# Patient Record
Sex: FEMALE | Race: BLACK OR AFRICAN AMERICAN | Employment: UNEMPLOYED
[De-identification: names, ages, dates, MRNs, and addresses within clinical notes are randomized per-mention and may not be internally consistent; named-entity substitution may affect disease eponyms.]

---

## 2017-01-11 ENCOUNTER — NURSE ONLY (OUTPATIENT)
Dept: OBGYN | Facility: CLINIC | Age: 22
End: 2017-01-11

## 2017-01-11 DIAGNOSIS — Z30.09 FAMILY PLANNING: Primary | ICD-10-CM

## 2017-01-11 PROCEDURE — 99211 OFF/OP EST MAY X REQ PHY/QHP: CPT | Performed by: OBSTETRICS & GYNECOLOGY

## 2017-01-11 RX ORDER — MEDROXYPROGESTERONE ACETATE 150 MG/ML
INJECTION, SUSPENSION INTRAMUSCULAR
Qty: 1 ML | Refills: 2 | Status: CANCELLED | OUTPATIENT
Start: 2017-01-11

## 2017-01-11 RX ORDER — MEDROXYPROGESTERONE ACETATE 150 MG/ML
150 INJECTION, SUSPENSION INTRAMUSCULAR ONCE
Status: COMPLETED | OUTPATIENT
Start: 2017-01-11 | End: 2017-01-11

## 2017-01-11 RX ORDER — MEDROXYPROGESTERONE ACETATE 150 MG/ML
INJECTION, SUSPENSION INTRAMUSCULAR
Qty: 1 ML | Refills: 2 | Status: SHIPPED | OUTPATIENT
Start: 2017-01-11 | End: 2017-06-14 | Stop reason: SDUPTHER

## 2017-01-11 RX ADMIN — MEDROXYPROGESTERONE ACETATE 150 MG: 150 INJECTION, SUSPENSION INTRAMUSCULAR at 11:17

## 2017-01-12 RX ORDER — MEDROXYPROGESTERONE ACETATE 150 MG/ML
INJECTION, SUSPENSION INTRAMUSCULAR
Qty: 1 ML | Refills: 2 | Status: CANCELLED | OUTPATIENT
Start: 2017-01-12

## 2017-03-29 ENCOUNTER — NURSE ONLY (OUTPATIENT)
Dept: OBGYN | Age: 22
End: 2017-03-29
Payer: COMMERCIAL

## 2017-03-29 VITALS — WEIGHT: 240.1 LBS | BODY MASS INDEX: 40.99 KG/M2 | HEIGHT: 64 IN | RESPIRATION RATE: 19 BRPM

## 2017-03-29 DIAGNOSIS — Z30.09 FAMILY PLANNING: Primary | ICD-10-CM

## 2017-03-29 PROCEDURE — 99211 OFF/OP EST MAY X REQ PHY/QHP: CPT | Performed by: OBSTETRICS & GYNECOLOGY

## 2017-03-29 RX ORDER — MEDROXYPROGESTERONE ACETATE 150 MG/ML
150 INJECTION, SUSPENSION INTRAMUSCULAR ONCE
Status: COMPLETED | OUTPATIENT
Start: 2017-03-29 | End: 2017-03-29

## 2017-03-29 RX ADMIN — MEDROXYPROGESTERONE ACETATE 150 MG: 150 INJECTION, SUSPENSION INTRAMUSCULAR at 11:08

## 2017-04-05 ENCOUNTER — HOSPITAL ENCOUNTER (EMERGENCY)
Age: 22
Discharge: HOME | End: 2017-04-05
Attending: EMERGENCY MEDICINE
Payer: COMMERCIAL

## 2017-04-05 VITALS
OXYGEN SATURATION: 100 % | HEIGHT: 64 IN | HEART RATE: 83 BPM | TEMPERATURE: 98.6 F | WEIGHT: 240 LBS | RESPIRATION RATE: 16 BRPM | DIASTOLIC BLOOD PRESSURE: 94 MMHG | SYSTOLIC BLOOD PRESSURE: 138 MMHG | BODY MASS INDEX: 40.97 KG/M2

## 2017-04-05 DIAGNOSIS — K04.7 DENTAL ABSCESS: Primary | ICD-10-CM

## 2017-04-05 PROCEDURE — 6370000000 HC RX 637 (ALT 250 FOR IP): Performed by: EMERGENCY MEDICINE

## 2017-04-05 PROCEDURE — 99282 EMERGENCY DEPT VISIT SF MDM: CPT

## 2017-04-05 RX ORDER — HYDROCODONE BITARTRATE AND ACETAMINOPHEN 5; 325 MG/1; MG/1
1 TABLET ORAL EVERY 6 HOURS PRN
Qty: 10 TABLET | Refills: 0 | Status: SHIPPED | OUTPATIENT
Start: 2017-04-05 | End: 2018-02-05

## 2017-04-05 RX ORDER — HYDROCODONE BITARTRATE AND ACETAMINOPHEN 5; 325 MG/1; MG/1
1 TABLET ORAL ONCE
Status: COMPLETED | OUTPATIENT
Start: 2017-04-05 | End: 2017-04-05

## 2017-04-05 RX ORDER — PENICILLIN V POTASSIUM 500 MG/1
500 TABLET ORAL 4 TIMES DAILY
Qty: 28 TABLET | Refills: 0 | Status: SHIPPED | OUTPATIENT
Start: 2017-04-05 | End: 2017-04-12

## 2017-04-05 RX ADMIN — HYDROCODONE BITARTRATE AND ACETAMINOPHEN 1 TABLET: 5; 325 TABLET ORAL at 19:21

## 2017-04-05 ASSESSMENT — PAIN SCALES - GENERAL
PAINLEVEL_OUTOF10: 10
PAINLEVEL_OUTOF10: 10

## 2017-04-05 ASSESSMENT — PAIN DESCRIPTION - LOCATION: LOCATION: JAW

## 2017-04-05 ASSESSMENT — PAIN DESCRIPTION - PAIN TYPE: TYPE: ACUTE PAIN

## 2017-04-06 ASSESSMENT — ENCOUNTER SYMPTOMS
ABDOMINAL PAIN: 0
SORE THROAT: 0
SINUS PRESSURE: 0
NAUSEA: 0
VOMITING: 0
VOICE CHANGE: 0
FACIAL SWELLING: 1
TROUBLE SWALLOWING: 0
SHORTNESS OF BREATH: 0

## 2017-04-16 ENCOUNTER — HOSPITAL ENCOUNTER (EMERGENCY)
Age: 22
Discharge: HOME | End: 2017-04-16
Attending: EMERGENCY MEDICINE
Payer: COMMERCIAL

## 2017-04-16 VITALS
SYSTOLIC BLOOD PRESSURE: 121 MMHG | HEART RATE: 85 BPM | DIASTOLIC BLOOD PRESSURE: 74 MMHG | TEMPERATURE: 97.9 F | HEIGHT: 67 IN | OXYGEN SATURATION: 99 % | WEIGHT: 200 LBS | RESPIRATION RATE: 12 BRPM | BODY MASS INDEX: 31.39 KG/M2

## 2017-04-16 DIAGNOSIS — J02.9 ACUTE VIRAL PHARYNGITIS: Primary | ICD-10-CM

## 2017-04-16 DIAGNOSIS — J02.9 SORE THROAT: ICD-10-CM

## 2017-04-16 PROCEDURE — 6360000002 HC RX W HCPCS: Performed by: EMERGENCY MEDICINE

## 2017-04-16 PROCEDURE — 99283 EMERGENCY DEPT VISIT LOW MDM: CPT

## 2017-04-16 PROCEDURE — 6370000000 HC RX 637 (ALT 250 FOR IP): Performed by: EMERGENCY MEDICINE

## 2017-04-16 RX ORDER — ACETAMINOPHEN 500 MG
1000 TABLET ORAL ONCE
Status: COMPLETED | OUTPATIENT
Start: 2017-04-16 | End: 2017-04-16

## 2017-04-16 RX ORDER — IBUPROFEN 800 MG/1
800 TABLET ORAL EVERY 8 HOURS PRN
Qty: 30 TABLET | Refills: 0 | Status: SHIPPED | OUTPATIENT
Start: 2017-04-16 | End: 2017-09-21

## 2017-04-16 RX ORDER — DEXAMETHASONE SODIUM PHOSPHATE 10 MG/ML
10 INJECTION INTRAMUSCULAR; INTRAVENOUS ONCE
Status: COMPLETED | OUTPATIENT
Start: 2017-04-16 | End: 2017-04-16

## 2017-04-16 RX ADMIN — ACETAMINOPHEN 1000 MG: 500 TABLET ORAL at 09:31

## 2017-04-16 RX ADMIN — BENZOCAINE AND MENTHOL 1 LOZENGE: 15; 3.6 LOZENGE ORAL at 09:31

## 2017-04-16 RX ADMIN — DEXAMETHASONE SODIUM PHOSPHATE 10 MG: 10 INJECTION INTRAMUSCULAR; INTRAVENOUS at 09:30

## 2017-04-16 ASSESSMENT — ENCOUNTER SYMPTOMS
EYES NEGATIVE: 1
COUGH: 1
SORE THROAT: 1
ALLERGIC/IMMUNOLOGIC NEGATIVE: 1
GASTROINTESTINAL NEGATIVE: 1

## 2017-04-16 ASSESSMENT — PAIN SCALES - GENERAL: PAINLEVEL_OUTOF10: 6

## 2017-06-14 ENCOUNTER — NURSE ONLY (OUTPATIENT)
Dept: OBGYN | Age: 22
End: 2017-06-14
Payer: COMMERCIAL

## 2017-06-14 VITALS — WEIGHT: 261 LBS | BODY MASS INDEX: 40.88 KG/M2

## 2017-06-14 DIAGNOSIS — Z30.09 FAMILY PLANNING: Primary | ICD-10-CM

## 2017-06-14 PROCEDURE — 96372 THER/PROPH/DIAG INJ SC/IM: CPT | Performed by: OBSTETRICS & GYNECOLOGY

## 2017-06-14 RX ORDER — MEDROXYPROGESTERONE ACETATE 150 MG/ML
150 INJECTION, SUSPENSION INTRAMUSCULAR ONCE
Status: COMPLETED | OUTPATIENT
Start: 2017-06-14 | End: 2017-06-14

## 2017-06-14 RX ORDER — MEDROXYPROGESTERONE ACETATE 150 MG/ML
INJECTION, SUSPENSION INTRAMUSCULAR
Qty: 1 ML | Refills: 2 | Status: SHIPPED | OUTPATIENT
Start: 2017-06-14 | End: 2017-08-15 | Stop reason: SDUPTHER

## 2017-06-14 RX ADMIN — MEDROXYPROGESTERONE ACETATE 150 MG: 150 INJECTION, SUSPENSION INTRAMUSCULAR at 09:33

## 2017-08-15 ENCOUNTER — OFFICE VISIT (OUTPATIENT)
Dept: OBGYN | Age: 22
End: 2017-08-15
Payer: COMMERCIAL

## 2017-08-15 ENCOUNTER — HOSPITAL ENCOUNTER (OUTPATIENT)
Age: 22
Setting detail: SPECIMEN
Discharge: HOME | End: 2017-08-15
Payer: COMMERCIAL

## 2017-08-15 VITALS
DIASTOLIC BLOOD PRESSURE: 80 MMHG | HEART RATE: 64 BPM | SYSTOLIC BLOOD PRESSURE: 122 MMHG | WEIGHT: 235 LBS | BODY MASS INDEX: 40.12 KG/M2 | HEIGHT: 64 IN

## 2017-08-15 DIAGNOSIS — Z01.419 WELL WOMAN EXAM: Primary | ICD-10-CM

## 2017-08-15 DIAGNOSIS — Z30.09 FAMILY PLANNING: ICD-10-CM

## 2017-08-15 LAB
DIRECT EXAM: NORMAL
Lab: NORMAL
SPECIMEN DESCRIPTION: NORMAL
STATUS: NORMAL

## 2017-08-15 PROCEDURE — 99395 PREV VISIT EST AGE 18-39: CPT | Performed by: OBSTETRICS & GYNECOLOGY

## 2017-08-15 RX ORDER — MEDROXYPROGESTERONE ACETATE 150 MG/ML
INJECTION, SUSPENSION INTRAMUSCULAR
Qty: 1 ML | Refills: 3 | Status: SHIPPED | OUTPATIENT
Start: 2017-08-15 | End: 2018-02-23 | Stop reason: SDUPTHER

## 2017-08-15 RX ORDER — MEDROXYPROGESTERONE ACETATE 150 MG/ML
INJECTION, SUSPENSION INTRAMUSCULAR
COMMUNITY
Start: 2017-06-13 | End: 2018-09-14

## 2017-08-16 LAB
C TRACH DNA GENITAL QL NAA+PROBE: NEGATIVE
N. GONORRHOEAE DNA: NEGATIVE

## 2017-08-30 ENCOUNTER — NURSE ONLY (OUTPATIENT)
Dept: OBGYN | Age: 22
End: 2017-08-30
Payer: COMMERCIAL

## 2017-08-30 VITALS
DIASTOLIC BLOOD PRESSURE: 74 MMHG | RESPIRATION RATE: 16 BRPM | HEART RATE: 62 BPM | TEMPERATURE: 98.3 F | WEIGHT: 238.4 LBS | BODY MASS INDEX: 40.7 KG/M2 | SYSTOLIC BLOOD PRESSURE: 118 MMHG | HEIGHT: 64 IN

## 2017-08-30 DIAGNOSIS — Z30.09 FAMILY PLANNING: Primary | ICD-10-CM

## 2017-08-30 PROCEDURE — 96372 THER/PROPH/DIAG INJ SC/IM: CPT | Performed by: OBSTETRICS & GYNECOLOGY

## 2017-08-30 RX ORDER — MEDROXYPROGESTERONE ACETATE 150 MG/ML
150 INJECTION, SUSPENSION INTRAMUSCULAR ONCE
Status: COMPLETED | OUTPATIENT
Start: 2017-08-30 | End: 2017-08-30

## 2017-08-30 RX ADMIN — MEDROXYPROGESTERONE ACETATE 150 MG: 150 INJECTION, SUSPENSION INTRAMUSCULAR at 11:50

## 2017-09-21 ENCOUNTER — HOSPITAL ENCOUNTER (EMERGENCY)
Age: 22
Discharge: HOME | End: 2017-09-21
Attending: EMERGENCY MEDICINE
Payer: COMMERCIAL

## 2017-09-21 VITALS
TEMPERATURE: 98.4 F | SYSTOLIC BLOOD PRESSURE: 131 MMHG | RESPIRATION RATE: 16 BRPM | DIASTOLIC BLOOD PRESSURE: 79 MMHG | OXYGEN SATURATION: 98 % | HEART RATE: 64 BPM

## 2017-09-21 DIAGNOSIS — M65.4 DE QUERVAIN'S TENOSYNOVITIS, RIGHT: Primary | ICD-10-CM

## 2017-09-21 PROCEDURE — 6370000000 HC RX 637 (ALT 250 FOR IP): Performed by: PHYSICIAN ASSISTANT

## 2017-09-21 PROCEDURE — 99283 EMERGENCY DEPT VISIT LOW MDM: CPT

## 2017-09-21 RX ORDER — IBUPROFEN 800 MG/1
800 TABLET ORAL EVERY 8 HOURS PRN
Qty: 20 TABLET | Refills: 0 | Status: SHIPPED | OUTPATIENT
Start: 2017-09-21 | End: 2018-01-10 | Stop reason: ALTCHOICE

## 2017-09-21 RX ORDER — IBUPROFEN 800 MG/1
800 TABLET ORAL ONCE
Status: COMPLETED | OUTPATIENT
Start: 2017-09-21 | End: 2017-09-21

## 2017-09-21 RX ADMIN — IBUPROFEN 800 MG: 800 TABLET ORAL at 22:54

## 2017-09-21 ASSESSMENT — PAIN DESCRIPTION - PAIN TYPE: TYPE: ACUTE PAIN

## 2017-09-21 ASSESSMENT — ENCOUNTER SYMPTOMS
COUGH: 0
ABDOMINAL PAIN: 0
NAUSEA: 0
VOMITING: 0
WHEEZING: 0

## 2017-09-21 ASSESSMENT — PAIN SCALES - GENERAL
PAINLEVEL_OUTOF10: 7
PAINLEVEL_OUTOF10: 7

## 2017-09-21 ASSESSMENT — PAIN DESCRIPTION - ORIENTATION: ORIENTATION: RIGHT

## 2017-09-21 ASSESSMENT — PAIN DESCRIPTION - LOCATION: LOCATION: FINGER (COMMENT WHICH ONE)

## 2017-10-12 ENCOUNTER — HOSPITAL ENCOUNTER (EMERGENCY)
Age: 22
Discharge: HOME | End: 2017-10-12
Attending: EMERGENCY MEDICINE
Payer: COMMERCIAL

## 2017-10-12 VITALS
SYSTOLIC BLOOD PRESSURE: 149 MMHG | HEART RATE: 113 BPM | OXYGEN SATURATION: 98 % | DIASTOLIC BLOOD PRESSURE: 78 MMHG | RESPIRATION RATE: 18 BRPM | TEMPERATURE: 98.1 F

## 2017-10-12 DIAGNOSIS — H60.312 ACUTE DIFFUSE OTITIS EXTERNA OF LEFT EAR: Primary | ICD-10-CM

## 2017-10-12 PROCEDURE — 99282 EMERGENCY DEPT VISIT SF MDM: CPT

## 2017-10-12 PROCEDURE — 6370000000 HC RX 637 (ALT 250 FOR IP): Performed by: NURSE PRACTITIONER

## 2017-10-12 RX ADMIN — NEOMYCIN SULFATE, POLYMYXIN B SULFATE, HYDROCORTISONE 4 DROP: 3.5; 10000; 1 SOLUTION/ DROPS AURICULAR (OTIC) at 12:47

## 2017-10-12 ASSESSMENT — PAIN SCALES - GENERAL: PAINLEVEL_OUTOF10: 7

## 2017-10-12 ASSESSMENT — PAIN DESCRIPTION - ORIENTATION: ORIENTATION: LEFT

## 2017-10-12 ASSESSMENT — PAIN DESCRIPTION - LOCATION: LOCATION: EAR

## 2017-10-12 NOTE — ED PROVIDER NOTES
[facility] ED  Emergency Department Encounter  Mid Level Provider     Pt Name: Charisma Song  MRN: [id number]  [place] 1995  Date of evaluation: 10/12/17  PCP:  Candy Cuevas MD    [address] [place] [address]       Chief Complaint   Patient presents with    Otalgia     left ear pain for 3 days. HISTORY OF PRESENT ILLNESS  (Location/Symptom, Timing/Onset, Context/Setting, Quality, Duration, Modifying Factors, Severity.)      Charisma Song is a 20 y.o. female who presents with Left ear pain ×1 week. Patient denies fever or chills. Patient states she had frequent ear infections as a child. PAST MEDICAL / SURGICAL / SOCIAL / FAMILY HISTORY      has a past medical history of Asthma; Chronic airway disease ([address] Dental crowns present; History of  labor; Hydradenitis; Left ankle sprain; Maternal congenital heart disease, antepartum; Obesity; and Wears glasses. has a past surgical history that includes Tonsillectomy and adenoidectomy and other surgical history (Bilateral, 2016). Social History     Social History    Marital status: Single     Spouse name: N/A    Number of children: N/A    Years of education: N/A     Occupational History    Not on file.      Social History Main Topics    Smoking status: Never Smoker    Smokeless tobacco: Never Used    Alcohol use No    Drug use: No    Sexual activity: Not Currently     Partners: Male     Other Topics Concern    Not on file     Social History Narrative    No narrative on file       Family History   Problem Relation Age of Onset    Asthma Brother     Learning Disabilities Brother     Substance Abuse Maternal Grandmother     Diabetes Maternal Grandmother     Cancer Maternal Grandmother     Heart Disease Paternal Uncle     Early Death Paternal Uncle 62     MI    Learning Disabilities Mother     Breast Cancer Neg Hx     Colon Cancer Neg Hx     Eclampsia Neg Hx     Hypertension Neg Hx     Ovarian Cancer Neg DO Bella       REVIEW OF SYSTEMS    (2-9 systems for level 4, 10 or more for level 5)      Review of Systems   Constitutional: Negative for chills and fever. HENT: Positive for ear discharge and ear pain. Left ear       PHYSICAL EXAM   (up to 7 for level 4, 8 or more for level 5)      INITIAL VITALS:  oral temperature is 98.1 °F (36.7 °C). Her blood pressure is 149/78 (abnormal) and her pulse is 113. Her respiration is 18 and oxygen saturation is 98%. Physical Exam   Constitutional: She is oriented to person, place, and time. She appears well-developed and well-nourished. No distress. HENT:   Head: Normocephalic and atraumatic. Left Ear: There is drainage, swelling and tenderness. No foreign bodies. No mastoid tenderness. Neck: Normal range of motion. Neck supple. Cardiovascular: Normal rate. Pulmonary/Chest: Effort normal. No respiratory distress. Neurological: She is alert and oriented to person, place, and time. Skin: Skin is warm and dry. Psychiatric: She has a normal mood and affect. Her behavior is normal. Judgment and thought content normal.   Nursing note and vitals reviewed. DIFFERENTIAL  DIAGNOSIS   Otitis media, otitis externa, foreign body    PLAN (LABS / IMAGING / EKG):  No orders of the defined types were placed in this encounter. MEDICATIONS ORDERED:  Orders Placed This Encounter   Medications    DISCONTD: neomycin-polymyxin-hydrocortisone (CORTISPORIN) otic solution 4 drop       Controlled Substances Monitoring:      DIAGNOSTIC RESULTS / EMERGENCY DEPARTMENT COURSE / [facility]     RADIOLOGY:   I directly visualized (with the attending physician) the following  images and reviewed the radiologist interpretations:  No results found. No orders to display       LABS:  No results found for this visit on 10/12/17. EMERGENCY DEPARTMENT COURSE:  Explained the patient how I would like her to use the otic drops.   Advised her to call her family doctor for a follow-up appointment. Patient understands to return to emergency room persistent symptoms or any new concerns    CONSULTS:  None    PROCEDURES:  None    FINAL IMPRESSION      1.  Acute diffuse otitis externa of left ear          DISPOSITION / PLAN     DISPOSITION Decision To Discharge    PATIENT REFERRED TO:  Gema Baker MD  [facility]  [phone]            DISCHARGE MEDICATIONS:  Discharge Medication List as of 10/12/2017 12:41 PM          Luana Aquino, [address] [facility]   Emergency Medicine Nurse Practitioner    (Please note that portions of this note were completed with a voice recognition program.  Efforts were made to edit the dictations but occasionally words are mis-transcribed.)        Luana Aquino, CNP  10/12/17 1966

## 2017-10-13 ENCOUNTER — HOSPITAL ENCOUNTER (EMERGENCY)
Age: 22
Discharge: HOME | End: 2017-10-13
Attending: EMERGENCY MEDICINE
Payer: COMMERCIAL

## 2017-10-13 VITALS
RESPIRATION RATE: 19 BRPM | BODY MASS INDEX: 40.12 KG/M2 | WEIGHT: 235 LBS | HEIGHT: 64 IN | DIASTOLIC BLOOD PRESSURE: 79 MMHG | TEMPERATURE: 98.1 F | SYSTOLIC BLOOD PRESSURE: 131 MMHG | HEART RATE: 92 BPM | OXYGEN SATURATION: 98 %

## 2017-10-13 DIAGNOSIS — H60.502 ACUTE NONINFECTIVE OTITIS EXTERNA OF LEFT EAR, UNSPECIFIED TYPE: Primary | ICD-10-CM

## 2017-10-13 PROCEDURE — 99282 EMERGENCY DEPT VISIT SF MDM: CPT

## 2017-10-13 PROCEDURE — 6370000000 HC RX 637 (ALT 250 FOR IP): Performed by: EMERGENCY MEDICINE

## 2017-10-13 RX ORDER — OXYCODONE HYDROCHLORIDE 5 MG/1
5 TABLET ORAL ONCE
Status: COMPLETED | OUTPATIENT
Start: 2017-10-13 | End: 2017-10-13

## 2017-10-13 RX ADMIN — OXYCODONE HYDROCHLORIDE 5 MG: 5 TABLET ORAL at 22:13

## 2017-10-13 ASSESSMENT — ENCOUNTER SYMPTOMS
FACIAL SWELLING: 0
SORE THROAT: 0
SHORTNESS OF BREATH: 0
VOMITING: 0
NAUSEA: 0
ABDOMINAL PAIN: 0

## 2017-10-13 ASSESSMENT — PAIN DESCRIPTION - FREQUENCY: FREQUENCY: CONTINUOUS

## 2017-10-13 ASSESSMENT — PAIN SCALES - GENERAL
PAINLEVEL_OUTOF10: 10
PAINLEVEL_OUTOF10: 10

## 2017-10-13 ASSESSMENT — PAIN DESCRIPTION - LOCATION: LOCATION: EAR

## 2017-10-13 ASSESSMENT — PAIN DESCRIPTION - ONSET: ONSET: SUDDEN

## 2017-10-13 ASSESSMENT — PAIN DESCRIPTION - PAIN TYPE: TYPE: ACUTE PAIN

## 2017-10-13 ASSESSMENT — PAIN DESCRIPTION - ORIENTATION: ORIENTATION: LEFT

## 2017-10-13 ASSESSMENT — PAIN DESCRIPTION - DESCRIPTORS: DESCRIPTORS: ACHING

## 2017-10-14 NOTE — ED PROVIDER NOTES
[facility] ED  Emergency Department Encounter  Emergency Medicine Resident     Pt Name: Radha Katz  MRN: [id number]  [place] 1995  Date of evaluation: 10/13/17  PCP:  Sigifredo Ferrell MD    [address] [place] [address]       Chief Complaint   Patient presents with    Otalgia     earache seen here yesterday sent home w/ drops, no relief and worsening       HISTORY OF PRESENT ILLNESS  (Location/Symptom, Timing/Onset, Context/Setting, Quality, Duration, Modifying Factors, Severity.)      Radha Katz is a 20 y.o. female who presents with L ear pain. Was seen here yesterday, carla'jacey, diagnosed with otitis externa. Given corticosporin drops and states has been compliant. Says pain hasn't gone away yet. PAST MEDICAL / SURGICAL / SOCIAL / FAMILY HISTORY      has a past medical history of Asthma; Chronic airway disease ([address] Dental crowns present; History of  labor; Hydradenitis; Left ankle sprain; Maternal congenital heart disease, antepartum; Obesity; and Wears glasses. has a past surgical history that includes Tonsillectomy and adenoidectomy and other surgical history (Bilateral, 2016). Social History     Social History    Marital status: Single     Spouse name: N/A    Number of children: N/A    Years of education: N/A     Occupational History    Not on file.      Social History Main Topics    Smoking status: Never Smoker    Smokeless tobacco: Never Used    Alcohol use No    Drug use: No    Sexual activity: Not Currently     Partners: Male     Other Topics Concern    Not on file     Social History Narrative    No narrative on file       Family History   Problem Relation Age of Onset    Asthma Brother     Learning Disabilities Brother     Substance Abuse Maternal Grandmother     Diabetes Maternal Grandmother     Cancer Maternal Grandmother     Heart Disease Paternal Uncle     Early Death Paternal Uncle 62     MI    Learning Disabilities Mother     Breast Cancer Neg Hx     Colon Cancer Neg Hx     Eclampsia Neg Hx     Hypertension Neg Hx     Ovarian Cancer Neg Hx      Labor Neg Hx     Spont Abortions Neg Hx     Stroke Neg Hx        Allergies:  Lidocaine    Home Medications:  Prior to Admission medications    Medication Sig Start Date End Date Taking? Authorizing Provider   ibuprofen (ADVIL;MOTRIN) 800 MG tablet Take 1 tablet by mouth every 8 hours as needed for Pain 17   Roni Medina PA-C   medroxyPROGESTERone (DEPO-PROVERA) 150 MG/ML injection  17   Historical Provider, MD   medroxyPROGESTERone (DEPO-PROVERA) 150 MG/ML injection Inject 1 mL into the muscle every 3 months 8/15/17   Emra Caban, DO   Benzocaine-Menthol (CEPACOL EXTRA STRENGTH) 15-2.6 MG LOZG lozenge Take 1 lozenge by mouth every 2 hours as needed for Sore Throat 17   Mich Benson, DO   Qrtkhpzlb-EIC-VY-Aspirin (REBEKAH-SELTZER PLUS COLD & COUGH) 7.8-2- MG TBEF Take 2 tablets by mouth 3 times daily as needed (for sore throat) 17   Mich Benson, DO   HYDROcodone-acetaminophen (NORCO) 5-325 MG per tablet Take 1 tablet by mouth every 6 hours as needed for Pain .  17   Gibson Nugent MD   docosanol (ABREVA) 10 % CREA cream Apply to affected area of your lip 16   Vitor Blum MD   fluticasone [facility]) 50 MCG/ACT nasal spray 1 spray by Nasal route daily 16   Saba Choudhary MD   albuterol sulfate HFA (PROAIR HFA) 108 (90 BASE) MCG/ACT inhaler Inhale 2 puffs into the lungs every 6 hours as needed for Wheezing 16   Saba Choudhary MD   gabapentin (NEURONTIN) 100 MG capsule Take 1 capsule by mouth 3 times daily 3/31/16   Avtar Guadalupe MD   HYDROcodone-acetaminophen [facility]) 5-325 MG per tablet 1-2 tabs by mouth every 4-6 hours as needed for pain 16   Yusra Leach, DO   fluticasone (FLOVENT [facility]) 110 MCG/ACT inhaler Inhale 2 puffs into the lungs 2 times daily 12/31/15 12/30/16  Farida Carlson MD orders of the defined types were placed in this encounter. MEDICATIONS ORDERED:  Orders Placed This Encounter   Medications    oxyCODONE (ROXICODONE) immediate release tablet 5 mg         DIAGNOSTIC RESULTS / EMERGENCY DEPARTMENT COURSE / MDM     LABS:  No results found for this visit on 10/13/17. IMPRESSION: 22yoF L otitis externa, corticosporin drops. Awake/alert/appropriately oriented/moderate distress. Speaking full sentences, clear lungs bilat, normal cardiac sounds, abd snt/nondistended. L TM clear; L tragus tender w mvmt and canal with cerumen/dead skin. Plan for pain relief, wick placement, continued use of corticosporin drops. Placed ear wick. Told her to come back three days from now (Monday) for removal.  She verbalized her understanding and agrees with this plan. PROCEDURES:  None    CONSULTS:  None    CRITICAL CARE:  None    FINAL IMPRESSION      1.  Acute noninfective otitis externa of left ear, unspecified type          DISPOSITION / PLAN     DISPOSITION   Discharged      PATIENT REFERRED TO:  Koffi Edwards MD  [place] [address]  [phone]    Schedule an appointment as soon as possible for a visit       [facility] ED  [facility]  [phone]  Go to   As needed      DISCHARGE MEDICATIONS:  New Prescriptions    No medications on file       Tyler Degroot MD  Emergency Medicine Resident    (Please note that portions of this note were completed with a voice recognition program.  Efforts were made to edit the dictations but occasionally words are mis-transcribed.)       Tyler Degroot MD  Resident  10/13/17 5969

## 2017-10-14 NOTE — ED NOTES
Pt walk in to ER c/o left earache w/ decrease in hearing ongoing x3 days and was seen here yesterday for it, discharged w/ ear drops and has had no relief of pain and increased pain. Pt arrives tearful and guarding left ear. Pt in NAD and rr even and unlabored on arrival, resident @ bedside, will continue to monitor.      Srinivasan Pires RN  10/13/17 6031

## 2017-10-14 NOTE — ED NOTES
Writer assisted resident in placing ear sponge to aid in med distribution. Pt discharged in NAD w/ pain relief and rr even and unlabored.      Eros Bowie RN  10/13/17 4781

## 2017-10-16 ENCOUNTER — HOSPITAL ENCOUNTER (EMERGENCY)
Age: 22
Discharge: HOME | End: 2017-10-17
Attending: EMERGENCY MEDICINE
Payer: COMMERCIAL

## 2017-10-16 VITALS
OXYGEN SATURATION: 96 % | SYSTOLIC BLOOD PRESSURE: 148 MMHG | TEMPERATURE: 97.3 F | HEART RATE: 85 BPM | RESPIRATION RATE: 14 BRPM | WEIGHT: 235 LBS | BODY MASS INDEX: 40.34 KG/M2 | DIASTOLIC BLOOD PRESSURE: 90 MMHG

## 2017-10-16 DIAGNOSIS — H60.502 ACUTE OTITIS EXTERNA OF LEFT EAR, UNSPECIFIED TYPE: Primary | ICD-10-CM

## 2017-10-16 PROCEDURE — 6370000000 HC RX 637 (ALT 250 FOR IP): Performed by: EMERGENCY MEDICINE

## 2017-10-16 PROCEDURE — G0381 LEV 2 HOSP TYPE B ED VISIT: HCPCS

## 2017-10-16 RX ORDER — ACETIC ACID 20.65 MG/ML
4 SOLUTION AURICULAR (OTIC) 3 TIMES DAILY
Status: DISCONTINUED | OUTPATIENT
Start: 2017-10-16 | End: 2017-10-17 | Stop reason: HOSPADM

## 2017-10-16 RX ADMIN — ACETIC ACID 4 DROP: 20 SOLUTION AURICULAR (OTIC) at 23:22

## 2017-10-16 ASSESSMENT — ENCOUNTER SYMPTOMS
VOMITING: 0
CHEST TIGHTNESS: 0
EYE DISCHARGE: 0
NAUSEA: 0
ABDOMINAL PAIN: 0
RHINORRHEA: 0
SORE THROAT: 0
DIARRHEA: 0
BLOOD IN STOOL: 0
SHORTNESS OF BREATH: 0
EYE REDNESS: 0
COUGH: 0

## 2017-10-17 NOTE — ED PROVIDER NOTES
[facility]  ED  Emergency Department Encounter  Emergency Medicine Resident     Pt Name: Martínez Fleming  MRN: [id number]  [place] 1995  Date of evaluation: 10/16/17  PCP:  Zoey Coates MD    CHIEF COMPLAINT       Chief Complaint   Patient presents with    Other     ear check (left)       HISTORY OF PRESENT ILLNESS  (Location/Symptom, Timing/Onset, Context/Setting, Quality, Duration, Modifying Factors, Severity.)      Martínez Fleming is a 20 y.o. female who presents with Complaints of continued left ear pain after being evaluated on the  and  for acute otitis externa and receiving antibiotic ear drops. Return for the second visit due to continued pain, during which visit in the ear wick was placed. Patient states that the ear wick came out, she went to [facility] and they gave her oral antibiotics, pain medication and placed a new ear wick. Patient states that her ear pain has improved, denies any new or worsening symptoms. PAST MEDICAL / SURGICAL / SOCIAL / FAMILY HISTORY      has a past medical history of Asthma; Chronic airway disease ([address] Dental crowns present; History of  labor; Hydradenitis; Left ankle sprain; Maternal congenital heart disease, antepartum; Obesity; and Wears glasses. has a past surgical history that includes Tonsillectomy and adenoidectomy and other surgical history (Bilateral, 2016). Social History     Social History    Marital status: Single     Spouse name: N/A    Number of children: N/A    Years of education: N/A     Occupational History    Not on file.      Social History Main Topics    Smoking status: Never Smoker    Smokeless tobacco: Never Used    Alcohol use No    Drug use: No    Sexual activity: Not Currently     Partners: Male     Other Topics Concern    Not on file     Social History Narrative    No narrative on file       Family History   Problem Relation Age of Onset    Asthma Brother     Learning RADIOLOGY:  None    PROCEDURES:  None    CONSULTS:  None    CRITICAL CARE:  None    FINAL IMPRESSION      1.  Acute otitis externa of left ear, unspecified type          DISPOSITION / PLAN     DISPOSITION     PATIENT REFERRED TO:  Terrell Mcdermott MD  [place] [address]  [phone]    Schedule an appointment as soon as possible for a visit       [facility] ED  [address] [address]  [phone]  Go to   If symptoms worsen    Estelita Castrejon MD  [address]  [phone]    Schedule an appointment as soon as possible for a visit         DISCHARGE MEDICATIONS:  New Prescriptions    No medications on file       Alfonso Iraheta DO  Emergency Medicine Resident    (Please note that portions of this note were completed with a voice recognition program.  Efforts were made to edit the dictations but occasionally words are mis-transcribed.)     Alfonso Iraheta DO  10/16/17 6134

## 2017-10-17 NOTE — ED PROVIDER NOTES
[facility]     Emergency Department     Faculty Attestation    I performed a history and physical examination of the patient and discussed management with the resident. I reviewed the residents note and agree with the documented findings including all diagnostic interpretations and plan of care. Any areas of disagreement are noted on the chart. I was personally present for the key portions of any procedures. I have documented in the chart those procedures where I was not present during the key portions. I have reviewed the emergency nurses triage note. I agree with the chief complaint, past medical history, past surgical history, allergies, medications, social and family history as documented unless otherwise noted below. Documentation of the HPI, Physical Exam and Medical Decision Making performed by scribpolo is based on my personal performance of the HPI, PE and MDM. For Physician Assistant/ Nurse Practitioner cases/documentation I have personally evaluated this patient and have completed at least one if not all key elements of the E/M (history, physical exam, and MDM). Additional findings are as noted. Primary Care Physician: Dea Morse MD    History: This is a 20 y.o. female who presents to the Emergency Department with complaint of ear discomfort. Patient has been seen several times in the past week for otitis externa, had a wick placed however this fell out, had it replaced at [facility] on Saturday. Reports that her pain and drainage has been improving however didn't want to be evaluated for the wick that was still in place. No fevers no vomiting    Physical:     weight is 235 lb (106.6 kg). Her oral temperature is 97.3 °F (36.3 °C). Her blood pressure is 148/90 (abnormal) and her pulse is 85.  Her respiration is 14 and oxygen saturation is 96%.    20 y.o. female no acute distress, no tenderness to palpation of the pinnae or tragus, there

## 2017-10-17 NOTE — ED NOTES
Pt advised to lay on side for approx 10 mins after instillation of otic drops w/ good understanding at this time     Lidia Salcido RN  10/16/17 5911

## 2017-11-28 ENCOUNTER — NURSE ONLY (OUTPATIENT)
Dept: OBGYN | Age: 22
End: 2017-11-28
Payer: COMMERCIAL

## 2017-11-28 DIAGNOSIS — Z30.09 FAMILY PLANNING: Primary | ICD-10-CM

## 2017-11-28 PROCEDURE — 96372 THER/PROPH/DIAG INJ SC/IM: CPT | Performed by: OBSTETRICS & GYNECOLOGY

## 2017-11-28 RX ORDER — MEDROXYPROGESTERONE ACETATE 150 MG/ML
150 INJECTION, SUSPENSION INTRAMUSCULAR ONCE
Status: COMPLETED | OUTPATIENT
Start: 2017-11-28 | End: 2017-11-28

## 2017-11-28 RX ADMIN — MEDROXYPROGESTERONE ACETATE 150 MG: 150 INJECTION, SUSPENSION INTRAMUSCULAR at 10:12

## 2017-11-28 NOTE — PROGRESS NOTES
Patient presents to office for Depo Provera injection. Per doctor's orders of Depo Provera 150mg given IM, right arm, patient tolerated well. Patient advised to return in 11-12 weeks for next injection.

## 2017-12-23 ENCOUNTER — HOSPITAL ENCOUNTER (EMERGENCY)
Age: 22
Discharge: HOME | End: 2017-12-23
Attending: EMERGENCY MEDICINE
Payer: COMMERCIAL

## 2017-12-23 VITALS
RESPIRATION RATE: 17 BRPM | WEIGHT: 251 LBS | HEART RATE: 66 BPM | DIASTOLIC BLOOD PRESSURE: 82 MMHG | SYSTOLIC BLOOD PRESSURE: 131 MMHG | TEMPERATURE: 97.3 F | BODY MASS INDEX: 42.85 KG/M2 | OXYGEN SATURATION: 97 % | HEIGHT: 64 IN

## 2017-12-23 DIAGNOSIS — H60.391 INFECTIVE OTITIS EXTERNA OF RIGHT EAR: Primary | ICD-10-CM

## 2017-12-23 LAB
CHP ED QC CHECK: NORMAL
GLUCOSE BLD-MCNC: 97 MG/DL
GLUCOSE BLD-MCNC: 97 MG/DL (ref 65–105)

## 2017-12-23 PROCEDURE — 82947 ASSAY GLUCOSE BLOOD QUANT: CPT

## 2017-12-23 PROCEDURE — 99282 EMERGENCY DEPT VISIT SF MDM: CPT

## 2017-12-23 PROCEDURE — 6370000000 HC RX 637 (ALT 250 FOR IP): Performed by: EMERGENCY MEDICINE

## 2017-12-23 RX ORDER — CIPROFLOXACIN AND DEXAMETHASONE 3; 1 MG/ML; MG/ML
4 SUSPENSION/ DROPS AURICULAR (OTIC) ONCE
Status: COMPLETED | OUTPATIENT
Start: 2017-12-23 | End: 2017-12-23

## 2017-12-23 RX ORDER — CIPROFLOXACIN AND DEXAMETHASONE 3; 1 MG/ML; MG/ML
4 SUSPENSION/ DROPS AURICULAR (OTIC) 2 TIMES DAILY
Qty: 1 BOTTLE | Refills: 0 | Status: SHIPPED | OUTPATIENT
Start: 2017-12-23 | End: 2017-12-30

## 2017-12-23 RX ADMIN — CIPROFLOXACIN AND DEXAMETHASONE 4 DROP: 3; 1 SUSPENSION/ DROPS AURICULAR (OTIC) at 09:35

## 2017-12-23 ASSESSMENT — PAIN SCALES - GENERAL: PAINLEVEL_OUTOF10: 6

## 2017-12-23 NOTE — ED PROVIDER NOTES
[facility] ED  Emergency Department Encounter  Emergency Medicine Resident     Pt Name: Jered Varela  MRN: [id number]  [place] 1995  Date of evaluation: 17  PCP:  Checo Cali MD    [address] [place] [address]       Chief Complaint   Patient presents with   Rita Mustache     for 2 weeks to rt ear        HISTORY OF PRESENT ILLNESS  (Location/Symptom, Timing/Onset, Context/Setting, Quality, Duration, Modifying Factors, Severity.)      Jered Varela is a 20 y.o. female who presents with Right ear pain. Patient states pain started actually 5 days ago. Patient states pain is sharp, worse with cold air blows into her ear. Patient also complains of mild sore throat for the past 5 days. Patient denies any fever, chills, cough, congestion, headache, weakness, numbness. Patient has history of left-sided otitis media. Patient denies history of diabetes. PAST MEDICAL / SURGICAL / SOCIAL / FAMILY HISTORY      has a past medical history of Asthma; Chronic airway disease ([address] Dental crowns present; History of  labor; Hydradenitis; Left ankle sprain; Maternal congenital heart disease, antepartum; Obesity; and Wears glasses. has a past surgical history that includes Tonsillectomy and adenoidectomy and other surgical history (Bilateral, 2016). Social History     Social History    Marital status: Single     Spouse name: N/A    Number of children: N/A    Years of education: N/A     Occupational History    Not on file.      Social History Main Topics    Smoking status: Never Smoker    Smokeless tobacco: Never Used    Alcohol use No    Drug use: No    Sexual activity: Not Currently     Partners: Male     Other Topics Concern    Not on file     Social History Narrative    No narrative on file       Family History   Problem Relation Age of Onset    Asthma Brother     Learning Disabilities Brother     Substance Abuse Maternal Grandmother     Diabetes Maternal Grandmother  Cancer Maternal Grandmother     Heart Disease Paternal Uncle     Early Death Paternal Uncle 62     MI    Learning Disabilities Mother     Breast Cancer Neg Hx     Colon Cancer Neg Hx     Eclampsia Neg Hx     Hypertension Neg Hx     Ovarian Cancer Neg Hx      Labor Neg Hx     Spont Abortions Neg Hx     Stroke Neg Hx        Allergies:  Lidocaine    Home Medications:  Prior to Admission medications    Medication Sig Start Date End Date Taking? Authorizing Provider   ciprofloxacin-dexamethasone (CIPRODEX) 0.3-0.1 % otic suspension Place 4 drops in ear(s) 2 times daily for 7 days 17 Yes Latesha Rick, DO   ibuprofen (ADVIL;MOTRIN) 800 MG tablet Take 1 tablet by mouth every 8 hours as needed for Pain 17  Yes Anca Hopkins PA-C   medroxyPROGESTERone (DEPO-PROVERA) 150 MG/ML injection  17  Yes Historical Provider, MD   Benzocaine-Menthol (CEPACOL EXTRA STRENGTH) 15-2.6 MG LOZG lozenge Take 1 lozenge by mouth every 2 hours as needed for Sore Throat 17  Yes Humphrey Moreno, DO   Fyxonwops-FCV-ZF-Aspirin (REBEKAH-SELTZER PLUS COLD & COUGH) 7.8-2- MG TBEF Take 2 tablets by mouth 3 times daily as needed (for sore throat) 17  Yes Humphrey Moreno, DO   fluticasone (FLONASE) 50 MCG/ACT nasal spray 1 spray by Nasal route daily 16  Yes Charles Diaz MD   albuterol sulfate HFA (PROAIR HFA) 108 (90 BASE) MCG/ACT inhaler Inhale 2 puffs into the lungs every 6 hours as needed for Wheezing 16  Yes Charles Diaz MD   docusate sodium (COLACE) 100 MG capsule Take 1 capsule by mouth 2 times daily as needed for Constipation. 4/10/15  Yes Tawnya Willett, DO   medroxyPROGESTERone (DEPO-PROVERA) 150 MG/ML injection Inject 1 mL into the muscle every 3 months 8/15/17   Justin Ponce, DO   HYDROcodone-acetaminophen (NORCO) 5-325 MG per tablet Take 1 tablet by mouth every 6 hours as needed for Pain .  17   Contreras Zamorano MD   docosanol ([address]) 10

## 2018-01-10 ENCOUNTER — HOSPITAL ENCOUNTER (EMERGENCY)
Age: 23
Discharge: HOME | End: 2018-01-10
Attending: EMERGENCY MEDICINE
Payer: COMMERCIAL

## 2018-01-10 ENCOUNTER — APPOINTMENT (OUTPATIENT)
Dept: CT IMAGING | Age: 23
End: 2018-01-10
Payer: COMMERCIAL

## 2018-01-10 VITALS
DIASTOLIC BLOOD PRESSURE: 87 MMHG | WEIGHT: 257 LBS | BODY MASS INDEX: 43.87 KG/M2 | OXYGEN SATURATION: 96 % | HEART RATE: 70 BPM | RESPIRATION RATE: 16 BRPM | HEIGHT: 64 IN | TEMPERATURE: 99.6 F | SYSTOLIC BLOOD PRESSURE: 133 MMHG

## 2018-01-10 DIAGNOSIS — H65.02 ACUTE SEROUS OTITIS MEDIA OF LEFT EAR, RECURRENCE NOT SPECIFIED: Primary | ICD-10-CM

## 2018-01-10 DIAGNOSIS — R07.89 MUSCULOSKELETAL CHEST PAIN: ICD-10-CM

## 2018-01-10 DIAGNOSIS — R51.9 INTRACTABLE EPISODIC HEADACHE, UNSPECIFIED HEADACHE TYPE: ICD-10-CM

## 2018-01-10 DIAGNOSIS — H92.03 ACUTE OTALGIA, BILATERAL: ICD-10-CM

## 2018-01-10 PROCEDURE — 6370000000 HC RX 637 (ALT 250 FOR IP): Performed by: STUDENT IN AN ORGANIZED HEALTH CARE EDUCATION/TRAINING PROGRAM

## 2018-01-10 PROCEDURE — 99285 EMERGENCY DEPT VISIT HI MDM: CPT

## 2018-01-10 PROCEDURE — 70486 CT MAXILLOFACIAL W/O DYE: CPT

## 2018-01-10 RX ORDER — IBUPROFEN 800 MG/1
800 TABLET ORAL EVERY 8 HOURS PRN
Qty: 30 TABLET | Refills: 0 | Status: SHIPPED | OUTPATIENT
Start: 2018-01-10 | End: 2018-02-05

## 2018-01-10 RX ORDER — AMOXICILLIN 500 MG/1
500 CAPSULE ORAL 2 TIMES DAILY
Qty: 20 CAPSULE | Refills: 0 | Status: SHIPPED | OUTPATIENT
Start: 2018-01-10 | End: 2018-01-20

## 2018-01-10 RX ORDER — IBUPROFEN 800 MG/1
800 TABLET ORAL ONCE
Status: COMPLETED | OUTPATIENT
Start: 2018-01-10 | End: 2018-01-10

## 2018-01-10 RX ORDER — AMOXICILLIN 250 MG/1
500 CAPSULE ORAL ONCE
Status: COMPLETED | OUTPATIENT
Start: 2018-01-10 | End: 2018-01-10

## 2018-01-10 RX ADMIN — AMOXICILLIN 500 MG: 250 CAPSULE ORAL at 14:09

## 2018-01-10 RX ADMIN — IBUPROFEN 800 MG: 800 TABLET, FILM COATED ORAL at 13:32

## 2018-01-10 ASSESSMENT — PAIN SCALES - GENERAL: PAINLEVEL_OUTOF10: 10

## 2018-01-10 ASSESSMENT — ENCOUNTER SYMPTOMS
PHOTOPHOBIA: 1
SORE THROAT: 0
SHORTNESS OF BREATH: 0
VOMITING: 0
BACK PAIN: 1
NAUSEA: 0
COUGH: 0
ABDOMINAL PAIN: 0

## 2018-01-10 NOTE — ED PROVIDER NOTES
time.   HENT:   No mastoid tenderness whatsoever but does have otitis media on the right as well as mild effusion on the left. Exquisitely reproduceable TTP over the bridge of the nose specifically. (This correlates to exactly where the patient's HA is as well)   Neck: Normal range of motion. Neck supple. Absolutely no meningismus whatsoever   Cardiovascular: Regular rhythm and normal heart sounds. Exam reveals no gallop. No murmur heard. Pulmonary/Chest: Breath sounds normal. No respiratory distress. She has no wheezes. She has no rales. Abdominal: Soft. Bowel sounds are normal. She exhibits no distension and no mass (No obvious pulsatile masses palpated). There is no tenderness. There is no rebound and no guarding. Musculoskeletal:   Paraspinal musculature of the thoracic area in the posterior region is incredibly reproducible to palpation and no bony midline tenderness. Neurological: She is alert and oriented to person, place, and time. She displays normal reflexes (DTR's are 2+ and equal bilaterally. Down going Babinski's bilaterally as well. ). No cranial nerve deficit (CN II-XII intact. ) or sensory deficit (Sensation to light touch intact bilaterally. ). She exhibits normal muscle tone (5/5 strength in bilateral proximal and distal flexors and extensors of both upper and lower extremities. ). Coordination (Finger to nose intact with no pronator drift.  ) normal.     Assessment/Plan   Appears to be otitis media some concern giving fever and possibly concern for sinusitis that could be complicated (especially given the exquisitely singular area of HA over the bridge of the nose) therefore obtain CT of the sinuses. Of note, the HA is not the \"worst HA of life\", and is clealry very focal with significant reproducibility to palpation on the nose and no neck pain at all.            Critical Care  None    Note, if the patient's blood pressure was elevated, and they have no history of

## 2018-01-10 NOTE — ED PROVIDER NOTES
[facility] ED  Emergency Department Encounter  Emergency Medicine Resident     Pt Name: Damir Paiz  MRN: [id number]  [place] 1995  Date of evaluation: 1/10/18  PCP:  Mireya Chavira MD    [address] [place] [address]       Chief Complaint   Patient presents with   Sydnie Silver     both    Back Pain     with movement    Chest Pain     with movement       HISTORY OF PRESENT ILLNESS  (Location/Symptom, Timing/Onset, Context/Setting, Quality, Duration, Modifying Factors, Severity.)      Damir Paiz is a 20 y.o. female who presents with Bilateral ear pain. Patient states that she has been dealing with alternating ear pain over last several months. Patient has been treated on multiple visits with drops as well as oral antibiotics. Patient states that she finished her most recent course of eardrops in the right ear, but is not having worsening left ear pain, with new onset headaches. Patient denies prior history of headaches. He states the headache came on suddenly, denies any focal neuro deficits, numbness tingling weakness. Extremities, changes in vision. The patient is complaining of ringing in the left ear which is new. Patient denies recent illness, no congestion, sore throat, fevers, chills, nausea or vomiting. Patient states that she is also having midsternal chest pain which is sharp, only occurs with movement, is tender to touch, as well as midback pain which occurs with movement and palpation. Patient history of asthma, denies cough, shortness of breath. PAST MEDICAL / SURGICAL / SOCIAL / FAMILY HISTORY      has a past medical history of Asthma; Chronic airway disease ([address] Dental crowns present; History of  labor; Hydradenitis; Left ankle sprain; Maternal congenital heart disease, antepartum; Obesity; and Wears glasses. has a past surgical history that includes Tonsillectomy and adenoidectomy and other surgical history (Bilateral, 2016).     Social History DO   HYDROcodone-acetaminophen (NORCO) 5-325 MG per tablet Take 1 tablet by mouth every 6 hours as needed for Pain . 4/5/17   Nirmala Madison MD   docosanol (ABREVA) 10 % CREA cream Apply to affected area of your lip 11/26/16   Alcides Mcgraw MD   fluticasone [facility]) 50 MCG/ACT nasal spray 1 spray by Nasal route daily 8/2/16   Rubén Garsia MD   albuterol sulfate HFA (PROAIR HFA) 108 (90 BASE) MCG/ACT inhaler Inhale 2 puffs into the lungs every 6 hours as needed for Wheezing 8/2/16   Rubén Garsia MD   gabapentin (NEURONTIN) 100 MG capsule Take 1 capsule by mouth 3 times daily 3/31/16   Art Love MD   HYDROcodone-acetaminophen [facility]) 5-325 MG per tablet 1-2 tabs by mouth every 4-6 hours as needed for pain 2/12/16   Shital Deleon DO   fluticasone (FLOVENT HFA) 110 MCG/ACT inhaler Inhale 2 puffs into the lungs 2 times daily 12/31/15 12/30/16  Yann Stevens MD   docusate sodium (COLACE) 100 MG capsule Take 1 capsule by mouth 2 times daily as needed for Constipation. 4/10/15   Dana Sloan DO       REVIEW OF SYSTEMS    (2-9 systems for level 4, 10 or more for level 5)      Review of Systems   Constitutional: Negative for chills and fever. HENT: Positive for ear pain. Negative for congestion, ear discharge and sore throat. Eyes: Positive for photophobia. Negative for visual disturbance. Respiratory: Negative for cough and shortness of breath. Cardiovascular: Positive for chest pain. Chest wall pain. Gastrointestinal: Negative for abdominal pain, nausea and vomiting. Musculoskeletal: Positive for back pain. Skin: Negative for rash. Neurological: Positive for headaches. Psychiatric/Behavioral: Negative for confusion.        PHYSICAL EXAM   (up to 7 for level 4, 8 or more for level 5)      INITIAL VITALS:   /87   Pulse 70   Temp 99.6 °F (37.6 °C) (Oral)   Resp 16   Ht 5' 4\" (1.626 m)   Wt 257 lb (116.6 kg)   SpO2 96%   BMI 44.11 kg/m²     Physical Exam   Constitutional: She is oriented to person, place, and time. She appears well-developed. No distress. HENT:   Right Ear: There is tenderness. No drainage. No mastoid tenderness. Left Ear: There is tenderness. No drainage. No mastoid tenderness. A middle ear effusion is present. Pain with bilateral injection of pain, no mastoid tenderness bilaterally. Mild left-sided middle ear effusion, concern for possible otitis media right TM. Eyes: EOM are normal. Pupils are equal, round, and reactive to light. Neck: Normal range of motion. Neck supple. Cardiovascular: Normal rate and normal heart sounds. Pulmonary/Chest: Effort normal. No respiratory distress. She has no wheezes. Midsternal chest wall pain, reproducible to palpation. Abdominal: Soft. There is no tenderness. There is no rebound and no guarding. Musculoskeletal: Normal range of motion. Neurological: She is alert and oriented to person, place, and time. She has normal strength. No cranial nerve deficit or sensory deficit. GCS eye subscore is 4. GCS verbal subscore is 5. GCS motor subscore is 6. DIFFERENTIAL  DIAGNOSIS     PLAN (LABS / IMAGING / EKG):  Orders Placed This Encounter   Procedures    CT Sinus WO Contrast    Vital signs       MEDICATIONS ORDERED:  Orders Placed This Encounter   Medications    ibuprofen (ADVIL;MOTRIN) tablet 800 mg    amoxicillin (AMOXIL) capsule 500 mg    amoxicillin (AMOXIL) 500 MG capsule     Sig: Take 1 capsule by mouth 2 times daily for 10 days     Dispense:  20 capsule     Refill:  0    ibuprofen (ADVIL;MOTRIN) 800 MG tablet     Sig: Take 1 tablet by mouth every 8 hours as needed for Pain     Dispense:  30 tablet     Refill:  0       DDX: Otitis media, otitis externa, sinusitis, lower concern for mastoiditis    DIAGNOSTIC RESULTS / EMERGENCY DEPARTMENT COURSE / MDM     LABS:  No results found for this visit on 01/10/18.     IMPRESSION: 23-year-old female presenting with recurrent bilateral

## 2018-02-05 ENCOUNTER — OFFICE VISIT (OUTPATIENT)
Dept: INTERNAL MEDICINE | Age: 23
End: 2018-02-05
Payer: COMMERCIAL

## 2018-02-05 VITALS
WEIGHT: 244 LBS | HEART RATE: 60 BPM | DIASTOLIC BLOOD PRESSURE: 70 MMHG | HEIGHT: 64 IN | BODY MASS INDEX: 41.66 KG/M2 | SYSTOLIC BLOOD PRESSURE: 113 MMHG

## 2018-02-05 DIAGNOSIS — J45.20 MILD INTERMITTENT ASTHMA WITHOUT COMPLICATION: Primary | ICD-10-CM

## 2018-02-05 DIAGNOSIS — L85.3 XEROSIS OF SKIN: ICD-10-CM

## 2018-02-05 DIAGNOSIS — Z23 NEED FOR INFLUENZA VACCINATION: ICD-10-CM

## 2018-02-05 DIAGNOSIS — H65.93 BILATERAL NON-SUPPURATIVE OTITIS MEDIA: ICD-10-CM

## 2018-02-05 DIAGNOSIS — K59.00 CONSTIPATION, UNSPECIFIED CONSTIPATION TYPE: ICD-10-CM

## 2018-02-05 DIAGNOSIS — R21 RASH: ICD-10-CM

## 2018-02-05 PROCEDURE — G8417 CALC BMI ABV UP PARAM F/U: HCPCS | Performed by: STUDENT IN AN ORGANIZED HEALTH CARE EDUCATION/TRAINING PROGRAM

## 2018-02-05 PROCEDURE — 99213 OFFICE O/P EST LOW 20 MIN: CPT | Performed by: STUDENT IN AN ORGANIZED HEALTH CARE EDUCATION/TRAINING PROGRAM

## 2018-02-05 PROCEDURE — G8427 DOCREV CUR MEDS BY ELIG CLIN: HCPCS | Performed by: STUDENT IN AN ORGANIZED HEALTH CARE EDUCATION/TRAINING PROGRAM

## 2018-02-05 PROCEDURE — G8484 FLU IMMUNIZE NO ADMIN: HCPCS | Performed by: STUDENT IN AN ORGANIZED HEALTH CARE EDUCATION/TRAINING PROGRAM

## 2018-02-05 PROCEDURE — 90471 IMMUNIZATION ADMIN: CPT | Performed by: INTERNAL MEDICINE

## 2018-02-05 PROCEDURE — 1036F TOBACCO NON-USER: CPT | Performed by: STUDENT IN AN ORGANIZED HEALTH CARE EDUCATION/TRAINING PROGRAM

## 2018-02-05 PROCEDURE — 90688 IIV4 VACCINE SPLT 0.5 ML IM: CPT | Performed by: INTERNAL MEDICINE

## 2018-02-05 RX ORDER — ALBUTEROL SULFATE 90 UG/1
2 AEROSOL, METERED RESPIRATORY (INHALATION) EVERY 6 HOURS PRN
Qty: 1 INHALER | Refills: 5 | Status: SHIPPED | OUTPATIENT
Start: 2018-02-05 | End: 2018-10-30

## 2018-02-05 RX ORDER — FLUTICASONE PROPIONATE 110 UG/1
2 AEROSOL, METERED RESPIRATORY (INHALATION) 2 TIMES DAILY
Qty: 1 INHALER | Refills: 3 | Status: SHIPPED | OUTPATIENT
Start: 2018-02-05 | End: 2018-08-03 | Stop reason: SDUPTHER

## 2018-02-05 RX ORDER — MONTELUKAST SODIUM 10 MG/1
10 TABLET ORAL NIGHTLY
Qty: 30 TABLET | Refills: 5 | Status: SHIPPED | OUTPATIENT
Start: 2018-02-05 | End: 2018-10-30 | Stop reason: SDUPTHER

## 2018-02-05 RX ORDER — FEXOFENADINE HYDROCHLORIDE 60 MG/1
60 TABLET, FILM COATED ORAL DAILY PRN
Qty: 30 TABLET | Refills: 2 | Status: SHIPPED | OUTPATIENT
Start: 2018-02-05 | End: 2019-02-04 | Stop reason: SDUPTHER

## 2018-02-05 RX ORDER — FLUTICASONE PROPIONATE 50 MCG
1 SPRAY, SUSPENSION (ML) NASAL DAILY
Qty: 1 BOTTLE | Refills: 3 | Status: CANCELLED | OUTPATIENT
Start: 2018-02-05

## 2018-02-05 ASSESSMENT — PATIENT HEALTH QUESTIONNAIRE - PHQ9
SUM OF ALL RESPONSES TO PHQ QUESTIONS 1-9: 0
2. FEELING DOWN, DEPRESSED OR HOPELESS: 0
SUM OF ALL RESPONSES TO PHQ9 QUESTIONS 1 & 2: 0
1. LITTLE INTEREST OR PLEASURE IN DOING THINGS: 0

## 2018-02-05 NOTE — PROGRESS NOTES
[facility]    Progress Note    Date of patient's visit: 2/5/2018  YOB: 1995  Patient's Name:  Imelda Turk    Patient Care Team:  Mitra Warner MD as PCP - General (Internal Medicine)  Gilbert Jesus MD as Obstetrician (Perinatology)  Ariana Allison DO as Consulting Physician (General Surgery)    REASON FOR VISIT: Routine outpatient follow     HISTORY OF PRESENT ILLNESS:    History was obtained from the patient. Imelda Turk is a 20 y.o. is here for a  Follow-up visit. Patient has underlying mild intermittent asthma. She says she actually requires 1-2 albuterol as every week. She says she has not been using steroid inhaler. She was recently seen in the emergency room for ear pain. She has been having recurrent ear issues for the last one to one and half years. Currently she is symptom-free as well as ears are concerned. Patient has a new complaint of a dry scaly itchy rash on her face. This started couple of months ago. She also has dry skin. Patient Active Problem List   Diagnosis    Asthma    Hidradenitis axillaris       ALLERGIES      Allergies   Allergen Reactions    Lidocaine Hives         MEDICATIONS:      Current Outpatient Prescriptions on File Prior to Visit   Medication Sig Dispense Refill    albuterol sulfate HFA (PROAIR HFA) 108 (90 BASE) MCG/ACT inhaler Inhale 2 puffs into the lungs every 6 hours as needed for Wheezing 1 Inhaler 3    medroxyPROGESTERone (DEPO-PROVERA) 150 MG/ML injection       medroxyPROGESTERone (DEPO-PROVERA) 150 MG/ML injection Inject 1 mL into the muscle every 3 months 1 mL 3    fluticasone (FLONASE) 50 MCG/ACT nasal spray 1 spray by Nasal route daily 1 Bottle 3    fluticasone (FLOVENT HFA) 110 MCG/ACT inhaler Inhale 2 puffs into the lungs 2 times daily 1 Inhaler 3    docusate sodium (COLACE) 100 MG capsule Take 1 capsule by mouth 2 times daily as needed for Constipation.  60 capsule 2 Current Facility-Administered Medications on File Prior to Visit   Medication Dose Route Frequency Provider Last Rate Last Dose    medroxyPROGESTERone (DEPO-PROVERA) injection 150 mg  150 mg Intramuscular See Admin Instructions Suraj George DO   150 mg at 04/03/14 1044     SOCIAL HISTORY    Reviewed and no change from previous record. Gracia Rodriguez  reports that she has never smoked. She has never used smokeless tobacco.    FAMILY HISTORY:    Reviewed and No change from previous visit    REVIEW OF SYSTEMS:    ENT: ear pain intermittently.   Currently pain-free  Respiratory: no cough, shortness of breath, or wheezing  Cardiovascular: no chest pain or dyspnea on exertion  Gastrointestinal: no abdominal pain, black or bloody stools  Neurological: no TIA or stroke symptoms  Endocrine: negative  Genito-Urinary: no dysuria, trouble voiding, or hematuria  Musculoskeletal: negative  Dermatological: Rash on face    PHYSICAL EXAM:      Vitals:    02/05/18 1248   BP: 113/70   Pulse: 60     General appearance - alert, well appearing, and in no distress  Lymphatics - no palpable lymphadenopathy, no hepatosplenomegaly  Chest - clear to auscultation, no wheezes, rales or rhonchi, symmetric air entry  Heart - normal rate, regular rhythm, normal S1, S2, no murmurs, rubs, clicks or gallops  Neurological - alert, oriented, normal speech, no focal findings or movement disorder noted  Musculoskeletal - no joint tenderness, deformity or swelling  Skin -DRY SCALY RASH ON FACE    LABORATORY FINDINGS:    CBC:  Lab Results   Component Value Date    WBC 6.4 02/12/2016    HGB 12.9 02/12/2016     02/12/2016     BMP:    Lab Results   Component Value Date     02/12/2016    K 4.0 02/12/2016     02/12/2016    CO2 23 02/12/2016    BUN 8 02/12/2016    CREATININE 0.53 02/12/2016    GLUCOSE 97 12/23/2017     HEMOGLOBIN A1C: No results found for: LABA1C  MICROALBUMIN URINE: No results found for: MICROALBUR  FASTING LIPID PANEL:No

## 2018-02-15 ENCOUNTER — NURSE ONLY (OUTPATIENT)
Dept: OBGYN | Age: 23
End: 2018-02-15
Payer: COMMERCIAL

## 2018-02-15 DIAGNOSIS — Z30.09 FAMILY PLANNING: Primary | ICD-10-CM

## 2018-02-15 PROCEDURE — 99211 OFF/OP EST MAY X REQ PHY/QHP: CPT | Performed by: OBSTETRICS & GYNECOLOGY

## 2018-02-15 RX ORDER — MEDROXYPROGESTERONE ACETATE 150 MG/ML
150 INJECTION, SUSPENSION INTRAMUSCULAR ONCE
Status: COMPLETED | OUTPATIENT
Start: 2018-02-15 | End: 2018-02-15

## 2018-02-15 RX ADMIN — MEDROXYPROGESTERONE ACETATE 150 MG: 150 INJECTION, SUSPENSION INTRAMUSCULAR at 11:40

## 2018-02-23 ENCOUNTER — HOSPITAL ENCOUNTER (OUTPATIENT)
Age: 23
Setting detail: SPECIMEN
Discharge: HOME | End: 2018-02-23
Payer: COMMERCIAL

## 2018-02-23 ENCOUNTER — OFFICE VISIT (OUTPATIENT)
Dept: INTERNAL MEDICINE | Age: 23
End: 2018-02-23
Payer: COMMERCIAL

## 2018-02-23 VITALS
HEIGHT: 64 IN | HEART RATE: 81 BPM | BODY MASS INDEX: 41.48 KG/M2 | SYSTOLIC BLOOD PRESSURE: 125 MMHG | WEIGHT: 243 LBS | DIASTOLIC BLOOD PRESSURE: 73 MMHG

## 2018-02-23 DIAGNOSIS — H65.93 BILATERAL NON-SUPPURATIVE OTITIS MEDIA: ICD-10-CM

## 2018-02-23 DIAGNOSIS — H10.9 BACTERIAL CONJUNCTIVITIS OF LEFT EYE: Primary | ICD-10-CM

## 2018-02-23 DIAGNOSIS — L85.3 XEROSIS OF SKIN: ICD-10-CM

## 2018-02-23 PROBLEM — B30.9 ACUTE VIRAL CONJUNCTIVITIS OF LEFT EYE: Status: ACTIVE | Noted: 2018-02-23

## 2018-02-23 PROBLEM — Z88.9 ATOPY: Status: ACTIVE | Noted: 2018-02-23

## 2018-02-23 LAB
ANION GAP SERPL CALCULATED.3IONS-SCNC: 15 MMOL/L (ref 9–17)
BUN BLDV-MCNC: 8 MG/DL (ref 6–20)
BUN/CREAT BLD: NORMAL (ref 9–20)
CALCIUM SERPL-MCNC: 9.1 MG/DL (ref 8.6–10.4)
CHLORIDE BLD-SCNC: 103 MMOL/L (ref 98–107)
CO2: 23 MMOL/L (ref 20–31)
CREAT SERPL-MCNC: 0.56 MG/DL (ref 0.5–0.9)
GFR AFRICAN AMERICAN: >60 ML/MIN
GFR NON-AFRICAN AMERICAN: >60 ML/MIN
GFR SERPL CREATININE-BSD FRML MDRD: NORMAL ML/MIN/{1.73_M2}
GFR SERPL CREATININE-BSD FRML MDRD: NORMAL ML/MIN/{1.73_M2}
GLUCOSE BLD-MCNC: 80 MG/DL (ref 70–99)
POTASSIUM SERPL-SCNC: 4.1 MMOL/L (ref 3.7–5.3)
SODIUM BLD-SCNC: 141 MMOL/L (ref 135–144)

## 2018-02-23 PROCEDURE — 80048 BASIC METABOLIC PNL TOTAL CA: CPT

## 2018-02-23 PROCEDURE — G8427 DOCREV CUR MEDS BY ELIG CLIN: HCPCS | Performed by: STUDENT IN AN ORGANIZED HEALTH CARE EDUCATION/TRAINING PROGRAM

## 2018-02-23 PROCEDURE — G8484 FLU IMMUNIZE NO ADMIN: HCPCS | Performed by: STUDENT IN AN ORGANIZED HEALTH CARE EDUCATION/TRAINING PROGRAM

## 2018-02-23 PROCEDURE — 1036F TOBACCO NON-USER: CPT | Performed by: STUDENT IN AN ORGANIZED HEALTH CARE EDUCATION/TRAINING PROGRAM

## 2018-02-23 PROCEDURE — 99213 OFFICE O/P EST LOW 20 MIN: CPT | Performed by: STUDENT IN AN ORGANIZED HEALTH CARE EDUCATION/TRAINING PROGRAM

## 2018-02-23 PROCEDURE — 36415 COLL VENOUS BLD VENIPUNCTURE: CPT

## 2018-02-23 PROCEDURE — G8417 CALC BMI ABV UP PARAM F/U: HCPCS | Performed by: STUDENT IN AN ORGANIZED HEALTH CARE EDUCATION/TRAINING PROGRAM

## 2018-02-23 RX ORDER — MOXIFLOXACIN 5 MG/ML
1 SOLUTION/ DROPS OPHTHALMIC 3 TIMES DAILY
Qty: 1 BOTTLE | Refills: 0 | Status: SHIPPED | OUTPATIENT
Start: 2018-02-23 | End: 2018-02-28

## 2018-02-23 RX ORDER — TETRAHYDROZOLINE HCL 0.05 %
1 DROPS OPHTHALMIC (EYE) 3 TIMES DAILY
Qty: 10 ML | Refills: 0 | Status: SHIPPED | OUTPATIENT
Start: 2018-02-23 | End: 2018-09-14 | Stop reason: ALTCHOICE

## 2018-02-23 NOTE — PROGRESS NOTES
Attending Physician Statement (Earle Allen)  Internal Medicine Clinic Progress Note    I have discussed the care of [address], including pertinent history and exam findings,  with the resident. I have reviewed the key elements of all parts of the encounter with the resident. I agree with the assessment, plan and orders as documented by the resident.      Sally Moon MD, DANIELA  Attending, Internal Medicine  [address] [place] [address]  2/23/2018, 1:30 PM
results found for: LABURIN  ASSESSMENT AND PLAN:    1. Acute viral conjunctivitis of left eye     - moxifloxacin (VIGAMOX) 0.5 % ophthalmic solution; Place 1 drop into the left eye 3 times daily for 5 days  Dispense: 1 Bottle; Refill: 0  - tetrahydrozoline (EYE DROPS) 0.05 % ophthalmic solution; Place 1 drop into the left eye 3 times daily  Dispense: 10 mL; Refill: 4      FOLLOW UP:       1. Clari received counseling on the following healthy behaviors: medication adherence  2. Patient given educational materials - see patient instructions  3. Discussed use, benefit, and side effects of prescribed medications. Barriers to medication compliance addressed. All patient questions answered. Pt voiced understanding. 4.  Reviewed prior labs and health maintenance  5. Continue current medications, diet and exercise.          Calixto Santoro MD, PGY-1 Internal Medicine Resident  [address] [facility]  2/23/2018  1:18 PM

## 2018-02-28 ENCOUNTER — HOSPITAL ENCOUNTER (EMERGENCY)
Age: 23
Discharge: HOME | End: 2018-02-28
Attending: EMERGENCY MEDICINE
Payer: COMMERCIAL

## 2018-02-28 VITALS
HEART RATE: 87 BPM | TEMPERATURE: 97.5 F | SYSTOLIC BLOOD PRESSURE: 136 MMHG | BODY MASS INDEX: 41.48 KG/M2 | WEIGHT: 243 LBS | DIASTOLIC BLOOD PRESSURE: 88 MMHG | HEIGHT: 64 IN | OXYGEN SATURATION: 99 %

## 2018-02-28 DIAGNOSIS — J02.9 ACUTE PHARYNGITIS, UNSPECIFIED ETIOLOGY: Primary | ICD-10-CM

## 2018-02-28 LAB
DIRECT EXAM: NORMAL
Lab: NORMAL
SPECIMEN DESCRIPTION: NORMAL
STATUS: NORMAL

## 2018-02-28 PROCEDURE — 87651 STREP A DNA AMP PROBE: CPT

## 2018-02-28 PROCEDURE — 99283 EMERGENCY DEPT VISIT LOW MDM: CPT

## 2018-02-28 PROCEDURE — 6360000002 HC RX W HCPCS: Performed by: EMERGENCY MEDICINE

## 2018-02-28 RX ORDER — DEXAMETHASONE SODIUM PHOSPHATE 10 MG/ML
10 INJECTION INTRAMUSCULAR; INTRAVENOUS ONCE
Status: COMPLETED | OUTPATIENT
Start: 2018-02-28 | End: 2018-02-28

## 2018-02-28 RX ORDER — IBUPROFEN 800 MG/1
800 TABLET ORAL EVERY 8 HOURS PRN
Qty: 30 TABLET | Refills: 0 | Status: SHIPPED | OUTPATIENT
Start: 2018-02-28 | End: 2018-09-14 | Stop reason: ALTCHOICE

## 2018-02-28 RX ADMIN — DEXAMETHASONE SODIUM PHOSPHATE 10 MG: 10 INJECTION INTRAMUSCULAR; INTRAVENOUS at 23:13

## 2018-02-28 ASSESSMENT — ENCOUNTER SYMPTOMS
VOICE CHANGE: 0
TROUBLE SWALLOWING: 0
NAUSEA: 0
VOMITING: 0
SORE THROAT: 1
COUGH: 0

## 2018-02-28 ASSESSMENT — PAIN SCALES - GENERAL: PAINLEVEL_OUTOF10: 10

## 2018-02-28 ASSESSMENT — PAIN DESCRIPTION - PAIN TYPE: TYPE: ACUTE PAIN

## 2018-02-28 ASSESSMENT — PAIN DESCRIPTION - LOCATION: LOCATION: THROAT

## 2018-03-01 LAB
DIRECT EXAM: NORMAL
DIRECT EXAM: NORMAL
Lab: NORMAL
SPECIMEN DESCRIPTION: NORMAL
STATUS: NORMAL

## 2018-03-01 NOTE — ED PROVIDER NOTES
[facility]     Emergency Department     Faculty Attestation    I performed a history and physical examination of the patient and discussed management with the resident. I reviewed the residents note and agree with the documented findings and plan of care. Any areas of disagreement are noted on the chart. I was personally present for the key portions of any procedures. I have documented in the chart those procedures where I was not present during the key portions. I have reviewed the emergency nurses triage note. I agree with the chief complaint, past medical history, past surgical history, allergies, medications, social and family history as documented unless otherwise noted below. Documentation of the HPI, Physical Exam and Medical Decision Making performed by medical students or scribes is based on my personal performance of the HPI, PE and MDM. For Midlevel providers: I have personally seen and evaluated the patient. I find the patient's history and physical exam are consistent with the NP/PA documentation. I agree with the care provided, treatment rendered, disposition and follow-up plan      Pharyngitis. Here with daughter.       Critical Care          MD Richi Bland MD  02/28/18 4284

## 2018-03-01 NOTE — ED PROVIDER NOTES
Review of Systems   Constitutional: Negative for chills and fever. HENT: Positive for sore throat. Negative for congestion, postnasal drip, trouble swallowing and voice change. Eyes: Negative for visual disturbance. Respiratory: Negative for cough. Gastrointestinal: Negative for nausea and vomiting. Musculoskeletal: Negative for neck pain and neck stiffness. Allergic/Immunologic: Negative for immunocompromised state. PHYSICAL EXAM   (up to 7 for level 4, 8 or more for level 5)      INITIAL VITALS:   /88   Pulse 87   Temp 97.5 °F (36.4 °C) (Oral)   Ht 5' 4\" (1.626 m)   Wt 243 lb (110.2 kg)   SpO2 99%   BMI 41.71 kg/m²     Physical Exam   Constitutional: She is oriented to person, place, and time. She appears well-developed and well-nourished. No distress. HENT:   Head: Normocephalic and atraumatic. Right Ear: External ear normal.   Left Ear: External ear normal.   Nose: Nose normal.   Mouth/Throat: Oropharynx is clear and moist. No oropharyngeal exudate. Eyes: Conjunctivae and EOM are normal. Right eye exhibits no discharge. Left eye exhibits no discharge. No scleral icterus. Neck: Normal range of motion. No JVD present. No tracheal deviation present. Cardiovascular: Normal rate. Pulmonary/Chest: Effort normal. No respiratory distress. Musculoskeletal: Normal range of motion. She exhibits no edema or tenderness. Lymphadenopathy:     She has cervical adenopathy. Neurological: She is alert and oriented to person, place, and time. She exhibits normal muscle tone. Coordination normal.   Skin: Skin is warm and dry. She is not diaphoretic. Psychiatric: She has a normal mood and affect.  Her behavior is normal.       DIFFERENTIAL  DIAGNOSIS     PLAN (LABS / IMAGING / EKG):  Orders Placed This Encounter   Procedures    STREP SCREEN GROUP A THROAT    Strep A DNA probe, amplification       MEDICATIONS ORDERED:  Orders Placed This Encounter   Medications    dexamethasone (DECADRON) injection 10 mg    ibuprofen (ADVIL;MOTRIN) 800 MG tablet     Sig: Take 1 tablet by mouth every 8 hours as needed for Pain     Dispense:  30 tablet     Refill:  0         DIAGNOSTIC RESULTS / EMERGENCY DEPARTMENT COURSE / MDM     LABS:  Results for orders placed or performed during the hospital encounter of 02/28/18   STREP SCREEN GROUP A THROAT   Result Value Ref Range    Specimen Description . THROAT     Special Requests NOT REPORTED     Direct Exam       Rapid Strep A negative. A negative Rapid Group A Strep Screen result does not    Direct Exam        rule out the possibility of Group A Streptococci in the specimen. A Group A    Direct Exam  strep DNA test will be performed. Direct Exam       [facility] [address] [facility], [address] [place] [address] [phone]    Status FINAL 02/28/2018        IMPRESSION: 58-year-old female pharyngitis. Awake alert and appropriate  Acute distress sitting upright in bed speaking in full sentences. Full range of motion in the neck. No oropharynx asymmetry. Mild anterior cervical adenopathy. Lungs clear to auscultation bilaterally. Plan for rapid strep test, oral Decadron. Strep neg. D/c with motrin. Spoke with pt regarding results, understands it will take 24-48hrs full effectiveness of steroid medication. PROCEDURES:  None    CONSULTS:  None    CRITICAL CARE:  None    FINAL IMPRESSION      1.  Acute pharyngitis, unspecified etiology          DISPOSITION / PLAN     DISPOSITION    Discharged      PATIENT REFERRED TO:  Taylor Birmingahm MD  [place] [address]  [phone]    Schedule an appointment as soon as possible for a visit       [facility] ED  [address] [place] [address]  [phone]  Go to   As needed      DISCHARGE MEDICATIONS:  New Prescriptions    IBUPROFEN (ADVIL;MOTRIN) 800 MG TABLET    Take 1 tablet by mouth every 8 hours as needed for Pain       Lala Fernandez MD  Emergency Medicine

## 2018-05-07 ENCOUNTER — NURSE ONLY (OUTPATIENT)
Dept: OBGYN | Age: 23
End: 2018-05-07
Payer: COMMERCIAL

## 2018-05-07 VITALS — WEIGHT: 243 LBS | BODY MASS INDEX: 41.48 KG/M2 | RESPIRATION RATE: 22 BRPM | HEIGHT: 64 IN

## 2018-05-07 DIAGNOSIS — Z30.09 FAMILY PLANNING: Primary | ICD-10-CM

## 2018-05-07 PROCEDURE — 96372 THER/PROPH/DIAG INJ SC/IM: CPT | Performed by: OBSTETRICS & GYNECOLOGY

## 2018-05-07 PROCEDURE — 99211 OFF/OP EST MAY X REQ PHY/QHP: CPT | Performed by: OBSTETRICS & GYNECOLOGY

## 2018-05-07 RX ORDER — MEDROXYPROGESTERONE ACETATE 150 MG/ML
150 INJECTION, SUSPENSION INTRAMUSCULAR ONCE
Status: COMPLETED | OUTPATIENT
Start: 2018-05-07 | End: 2018-05-07

## 2018-05-07 RX ADMIN — MEDROXYPROGESTERONE ACETATE 150 MG: 150 INJECTION, SUSPENSION INTRAMUSCULAR at 11:09

## 2018-06-01 ENCOUNTER — HOSPITAL ENCOUNTER (EMERGENCY)
Age: 23
Discharge: HOME | End: 2018-06-01
Attending: EMERGENCY MEDICINE
Payer: COMMERCIAL

## 2018-06-01 VITALS
SYSTOLIC BLOOD PRESSURE: 127 MMHG | RESPIRATION RATE: 16 BRPM | WEIGHT: 250 LBS | OXYGEN SATURATION: 98 % | HEART RATE: 84 BPM | HEIGHT: 64 IN | DIASTOLIC BLOOD PRESSURE: 89 MMHG | TEMPERATURE: 97.2 F | BODY MASS INDEX: 42.68 KG/M2

## 2018-06-01 DIAGNOSIS — H65.02 ACUTE SEROUS OTITIS MEDIA OF LEFT EAR, RECURRENCE NOT SPECIFIED: Primary | ICD-10-CM

## 2018-06-01 PROCEDURE — 6370000000 HC RX 637 (ALT 250 FOR IP): Performed by: EMERGENCY MEDICINE

## 2018-06-01 PROCEDURE — 99282 EMERGENCY DEPT VISIT SF MDM: CPT

## 2018-06-01 RX ORDER — NEOMYCIN SULFATE, POLYMYXIN B SULFATE AND HYDROCORTISONE 10; 3.5; 1 MG/ML; MG/ML; [USP'U]/ML
3 SUSPENSION/ DROPS AURICULAR (OTIC) EVERY 6 HOURS SCHEDULED
Status: DISCONTINUED | OUTPATIENT
Start: 2018-06-02 | End: 2018-06-02 | Stop reason: HOSPADM

## 2018-06-01 RX ORDER — PSEUDOEPHEDRINE HYDROCHLORIDE 30 MG/1
30 TABLET ORAL EVERY 4 HOURS PRN
Qty: 30 TABLET | Refills: 1 | Status: SHIPPED | OUTPATIENT
Start: 2018-06-01 | End: 2018-06-08

## 2018-06-01 RX ORDER — CETIRIZINE HYDROCHLORIDE 10 MG/1
10 TABLET ORAL ONCE
Status: COMPLETED | OUTPATIENT
Start: 2018-06-01 | End: 2018-06-01

## 2018-06-01 RX ORDER — AMOXICILLIN 250 MG/1
500 CAPSULE ORAL ONCE
Status: COMPLETED | OUTPATIENT
Start: 2018-06-01 | End: 2018-06-01

## 2018-06-01 RX ORDER — NEOMYCIN SULFATE, POLYMYXIN B SULFATE, HYDROCORTISONE 3.5; 10000; 1 MG/ML; [USP'U]/ML; MG/ML
2 SOLUTION/ DROPS AURICULAR (OTIC) EVERY 8 HOURS SCHEDULED
Qty: 1 BOTTLE | Refills: 0 | Status: SHIPPED | OUTPATIENT
Start: 2018-06-01 | End: 2018-06-08

## 2018-06-01 RX ORDER — CETIRIZINE HYDROCHLORIDE 10 MG/1
10 TABLET ORAL DAILY
Qty: 30 TABLET | Refills: 0 | Status: SHIPPED | OUTPATIENT
Start: 2018-06-01 | End: 2019-11-08

## 2018-06-01 RX ORDER — AMOXICILLIN 500 MG/1
500 CAPSULE ORAL 3 TIMES DAILY
Qty: 30 CAPSULE | Refills: 0 | Status: SHIPPED | OUTPATIENT
Start: 2018-06-01 | End: 2018-06-11

## 2018-06-01 RX ADMIN — CETIRIZINE HYDROCHLORIDE 10 MG: 10 TABLET ORAL at 23:12

## 2018-06-01 RX ADMIN — NEOMYCIN SULFATE, POLYMYXIN B SULFATE AND HYDROCORTISONE 3 DROP: 3.5; 10000; 1 SUSPENSION AURICULAR (OTIC) at 23:13

## 2018-06-01 RX ADMIN — AMOXICILLIN 500 MG: 250 CAPSULE ORAL at 23:12

## 2018-06-01 ASSESSMENT — ENCOUNTER SYMPTOMS
SORE THROAT: 0
EYE DISCHARGE: 0
SHORTNESS OF BREATH: 0
SINUS PRESSURE: 0
CHEST TIGHTNESS: 0
ABDOMINAL PAIN: 0

## 2018-06-01 ASSESSMENT — PAIN DESCRIPTION - PAIN TYPE: TYPE: ACUTE PAIN

## 2018-06-01 ASSESSMENT — PAIN DESCRIPTION - ORIENTATION: ORIENTATION: RIGHT;LEFT

## 2018-06-01 ASSESSMENT — PAIN DESCRIPTION - LOCATION: LOCATION: EAR

## 2018-06-01 ASSESSMENT — PAIN SCALES - GENERAL: PAINLEVEL_OUTOF10: 9

## 2018-07-25 ENCOUNTER — TELEPHONE (OUTPATIENT)
Dept: INTERNAL MEDICINE | Age: 23
End: 2018-07-25

## 2018-07-30 ENCOUNTER — NURSE ONLY (OUTPATIENT)
Dept: OBGYN | Age: 23
End: 2018-07-30
Payer: COMMERCIAL

## 2018-07-30 VITALS
SYSTOLIC BLOOD PRESSURE: 132 MMHG | HEART RATE: 60 BPM | DIASTOLIC BLOOD PRESSURE: 89 MMHG | BODY MASS INDEX: 42.91 KG/M2 | WEIGHT: 250 LBS

## 2018-07-30 DIAGNOSIS — Z30.09 FAMILY PLANNING: Primary | ICD-10-CM

## 2018-07-30 PROCEDURE — 96372 THER/PROPH/DIAG INJ SC/IM: CPT

## 2018-07-30 PROCEDURE — 99211 OFF/OP EST MAY X REQ PHY/QHP: CPT

## 2018-07-30 RX ORDER — MEDROXYPROGESTERONE ACETATE 150 MG/ML
150 INJECTION, SUSPENSION INTRAMUSCULAR ONCE
Status: COMPLETED | OUTPATIENT
Start: 2018-07-30 | End: 2018-07-30

## 2018-07-30 RX ORDER — MEDROXYPROGESTERONE ACETATE 150 MG/ML
150 INJECTION, SUSPENSION INTRAMUSCULAR
Qty: 1 ML | Refills: 3 | Status: CANCELLED | OUTPATIENT
Start: 2018-07-30

## 2018-07-30 RX ORDER — MEDROXYPROGESTERONE ACETATE 150 MG/ML
150 INJECTION, SUSPENSION INTRAMUSCULAR
Qty: 1 ML | Refills: 3 | Status: SHIPPED | OUTPATIENT
Start: 2018-07-30 | End: 2018-09-14

## 2018-07-30 RX ADMIN — MEDROXYPROGESTERONE ACETATE 150 MG: 150 INJECTION, SUSPENSION INTRAMUSCULAR at 10:21

## 2018-07-30 NOTE — TELEPHONE ENCOUNTER
Indication (medical or family planning): Family planning  Last Depo given: 5/7/18  Last Annual: 8/2017  Next Annual: 8/2018  Depo refills: none  Appointment History Compliance: yes  Any significant provider instructions/notes: n/a

## 2018-08-03 ENCOUNTER — OFFICE VISIT (OUTPATIENT)
Dept: INTERNAL MEDICINE | Age: 23
End: 2018-08-03
Payer: COMMERCIAL

## 2018-08-03 VITALS
SYSTOLIC BLOOD PRESSURE: 130 MMHG | BODY MASS INDEX: 42.61 KG/M2 | HEART RATE: 67 BPM | HEIGHT: 64 IN | DIASTOLIC BLOOD PRESSURE: 70 MMHG | WEIGHT: 249.6 LBS

## 2018-08-03 DIAGNOSIS — H60.63 CHRONIC OTITIS EXTERNA OF BOTH EARS, UNSPECIFIED TYPE: ICD-10-CM

## 2018-08-03 DIAGNOSIS — J45.20 MILD INTERMITTENT ASTHMA WITHOUT COMPLICATION: ICD-10-CM

## 2018-08-03 DIAGNOSIS — G47.33 OSA (OBSTRUCTIVE SLEEP APNEA): Primary | ICD-10-CM

## 2018-08-03 PROCEDURE — 99212 OFFICE O/P EST SF 10 MIN: CPT | Performed by: INTERNAL MEDICINE

## 2018-08-03 PROCEDURE — 1036F TOBACCO NON-USER: CPT | Performed by: STUDENT IN AN ORGANIZED HEALTH CARE EDUCATION/TRAINING PROGRAM

## 2018-08-03 PROCEDURE — G8427 DOCREV CUR MEDS BY ELIG CLIN: HCPCS | Performed by: STUDENT IN AN ORGANIZED HEALTH CARE EDUCATION/TRAINING PROGRAM

## 2018-08-03 PROCEDURE — G8417 CALC BMI ABV UP PARAM F/U: HCPCS | Performed by: STUDENT IN AN ORGANIZED HEALTH CARE EDUCATION/TRAINING PROGRAM

## 2018-08-03 PROCEDURE — 99213 OFFICE O/P EST LOW 20 MIN: CPT | Performed by: STUDENT IN AN ORGANIZED HEALTH CARE EDUCATION/TRAINING PROGRAM

## 2018-08-03 RX ORDER — FLUTICASONE PROPIONATE 110 UG/1
2 AEROSOL, METERED RESPIRATORY (INHALATION) 2 TIMES DAILY
Qty: 1 INHALER | Refills: 3 | Status: SHIPPED | OUTPATIENT
Start: 2018-08-03 | End: 2020-04-09

## 2018-08-03 RX ORDER — ALBUTEROL SULFATE 90 UG/1
2 AEROSOL, METERED RESPIRATORY (INHALATION) EVERY 6 HOURS PRN
Qty: 1 INHALER | Refills: 5 | Status: CANCELLED | OUTPATIENT
Start: 2018-08-03

## 2018-08-03 RX ORDER — HYDROCORTISONE AND ACETIC ACID 20.75; 10.375 MG/ML; MG/ML
3 SOLUTION AURICULAR (OTIC) 2 TIMES DAILY
Qty: 1 BOTTLE | Refills: 5 | Status: SHIPPED | OUTPATIENT
Start: 2018-08-03 | End: 2018-08-13

## 2018-08-03 ASSESSMENT — ENCOUNTER SYMPTOMS
SHORTNESS OF BREATH: 0
BACK PAIN: 0
SPUTUM PRODUCTION: 0
ABDOMINAL PAIN: 0
VOMITING: 0
HEARTBURN: 0
PHOTOPHOBIA: 0
COUGH: 0
NAUSEA: 0
EYE PAIN: 0
HEMOPTYSIS: 0
WHEEZING: 0
SINUS PAIN: 0
STRIDOR: 0
BLURRED VISION: 0
DOUBLE VISION: 0
ORTHOPNEA: 0

## 2018-08-03 NOTE — PROGRESS NOTES
[facility]  [facility] [address] [facility]  [address] [facility] [address]  Dept: [phone]  Dept Fax: [phone]    Office Progress/Follow Up Note  Date of patient's visit: 8/3/2018  Patient's Name:  Liza Ruby YOB: 1995            Patient Care Team:  Jennifer oJyner MD as PCP - General (Internal Medicine)  Lizzie Klinefelter, MD as PCP - S Attributed Provider  Cheryle Eis, MD as Obstetrician (Perinatology)  Brianna Harris DO as Consulting Physician (General Surgery)  ================================================================    REASON FOR VISIT/CHIEF COMPLAINT:  Asthma    HISTORY OF PRESENTING ILLNESS:  History was obtained from: patient. Liza Ruby is a 24 y.o. is here for a follow for asthma. Patient denies any increased use of albuterol. She has been complaining of ear pain that has been ongoing for about a year and a half. Last time when she was to she was given eardrops, she states that they helped a little but doesn't help anymore. She is also complaining of ear discharge, as well as itching in her ear. Patient has been complaining of chest pain on and off, initially reproducible but now she states it is not reproducible. She followed up with pediatric cardiology for mild aortic stenosis in the past with no symptoms at that time. She states that chest pain is mostly exertional.  On further questioning patient admits to snoring at night, increased tiredness throughout the day. STOP BANG score of 3.        Patient Active Problem List   Diagnosis    Asthma    Atopy    Acute viral conjunctivitis of left eye       Health Maintenance Due   Topic Date Due    Chlamydia screen  08/15/2018       Allergies   Allergen Reactions    Lidocaine Hives         Current Outpatient Prescriptions   Medication Sig Dispense Refill    medroxyPROGESTERone (DEPO-PROVERA) 150 MG/ML injection Inject 1 mL into the muscle every 3 months 1 mL 3 Hypertension Neg Hx     Ovarian Cancer Neg Hx      Labor Neg Hx     Spont Abortions Neg Hx     Stroke Neg Hx         REVIEW OF SYSTEMS:  Review of Systems   Constitutional: Negative for chills, diaphoresis, fever, malaise/fatigue and weight loss. HENT: Positive for ear discharge and ear pain. Negative for congestion, hearing loss, nosebleeds, sinus pain and tinnitus. Eyes: Negative for blurred vision, double vision, photophobia and pain. Respiratory: Negative for cough, hemoptysis, sputum production, shortness of breath, wheezing and stridor. Cardiovascular: Positive for chest pain. Negative for palpitations, orthopnea, claudication, leg swelling and PND. Gastrointestinal: Negative for abdominal pain, heartburn, nausea and vomiting. Genitourinary: Negative for dysuria, frequency and urgency. Musculoskeletal: Negative for back pain, joint pain, myalgias and neck pain. Skin: Negative for itching and rash. Neurological: Negative for dizziness, tingling, tremors, sensory change, speech change, focal weakness, weakness and headaches. Endo/Heme/Allergies: Does not bruise/bleed easily. Psychiatric/Behavioral: Negative for depression, hallucinations, memory loss, substance abuse and suicidal ideas. The patient is not nervous/anxious and does not have insomnia. PHYSICAL EXAM:  Vitals:    18 0941   BP: 130/70   Site: Right Arm   Position: Sitting   Cuff Size: Large Adult   Pulse: 67   Weight: 249 lb 9.6 oz (113.2 kg)   Height: 5' 4\" (1.626 m)     BP Readings from Last 3 Encounters:   18 130/70   18 132/89   18 127/89        Physical Exam   Constitutional: She is oriented to person, place, and time and well-developed, well-nourished, and in no distress. No distress. HENT:   Head: Normocephalic and atraumatic. B/L ear : pearly white coating on external ear canal.    Eyes: Conjunctivae and EOM are normal. Pupils are equal, round, and reactive to light.  Right eye exhibits no discharge. Left eye exhibits no discharge. Neck: Normal range of motion. Neck supple. No tracheal deviation present. No thyromegaly present. Cardiovascular: Normal rate, regular rhythm, normal heart sounds and intact distal pulses. Exam reveals no friction rub. No murmur heard. Pulmonary/Chest: Effort normal and breath sounds normal. No respiratory distress. She has no wheezes. She has no rales. Abdominal: Soft. Bowel sounds are normal. She exhibits no distension. There is no tenderness. There is no rebound. Musculoskeletal: Normal range of motion. She exhibits no edema or deformity. Neurological: She is alert and oriented to person, place, and time. No cranial nerve deficit. Gait normal. Coordination normal.   Skin: Skin is warm and dry. No rash noted. She is not diaphoretic. No erythema. No pallor. Psychiatric: Mood, memory, affect and judgment normal.     DIAGNOSTIC FINDINGS:  CBC:  Lab Results   Component Value Date    WBC 6.4 02/12/2016    HGB 12.9 02/12/2016     02/12/2016       BMP:    Lab Results   Component Value Date     02/23/2018    K 4.1 02/23/2018     02/23/2018    CO2 23 02/23/2018    BUN 8 02/23/2018    CREATININE 0.56 02/23/2018    GLUCOSE 80 02/23/2018       HEMOGLOBIN A1C: No results found for: LABA1C    FASTING LIPID PANEL:No results found for: CHOL, HDL, TRIG    ASSESSMENT AND PLAN:    AMPARO was seen today for asthma. Diagnoses and all orders for this visit:    Mild intermittent asthma without complication  -     fluticasone (FLOVENT HFA) 110 MCG/ACT inhaler; Inhale 2 puffs into the lungs 2 times daily  -     albuterol sulfate HFA (PROAIR HFA) 108 (90 Base) MCG/ACT inhaler; Inhale 2 puffs into the lungs every 6 hours as needed for Wheezing    Other orders  -     [facility] [address]  [address]  -     Baseline Diagnostic Sleep Study; Future  -     Sleep Study with PAP Titration;  Future  -     [address] [facility] AND INSTRUCTIONS:    Return in about 3 months (around 11/3/2018), or if symptoms worsen or fail to improve. · Clari received counseling on the following healthy behaviors: nutrition, exercise and medication adherence    · Discussed use, benefit, and side effects of prescribed medications. Barriers to medication compliance addressed. All patient questions answered. Pt voiced understanding.      · Patient given educational materials - see patient instructions    Quincy Unger MD      Department of Internal Medicine  [facility]         8/3/2018, 10:54 AM

## 2018-08-03 NOTE — PROGRESS NOTES
Attending Physician Statement  I have discussed the care of [address], including pertinent history and exam findings with the resident. I have reviewed the key elements of all parts of the encounter and discussed them with the resident. Added history includes hx of asthma that is mod persistent and it is doing ok. She has recurrent ear pain itching and discharge. She has some intermittent chest pain that is not reproducible on chest wall. When it happens she rests and it goes away. She has no risk factors for CAD. Her STOP BANG is pos and she has daytime drowsiness  Added exam findings include Obese BP is ok and heart normal no chest tenderness chest is clear and obese no edema . I agree with the assessment, and status of the problem list as documented. The plan and orders should include continue her current medss and get Sleep study done and this was also documented by the resident. .The medication list was reviewed with the resident and is up to date. The return visit should be in 3 months or sooner if sx worsen .     Chidi Cantrell

## 2018-09-14 ENCOUNTER — OFFICE VISIT (OUTPATIENT)
Dept: OBGYN | Age: 23
End: 2018-09-14
Payer: COMMERCIAL

## 2018-09-14 ENCOUNTER — HOSPITAL ENCOUNTER (OUTPATIENT)
Age: 23
Setting detail: SPECIMEN
Discharge: HOME | End: 2018-09-14
Payer: COMMERCIAL

## 2018-09-14 VITALS
WEIGHT: 249 LBS | SYSTOLIC BLOOD PRESSURE: 120 MMHG | HEIGHT: 64 IN | HEART RATE: 70 BPM | BODY MASS INDEX: 42.51 KG/M2 | DIASTOLIC BLOOD PRESSURE: 78 MMHG

## 2018-09-14 DIAGNOSIS — Z01.419 ENCOUNTER FOR WELL WOMAN EXAM WITH ROUTINE GYNECOLOGICAL EXAM: ICD-10-CM

## 2018-09-14 DIAGNOSIS — Z01.419 ENCOUNTER FOR WELL WOMAN EXAM WITH ROUTINE GYNECOLOGICAL EXAM: Primary | ICD-10-CM

## 2018-09-14 LAB
DIRECT EXAM: NORMAL
Lab: NORMAL
SPECIMEN DESCRIPTION: NORMAL
STATUS: NORMAL

## 2018-09-14 PROCEDURE — 99213 OFFICE O/P EST LOW 20 MIN: CPT | Performed by: STUDENT IN AN ORGANIZED HEALTH CARE EDUCATION/TRAINING PROGRAM

## 2018-09-14 PROCEDURE — 99395 PREV VISIT EST AGE 18-39: CPT | Performed by: STUDENT IN AN ORGANIZED HEALTH CARE EDUCATION/TRAINING PROGRAM

## 2018-09-14 RX ORDER — MEDROXYPROGESTERONE ACETATE 150 MG/ML
150 INJECTION, SUSPENSION INTRAMUSCULAR
Qty: 1 ML | Refills: 3 | Status: SHIPPED | OUTPATIENT
Start: 2018-09-14 | End: 2019-11-08

## 2018-09-14 NOTE — PROGRESS NOTES
Diabetes Maternal Grandmother     Cancer Maternal Grandmother     Heart Disease Paternal Uncle     Early Death Paternal Uncle 62        MI    Learning Disabilities Mother     Breast Cancer Neg Hx     Colon Cancer Neg Hx     Eclampsia Neg Hx     Hypertension Neg Hx     Ovarian Cancer Neg Hx      Labor Neg Hx     Spont Abortions Neg Hx     Stroke Neg Hx     Uterine Cancer Neg Hx      Social History     Social History    Marital status: Single     Spouse name: N/A    Number of children: N/A    Years of education: N/A     Occupational History    Not on file. Social History Main Topics    Smoking status: Never Smoker    Smokeless tobacco: Never Used    Alcohol use No    Drug use: No    Sexual activity: Not Currently     Partners: Male     Birth control/ protection: Injection     Other Topics Concern    Not on file     Social History Narrative    No narrative on file       MEDICATIONS:  Current Outpatient Prescriptions   Medication Sig Dispense Refill    medroxyPROGESTERone (DEPO-PROVERA) 150 MG/ML injection Inject 1 mL into the muscle every 3 months 1 mL 3    fluticasone (FLOVENT HFA) 110 MCG/ACT inhaler Inhale 2 puffs into the lungs 2 times daily 1 Inhaler 3    albuterol sulfate HFA (PROAIR HFA) 108 (90 Base) MCG/ACT inhaler Inhale 2 puffs into the lungs every 6 hours as needed for Wheezing 1 Inhaler 5    fexofenadine (ALLEGRA ALLERGY) 60 MG tablet Take 1 tablet by mouth daily as needed (ITCHING) 30 tablet 2    diphenhydrAMINE-zinc acetate (BENADRYL ITCH STOPPING) 1-0.1 % cream Apply topically 3 times daily as needed.  1 Tube 2    fluticasone (FLONASE) 50 MCG/ACT nasal spray 1 spray by Nasal route daily 1 Bottle 3    cetirizine (ZYRTEC ALLERGY) 10 MG tablet Take 1 tablet by mouth daily 30 tablet 0    montelukast (SINGULAIR) 10 MG tablet Take 1 tablet by mouth nightly 30 tablet 5    docusate sodium (COLACE) 100 MG capsule Take 1 capsule by mouth 2 times daily as needed for Constipation. 60 capsule 2     No current facility-administered medications for this visit. ALLERGIES:  Allergies as of 09/14/2018 - Review Complete 09/14/2018   Allergen Reaction Noted    Lidocaine Hives 09/19/2014         Symptoms of decreased mood absent  Symptoms of anhedonia absent    **If either question is answered in a  positive fashion then complete the PHQ9 Scoring Evaluation and make the appropriate referral**    Immunization status: stated as current, but no records available. Gynecologic History:  Menarche: does not remember  Menopause N/A     No LMP recorded. Patient has had an injection. Sexually Active: No    STD History: No     Permanent Sterilization: No   Reversible Birth Control: Yes Depo    (last given 7/30/18)    Hormone Replacement Exposure: No      Genetic Qualified Family History of Breast, Ovarian , Colon or Uterine Cancer: No     If YES see scanned worksheet. Preventative Health Testing:    Health Maintenance Due:  Health Maintenance Due   Topic Date Due    Chlamydia screen  08/15/2018    Flu vaccine (1) 09/01/2018         Date of Last Pap Smear: 11/30/2015; Negative  Abnormal Pap Smear History: Denies  Colposcopy History: N/A  Date of Last Mammogram: N/A  Date of Last Colonoscopy: N/A  Date of Last Bone Density:N/A      ________________________________________________________________________        REVIEW OF SYSTEMS  A minimum of an eleven point review of systems was completed.     Review Of Systems (11 point):  Constitutional: negative fever, negative chills  HEENT: negative visual disturbances, negative headaches  Respiratory: negative dyspnea, negative cough  Cardiovascular: negative chest pain,  negative palpitations  Gastrointestinal: negative abdominal pain, negative RUQ pain, negative N/V, negative diarrhea, negative constipation  Genitourinary: negative dysuria, negative vaginal discharge  Dermatological: negative rash  Hematologic: negative mL into the muscle See Admin Instructions    Route: Intramuscular    Cosign for Ordering: Accepted by Lizz Davila DO on 1/15/2014  6:15   AM        Smoking status: Never Smoker                                                              Smokeless tobacco: Never Used                           Standing Status:   Future     Standing Expiration Date:   9/14/2019     Order Specific Question:   Collection Type     Answer: Thin Prep     Order Specific Question:   Prior Abnormal Pap Test     Answer:   No     Order Specific Question:   Screening or Diagnostic     Answer:   Screening     Order Specific Question:   HPV Requested?      Answer:   Yes -  If ASCUS Reflex HPV     Order Specific Question:   High Risk Patient     Answer:   N/A

## 2018-09-17 LAB
C TRACH DNA GENITAL QL NAA+PROBE: NEGATIVE
N. GONORRHOEAE DNA: NEGATIVE

## 2018-09-28 LAB — CYTOLOGY REPORT: NORMAL

## 2018-10-15 ENCOUNTER — NURSE ONLY (OUTPATIENT)
Dept: OBGYN | Age: 23
End: 2018-10-15
Payer: COMMERCIAL

## 2018-10-15 VITALS
BODY MASS INDEX: 43.07 KG/M2 | RESPIRATION RATE: 20 BRPM | DIASTOLIC BLOOD PRESSURE: 80 MMHG | HEART RATE: 91 BPM | HEIGHT: 64 IN | WEIGHT: 252.3 LBS | SYSTOLIC BLOOD PRESSURE: 132 MMHG

## 2018-10-15 DIAGNOSIS — Z30.09 FAMILY PLANNING: Primary | ICD-10-CM

## 2018-10-15 PROCEDURE — 96372 THER/PROPH/DIAG INJ SC/IM: CPT

## 2018-10-15 PROCEDURE — 99211 OFF/OP EST MAY X REQ PHY/QHP: CPT

## 2018-10-15 RX ORDER — MEDROXYPROGESTERONE ACETATE 150 MG/ML
150 INJECTION, SUSPENSION INTRAMUSCULAR ONCE
Status: COMPLETED | OUTPATIENT
Start: 2018-10-15 | End: 2018-10-15

## 2018-10-15 RX ADMIN — MEDROXYPROGESTERONE ACETATE 150 MG: 150 INJECTION, SUSPENSION, EXTENDED RELEASE INTRAMUSCULAR at 12:43

## 2018-10-30 ENCOUNTER — OFFICE VISIT (OUTPATIENT)
Dept: INTERNAL MEDICINE | Age: 23
End: 2018-10-30
Payer: COMMERCIAL

## 2018-10-30 VITALS
WEIGHT: 252 LBS | DIASTOLIC BLOOD PRESSURE: 68 MMHG | BODY MASS INDEX: 43.26 KG/M2 | SYSTOLIC BLOOD PRESSURE: 118 MMHG | HEART RATE: 68 BPM

## 2018-10-30 DIAGNOSIS — J45.30 MILD PERSISTENT ASTHMA WITHOUT COMPLICATION: ICD-10-CM

## 2018-10-30 DIAGNOSIS — K21.9 GASTROESOPHAGEAL REFLUX DISEASE WITHOUT ESOPHAGITIS: ICD-10-CM

## 2018-10-30 DIAGNOSIS — Z23 NEEDS FLU SHOT: ICD-10-CM

## 2018-10-30 DIAGNOSIS — E66.01 MORBID OBESITY WITH BMI OF 40.0-44.9, ADULT (HCC): ICD-10-CM

## 2018-10-30 DIAGNOSIS — B00.9 HSV-1 (HERPES SIMPLEX VIRUS 1) INFECTION: Primary | ICD-10-CM

## 2018-10-30 PROCEDURE — 99211 OFF/OP EST MAY X REQ PHY/QHP: CPT | Performed by: INTERNAL MEDICINE

## 2018-10-30 PROCEDURE — 99213 OFFICE O/P EST LOW 20 MIN: CPT | Performed by: STUDENT IN AN ORGANIZED HEALTH CARE EDUCATION/TRAINING PROGRAM

## 2018-10-30 PROCEDURE — G8482 FLU IMMUNIZE ORDER/ADMIN: HCPCS | Performed by: STUDENT IN AN ORGANIZED HEALTH CARE EDUCATION/TRAINING PROGRAM

## 2018-10-30 PROCEDURE — 1036F TOBACCO NON-USER: CPT | Performed by: STUDENT IN AN ORGANIZED HEALTH CARE EDUCATION/TRAINING PROGRAM

## 2018-10-30 PROCEDURE — G8417 CALC BMI ABV UP PARAM F/U: HCPCS | Performed by: STUDENT IN AN ORGANIZED HEALTH CARE EDUCATION/TRAINING PROGRAM

## 2018-10-30 PROCEDURE — 90686 IIV4 VACC NO PRSV 0.5 ML IM: CPT | Performed by: INTERNAL MEDICINE

## 2018-10-30 PROCEDURE — G8427 DOCREV CUR MEDS BY ELIG CLIN: HCPCS | Performed by: STUDENT IN AN ORGANIZED HEALTH CARE EDUCATION/TRAINING PROGRAM

## 2018-10-30 RX ORDER — ALBUTEROL SULFATE 90 UG/1
2 AEROSOL, METERED RESPIRATORY (INHALATION) EVERY 6 HOURS PRN
Qty: 1 INHALER | Refills: 3 | Status: SHIPPED | OUTPATIENT
Start: 2018-10-30 | End: 2019-02-04 | Stop reason: SDUPTHER

## 2018-10-30 RX ORDER — OMEPRAZOLE 20 MG/1
20 CAPSULE, DELAYED RELEASE ORAL DAILY
Qty: 30 CAPSULE | Refills: 0 | Status: SHIPPED | OUTPATIENT
Start: 2018-10-30 | End: 2019-07-05 | Stop reason: ALTCHOICE

## 2018-10-30 RX ORDER — FLUTICASONE PROPIONATE 50 MCG
1 SPRAY, SUSPENSION (ML) NASAL DAILY
Qty: 1 BOTTLE | Refills: 3 | Status: SHIPPED | OUTPATIENT
Start: 2018-10-30 | End: 2019-02-04 | Stop reason: SDUPTHER

## 2018-10-30 RX ORDER — ACYCLOVIR 400 MG/1
400 TABLET ORAL EVERY 8 HOURS
Qty: 21 TABLET | Refills: 0 | Status: SHIPPED | OUTPATIENT
Start: 2018-10-30 | End: 2018-11-06

## 2018-10-30 RX ORDER — MONTELUKAST SODIUM 10 MG/1
10 TABLET ORAL NIGHTLY
Qty: 30 TABLET | Refills: 5 | Status: SHIPPED | OUTPATIENT
Start: 2018-10-30 | End: 2019-02-04 | Stop reason: SDUPTHER

## 2018-10-30 NOTE — LETTER
[address]  [address] [facility] [address] [facility]  Phone: [phone]  Fax: [phone]    Beverley Cohen MD        October 30, 2018     Patient: Hilda Bustos   YOB: 1995   Date of Visit: 10/30/2018       To Whom It May Concern:     Gwendolyn Wei was seen in our office on 10/30/18. she may return to school on 10/31/18. If you have any questions or concerns, please don't hesitate to call.     Sincerely,        Beverley Cohen MD

## 2018-10-30 NOTE — PATIENT INSTRUCTIONS
Your medications for this visit were escribed to your preferred pharmacy. Avs was given and reviewed appt card given with next appt. MS      You were given your flu vaccination in office today.

## 2019-01-02 ENCOUNTER — NURSE ONLY (OUTPATIENT)
Dept: OBGYN | Age: 24
End: 2019-01-02
Payer: COMMERCIAL

## 2019-01-02 VITALS — HEIGHT: 64 IN | RESPIRATION RATE: 20 BRPM | BODY MASS INDEX: 42.85 KG/M2 | WEIGHT: 251 LBS

## 2019-01-02 DIAGNOSIS — Z30.09 FAMILY PLANNING: Primary | ICD-10-CM

## 2019-01-02 PROCEDURE — 99211 OFF/OP EST MAY X REQ PHY/QHP: CPT | Performed by: OBSTETRICS & GYNECOLOGY

## 2019-01-02 PROCEDURE — 96372 THER/PROPH/DIAG INJ SC/IM: CPT | Performed by: OBSTETRICS & GYNECOLOGY

## 2019-01-02 RX ORDER — MEDROXYPROGESTERONE ACETATE 150 MG/ML
150 INJECTION, SUSPENSION INTRAMUSCULAR ONCE
Status: COMPLETED | OUTPATIENT
Start: 2019-01-02 | End: 2019-01-02

## 2019-01-02 RX ADMIN — MEDROXYPROGESTERONE ACETATE 150 MG: 150 INJECTION, SUSPENSION, EXTENDED RELEASE INTRAMUSCULAR at 12:57

## 2019-02-04 ENCOUNTER — TELEPHONE (OUTPATIENT)
Dept: INTERNAL MEDICINE CLINIC | Age: 24
End: 2019-02-04

## 2019-02-04 DIAGNOSIS — R21 RASH: ICD-10-CM

## 2019-02-04 RX ORDER — FEXOFENADINE HYDROCHLORIDE 60 MG/1
60 TABLET, FILM COATED ORAL DAILY PRN
Qty: 30 TABLET | Refills: 2 | Status: SHIPPED | OUTPATIENT
Start: 2019-02-04 | End: 2019-11-08

## 2019-02-04 RX ORDER — FLUTICASONE PROPIONATE 50 MCG
1 SPRAY, SUSPENSION (ML) NASAL DAILY
Qty: 1 BOTTLE | Refills: 3 | Status: SHIPPED | OUTPATIENT
Start: 2019-02-04 | End: 2020-01-02 | Stop reason: ALTCHOICE

## 2019-02-04 RX ORDER — MONTELUKAST SODIUM 10 MG/1
10 TABLET ORAL NIGHTLY
Qty: 30 TABLET | Refills: 5 | Status: SHIPPED | OUTPATIENT
Start: 2019-02-04 | End: 2019-11-19 | Stop reason: ALTCHOICE

## 2019-02-04 RX ORDER — ALBUTEROL SULFATE 90 UG/1
2 AEROSOL, METERED RESPIRATORY (INHALATION) EVERY 6 HOURS PRN
Qty: 1 INHALER | Refills: 3 | Status: SHIPPED | OUTPATIENT
Start: 2019-02-04 | End: 2020-01-02 | Stop reason: SDUPTHER

## 2019-03-27 ENCOUNTER — NURSE ONLY (OUTPATIENT)
Dept: OBGYN | Age: 24
End: 2019-03-27
Payer: COMMERCIAL

## 2019-03-27 DIAGNOSIS — Z30.09 FAMILY PLANNING: Primary | ICD-10-CM

## 2019-03-27 PROCEDURE — 99211 OFF/OP EST MAY X REQ PHY/QHP: CPT

## 2019-03-27 PROCEDURE — 96372 THER/PROPH/DIAG INJ SC/IM: CPT

## 2019-03-27 RX ORDER — MEDROXYPROGESTERONE ACETATE 150 MG/ML
150 INJECTION, SUSPENSION INTRAMUSCULAR ONCE
Status: COMPLETED | OUTPATIENT
Start: 2019-03-27 | End: 2019-03-27

## 2019-03-27 RX ADMIN — MEDROXYPROGESTERONE ACETATE 150 MG: 150 INJECTION, SUSPENSION, EXTENDED RELEASE INTRAMUSCULAR at 14:02

## 2019-04-26 ENCOUNTER — TELEPHONE (OUTPATIENT)
Dept: OBGYN | Age: 24
End: 2019-04-26

## 2019-06-28 ENCOUNTER — TELEPHONE (OUTPATIENT)
Dept: OBGYN | Age: 24
End: 2019-06-28

## 2019-06-28 NOTE — TELEPHONE ENCOUNTER
Pt states she missed her Depo-Provera injection because she is in the process of trying to conceive with her significant other. Pt will call to schedule appointment once pregnant.

## 2019-07-01 ENCOUNTER — TELEPHONE (OUTPATIENT)
Dept: OBGYN | Age: 24
End: 2019-07-01

## 2019-07-05 ENCOUNTER — OFFICE VISIT (OUTPATIENT)
Dept: OBGYN | Age: 24
End: 2019-07-05
Payer: COMMERCIAL

## 2019-07-05 VITALS
DIASTOLIC BLOOD PRESSURE: 80 MMHG | HEART RATE: 66 BPM | WEIGHT: 252.9 LBS | BODY MASS INDEX: 43.17 KG/M2 | HEIGHT: 64 IN | SYSTOLIC BLOOD PRESSURE: 120 MMHG

## 2019-07-05 DIAGNOSIS — N64.4 BREAST PAIN: Primary | ICD-10-CM

## 2019-07-05 LAB
CONTROL: PRESENT
PREGNANCY TEST URINE, POC: NEGATIVE

## 2019-07-05 PROCEDURE — 81025 URINE PREGNANCY TEST: CPT | Performed by: STUDENT IN AN ORGANIZED HEALTH CARE EDUCATION/TRAINING PROGRAM

## 2019-07-05 PROCEDURE — 99213 OFFICE O/P EST LOW 20 MIN: CPT | Performed by: STUDENT IN AN ORGANIZED HEALTH CARE EDUCATION/TRAINING PROGRAM

## 2019-07-05 PROCEDURE — G8417 CALC BMI ABV UP PARAM F/U: HCPCS | Performed by: STUDENT IN AN ORGANIZED HEALTH CARE EDUCATION/TRAINING PROGRAM

## 2019-07-05 PROCEDURE — 99212 OFFICE O/P EST SF 10 MIN: CPT | Performed by: STUDENT IN AN ORGANIZED HEALTH CARE EDUCATION/TRAINING PROGRAM

## 2019-07-05 PROCEDURE — G8427 DOCREV CUR MEDS BY ELIG CLIN: HCPCS | Performed by: STUDENT IN AN ORGANIZED HEALTH CARE EDUCATION/TRAINING PROGRAM

## 2019-07-05 PROCEDURE — 1036F TOBACCO NON-USER: CPT | Performed by: STUDENT IN AN ORGANIZED HEALTH CARE EDUCATION/TRAINING PROGRAM

## 2019-07-05 NOTE — PROGRESS NOTES
disorder  Immunologic: negative recent illness, negative recent sick contact, negative allergic reactions  Lymphatic: negative lymph nodes  Musculoskeletal: negative back pain, negative myalgias, negative arthralgias  Neurological:  negative dizziness, negative weakness  Behavior/Psych: negative depression, negative anxiety    ________________________________________________________________________    GYNECOLOGICAL HISTORY:  Age of Menarche: reports she doesn't remember   Age of Menopause: N/A      Sexually Active: single partner, contraception - none  STD History: no past history    Pap History: Last PAP was normal; 2018.   Colposcopy History: denies    Permanent Sterilization: no  Reversible Birth Control: no  Hormone Replacement Exposure: no    OBSTETRICAL HISTORY:  OB History    Para Term  AB Living   2 2 0 2 0 2   SAB TAB Ectopic Molar Multiple Live Births   0 0 0   0 1      # Outcome Date GA Lbr Filipe/2nd Weight Sex Delivery Anes PTL Lv   2   32w0d  4 lb 3 oz (1.899 kg) M Vag-Spont         Birth Comments: [facility]   1   35w0d   F Vag-Spont EPI Y VANITA       PAST MEDICAL HISTORY:      Diagnosis Date    Asthma     Chronic airway disease ([address]     Dental crowns present     Hidradenitis axillaris 2015    History of  labor     Hydradenitis     Left ankle sprain 2015    Maternal congenital heart disease, antepartum 2014    Obesity     Wears glasses        PAST SURGICAL HISTORY:                                                                    Procedure Laterality Date    OTHER SURGICAL HISTORY Bilateral 2016    excision axillary hydranitis    TONSILLECTOMY AND ADENOIDECTOMY         MEDICATIONS:  Current Outpatient Medications   Medication Sig Dispense Refill    montelukast (SINGULAIR) 10 MG tablet Take 1 tablet by mouth nightly 30 tablet 5    albuterol sulfate HFA (VENTOLIN HFA) 108 (90 Base) MCG/ACT inhaler Inhale 2 puffs into the

## 2019-07-30 ENCOUNTER — APPOINTMENT (OUTPATIENT)
Dept: CT IMAGING | Age: 24
End: 2019-07-30
Payer: COMMERCIAL

## 2019-07-30 ENCOUNTER — HOSPITAL ENCOUNTER (EMERGENCY)
Age: 24
Discharge: HOME | End: 2019-07-30
Attending: EMERGENCY MEDICINE
Payer: COMMERCIAL

## 2019-07-30 VITALS
DIASTOLIC BLOOD PRESSURE: 81 MMHG | BODY MASS INDEX: 36.7 KG/M2 | WEIGHT: 215 LBS | OXYGEN SATURATION: 97 % | TEMPERATURE: 97.5 F | RESPIRATION RATE: 18 BRPM | HEIGHT: 64 IN | HEART RATE: 62 BPM | SYSTOLIC BLOOD PRESSURE: 133 MMHG

## 2019-07-30 DIAGNOSIS — H65.196 OTHER RECURRENT ACUTE NONSUPPURATIVE OTITIS MEDIA OF BOTH EARS: ICD-10-CM

## 2019-07-30 DIAGNOSIS — H60.393 INFECTIVE OTITIS EXTERNA OF BOTH EARS: Primary | ICD-10-CM

## 2019-07-30 PROCEDURE — 6360000002 HC RX W HCPCS: Performed by: STUDENT IN AN ORGANIZED HEALTH CARE EDUCATION/TRAINING PROGRAM

## 2019-07-30 PROCEDURE — 99283 EMERGENCY DEPT VISIT LOW MDM: CPT

## 2019-07-30 PROCEDURE — 70480 CT ORBIT/EAR/FOSSA W/O DYE: CPT

## 2019-07-30 PROCEDURE — 6370000000 HC RX 637 (ALT 250 FOR IP): Performed by: STUDENT IN AN ORGANIZED HEALTH CARE EDUCATION/TRAINING PROGRAM

## 2019-07-30 PROCEDURE — 96372 THER/PROPH/DIAG INJ SC/IM: CPT

## 2019-07-30 RX ORDER — IBUPROFEN 800 MG/1
800 TABLET ORAL EVERY 6 HOURS PRN
Qty: 30 TABLET | Refills: 0 | Status: SHIPPED | OUTPATIENT
Start: 2019-07-30 | End: 2019-11-08

## 2019-07-30 RX ORDER — IBUPROFEN 800 MG/1
800 TABLET ORAL ONCE
Status: DISCONTINUED | OUTPATIENT
Start: 2019-07-30 | End: 2019-07-30

## 2019-07-30 RX ORDER — AMOXICILLIN AND CLAVULANATE POTASSIUM 875; 125 MG/1; MG/1
1 TABLET, FILM COATED ORAL 2 TIMES DAILY
Qty: 20 TABLET | Refills: 0 | Status: SHIPPED | OUTPATIENT
Start: 2019-07-30 | End: 2019-08-09

## 2019-07-30 RX ORDER — CIPROFLOXACIN AND DEXAMETHASONE 3; 1 MG/ML; MG/ML
4 SUSPENSION/ DROPS AURICULAR (OTIC) 2 TIMES DAILY
Qty: 1 BOTTLE | Refills: 0 | Status: SHIPPED | OUTPATIENT
Start: 2019-07-30 | End: 2019-08-09

## 2019-07-30 RX ORDER — CIPROFLOXACIN AND DEXAMETHASONE 3; 1 MG/ML; MG/ML
4 SUSPENSION/ DROPS AURICULAR (OTIC) ONCE
Status: COMPLETED | OUTPATIENT
Start: 2019-07-30 | End: 2019-07-30

## 2019-07-30 RX ORDER — KETOROLAC TROMETHAMINE 15 MG/ML
15 INJECTION, SOLUTION INTRAMUSCULAR; INTRAVENOUS ONCE
Status: COMPLETED | OUTPATIENT
Start: 2019-07-30 | End: 2019-07-30

## 2019-07-30 RX ORDER — AMOXICILLIN AND CLAVULANATE POTASSIUM 875; 125 MG/1; MG/1
1 TABLET, FILM COATED ORAL ONCE
Status: COMPLETED | OUTPATIENT
Start: 2019-07-30 | End: 2019-07-30

## 2019-07-30 RX ADMIN — CIPROFLOXACIN AND DEXAMETHASONE 4 DROP: 3; 1 SUSPENSION/ DROPS AURICULAR (OTIC) at 20:35

## 2019-07-30 RX ADMIN — KETOROLAC TROMETHAMINE 15 MG: 15 INJECTION, SOLUTION INTRAMUSCULAR; INTRAVENOUS at 20:34

## 2019-07-30 RX ADMIN — AMOXICILLIN AND CLAVULANATE POTASSIUM 1 TABLET: 875; 125 TABLET, FILM COATED ORAL at 20:35

## 2019-07-30 ASSESSMENT — ENCOUNTER SYMPTOMS
WHEEZING: 0
VOMITING: 0
ABDOMINAL PAIN: 0
NAUSEA: 0
SORE THROAT: 0
FACIAL SWELLING: 0
SHORTNESS OF BREATH: 0
TROUBLE SWALLOWING: 0
COUGH: 0

## 2019-07-30 ASSESSMENT — PAIN SCALES - GENERAL: PAINLEVEL_OUTOF10: 7

## 2019-07-30 NOTE — ED PROVIDER NOTES
HISTORY: recurrent internal/external ear infections, right mastoid swelling/tenderness TECHNOLOGIST PROVIDED HISTORY: recurrent internal/external ear infections, right mastoid swelling/tenderness Reason for Exam: recurrent internal/external ear infections, right mastoid swelling/tenderness; recurrent internal/external ear infections, right mastoid swelling/tenderness Acuity: Unknown Type of Exam: Unknown FINDINGS: RIGHT TEMPORAL BONE:  Mucosal thickening is noted along the margins of the external auditory canal.  There retraction and soft tissue thickening in the region of the tympanic membrane. Soft tissue density is noted near the right scutum and in the Prussak's space. Artifact limits fine detail in this region. Subtle erosive changes would be difficult to exclude. There is no disruption of the ossicles. Multifocal additional mucosal thickening is noted. Internal auditory canal is normal in caliber. 7th cranial nerve canal is grossly normal in appearance. LEFT TEMPORAL BONE: Left mastoid air cells are clear. Left internal and external auditory canals are normal in appearance. 7th cranial nerve canal is normal.  Ossicles are normal in appearance. There is a questionable small amount of soft tissue density in the Prussak's space on the left marginating the scutum. There is no definitive erosive change or blunting of the scutum. BRAIN: Limited intracranial evaluation demonstrates no large area of abnormal density. There is no midline shift. ORBITS: Limited evaluation of the orbits demonstrates no focal abnormality. SINUSES: Multifocal paranasal sinus mucosal thickening is noted. This is greatest in the right maxillary sinus. Soft tissues: Small soft tissue lesions are noted in the parotid glands bilaterally. Correlate with ultrasound. There is soft tissue density near the scutum and within the Prussak's space on the right.   This is nonspecific and may be related to an inflammatory/infectious

## 2019-07-31 NOTE — ED PROVIDER NOTES
[facility] ED  eMERGENCY dEPARTMENT eNCOUnter   Attending Attestation     Pt Name: Consuella Barthel  MRN: [id number]  [place] 1995  Date of evaluation: 7/30/19       Consuella Barthel is a 21 y.o. female who presents with Otalgia (Pt c/o Rt ear pain )      History: Patient is a 69-year-old female with bilateral ear pain and discharge. Patient has had this in the past and sees an ENT doctor. Patient has an appointment with them. Exam: Bilateral ears have yellow discharge coming from them. Patient has mastoid tenderness. TMs are difficult to appreciate given amount of purulent drainage. Plan for treating with oral and topical antibiotics and discharge and follow-up with ENT after CT to rule out mastoiditis. I performed a history and physical examination of the patient and discussed management with the resident. I reviewed the residents note and agree with the documented findings and plan of care. Any areas of disagreement are noted on the chart. I was personally present for the key portions of any procedures. I have documented in the chart those procedures where I was not present during the key portions. I have personally reviewed all images and agree with the resident's interpretation. I have reviewed the emergency nurses triage note. I agree with the chief complaint, past medical history, past surgical history, allergies, medications, social and family history as documented unless otherwise noted below. Documentation of the HPI, Physical Exam and Medical Decision Making performed by medical students or scribes is based on my personal performance of the HPI, PE and MDM. For Phys Assistant/ Nurse Practitioner cases/documentation I have had a face to face evaluation of this patient and have completed at least one if not all key elements of the E/M (history, physical exam, and MDM). Additional findings are as noted.     For APC cases I have personally evaluated and examined the patient in conjunction with the APC and agree with the treatment plan and disposition of the patient as recorded by the APC.     Kassandra Rabago MD  Attending Emergency  Physician       Aden Dong MD  07/30/19 [id number]

## 2019-08-05 ENCOUNTER — HOSPITAL ENCOUNTER (OUTPATIENT)
Dept: MAMMOGRAPHY | Age: 24
End: 2019-08-05
Payer: COMMERCIAL

## 2019-08-05 ENCOUNTER — HOSPITAL ENCOUNTER (OUTPATIENT)
Dept: ULTRASOUND IMAGING | Age: 24
Discharge: HOME | End: 2019-08-07
Payer: COMMERCIAL

## 2019-08-05 DIAGNOSIS — N64.4 BREAST PAIN: ICD-10-CM

## 2019-08-05 PROCEDURE — 76642 ULTRASOUND BREAST LIMITED: CPT

## 2019-08-08 ENCOUNTER — TELEPHONE (OUTPATIENT)
Dept: OBGYN | Age: 24
End: 2019-08-08

## 2019-10-24 ENCOUNTER — HOSPITAL ENCOUNTER (OUTPATIENT)
Age: 24
Setting detail: SPECIMEN
Discharge: HOME | End: 2019-10-24
Payer: COMMERCIAL

## 2019-10-24 ENCOUNTER — NURSE ONLY (OUTPATIENT)
Dept: OBGYN | Age: 24
End: 2019-10-24
Payer: COMMERCIAL

## 2019-10-24 VITALS — BODY MASS INDEX: 42.74 KG/M2 | WEIGHT: 249 LBS

## 2019-10-24 DIAGNOSIS — Z34.91 CURRENTLY PREGNANT IN FIRST TRIMESTER WITH UNKNOWN GESTATIONAL AGE: ICD-10-CM

## 2019-10-24 DIAGNOSIS — Z34.91 CURRENTLY PREGNANT IN FIRST TRIMESTER WITH UNKNOWN GESTATIONAL AGE: Primary | ICD-10-CM

## 2019-10-24 DIAGNOSIS — Z32.01 POSITIVE PREGNANCY TEST: Primary | ICD-10-CM

## 2019-10-24 LAB
CONTROL: YES
HCG QUANTITATIVE: 6206 IU/L
PREGNANCY TEST URINE, POC: POSITIVE

## 2019-10-24 PROCEDURE — 81025 URINE PREGNANCY TEST: CPT | Performed by: OBSTETRICS & GYNECOLOGY

## 2019-10-24 PROCEDURE — 36415 COLL VENOUS BLD VENIPUNCTURE: CPT

## 2019-10-24 PROCEDURE — 99211 OFF/OP EST MAY X REQ PHY/QHP: CPT | Performed by: OBSTETRICS & GYNECOLOGY

## 2019-10-24 PROCEDURE — 84702 CHORIONIC GONADOTROPIN TEST: CPT

## 2019-10-25 ENCOUNTER — TELEPHONE (OUTPATIENT)
Dept: OBGYN | Age: 24
End: 2019-10-25

## 2019-10-25 DIAGNOSIS — Z34.90 PREGNANCY, UNSPECIFIED GESTATIONAL AGE: Primary | ICD-10-CM

## 2019-10-26 ENCOUNTER — TELEPHONE (OUTPATIENT)
Dept: OBGYN | Age: 24
End: 2019-10-26

## 2019-10-29 ENCOUNTER — TELEPHONE (OUTPATIENT)
Dept: OBGYN | Age: 24
End: 2019-10-29

## 2019-11-08 ENCOUNTER — TELEPHONE (OUTPATIENT)
Dept: OBGYN | Age: 24
End: 2019-11-08

## 2019-11-08 ENCOUNTER — ROUTINE PRENATAL (OUTPATIENT)
Dept: PERINATAL CARE | Age: 24
End: 2019-11-08
Payer: COMMERCIAL

## 2019-11-08 VITALS
DIASTOLIC BLOOD PRESSURE: 69 MMHG | BODY MASS INDEX: 42.76 KG/M2 | HEIGHT: 64 IN | HEART RATE: 60 BPM | RESPIRATION RATE: 16 BRPM | TEMPERATURE: 98.4 F | WEIGHT: 250.5 LBS | SYSTOLIC BLOOD PRESSURE: 110 MMHG

## 2019-11-08 DIAGNOSIS — Z3A.01 7 WEEKS GESTATION OF PREGNANCY: ICD-10-CM

## 2019-11-08 DIAGNOSIS — O09.211 CURRENT PREGNANCY WITH HISTORY OF PRE-TERM LABOR IN FIRST TRIMESTER: ICD-10-CM

## 2019-11-08 DIAGNOSIS — O36.80X0 ENCOUNTER TO DETERMINE FETAL VIABILITY OF PREGNANCY, SINGLE OR UNSPECIFIED FETUS: Primary | ICD-10-CM

## 2019-11-08 DIAGNOSIS — Q28.9 MATERNAL CONGENITAL CARDIOVASCULAR DISORDER DURING PREGNANCY IN FIRST TRIMESTER: ICD-10-CM

## 2019-11-08 DIAGNOSIS — O99.211 OBESITY AFFECTING PREGNANCY IN FIRST TRIMESTER: ICD-10-CM

## 2019-11-08 DIAGNOSIS — Z87.74 HISTORY OF CONGENITAL HEART DEFECT: Primary | ICD-10-CM

## 2019-11-08 DIAGNOSIS — O99.411 MATERNAL CONGENITAL CARDIOVASCULAR DISORDER DURING PREGNANCY IN FIRST TRIMESTER: ICD-10-CM

## 2019-11-08 DIAGNOSIS — Z87.51 HISTORY OF PRETERM DELIVERY: ICD-10-CM

## 2019-11-08 PROCEDURE — 76817 TRANSVAGINAL US OBSTETRIC: CPT | Performed by: OBSTETRICS & GYNECOLOGY

## 2019-11-08 RX ORDER — IBUPROFEN 200 MG
1 TABLET ORAL DAILY
Qty: 30 TABLET | Refills: 12 | Status: SHIPPED | OUTPATIENT
Start: 2019-11-08 | End: 2020-01-02 | Stop reason: SDUPTHER

## 2019-11-19 ENCOUNTER — INITIAL PRENATAL (OUTPATIENT)
Dept: OBGYN | Age: 24
End: 2019-11-19
Payer: COMMERCIAL

## 2019-11-19 VITALS
SYSTOLIC BLOOD PRESSURE: 90 MMHG | WEIGHT: 247 LBS | HEART RATE: 80 BPM | DIASTOLIC BLOOD PRESSURE: 60 MMHG | BODY MASS INDEX: 42.4 KG/M2

## 2019-11-19 DIAGNOSIS — L73.2 AXILLARY HIDRADENITIS SUPPURATIVA: ICD-10-CM

## 2019-11-19 DIAGNOSIS — Z87.74 HISTORY OF CONGENITAL HEART DEFECT: ICD-10-CM

## 2019-11-19 DIAGNOSIS — O09.91 HIGH-RISK PREGNANCY, FIRST TRIMESTER: ICD-10-CM

## 2019-11-19 DIAGNOSIS — J45.30 MILD PERSISTENT ASTHMA WITHOUT COMPLICATION: ICD-10-CM

## 2019-11-19 DIAGNOSIS — O09.891 HISTORY OF PRETERM DELIVERY, CURRENTLY PREGNANT IN FIRST TRIMESTER: ICD-10-CM

## 2019-11-19 PROBLEM — B30.9 ACUTE VIRAL CONJUNCTIVITIS OF LEFT EYE: Status: RESOLVED | Noted: 2018-02-23 | Resolved: 2019-11-19

## 2019-11-19 PROCEDURE — 59899 UNLISTED PX MAT CARE&DLVR: CPT | Performed by: OBSTETRICS & GYNECOLOGY

## 2019-11-19 PROCEDURE — G8417 CALC BMI ABV UP PARAM F/U: HCPCS | Performed by: OBSTETRICS & GYNECOLOGY

## 2019-11-19 PROCEDURE — G8427 DOCREV CUR MEDS BY ELIG CLIN: HCPCS | Performed by: OBSTETRICS & GYNECOLOGY

## 2019-11-19 PROCEDURE — 99211 OFF/OP EST MAY X REQ PHY/QHP: CPT | Performed by: OBSTETRICS & GYNECOLOGY

## 2019-11-19 PROCEDURE — 99213 OFFICE O/P EST LOW 20 MIN: CPT | Performed by: OBSTETRICS & GYNECOLOGY

## 2019-11-19 RX ORDER — ASPIRIN 81 MG/1
81 TABLET ORAL DAILY
Qty: 30 TABLET | Refills: 12 | Status: SHIPPED | OUTPATIENT
Start: 2019-11-19 | End: 2020-04-09 | Stop reason: SDUPTHER

## 2019-11-26 ENCOUNTER — TELEPHONE (OUTPATIENT)
Dept: OBGYN | Age: 24
End: 2019-11-26

## 2019-11-27 DIAGNOSIS — O09.91 HIGH-RISK PREGNANCY, FIRST TRIMESTER: Primary | ICD-10-CM

## 2019-12-06 DIAGNOSIS — Z87.74 HISTORY OF CONGENITAL HEART DEFECT: ICD-10-CM

## 2019-12-06 DIAGNOSIS — O09.91 HIGH-RISK PREGNANCY, FIRST TRIMESTER: Primary | ICD-10-CM

## 2019-12-06 DIAGNOSIS — Z86.79 HISTORY OF AORTIC STENOSIS: ICD-10-CM

## 2019-12-12 ENCOUNTER — TELEPHONE (OUTPATIENT)
Dept: OTHER | Age: 24
End: 2019-12-12

## 2019-12-12 ENCOUNTER — TELEPHONE (OUTPATIENT)
Dept: OBGYN | Age: 24
End: 2019-12-12

## 2019-12-13 ENCOUNTER — ROUTINE PRENATAL (OUTPATIENT)
Dept: PERINATAL CARE | Age: 24
End: 2019-12-13
Payer: COMMERCIAL

## 2019-12-13 VITALS
TEMPERATURE: 98.1 F | RESPIRATION RATE: 16 BRPM | DIASTOLIC BLOOD PRESSURE: 76 MMHG | HEIGHT: 64 IN | SYSTOLIC BLOOD PRESSURE: 121 MMHG | HEART RATE: 79 BPM | WEIGHT: 243 LBS | BODY MASS INDEX: 41.48 KG/M2

## 2019-12-13 DIAGNOSIS — O09.211 CURRENT PREGNANCY WITH HISTORY OF PRE-TERM LABOR IN FIRST TRIMESTER: ICD-10-CM

## 2019-12-13 DIAGNOSIS — O99.211 OBESITY AFFECTING PREGNANCY IN FIRST TRIMESTER: ICD-10-CM

## 2019-12-13 DIAGNOSIS — O99.411 MATERNAL CONGENITAL CARDIOVASCULAR DISORDER DURING PREGNANCY IN FIRST TRIMESTER: ICD-10-CM

## 2019-12-13 DIAGNOSIS — Z3A.12 12 WEEKS GESTATION OF PREGNANCY: ICD-10-CM

## 2019-12-13 DIAGNOSIS — Z36.9 FIRST TRIMESTER SCREENING: Primary | ICD-10-CM

## 2019-12-13 DIAGNOSIS — Q28.9 MATERNAL CONGENITAL CARDIOVASCULAR DISORDER DURING PREGNANCY IN FIRST TRIMESTER: ICD-10-CM

## 2019-12-13 LAB
CRL: NORMAL
SAC DIAMETER: NORMAL

## 2019-12-13 PROCEDURE — 76801 OB US < 14 WKS SINGLE FETUS: CPT | Performed by: OBSTETRICS & GYNECOLOGY

## 2019-12-13 PROCEDURE — 76813 OB US NUCHAL MEAS 1 GEST: CPT | Performed by: OBSTETRICS & GYNECOLOGY

## 2019-12-15 ENCOUNTER — HOSPITAL ENCOUNTER (EMERGENCY)
Age: 24
Discharge: HOME | End: 2019-12-15
Attending: EMERGENCY MEDICINE
Payer: COMMERCIAL

## 2019-12-15 VITALS
BODY MASS INDEX: 43.19 KG/M2 | WEIGHT: 253 LBS | HEIGHT: 64 IN | OXYGEN SATURATION: 98 % | DIASTOLIC BLOOD PRESSURE: 77 MMHG | RESPIRATION RATE: 15 BRPM | SYSTOLIC BLOOD PRESSURE: 126 MMHG | HEART RATE: 87 BPM | TEMPERATURE: 98.2 F

## 2019-12-15 DIAGNOSIS — K92.0 HEMATEMESIS WITH NAUSEA: ICD-10-CM

## 2019-12-15 DIAGNOSIS — R10.9 ABDOMINAL PAIN IN PREGNANCY, SECOND TRIMESTER: Primary | ICD-10-CM

## 2019-12-15 DIAGNOSIS — O26.892 ABDOMINAL PAIN IN PREGNANCY, SECOND TRIMESTER: Primary | ICD-10-CM

## 2019-12-15 LAB
ABO/RH: NORMAL
ABSOLUTE EOS #: 0.04 K/UL (ref 0–0.44)
ABSOLUTE IMMATURE GRANULOCYTE: <0.03 K/UL (ref 0–0.3)
ABSOLUTE LYMPH #: 1.16 K/UL (ref 1.1–3.7)
ABSOLUTE MONO #: 0.27 K/UL (ref 0.1–1.2)
ANION GAP SERPL CALCULATED.3IONS-SCNC: 13 MMOL/L (ref 9–17)
ANTIBODY SCREEN: NEGATIVE
ARM BAND NUMBER: NORMAL
BASOPHILS # BLD: 0 % (ref 0–2)
BASOPHILS ABSOLUTE: <0.03 K/UL (ref 0–0.2)
BUN BLDV-MCNC: 4 MG/DL (ref 6–20)
BUN/CREAT BLD: ABNORMAL (ref 9–20)
CALCIUM SERPL-MCNC: 9 MG/DL (ref 8.6–10.4)
CHLORIDE BLD-SCNC: 103 MMOL/L (ref 98–107)
CO2: 18 MMOL/L (ref 20–31)
CREAT SERPL-MCNC: 0.31 MG/DL (ref 0.5–0.9)
DIFFERENTIAL TYPE: ABNORMAL
DIRECT EXAM: NORMAL
EOSINOPHILS RELATIVE PERCENT: 1 % (ref 1–4)
EXPIRATION DATE: NORMAL
GFR AFRICAN AMERICAN: >60 ML/MIN
GFR NON-AFRICAN AMERICAN: >60 ML/MIN
GFR SERPL CREATININE-BSD FRML MDRD: ABNORMAL ML/MIN/{1.73_M2}
GFR SERPL CREATININE-BSD FRML MDRD: ABNORMAL ML/MIN/{1.73_M2}
GLUCOSE BLD-MCNC: 86 MG/DL (ref 70–99)
HCT VFR BLD CALC: 37.4 % (ref 36.3–47.1)
HEMOGLOBIN: 12.3 G/DL (ref 11.9–15.1)
IMMATURE GRANULOCYTES: 0 %
LIPASE: 13 U/L (ref 13–60)
LYMPHOCYTES # BLD: 20 % (ref 24–43)
Lab: NORMAL
Lab: NORMAL
MCH RBC QN AUTO: 29.8 PG (ref 25.2–33.5)
MCHC RBC AUTO-ENTMCNC: 32.9 G/DL (ref 28.4–34.8)
MCV RBC AUTO: 90.6 FL (ref 82.6–102.9)
MONOCYTES # BLD: 5 % (ref 3–12)
NRBC AUTOMATED: 0 PER 100 WBC
PDW BLD-RTO: 12.5 % (ref 11.8–14.4)
PLATELET # BLD: 158 K/UL (ref 138–453)
PLATELET ESTIMATE: ABNORMAL
PMV BLD AUTO: 14 FL (ref 8.1–13.5)
POTASSIUM SERPL-SCNC: 3.6 MMOL/L (ref 3.7–5.3)
RBC # BLD: 4.13 M/UL (ref 3.95–5.11)
RBC # BLD: ABNORMAL 10*6/UL
SEG NEUTROPHILS: 75 % (ref 36–65)
SEGMENTED NEUTROPHILS ABSOLUTE COUNT: 4.38 K/UL (ref 1.5–8.1)
SODIUM BLD-SCNC: 134 MMOL/L (ref 135–144)
SPECIMEN DESCRIPTION: NORMAL
SPECIMEN DESCRIPTION: NORMAL
WBC # BLD: 5.9 K/UL (ref 3.5–11.3)
WBC # BLD: ABNORMAL 10*3/UL

## 2019-12-15 PROCEDURE — 99284 EMERGENCY DEPT VISIT MOD MDM: CPT

## 2019-12-15 PROCEDURE — 87086 URINE CULTURE/COLONY COUNT: CPT

## 2019-12-15 PROCEDURE — 87804 INFLUENZA ASSAY W/OPTIC: CPT

## 2019-12-15 PROCEDURE — 86850 RBC ANTIBODY SCREEN: CPT

## 2019-12-15 PROCEDURE — 80076 HEPATIC FUNCTION PANEL: CPT

## 2019-12-15 PROCEDURE — 87591 N.GONORRHOEAE DNA AMP PROB: CPT

## 2019-12-15 PROCEDURE — 86900 BLOOD TYPING SEROLOGIC ABO: CPT

## 2019-12-15 PROCEDURE — 80048 BASIC METABOLIC PNL TOTAL CA: CPT

## 2019-12-15 PROCEDURE — 81001 URINALYSIS AUTO W/SCOPE: CPT

## 2019-12-15 PROCEDURE — 87660 TRICHOMONAS VAGIN DIR PROBE: CPT

## 2019-12-15 PROCEDURE — 83690 ASSAY OF LIPASE: CPT

## 2019-12-15 PROCEDURE — 96374 THER/PROPH/DIAG INJ IV PUSH: CPT

## 2019-12-15 PROCEDURE — 87491 CHLMYD TRACH DNA AMP PROBE: CPT

## 2019-12-15 PROCEDURE — 2500000003 HC RX 250 WO HCPCS: Performed by: STUDENT IN AN ORGANIZED HEALTH CARE EDUCATION/TRAINING PROGRAM

## 2019-12-15 PROCEDURE — 85025 COMPLETE CBC W/AUTO DIFF WBC: CPT

## 2019-12-15 PROCEDURE — 96375 TX/PRO/DX INJ NEW DRUG ADDON: CPT

## 2019-12-15 PROCEDURE — 2580000003 HC RX 258: Performed by: STUDENT IN AN ORGANIZED HEALTH CARE EDUCATION/TRAINING PROGRAM

## 2019-12-15 PROCEDURE — 80053 COMPREHEN METABOLIC PANEL: CPT

## 2019-12-15 PROCEDURE — 6360000002 HC RX W HCPCS: Performed by: STUDENT IN AN ORGANIZED HEALTH CARE EDUCATION/TRAINING PROGRAM

## 2019-12-15 PROCEDURE — 86901 BLOOD TYPING SEROLOGIC RH(D): CPT

## 2019-12-15 PROCEDURE — 87480 CANDIDA DNA DIR PROBE: CPT

## 2019-12-15 PROCEDURE — 87510 GARDNER VAG DNA DIR PROBE: CPT

## 2019-12-15 RX ORDER — DOCUSATE SODIUM 100 MG/1
100 CAPSULE, LIQUID FILLED ORAL 2 TIMES DAILY PRN
Qty: 60 CAPSULE | Refills: 1 | Status: SHIPPED | OUTPATIENT
Start: 2019-12-15 | End: 2020-04-28

## 2019-12-15 RX ORDER — ONDANSETRON 2 MG/ML
4 INJECTION INTRAMUSCULAR; INTRAVENOUS ONCE
Status: COMPLETED | OUTPATIENT
Start: 2019-12-15 | End: 2019-12-15

## 2019-12-15 RX ORDER — ONDANSETRON 2 MG/ML
INJECTION INTRAMUSCULAR; INTRAVENOUS
Status: DISPENSED
Start: 2019-12-15 | End: 2019-12-15

## 2019-12-15 RX ORDER — ONDANSETRON 4 MG/1
4 TABLET, ORALLY DISINTEGRATING ORAL EVERY 8 HOURS PRN
Qty: 8 TABLET | Refills: 0 | Status: SHIPPED | OUTPATIENT
Start: 2019-12-15 | End: 2020-04-14 | Stop reason: ALTCHOICE

## 2019-12-15 RX ORDER — FAMOTIDINE 20 MG/1
20 TABLET, FILM COATED ORAL 2 TIMES DAILY
Qty: 60 TABLET | Refills: 3 | Status: SHIPPED | OUTPATIENT
Start: 2019-12-15 | End: 2020-04-28

## 2019-12-15 RX ORDER — 0.9 % SODIUM CHLORIDE 0.9 %
1000 INTRAVENOUS SOLUTION INTRAVENOUS ONCE
Status: COMPLETED | OUTPATIENT
Start: 2019-12-15 | End: 2019-12-15

## 2019-12-15 RX ADMIN — SODIUM CHLORIDE 1000 ML: 9 INJECTION, SOLUTION INTRAVENOUS at 08:16

## 2019-12-15 RX ADMIN — ONDANSETRON 4 MG: 2 INJECTION INTRAMUSCULAR; INTRAVENOUS at 08:16

## 2019-12-15 RX ADMIN — FAMOTIDINE 20 MG: 10 INJECTION, SOLUTION INTRAVENOUS at 08:16

## 2019-12-15 ASSESSMENT — ENCOUNTER SYMPTOMS
BLOOD IN STOOL: 0
BACK PAIN: 0
PHOTOPHOBIA: 0
SHORTNESS OF BREATH: 0
STRIDOR: 0
NAUSEA: 1
VOMITING: 1
ABDOMINAL PAIN: 1
COUGH: 0
SORE THROAT: 0
RHINORRHEA: 0

## 2019-12-15 ASSESSMENT — PAIN DESCRIPTION - DESCRIPTORS: DESCRIPTORS: CRAMPING

## 2019-12-15 ASSESSMENT — PAIN SCALES - GENERAL: PAINLEVEL_OUTOF10: 9

## 2019-12-15 ASSESSMENT — PAIN DESCRIPTION - ORIENTATION: ORIENTATION: LOWER

## 2019-12-15 ASSESSMENT — PAIN DESCRIPTION - LOCATION: LOCATION: ABDOMEN

## 2019-12-15 ASSESSMENT — PAIN DESCRIPTION - PAIN TYPE: TYPE: ACUTE PAIN

## 2019-12-16 LAB
-: NORMAL
ALBUMIN SERPL-MCNC: 4.1 G/DL (ref 3.5–5.2)
ALBUMIN/GLOBULIN RATIO: 1 (ref 1–2.5)
ALP BLD-CCNC: 74 U/L (ref 35–104)
ALT SERPL-CCNC: 33 U/L (ref 5–33)
AMORPHOUS: NORMAL
AST SERPL-CCNC: 30 U/L
BACTERIA: NORMAL
BILIRUB SERPL-MCNC: 0.19 MG/DL (ref 0.3–1.2)
BILIRUBIN DIRECT: 0.09 MG/DL
BILIRUBIN URINE: NEGATIVE
BILIRUBIN, INDIRECT: 0.1 MG/DL (ref 0–1)
C TRACH DNA GENITAL QL NAA+PROBE: NEGATIVE
CASTS UA: NORMAL /LPF (ref 0–2)
COLOR: YELLOW
COMMENT UA: ABNORMAL
CRYSTALS, UA: NORMAL /HPF
CULTURE: NORMAL
EPITHELIAL CELLS UA: NORMAL /HPF
GLOBULIN: ABNORMAL G/DL (ref 1.5–3.8)
GLUCOSE URINE: NEGATIVE
KETONES, URINE: ABNORMAL
LEUKOCYTE ESTERASE, URINE: NEGATIVE
Lab: NORMAL
MUCUS: NORMAL
N. GONORRHOEAE DNA: NEGATIVE
NITRITE, URINE: NEGATIVE
OTHER OBSERVATIONS UA: NORMAL
PH UA: 6.5 (ref 5–8)
PROTEIN UA: ABNORMAL
RBC UA: NORMAL /HPF (ref 0–2)
RENAL EPITHELIAL, UA: NORMAL /HPF
SPECIFIC GRAVITY UA: 1.03 (ref 1–1.03)
SPECIMEN DESCRIPTION: NORMAL
SPECIMEN DESCRIPTION: NORMAL
TOTAL PROTEIN: 8.1 G/DL (ref 6.4–8.3)
TRICHOMONAS: NORMAL
TURBIDITY: CLEAR
URINE HGB: NEGATIVE
UROBILINOGEN, URINE: NORMAL
WBC UA: NORMAL /HPF (ref 0–5)
YEAST: NORMAL

## 2019-12-18 ENCOUNTER — ROUTINE PRENATAL (OUTPATIENT)
Dept: PERINATAL CARE | Age: 24
End: 2019-12-18
Payer: COMMERCIAL

## 2019-12-18 ENCOUNTER — TELEPHONE (OUTPATIENT)
Dept: OBGYN | Age: 24
End: 2019-12-18

## 2019-12-18 VITALS
TEMPERATURE: 98 F | HEART RATE: 65 BPM | HEIGHT: 64 IN | DIASTOLIC BLOOD PRESSURE: 45 MMHG | SYSTOLIC BLOOD PRESSURE: 108 MMHG | BODY MASS INDEX: 41.48 KG/M2 | RESPIRATION RATE: 16 BRPM | WEIGHT: 243 LBS

## 2019-12-18 DIAGNOSIS — O99.211 OBESITY AFFECTING PREGNANCY IN FIRST TRIMESTER: ICD-10-CM

## 2019-12-18 DIAGNOSIS — O09.211 CURRENT PREGNANCY WITH HISTORY OF PRE-TERM LABOR IN FIRST TRIMESTER: ICD-10-CM

## 2019-12-18 DIAGNOSIS — O99.411 MATERNAL CONGENITAL CARDIOVASCULAR DISORDER DURING PREGNANCY IN FIRST TRIMESTER: Primary | ICD-10-CM

## 2019-12-18 DIAGNOSIS — Q28.9 MATERNAL CONGENITAL CARDIOVASCULAR DISORDER DURING PREGNANCY IN FIRST TRIMESTER: Primary | ICD-10-CM

## 2019-12-18 DIAGNOSIS — Z3A.13 13 WEEKS GESTATION OF PREGNANCY: ICD-10-CM

## 2019-12-18 PROCEDURE — G8427 DOCREV CUR MEDS BY ELIG CLIN: HCPCS | Performed by: OBSTETRICS & GYNECOLOGY

## 2019-12-18 PROCEDURE — G8484 FLU IMMUNIZE NO ADMIN: HCPCS | Performed by: OBSTETRICS & GYNECOLOGY

## 2019-12-18 PROCEDURE — 99242 OFF/OP CONSLTJ NEW/EST SF 20: CPT | Performed by: OBSTETRICS & GYNECOLOGY

## 2019-12-18 PROCEDURE — G8417 CALC BMI ABV UP PARAM F/U: HCPCS | Performed by: OBSTETRICS & GYNECOLOGY

## 2019-12-20 ENCOUNTER — HOSPITAL ENCOUNTER (OUTPATIENT)
Age: 24
Setting detail: SPECIMEN
Discharge: HOME | End: 2019-12-20
Payer: COMMERCIAL

## 2019-12-20 ENCOUNTER — INITIAL PRENATAL (OUTPATIENT)
Dept: OBGYN | Age: 24
End: 2019-12-20
Payer: COMMERCIAL

## 2019-12-20 VITALS
SYSTOLIC BLOOD PRESSURE: 117 MMHG | WEIGHT: 242.2 LBS | DIASTOLIC BLOOD PRESSURE: 74 MMHG | HEIGHT: 64 IN | BODY MASS INDEX: 41.35 KG/M2 | HEART RATE: 90 BPM

## 2019-12-20 DIAGNOSIS — O09.91 HIGH-RISK PREGNANCY, FIRST TRIMESTER: ICD-10-CM

## 2019-12-20 LAB
ABO/RH: NORMAL
ABSOLUTE EOS #: 0.05 K/UL (ref 0–0.44)
ABSOLUTE IMMATURE GRANULOCYTE: <0.03 K/UL (ref 0–0.3)
ABSOLUTE LYMPH #: 1.48 K/UL (ref 1.1–3.7)
ABSOLUTE MONO #: 0.3 K/UL (ref 0.1–1.2)
AMPHETAMINE SCREEN URINE: NEGATIVE
ANTIBODY SCREEN: NEGATIVE
BARBITURATE SCREEN URINE: NEGATIVE
BASOPHILS # BLD: 0 % (ref 0–2)
BASOPHILS ABSOLUTE: <0.03 K/UL (ref 0–0.2)
BENZODIAZEPINE SCREEN, URINE: NEGATIVE
BUPRENORPHINE URINE: NORMAL
CANNABINOID SCREEN URINE: NEGATIVE
COCAINE METABOLITE, URINE: NEGATIVE
DIFFERENTIAL TYPE: ABNORMAL
DIRECT EXAM: NORMAL
EOSINOPHILS RELATIVE PERCENT: 1 % (ref 1–4)
HCT VFR BLD CALC: 35.7 % (ref 36.3–47.1)
HEMOGLOBIN: 11.7 G/DL (ref 11.9–15.1)
HEPATITIS B SURFACE ANTIGEN: NONREACTIVE
HIV AG/AB: NONREACTIVE
IMMATURE GRANULOCYTES: 0 %
LYMPHOCYTES # BLD: 25 % (ref 24–43)
Lab: NORMAL
MCH RBC QN AUTO: 29.8 PG (ref 25.2–33.5)
MCHC RBC AUTO-ENTMCNC: 32.8 G/DL (ref 28.4–34.8)
MCV RBC AUTO: 91.1 FL (ref 82.6–102.9)
MDMA URINE: NORMAL
METHADONE SCREEN, URINE: NEGATIVE
METHAMPHETAMINE, URINE: NORMAL
MONOCYTES # BLD: 5 % (ref 3–12)
NRBC AUTOMATED: 0 PER 100 WBC
OPIATES, URINE: NEGATIVE
OXYCODONE SCREEN URINE: NEGATIVE
PDW BLD-RTO: 12.6 % (ref 11.8–14.4)
PHENCYCLIDINE, URINE: NEGATIVE
PLATELET # BLD: ABNORMAL K/UL (ref 138–453)
PLATELET ESTIMATE: ABNORMAL
PLATELET, FLUORESCENCE: 171 K/UL (ref 138–453)
PLATELET, IMMATURE FRACTION: 11.8 % (ref 1.1–10.3)
PMV BLD AUTO: ABNORMAL FL (ref 8.1–13.5)
PROPOXYPHENE, URINE: NORMAL
RBC # BLD: 3.92 M/UL (ref 3.95–5.11)
RBC # BLD: ABNORMAL 10*6/UL
RUBV IGG SER QL: 262.6 IU/ML
SEG NEUTROPHILS: 69 % (ref 36–65)
SEGMENTED NEUTROPHILS ABSOLUTE COUNT: 4.17 K/UL (ref 1.5–8.1)
SPECIMEN DESCRIPTION: NORMAL
T. PALLIDUM, IGG: NONREACTIVE
TEST INFORMATION: NORMAL
TRICYCLIC ANTIDEPRESSANTS, UR: NORMAL
WBC # BLD: 6 K/UL (ref 3.5–11.3)
WBC # BLD: ABNORMAL 10*3/UL

## 2019-12-20 PROCEDURE — 80307 DRUG TEST PRSMV CHEM ANLYZR: CPT

## 2019-12-20 PROCEDURE — 87340 HEPATITIS B SURFACE AG IA: CPT

## 2019-12-20 PROCEDURE — 85025 COMPLETE CBC W/AUTO DIFF WBC: CPT

## 2019-12-20 PROCEDURE — 36415 COLL VENOUS BLD VENIPUNCTURE: CPT

## 2019-12-20 PROCEDURE — 87480 CANDIDA DNA DIR PROBE: CPT

## 2019-12-20 PROCEDURE — 83020 HEMOGLOBIN ELECTROPHORESIS: CPT

## 2019-12-20 PROCEDURE — 87086 URINE CULTURE/COLONY COUNT: CPT

## 2019-12-20 PROCEDURE — 86850 RBC ANTIBODY SCREEN: CPT

## 2019-12-20 PROCEDURE — 86900 BLOOD TYPING SEROLOGIC ABO: CPT

## 2019-12-20 PROCEDURE — 99212 OFFICE O/P EST SF 10 MIN: CPT | Performed by: STUDENT IN AN ORGANIZED HEALTH CARE EDUCATION/TRAINING PROGRAM

## 2019-12-20 PROCEDURE — 87660 TRICHOMONAS VAGIN DIR PROBE: CPT

## 2019-12-20 PROCEDURE — 87591 N.GONORRHOEAE DNA AMP PROB: CPT

## 2019-12-20 PROCEDURE — 86780 TREPONEMA PALLIDUM: CPT

## 2019-12-20 PROCEDURE — 86762 RUBELLA ANTIBODY: CPT

## 2019-12-20 PROCEDURE — 99213 OFFICE O/P EST LOW 20 MIN: CPT | Performed by: STUDENT IN AN ORGANIZED HEALTH CARE EDUCATION/TRAINING PROGRAM

## 2019-12-20 PROCEDURE — 87491 CHLMYD TRACH DNA AMP PROBE: CPT

## 2019-12-20 PROCEDURE — 87389 HIV-1 AG W/HIV-1&-2 AB AG IA: CPT

## 2019-12-20 PROCEDURE — 87510 GARDNER VAG DNA DIR PROBE: CPT

## 2019-12-20 PROCEDURE — 86901 BLOOD TYPING SEROLOGIC RH(D): CPT

## 2019-12-20 PROCEDURE — 90686 IIV4 VACC NO PRSV 0.5 ML IM: CPT | Performed by: STUDENT IN AN ORGANIZED HEALTH CARE EDUCATION/TRAINING PROGRAM

## 2019-12-20 PROCEDURE — 85055 RETICULATED PLATELET ASSAY: CPT

## 2019-12-21 LAB
CULTURE: NORMAL
Lab: NORMAL
SPECIMEN DESCRIPTION: NORMAL

## 2019-12-23 LAB
C TRACH DNA GENITAL QL NAA+PROBE: NEGATIVE
HGB ELECTROPHORESIS INTERP: NORMAL
N. GONORRHOEAE DNA: NEGATIVE
PATHOLOGIST: NORMAL
SPECIMEN DESCRIPTION: NORMAL

## 2019-12-24 ENCOUNTER — HOSPITAL ENCOUNTER (EMERGENCY)
Age: 24
Discharge: HOME | End: 2019-12-24
Attending: EMERGENCY MEDICINE
Payer: COMMERCIAL

## 2019-12-24 ENCOUNTER — TELEPHONE (OUTPATIENT)
Dept: OBGYN | Age: 24
End: 2019-12-24

## 2019-12-24 VITALS
RESPIRATION RATE: 16 BRPM | TEMPERATURE: 98.1 F | OXYGEN SATURATION: 98 % | HEART RATE: 87 BPM | WEIGHT: 250 LBS | HEIGHT: 64 IN | SYSTOLIC BLOOD PRESSURE: 121 MMHG | BODY MASS INDEX: 42.68 KG/M2 | DIASTOLIC BLOOD PRESSURE: 74 MMHG

## 2019-12-24 DIAGNOSIS — N30.01 ACUTE CYSTITIS WITH HEMATURIA: ICD-10-CM

## 2019-12-24 DIAGNOSIS — O46.90 VAGINAL BLEEDING IN PREGNANCY: Primary | ICD-10-CM

## 2019-12-24 LAB
-: NORMAL
ALBUMIN SERPL-MCNC: 4.1 G/DL (ref 3.5–5.2)
ALBUMIN/GLOBULIN RATIO: 1.3 (ref 1–2.5)
ALP BLD-CCNC: 73 U/L (ref 35–104)
ALT SERPL-CCNC: 42 U/L (ref 5–33)
AMORPHOUS: NORMAL
ANION GAP SERPL CALCULATED.3IONS-SCNC: 11 MMOL/L (ref 9–17)
AST SERPL-CCNC: 23 U/L
BACTERIA: NORMAL
BILIRUB SERPL-MCNC: 0.19 MG/DL (ref 0.3–1.2)
BILIRUBIN URINE: NEGATIVE
BUN BLDV-MCNC: 5 MG/DL (ref 6–20)
BUN/CREAT BLD: ABNORMAL (ref 9–20)
CALCIUM SERPL-MCNC: 9.2 MG/DL (ref 8.6–10.4)
CASTS UA: NORMAL /LPF (ref 0–8)
CHLORIDE BLD-SCNC: 101 MMOL/L (ref 98–107)
CO2: 21 MMOL/L (ref 20–31)
COLOR: ABNORMAL
COMMENT UA: ABNORMAL
CREAT SERPL-MCNC: 0.35 MG/DL (ref 0.5–0.9)
CRYSTALS, UA: NORMAL /HPF
CULTURE: NORMAL
DIRECT EXAM: NORMAL
EPITHELIAL CELLS UA: NORMAL /HPF (ref 0–5)
GFR AFRICAN AMERICAN: >60 ML/MIN
GFR NON-AFRICAN AMERICAN: >60 ML/MIN
GFR SERPL CREATININE-BSD FRML MDRD: ABNORMAL ML/MIN/{1.73_M2}
GFR SERPL CREATININE-BSD FRML MDRD: ABNORMAL ML/MIN/{1.73_M2}
GLUCOSE BLD-MCNC: 93 MG/DL (ref 70–99)
GLUCOSE URINE: NEGATIVE
HCG QUANTITATIVE: ABNORMAL IU/L
HCT VFR BLD CALC: 34.5 % (ref 36.3–47.1)
HEMOGLOBIN: 11.2 G/DL (ref 11.9–15.1)
KETONES, URINE: ABNORMAL
LEUKOCYTE ESTERASE, URINE: ABNORMAL
LIPASE: 15 U/L (ref 13–60)
Lab: NORMAL
Lab: NORMAL
MCH RBC QN AUTO: 29.8 PG (ref 25.2–33.5)
MCHC RBC AUTO-ENTMCNC: 32.5 G/DL (ref 28.4–34.8)
MCV RBC AUTO: 91.8 FL (ref 82.6–102.9)
MUCUS: NORMAL
NITRITE, URINE: NEGATIVE
NRBC AUTOMATED: 0 PER 100 WBC
OTHER OBSERVATIONS UA: NORMAL
PDW BLD-RTO: 12.7 % (ref 11.8–14.4)
PH UA: 6 (ref 5–8)
PLATELET # BLD: 157 K/UL (ref 138–453)
PMV BLD AUTO: 12.8 FL (ref 8.1–13.5)
POTASSIUM SERPL-SCNC: 3.4 MMOL/L (ref 3.7–5.3)
PROTEIN UA: ABNORMAL
RBC # BLD: 3.76 M/UL (ref 3.95–5.11)
RBC UA: NORMAL /HPF (ref 0–4)
RENAL EPITHELIAL, UA: NORMAL /HPF
SODIUM BLD-SCNC: 133 MMOL/L (ref 135–144)
SPECIFIC GRAVITY UA: 1.03 (ref 1–1.03)
SPECIMEN DESCRIPTION: NORMAL
SPECIMEN DESCRIPTION: NORMAL
TOTAL PROTEIN: 7.3 G/DL (ref 6.4–8.3)
TRICHOMONAS: NORMAL
TURBIDITY: ABNORMAL
URINE HGB: ABNORMAL
UROBILINOGEN, URINE: NORMAL
WBC # BLD: 6.8 K/UL (ref 3.5–11.3)
WBC UA: NORMAL /HPF (ref 0–5)
YEAST: NORMAL

## 2019-12-24 PROCEDURE — 87086 URINE CULTURE/COLONY COUNT: CPT

## 2019-12-24 PROCEDURE — 83690 ASSAY OF LIPASE: CPT

## 2019-12-24 PROCEDURE — 87491 CHLMYD TRACH DNA AMP PROBE: CPT

## 2019-12-24 PROCEDURE — 99284 EMERGENCY DEPT VISIT MOD MDM: CPT

## 2019-12-24 PROCEDURE — 84702 CHORIONIC GONADOTROPIN TEST: CPT

## 2019-12-24 PROCEDURE — 87660 TRICHOMONAS VAGIN DIR PROBE: CPT

## 2019-12-24 PROCEDURE — 87591 N.GONORRHOEAE DNA AMP PROB: CPT

## 2019-12-24 PROCEDURE — 85027 COMPLETE CBC AUTOMATED: CPT

## 2019-12-24 PROCEDURE — 80053 COMPREHEN METABOLIC PANEL: CPT

## 2019-12-24 PROCEDURE — 87510 GARDNER VAG DNA DIR PROBE: CPT

## 2019-12-24 PROCEDURE — 87480 CANDIDA DNA DIR PROBE: CPT

## 2019-12-24 PROCEDURE — 81001 URINALYSIS AUTO W/SCOPE: CPT

## 2019-12-24 RX ORDER — CEPHALEXIN 500 MG/1
500 CAPSULE ORAL 2 TIMES DAILY
Qty: 14 CAPSULE | Refills: 0 | Status: SHIPPED | OUTPATIENT
Start: 2019-12-24 | End: 2019-12-31

## 2019-12-24 ASSESSMENT — ENCOUNTER SYMPTOMS
ABDOMINAL PAIN: 1
CHEST TIGHTNESS: 0
SHORTNESS OF BREATH: 0
SORE THROAT: 0
EYE REDNESS: 0
NAUSEA: 0
BACK PAIN: 0
VOMITING: 0
PHOTOPHOBIA: 0

## 2019-12-24 ASSESSMENT — PAIN DESCRIPTION - ORIENTATION: ORIENTATION: LEFT;UPPER

## 2019-12-24 ASSESSMENT — PAIN DESCRIPTION - LOCATION: LOCATION: ABDOMEN

## 2019-12-24 ASSESSMENT — PAIN SCALES - GENERAL: PAINLEVEL_OUTOF10: 10

## 2019-12-24 ASSESSMENT — PAIN DESCRIPTION - DESCRIPTORS: DESCRIPTORS: SHARP

## 2019-12-26 LAB
C TRACH DNA GENITAL QL NAA+PROBE: NEGATIVE
N. GONORRHOEAE DNA: NEGATIVE
SPECIMEN DESCRIPTION: NORMAL

## 2020-01-02 ENCOUNTER — OFFICE VISIT (OUTPATIENT)
Dept: INTERNAL MEDICINE | Age: 25
End: 2020-01-02
Payer: COMMERCIAL

## 2020-01-02 VITALS
SYSTOLIC BLOOD PRESSURE: 127 MMHG | WEIGHT: 235.4 LBS | BODY MASS INDEX: 40.19 KG/M2 | HEART RATE: 92 BPM | DIASTOLIC BLOOD PRESSURE: 74 MMHG | HEIGHT: 64 IN

## 2020-01-02 PROCEDURE — 99213 OFFICE O/P EST LOW 20 MIN: CPT | Performed by: STUDENT IN AN ORGANIZED HEALTH CARE EDUCATION/TRAINING PROGRAM

## 2020-01-02 PROCEDURE — 1036F TOBACCO NON-USER: CPT | Performed by: STUDENT IN AN ORGANIZED HEALTH CARE EDUCATION/TRAINING PROGRAM

## 2020-01-02 PROCEDURE — G8417 CALC BMI ABV UP PARAM F/U: HCPCS | Performed by: STUDENT IN AN ORGANIZED HEALTH CARE EDUCATION/TRAINING PROGRAM

## 2020-01-02 PROCEDURE — G8482 FLU IMMUNIZE ORDER/ADMIN: HCPCS | Performed by: STUDENT IN AN ORGANIZED HEALTH CARE EDUCATION/TRAINING PROGRAM

## 2020-01-02 PROCEDURE — 99211 OFF/OP EST MAY X REQ PHY/QHP: CPT | Performed by: INTERNAL MEDICINE

## 2020-01-02 PROCEDURE — G8427 DOCREV CUR MEDS BY ELIG CLIN: HCPCS | Performed by: STUDENT IN AN ORGANIZED HEALTH CARE EDUCATION/TRAINING PROGRAM

## 2020-01-02 RX ORDER — AMOXICILLIN AND CLAVULANATE POTASSIUM 875; 125 MG/1; MG/1
1 TABLET, FILM COATED ORAL 2 TIMES DAILY
Qty: 14 TABLET | Refills: 0 | Status: SHIPPED | OUTPATIENT
Start: 2020-01-02 | End: 2020-01-09

## 2020-01-02 RX ORDER — DOCUSATE SODIUM 100 MG/1
100 CAPSULE, LIQUID FILLED ORAL 2 TIMES DAILY PRN
Qty: 60 CAPSULE | Refills: 1 | Status: CANCELLED | OUTPATIENT
Start: 2020-01-02

## 2020-01-02 RX ORDER — ASPIRIN 81 MG/1
81 TABLET ORAL DAILY
Qty: 30 TABLET | Refills: 12 | Status: CANCELLED | OUTPATIENT
Start: 2020-01-02

## 2020-01-02 RX ORDER — IBUPROFEN 200 MG
1 TABLET ORAL DAILY
Qty: 30 TABLET | Refills: 12 | Status: SHIPPED | OUTPATIENT
Start: 2020-01-02 | End: 2020-04-09 | Stop reason: SDUPTHER

## 2020-01-02 RX ORDER — LORATADINE 10 MG/1
10 TABLET ORAL DAILY
Qty: 30 TABLET | Refills: 0 | Status: SHIPPED | OUTPATIENT
Start: 2020-01-02 | End: 2020-04-28

## 2020-01-02 RX ORDER — ONDANSETRON 4 MG/1
4 TABLET, ORALLY DISINTEGRATING ORAL EVERY 8 HOURS PRN
Qty: 8 TABLET | Refills: 0 | Status: CANCELLED | OUTPATIENT
Start: 2020-01-02

## 2020-01-02 RX ORDER — FLUTICASONE PROPIONATE 50 MCG
1 SPRAY, SUSPENSION (ML) NASAL DAILY
Qty: 1 BOTTLE | Refills: 3 | Status: CANCELLED | OUTPATIENT
Start: 2020-01-02

## 2020-01-02 RX ORDER — FAMOTIDINE 20 MG/1
20 TABLET, FILM COATED ORAL 2 TIMES DAILY
Qty: 60 TABLET | Refills: 3 | Status: CANCELLED | OUTPATIENT
Start: 2020-01-02

## 2020-01-02 RX ORDER — ALBUTEROL SULFATE 90 UG/1
2 AEROSOL, METERED RESPIRATORY (INHALATION) EVERY 6 HOURS PRN
Qty: 1 INHALER | Refills: 3 | Status: SHIPPED | OUTPATIENT
Start: 2020-01-02 | End: 2020-08-19

## 2020-01-02 ASSESSMENT — PATIENT HEALTH QUESTIONNAIRE - PHQ9
SUM OF ALL RESPONSES TO PHQ9 QUESTIONS 1 & 2: 0
SUM OF ALL RESPONSES TO PHQ QUESTIONS 1-9: 0
SUM OF ALL RESPONSES TO PHQ QUESTIONS 1-9: 0
1. LITTLE INTEREST OR PLEASURE IN DOING THINGS: 0
2. FEELING DOWN, DEPRESSED OR HOPELESS: 0

## 2020-01-02 NOTE — PROGRESS NOTES
[facility] [address] [address]  Dept: [phone]  Dept Fax: [phone]    Office Progress/Follow Up Note  Date of patient's visit: 2020  Patient's Name:  Hermes Aggarwal YOB: 1995            Patient Care Team:  Kassy Rosen MD as PCP - General (Internal Medicine)  Bruce Zavala MD as Obstetrician (Perinatology)  Tariq Whyte DO as Consulting Physician (General Surgery)  ________________________________________________________________________      Reason for Visit: Routine outpatient same-day visit  ________________________________________________________________________  Chief Complaint:  Otalgia (infection, off and on about a year)    ________________________________________________________________________  History of Presenting Illness:  History was obtained from: patient, electronic medical record. Hermes Aggarwal is a 21 y.o. is here for a follow-up for same-day visit for right ear infection. Patient has history of seasonal allergies, asthma on albuterol inhaler, 15 weeks pregnant is here for the same day visit complaining of right ear pain infection. Patient stating that she has right ear infection with the same type of symptoms that she experience when she had one in the past.  On examination his right external ear appears normal, tympanic membrane erythematous, patient complaining of excruciating pain in the ear, no pain on the movement of ear pinna. No complaint of chest pain, nausea, vomiting or diaphoresis. Patient Active Problem List   Diagnosis    Asthma    Atopy    High-risk pregnancy, first trimester    History of congenital heart defect    BMI 40.0-44.9, adult ([address]    Axillary hidradenitis suppurativa    History of  delivery, currently pregnant in first trimester       There are no preventive care reminders to display for this patient.     Allergies   Allergen Reactions    Lidocaine Hives         Current Outpatient Medications   Medication Sig Dispense Refill    docusate sodium (COLACE) 100 MG capsule Take 1 capsule by mouth 2 times daily as needed for Constipation 60 capsule 1    famotidine (PEPCID) 20 MG tablet Take 1 tablet by mouth 2 times daily 60 tablet 3    ondansetron (ZOFRAN ODT) 4 MG disintegrating tablet Take 1 tablet by mouth every 8 hours as needed for Nausea 8 tablet 0    aspirin EC 81 MG EC tablet Take 1 tablet by mouth daily Start on Dec 10, 2019  and take until completion of pregnancy 30 tablet 12    Prenatal Multivit-Min-Fe-FA (PRENATAL FORTE) TABS Take 1 tablet by mouth Daily Pharmacist may substitute with any formulary prenatal vitamin. 30 tablet 12    albuterol sulfate HFA (VENTOLIN HFA) 108 (90 Base) MCG/ACT inhaler Inhale 2 puffs into the lungs every 6 hours as needed for Wheezing 1 Inhaler 3    fluticasone (FLONASE) 50 MCG/ACT nasal spray 1 spray by Nasal route daily 1 Bottle 3    fluticasone (FLOVENT HFA) 110 MCG/ACT inhaler Inhale 2 puffs into the lungs 2 times daily 1 Inhaler 3     No current facility-administered medications for this visit.         Social History     Tobacco Use    Smoking status: Never Smoker    Smokeless tobacco: Never Used   Substance Use Topics    Alcohol use: No     Alcohol/week: 0.0 standard drinks    Drug use: No       Family History   Problem Relation Age of Onset    Other Brother          suicide     Diabetes Maternal Grandmother     Cancer Maternal Grandmother     Heart Disease Paternal Uncle     Early Death Paternal Uncle 62        MI    Learning Disabilities Mother     Diabetes Mother         Type 2 managed with insulin     No Known Problems Paternal Grandfather     No Known Problems Paternal Grandmother     Lung Cancer Maternal Grandfather     No Known Problems Father     No Known Problems Sister     No Known Problems Brother     Breast Cancer Neg Hx     Colon Cancer Neg Hx     Eclampsia Neg Hx     Hypertension Neg Hx     Ovarian Cancer Neg Hx      Labor Neg Hx     Spont Abortions Neg Hx     Stroke Neg Hx     Uterine Cancer Neg Hx       ________________________________________________________________________  Review of Systems:  CONSTITUTIONAL: Denies: fever, chills  PSYCH: Denies: anxiety, depression  ALLERGIES: Denies: urticaria  EYES: Denies: blurry vision, decreased vision, photophobia  ENT: Ear as mentioned in HPI, complaining of nasal congestion and runny nose.   CARDIOVASCULAR: Denies: chest pain, dyspnea on exertion  RESPIRATORY: Denies: cough, hemoptysis, shortness of breath  GI: Denies: Denies: abdominal pain, flank pain  : Denies: Denies: dysuria, frequency/urgency  NEURO: Denies: dizzy/vertigo, headache  MUSCULOSKELETAL: Denies: back pain, joint pain  SKIN: Denies: rash, itching  ________________________________________________________________________  Physical Exam:  Vitals:    20 1130   BP: 127/74   Site: Right Upper Arm   Position: Sitting   Cuff Size: Medium Adult   Pulse: 92   Weight: 235 lb 6.4 oz (106.8 kg)   Height: 5' 4\" (1.626 m)     BP Readings from Last 3 Encounters:   20 127/74   19 121/74   19 117/74      General appearance - alert, well appearing, and in no distress  Mental status - alert, oriented to person, place, and time  Eyes - pupils equal and reactive, extraocular eye movements intact  Ears -as mentioned in the HPI  Nose - normal and patent, edema present, discharge noted  Chest - clear to auscultation, no wheezes, rales or rhonchi, symmetric air entry  Heart - normal rate, regular rhythm, normal S1, S2, no murmurs, rubs, clicks or gallops  Extremities - peripheral pulses normal, no pedal edema, no clubbing or cyanosis    ________________________________________________________________________  Diagnostic findings:  CBC:  Lab Results   Component Value Date    WBC 6.8 2019    HGB 11.2 2019     2019       BMP:    Lab 12:00 PM    This note is created with the assistance of a speech-recognition program. While intending to generate a document that actually reflects the content of the visit, the document can still have some mistakes which may not have been identified and corrected by editing. Attending Physician Statement  I have discussed the care of [address], including pertinent history and exam findings,  with the resident. I have reviewed the key elements of all parts of the encounter with the resident. I agree with the assessment, plan and orders as documented by the resident. (GE Modifier)    Acute OM. Plan budesonide nasal, po Augmentin course. Note 15 week pregnancy.

## 2020-01-08 ENCOUNTER — TELEPHONE (OUTPATIENT)
Dept: INTERNAL MEDICINE | Age: 25
End: 2020-01-08

## 2020-01-08 NOTE — TELEPHONE ENCOUNTER
PA request for Rhinocort      PA processed and submitted to pt insurance, waiting for response in regards to coverage

## 2020-01-09 ENCOUNTER — TELEPHONE (OUTPATIENT)
Dept: OBGYN | Age: 25
End: 2020-01-09

## 2020-01-10 ENCOUNTER — ROUTINE PRENATAL (OUTPATIENT)
Dept: PERINATAL CARE | Age: 25
End: 2020-01-10
Payer: COMMERCIAL

## 2020-01-10 VITALS
DIASTOLIC BLOOD PRESSURE: 70 MMHG | SYSTOLIC BLOOD PRESSURE: 110 MMHG | BODY MASS INDEX: 40.46 KG/M2 | WEIGHT: 237 LBS | RESPIRATION RATE: 16 BRPM | TEMPERATURE: 98.3 F | HEIGHT: 64 IN | HEART RATE: 68 BPM

## 2020-01-10 LAB
ABDOMINAL CIRCUMFERENCE: NORMAL
BIPARIETAL DIAMETER: NORMAL
ESTIMATED FETAL WEIGHT: NORMAL
FEMORAL DIAMETER: NORMAL
HC/AC: NORMAL
HEAD CIRCUMFERENCE: NORMAL

## 2020-01-10 PROCEDURE — 76805 OB US >/= 14 WKS SNGL FETUS: CPT | Performed by: OBSTETRICS & GYNECOLOGY

## 2020-01-10 PROCEDURE — 76817 TRANSVAGINAL US OBSTETRIC: CPT | Performed by: OBSTETRICS & GYNECOLOGY

## 2020-01-10 RX ORDER — HYDROXYPROGESTERONE CAPROATE 250 MG/ML
250 INJECTION INTRAMUSCULAR
Status: ON HOLD | COMMUNITY
Start: 2020-01-11 | End: 2020-06-11 | Stop reason: HOSPADM

## 2020-01-10 RX ORDER — AMOXICILLIN 875 MG/1
875 TABLET, COATED ORAL 2 TIMES DAILY
COMMUNITY
End: 2020-02-21 | Stop reason: ALTCHOICE

## 2020-01-14 ENCOUNTER — ROUTINE PRENATAL (OUTPATIENT)
Dept: OBGYN | Age: 25
End: 2020-01-14
Payer: COMMERCIAL

## 2020-01-14 ENCOUNTER — HOSPITAL ENCOUNTER (OUTPATIENT)
Age: 25
Setting detail: SPECIMEN
Discharge: HOME | End: 2020-01-14
Payer: COMMERCIAL

## 2020-01-14 VITALS
WEIGHT: 239.31 LBS | DIASTOLIC BLOOD PRESSURE: 66 MMHG | BODY MASS INDEX: 41.08 KG/M2 | HEART RATE: 66 BPM | SYSTOLIC BLOOD PRESSURE: 103 MMHG

## 2020-01-14 PROCEDURE — 82105 ALPHA-FETOPROTEIN SERUM: CPT

## 2020-01-14 PROCEDURE — 99213 OFFICE O/P EST LOW 20 MIN: CPT | Performed by: OBSTETRICS & GYNECOLOGY

## 2020-01-14 PROCEDURE — G8482 FLU IMMUNIZE ORDER/ADMIN: HCPCS | Performed by: OBSTETRICS & GYNECOLOGY

## 2020-01-14 PROCEDURE — 1036F TOBACCO NON-USER: CPT | Performed by: OBSTETRICS & GYNECOLOGY

## 2020-01-14 PROCEDURE — G8417 CALC BMI ABV UP PARAM F/U: HCPCS | Performed by: OBSTETRICS & GYNECOLOGY

## 2020-01-14 PROCEDURE — G8427 DOCREV CUR MEDS BY ELIG CLIN: HCPCS | Performed by: OBSTETRICS & GYNECOLOGY

## 2020-01-14 PROCEDURE — 36415 COLL VENOUS BLD VENIPUNCTURE: CPT

## 2020-01-14 NOTE — PROGRESS NOTES
Elena Downing is a 21 y.o. female 17w0d        OB History    Para Term  AB Living   3 2 0 2 0 2   SAB TAB Ectopic Molar Multiple Live Births   0 0 0   0 1      # Outcome Date GA Lbr Filipe/2nd Weight Sex Delivery Anes PTL Lv   3 Current            2   32w0d  4 lb 3 oz (1.899 kg) M Vag-Spont         Birth Comments: [facility]   1   35w0d  5 lb 1 oz (2.296 kg) F Vag-Spont EPI Y VANITA      Obstetric Comments   G1-    G2-  - Progesterone in this preg      Same partner for all preg. Vitals  BP: 103/66  Weight: 239 lb 5 oz (108.6 kg)  Pulse: 66  Patient Position: Sitting  Albumin: Trace  Glucose: Negative      The patient was seen and evaluated. There was Positive fetal movements. No contractions or leakage of fluid. Signs and symptoms of  labor as well as labor were reviewed. The Nuchal Translucency testing was reviewed and found to be normal. A single marker MSAFP was ordered for a 15-20 week gestational age window. An 18-22 week anatomy ultrasound has been ordered. The patient will return to the office for her next visit in 4 weeks. See antepartum flow sheet. Risk Factors- Same partner, h/o CHD, BMI>40, Asthma, h/o SPTD  Cultures only at next visit. Agreeable to vaccinations in preg  1hr gtt lab given for early diabetic screening   Plan to refer pt to Dr. Garrett Winter for h/o CHD   Rx sent for pt to start ASA  19- Pt has appt with M for first trimester screening and consult. Referral placed for Anatomy Scan  19- Referral sent to Dr. Garrett Winter   12/10/19- Rx for prog sent to [facility] and referral faxed to [employer]     Pap 18 Negative (Age 24)          Assessment:  1. Elena Downing is a 21 y.o. female  2. [id number]  3. 17w0d    Patient Active Problem List    Diagnosis Date Noted    Asthma 2012     Priority: High     Daily inhaler- pt non compliant using.   Rescue inhaler as needed      High-risk pregnancy, first trimester 2019     Rx for ASA sent to pt pharm to start at [facility] 12wks as she meets mod risk factors per protocol. Pt established with MFM. Scheduled for first trimester screening and consult  19- MFM referral placed for anatomy scan      History of congenital heart defect 2019     Arotic Stenosis. Pt was under the care of Peds Cardiology. Dr. Randa Ramos. Last visit . Plan to refer patient. Last maternal echo -   19- Referral sent to Dr. Randa Ramos   19- Pt reports she has appt with [facility] on 19 referred by Dr. Primitivo Woods- [place] BMI 40.0-44.9, adult ([address] 2019     GA 9 wk. Early one hr GTT lab ordered for early diabetic screening.  Axillary hidradenitis suppurativa 2019- Pt reports she continues to have current problems and pain assoc with this dx- SK      History of  delivery, currently pregnant in first trimester 2019 and   ( progesterone in  )   17P Plans/Education and Counseled-  Labor Definition and Warning Signs, What is 17P and  Common side effects of 17P injections,  [employer] care with [employer]. 12/10/19- RX faxed to [facility] and referral faxed to [facility] Atopy 2018        Diagnosis Orders   1.  HRP (high risk pregnancy), second trimester  Alpha Fetoprotein, Maternal         Plan:  MSAFP ordered  Pt receiving Progesterone injections on   States she is taking PNV and ASA 81mg daily  Pt scheduled for MFM follow up (2020)  Encouraged pt to get 1 hour GTT done asap  RTO in 4 weeks    [address]

## 2020-01-19 LAB
AFP INTERPRETATION: NORMAL
AFP MOM: 1.61
AFP SPECIMEN: NORMAL
AFP: 48 NG/ML
DATE OF BIRTH: NORMAL
DATING METHOD: NORMAL
DETERMINED BY: NORMAL
DIABETIC: NEGATIVE
DUE DATE: NORMAL
ESTIMATED DUE DATE: NORMAL
FAMILY HISTORY NTD: NORMAL
GESTATIONAL AGE: NORMAL
INSULIN REQ DIABETES: NO
LAST MENSTRUAL PERIOD: NORMAL
MATERNAL AGE AT EDD: 26 YR
MATERNAL WEIGHT: 239
MONOCHORIONIC TWINS: NORMAL
NUMBER OF FETUSES: NORMAL
PATIENT WEIGHT UNITS: NORMAL
PATIENT WEIGHT: NORMAL
RACE (MATERNAL): NORMAL
RACE: NORMAL
REPEAT SPECIMEN?: NORMAL
SMOKING: NORMAL
SMOKING: NORMAL
VALPROIC/CARBAMAZEP: NORMAL
ZZ NTE CLEAN UP: HISTORY: NO

## 2020-01-24 ENCOUNTER — ROUTINE PRENATAL (OUTPATIENT)
Dept: PERINATAL CARE | Age: 25
End: 2020-01-24
Payer: COMMERCIAL

## 2020-01-24 VITALS
BODY MASS INDEX: 40.63 KG/M2 | HEART RATE: 81 BPM | TEMPERATURE: 98.7 F | RESPIRATION RATE: 16 BRPM | HEIGHT: 64 IN | WEIGHT: 238 LBS | SYSTOLIC BLOOD PRESSURE: 128 MMHG | DIASTOLIC BLOOD PRESSURE: 61 MMHG

## 2020-01-24 PROCEDURE — 76815 OB US LIMITED FETUS(S): CPT | Performed by: OBSTETRICS & GYNECOLOGY

## 2020-01-24 PROCEDURE — 99999 PR OFFICE/OUTPT VISIT,PROCEDURE ONLY: CPT | Performed by: OBSTETRICS & GYNECOLOGY

## 2020-01-24 PROCEDURE — 76817 TRANSVAGINAL US OBSTETRIC: CPT | Performed by: OBSTETRICS & GYNECOLOGY

## 2020-02-07 ENCOUNTER — ROUTINE PRENATAL (OUTPATIENT)
Dept: PERINATAL CARE | Age: 25
End: 2020-02-07
Payer: COMMERCIAL

## 2020-02-07 VITALS
RESPIRATION RATE: 16 BRPM | BODY MASS INDEX: 40.8 KG/M2 | TEMPERATURE: 98.2 F | HEIGHT: 64 IN | WEIGHT: 239 LBS | HEART RATE: 79 BPM | DIASTOLIC BLOOD PRESSURE: 69 MMHG | SYSTOLIC BLOOD PRESSURE: 115 MMHG

## 2020-02-07 PROCEDURE — 76811 OB US DETAILED SNGL FETUS: CPT | Performed by: OBSTETRICS & GYNECOLOGY

## 2020-02-07 PROCEDURE — 76817 TRANSVAGINAL US OBSTETRIC: CPT | Performed by: OBSTETRICS & GYNECOLOGY

## 2020-02-21 ENCOUNTER — ROUTINE PRENATAL (OUTPATIENT)
Dept: PERINATAL CARE | Age: 25
End: 2020-02-21
Payer: COMMERCIAL

## 2020-02-21 VITALS
WEIGHT: 242 LBS | HEART RATE: 74 BPM | TEMPERATURE: 97.7 F | RESPIRATION RATE: 16 BRPM | HEIGHT: 64 IN | SYSTOLIC BLOOD PRESSURE: 105 MMHG | DIASTOLIC BLOOD PRESSURE: 59 MMHG | BODY MASS INDEX: 41.32 KG/M2

## 2020-02-21 PROCEDURE — 76817 TRANSVAGINAL US OBSTETRIC: CPT | Performed by: OBSTETRICS & GYNECOLOGY

## 2020-02-21 PROCEDURE — 76815 OB US LIMITED FETUS(S): CPT | Performed by: OBSTETRICS & GYNECOLOGY

## 2020-03-03 ENCOUNTER — ROUTINE PRENATAL (OUTPATIENT)
Dept: OBGYN | Age: 25
End: 2020-03-03
Payer: COMMERCIAL

## 2020-03-03 VITALS
HEART RATE: 78 BPM | DIASTOLIC BLOOD PRESSURE: 67 MMHG | SYSTOLIC BLOOD PRESSURE: 107 MMHG | WEIGHT: 240 LBS | BODY MASS INDEX: 41.2 KG/M2

## 2020-03-03 PROCEDURE — 99212 OFFICE O/P EST SF 10 MIN: CPT | Performed by: OBSTETRICS & GYNECOLOGY

## 2020-03-03 PROCEDURE — G8417 CALC BMI ABV UP PARAM F/U: HCPCS | Performed by: OBSTETRICS & GYNECOLOGY

## 2020-03-03 PROCEDURE — G8482 FLU IMMUNIZE ORDER/ADMIN: HCPCS | Performed by: OBSTETRICS & GYNECOLOGY

## 2020-03-03 PROCEDURE — 99213 OFFICE O/P EST LOW 20 MIN: CPT | Performed by: OBSTETRICS & GYNECOLOGY

## 2020-03-03 PROCEDURE — 1036F TOBACCO NON-USER: CPT | Performed by: OBSTETRICS & GYNECOLOGY

## 2020-03-03 PROCEDURE — G8427 DOCREV CUR MEDS BY ELIG CLIN: HCPCS | Performed by: OBSTETRICS & GYNECOLOGY

## 2020-03-03 NOTE — PROGRESS NOTES
Hermes Aggarwal 21 y.o. [id number] at 24w0d     Blood pressure 107/67, pulse 78, weight 240 lb (108.9 kg), not currently breastfeeding. The patient was seen and evaluated. There was positive fetal movements. No contractions or leakage of fluid. Signs and symptoms of  labor as well as labor were reviewed. The S/S of Pre-Eclampsia were reviewed with the patient in detail. She is to report any of these if they occur. She currently denies any of these. The patient had her 28 week labs ordered. The patient was instructed on fetal kick counts. . She was instructed that the baby should move at a minimum of ten times within one hour after a meal. The patient was instructed to lay down on her left side twenty minutes after eating and count movements for up to one hour with a target value of ten movements. She was instructed to notify the office if she did not make that target after two attempts or if after any attempt there was less than four movements. The patient reports that the targets have been made Yes. Patient Active Problem List   Diagnosis    Asthma    Atopy    High-risk pregnancy, first trimester    History of congenital heart defect    BMI 40.0-44.9, adult ([facility] [address]    Axillary hidradenitis suppurativa    History of  delivery, currently pregnant in first trimester     1. HRP (high risk pregnancy), second trimester  -  1 hr GTT  - CBC; Future    2. History of congenital heart defect  - Cardiology follow up scheduled 3/24/20    3. History of aortic stenosis, asymptomatic  Cardiology follow up    4. History of  delivery  - continue weekly 17 OHP          The patient will return to the office for her next visit in 4 weeks. See antepartum flow sheet.

## 2020-03-06 ENCOUNTER — ROUTINE PRENATAL (OUTPATIENT)
Dept: PERINATAL CARE | Age: 25
End: 2020-03-06
Payer: COMMERCIAL

## 2020-03-06 VITALS
HEART RATE: 85 BPM | DIASTOLIC BLOOD PRESSURE: 62 MMHG | TEMPERATURE: 98 F | SYSTOLIC BLOOD PRESSURE: 116 MMHG | BODY MASS INDEX: 40.72 KG/M2 | RESPIRATION RATE: 16 BRPM | HEIGHT: 64 IN | WEIGHT: 238.5 LBS

## 2020-03-06 PROCEDURE — 76816 OB US FOLLOW-UP PER FETUS: CPT | Performed by: OBSTETRICS & GYNECOLOGY

## 2020-03-06 PROCEDURE — 76817 TRANSVAGINAL US OBSTETRIC: CPT | Performed by: OBSTETRICS & GYNECOLOGY

## 2020-03-27 ENCOUNTER — TELEPHONE (OUTPATIENT)
Dept: INTERNAL MEDICINE | Age: 25
End: 2020-03-27

## 2020-03-27 NOTE — TELEPHONE ENCOUNTER
Patient states she has another ear infection & would like an antibiotic sent downstairs. She wanted to inform that she is 7 months pregnant in case it is not safe to take certain antibiotics while pregnant. Please advise & send medications to [facility] pharmacy if appropriate.

## 2020-03-31 ENCOUNTER — ROUTINE PRENATAL (OUTPATIENT)
Dept: OBGYN | Age: 25
End: 2020-03-31
Payer: COMMERCIAL

## 2020-03-31 VITALS
TEMPERATURE: 96.6 F | BODY MASS INDEX: 41.15 KG/M2 | DIASTOLIC BLOOD PRESSURE: 62 MMHG | HEART RATE: 67 BPM | HEIGHT: 64 IN | SYSTOLIC BLOOD PRESSURE: 107 MMHG | WEIGHT: 241 LBS

## 2020-03-31 PROBLEM — O09.91 HIGH-RISK PREGNANCY, FIRST TRIMESTER: Status: RESOLVED | Noted: 2019-11-19 | Resolved: 2020-03-31

## 2020-03-31 PROCEDURE — 99213 OFFICE O/P EST LOW 20 MIN: CPT | Performed by: STUDENT IN AN ORGANIZED HEALTH CARE EDUCATION/TRAINING PROGRAM

## 2020-03-31 PROCEDURE — G8482 FLU IMMUNIZE ORDER/ADMIN: HCPCS | Performed by: STUDENT IN AN ORGANIZED HEALTH CARE EDUCATION/TRAINING PROGRAM

## 2020-03-31 PROCEDURE — 90715 TDAP VACCINE 7 YRS/> IM: CPT | Performed by: OBSTETRICS & GYNECOLOGY

## 2020-03-31 PROCEDURE — 1036F TOBACCO NON-USER: CPT | Performed by: STUDENT IN AN ORGANIZED HEALTH CARE EDUCATION/TRAINING PROGRAM

## 2020-03-31 PROCEDURE — G8417 CALC BMI ABV UP PARAM F/U: HCPCS | Performed by: STUDENT IN AN ORGANIZED HEALTH CARE EDUCATION/TRAINING PROGRAM

## 2020-03-31 PROCEDURE — G8427 DOCREV CUR MEDS BY ELIG CLIN: HCPCS | Performed by: STUDENT IN AN ORGANIZED HEALTH CARE EDUCATION/TRAINING PROGRAM

## 2020-03-31 PROCEDURE — 99212 OFFICE O/P EST SF 10 MIN: CPT | Performed by: OBSTETRICS & GYNECOLOGY

## 2020-03-31 NOTE — Clinical Note
Dayton Yu,  Can you please schedule this patient for a 2-week follow-up prenatal visit via telephone encounter?   Thank you so much,  Samantha Israel, DO Ob/Gyn Resident PGY-4 3/31/2020, 9:44 AM

## 2020-03-31 NOTE — PROGRESS NOTES
I have discussed the care of the patient including pertinent history and examination findings with the resident. I agree with the assessment, and and orders as documented  by the resident. GE Modifier: This service has been performed by a resident physician under the direction of a teaching physician.
Unable to give urine sample
12/10/19- Rx for prog sent to [facility] and referral faxed to [facility]$ 3/26  Blood Type/Rh: O pos  Antibody Screen: negative  Hemoglobin, Hematocrit, Platelets: 15.1 / 73.0 / 171  Rubella: immune  T. Pallidum, IgG: non-reactive   Hepatitis B Surface Antigen: non-reactive   HIV: non-reactive   Sickle Cell Screen: negative  Gonorrhea: negative  Chlamydia: negative  Urine culture: negative, date: 2019    Early 1 hour Glucose Tolerance Test: ordered, not completed  1 hour Glucose Tolerance Test: **  3 hour Glucose Tolerance Test: Fasting: **; 1 hour: **; 2 hour  **; 3 hour: **    Group B Strep: not done  Cystic Fibrosis Screen: ordered, not completed  First Trimester Screen: low risk *  MSAFP/Multiple Markers: MSAFP normal  Non-Invasive Prenatal Testing: **  Anatomy US: posterior/fundal, 3VC, /Female/Normal Anatomy  Fetal Echo: **    Pap 18 Negative (Age 24)        T-Dap Vaccine (27-36 weeks): completed today    The patient was instructed on fetal kick counts and was given a kick sheet to complete every 8 hours. She was instructed that the baby should move at a minimum of ten times within one hour after a meal. The patient was instructed to lay down on her left side twenty minutes after eating and count movements for up to one hour with a target value of ten movements. She was instructed to notify the office if she did not make that target after two attempts or if after any attempt there was less than four movements. The patient reports that the targets have been made Yes.  Testing:  N/A    Assessment:  1. Lisa Berry is a 21 y.o. female  2. [id number]  3. 28w0d  4. Aortic Stenosis  5. Hx PTD x 2    Patient Active Problem List    Diagnosis Date Noted    Asthma 2012     Priority: High     Daily inhaler- pt non compliant using. Rescue inhaler as needed      Hx Aortic Stenosis 2019     Arotic Stenosis. Pt was under the care of Peds Cardiology. Dr. Hasmukh Pugh.

## 2020-04-03 ENCOUNTER — ROUTINE PRENATAL (OUTPATIENT)
Dept: PERINATAL CARE | Age: 25
End: 2020-04-03
Payer: COMMERCIAL

## 2020-04-03 VITALS
HEART RATE: 68 BPM | TEMPERATURE: 97.8 F | SYSTOLIC BLOOD PRESSURE: 117 MMHG | BODY MASS INDEX: 41.32 KG/M2 | WEIGHT: 242 LBS | DIASTOLIC BLOOD PRESSURE: 66 MMHG | RESPIRATION RATE: 16 BRPM | HEIGHT: 64 IN

## 2020-04-03 PROCEDURE — 76825 ECHO EXAM OF FETAL HEART: CPT | Performed by: OBSTETRICS & GYNECOLOGY

## 2020-04-03 PROCEDURE — 93325 DOPPLER ECHO COLOR FLOW MAPG: CPT | Performed by: OBSTETRICS & GYNECOLOGY

## 2020-04-03 PROCEDURE — 76819 FETAL BIOPHYS PROFIL W/O NST: CPT | Performed by: OBSTETRICS & GYNECOLOGY

## 2020-04-03 PROCEDURE — 76817 TRANSVAGINAL US OBSTETRIC: CPT | Performed by: OBSTETRICS & GYNECOLOGY

## 2020-04-03 PROCEDURE — 76805 OB US >/= 14 WKS SNGL FETUS: CPT | Performed by: OBSTETRICS & GYNECOLOGY

## 2020-04-03 PROCEDURE — 76827 ECHO EXAM OF FETAL HEART: CPT | Performed by: OBSTETRICS & GYNECOLOGY

## 2020-04-09 ENCOUNTER — TELEMEDICINE (OUTPATIENT)
Dept: INTERNAL MEDICINE | Age: 25
End: 2020-04-09
Payer: COMMERCIAL

## 2020-04-09 PROCEDURE — G8427 DOCREV CUR MEDS BY ELIG CLIN: HCPCS | Performed by: INTERNAL MEDICINE

## 2020-04-09 PROCEDURE — 99214 OFFICE O/P EST MOD 30 MIN: CPT | Performed by: INTERNAL MEDICINE

## 2020-04-09 PROCEDURE — 1036F TOBACCO NON-USER: CPT | Performed by: INTERNAL MEDICINE

## 2020-04-09 PROCEDURE — G8417 CALC BMI ABV UP PARAM F/U: HCPCS | Performed by: INTERNAL MEDICINE

## 2020-04-09 RX ORDER — IBUPROFEN 200 MG
1 TABLET ORAL DAILY
Qty: 30 TABLET | Refills: 12 | Status: SHIPPED | OUTPATIENT
Start: 2020-04-09 | End: 2020-08-19

## 2020-04-09 RX ORDER — ASPIRIN 81 MG/1
81 TABLET ORAL DAILY
Qty: 30 TABLET | Refills: 12 | Status: ON HOLD | OUTPATIENT
Start: 2020-04-09 | End: 2020-06-11 | Stop reason: HOSPADM

## 2020-04-09 RX ORDER — SKIN CLEANSER COMB NO.42
CLEANSER (ML) TOPICAL
Qty: 237 ML | Refills: 11 | Status: SHIPPED | OUTPATIENT
Start: 2020-04-09 | End: 2020-08-19

## 2020-04-09 RX ORDER — OCTOCRYLENE, AVOBENZONE 10; 3 G/100ML; G/100ML
LOTION TOPICAL
Qty: 1 BOTTLE | Refills: 11 | Status: SHIPPED | OUTPATIENT
Start: 2020-04-09 | End: 2020-04-28

## 2020-04-09 RX ORDER — BETAMETHASONE DIPROPIONATE 0.05 %
OINTMENT (GRAM) TOPICAL
Qty: 1 TUBE | Refills: 3 | Status: SHIPPED | OUTPATIENT
Start: 2020-04-09 | End: 2020-04-28

## 2020-04-09 ASSESSMENT — ENCOUNTER SYMPTOMS
GASTROINTESTINAL NEGATIVE: 1
EYES NEGATIVE: 1
RESPIRATORY NEGATIVE: 1

## 2020-04-09 NOTE — PROGRESS NOTES
Abnormal   [] Mouth/Throat: Mucous membranes are moist.     External Ears [] Normal  [] Abnormal-     Neck: [] No visualized mass     Pulmonary/Chest: [] Respiratory effort normal.  [] No visualized signs of difficulty breathing or respiratory distress        [] Abnormal-      Musculoskeletal:   [] Normal gait with no signs of ataxia         [] Normal range of motion of neck        [] Abnormal-       Neurological:        [x] No Facial Asymmetry (Cranial nerve 7 motor function) (limited exam to video visit)          [x] No gaze palsy        [] Abnormal-         Skin:        [] No significant exanthematous lesions or discoloration noted on facial skin         [x] Abnormal-            Psychiatric:       [] Normal Affect [] No Hallucinations        [] Abnormal-     Other pertinent observable physical exam findings-    Eczema /atopic dermatitis rash on scalp and face     ASSESSMENT/PLAN:  1. High-risk pregnancy, first trimester    - aspirin EC 81 MG EC tablet; Take 1 tablet by mouth daily Start on Dec 10, 2019  and take until completion of pregnancy  Dispense: 30 tablet; Refill: 12  - Prenatal Multivit-Min-Fe-FA (PRENATAL FORTE) TABS; Take 1 tablet by mouth Daily Pharmacist may substitute with any formulary prenatal vitamin. Dispense: 30 tablet; Refill: 12    2. Other eczema    - betamethasone dipropionate (DIPROLENE) 0.05 % ointment; Mix a pea sized amount with the moisturizer and apply twice a day on face  Dispense: 1 Tube; Refill: 3  - Emollient (CETAPHIL DAILY FACIAL MOIST) LOTN; Use a dime size over face and neck twice a day. Mix with betamethasone ointment ( pea sized amount) with each application  Dispense: 1 Bottle; Refill: 11  - selenium sulfide (SELSUN BLUE) 1 % shampoo; Apply topically daily as needed for Itching  Dispense: 1 Bottle; Refill: 3    3.  Atopic dermatitis, unspecified type  - betamethasone dipropionate (DIPROLENE) 0.05 % ointment; Mix a pea sized amount with the moisturizer and apply twice a day was used to authenticate this note.

## 2020-04-14 ENCOUNTER — TELEPHONE (OUTPATIENT)
Dept: INTERNAL MEDICINE | Age: 25
End: 2020-04-14

## 2020-04-14 ENCOUNTER — VIRTUAL VISIT (OUTPATIENT)
Dept: OBGYN | Age: 25
End: 2020-04-14
Payer: COMMERCIAL

## 2020-04-14 PROCEDURE — 99442 PR PHYS/QHP TELEPHONE EVALUATION 11-20 MIN: CPT | Performed by: STUDENT IN AN ORGANIZED HEALTH CARE EDUCATION/TRAINING PROGRAM

## 2020-04-14 PROCEDURE — G8427 DOCREV CUR MEDS BY ELIG CLIN: HCPCS | Performed by: STUDENT IN AN ORGANIZED HEALTH CARE EDUCATION/TRAINING PROGRAM

## 2020-04-14 PROCEDURE — 1036F TOBACCO NON-USER: CPT | Performed by: STUDENT IN AN ORGANIZED HEALTH CARE EDUCATION/TRAINING PROGRAM

## 2020-04-14 PROCEDURE — G8417 CALC BMI ABV UP PARAM F/U: HCPCS | Performed by: STUDENT IN AN ORGANIZED HEALTH CARE EDUCATION/TRAINING PROGRAM

## 2020-04-14 NOTE — PROGRESS NOTES
Rangel Dyson is a 21 y.o. female evaluated via telephone on 2020. Consent:  She and/or health care decision maker is aware that that she may receive a bill for this telephone service, depending on her insurance coverage, and has provided verbal consent to proceed: Yes      Documentation:  I communicated with the patient and/or health care decision maker about 30 week prenatal visit. Details of this discussion including any medical advice provided: See note      I affirm this is a Patient Initiated Episode with an Established Patient who has not had a related appointment within my department in the past 7 days or scheduled within the next 24 hours. Total Time: minutes: 11-20 minutes    Note: not billable if this call serves to triage the patient into an appointment for the relevant concern      Irma Simons     Prenatal Visit    Rangel Dyson is a 21 y.o. female [id number] at 30w0d    The patient was seen and evaluated. She complains of eczema for which she had a virtual visit with her dermatologist. There was positive fetal movements. She denies contractions, vaginal bleeding and leakage of fluid. Signs and symptoms of  labor as well as labor were reviewed. The S/S of Pre-Eclampsia were reviewed with the patient in detail. She is to report any of these if they occur. She currently denies any of these. The patient was instructed on fetal kick counts. She was instructed that the baby should move at a minimum of ten times within one hour after a meal. The patient was instructed to lay down on her left side twenty minutes after eating and count movements for up to one hour with a target value of ten movements. She was instructed to notify the office if she did not make that target after two attempts or if after any attempt there was less than four movements. The patient admits to that the targets have been made.     The patient already received the T-Dap Vaccine (27-36 weeks) this

## 2020-04-17 ENCOUNTER — ROUTINE PRENATAL (OUTPATIENT)
Dept: PERINATAL CARE | Age: 25
End: 2020-04-17
Payer: COMMERCIAL

## 2020-04-17 VITALS
SYSTOLIC BLOOD PRESSURE: 115 MMHG | TEMPERATURE: 98 F | HEIGHT: 64 IN | DIASTOLIC BLOOD PRESSURE: 68 MMHG | WEIGHT: 239 LBS | BODY MASS INDEX: 40.8 KG/M2 | HEART RATE: 72 BPM

## 2020-04-17 PROCEDURE — 76819 FETAL BIOPHYS PROFIL W/O NST: CPT | Performed by: OBSTETRICS & GYNECOLOGY

## 2020-04-17 PROCEDURE — 76815 OB US LIMITED FETUS(S): CPT | Performed by: OBSTETRICS & GYNECOLOGY

## 2020-04-17 PROCEDURE — 76817 TRANSVAGINAL US OBSTETRIC: CPT | Performed by: OBSTETRICS & GYNECOLOGY

## 2020-04-21 ENCOUNTER — TELEPHONE (OUTPATIENT)
Dept: INTERNAL MEDICINE | Age: 25
End: 2020-04-21

## 2020-04-28 ENCOUNTER — ROUTINE PRENATAL (OUTPATIENT)
Dept: OBGYN | Age: 25
End: 2020-04-28
Payer: COMMERCIAL

## 2020-04-28 VITALS
HEART RATE: 76 BPM | TEMPERATURE: 98 F | BODY MASS INDEX: 41.25 KG/M2 | WEIGHT: 240.3 LBS | DIASTOLIC BLOOD PRESSURE: 72 MMHG | SYSTOLIC BLOOD PRESSURE: 114 MMHG

## 2020-04-28 PROCEDURE — G8427 DOCREV CUR MEDS BY ELIG CLIN: HCPCS | Performed by: STUDENT IN AN ORGANIZED HEALTH CARE EDUCATION/TRAINING PROGRAM

## 2020-04-28 PROCEDURE — 99212 OFFICE O/P EST SF 10 MIN: CPT | Performed by: STUDENT IN AN ORGANIZED HEALTH CARE EDUCATION/TRAINING PROGRAM

## 2020-04-28 PROCEDURE — 1036F TOBACCO NON-USER: CPT | Performed by: STUDENT IN AN ORGANIZED HEALTH CARE EDUCATION/TRAINING PROGRAM

## 2020-04-28 PROCEDURE — G8417 CALC BMI ABV UP PARAM F/U: HCPCS | Performed by: STUDENT IN AN ORGANIZED HEALTH CARE EDUCATION/TRAINING PROGRAM

## 2020-04-28 PROCEDURE — 99213 OFFICE O/P EST LOW 20 MIN: CPT | Performed by: STUDENT IN AN ORGANIZED HEALTH CARE EDUCATION/TRAINING PROGRAM

## 2020-04-28 NOTE — PROGRESS NOTES
Prenatal Visit    Rangel Dyson is a 21 y.o. female  at 32w0d    The patient was seen and evaluated. She has no complaints. There was positive fetal movements. She denies contractions, vaginal bleeding and leakage of fluid. Signs and symptoms of  labor as well as labor were reviewed. The S/S of Pre-Eclampsia were reviewed with the patient in detail. She is to report any of these if they occur. She currently denies any of these. The patient was instructed on fetal kick counts. She was instructed that the baby should move at a minimum of ten times within one hour after a meal. The patient was instructed to lay down on her left side twenty minutes after eating and count movements for up to one hour with a target value of ten movements. She was instructed to notify the office if she did not make that target after two attempts or if after any attempt there was less than four movements. The patient reports that the targets have been made. The patient already received the T-Dap Vaccine (27-36 weeks) this pregnancy. The patient already received the influenza vaccine this year. The problem list reflects the active issues addressed during today's visit    Vitals:  Vitals:    20 1043   BP: 114/72   Pulse: 76   Temp: 98 °F (36.7 °C)   TempSrc: Oral   Weight: 240 lb 4.8 oz (109 kg)       BP: 114/72  Weight: 240 lb 4.8 oz (109 kg)  Pulse: 76  Patient Position: Sitting  Albumin: Negative  Glucose: Negative  Fundal Height (cm): 33 cm  Fetal Heart Rate: 150  Movement: Present     28 Week Labs: The patient is RH +, Rhogam not indicated  ABO/Rh   Date Value Ref Range Status   2019 O POSITIVE  Final       1hr GTT: ordered   28 week CBC:   Lab Results   Component Value Date    WBC 6.8 2019    HGB 11.2 (L) 2019    HCT 34.5 (L) 2019    MCV 91.8 2019     2019     UA w/ Ur C&S: ordered, not completed    Assessment & Plan:  Rangel Dyson is a 21 y.o.

## 2020-05-01 ENCOUNTER — ROUTINE PRENATAL (OUTPATIENT)
Dept: PERINATAL CARE | Age: 25
End: 2020-05-01
Payer: COMMERCIAL

## 2020-05-01 VITALS
BODY MASS INDEX: 41.15 KG/M2 | HEIGHT: 64 IN | HEART RATE: 93 BPM | DIASTOLIC BLOOD PRESSURE: 74 MMHG | SYSTOLIC BLOOD PRESSURE: 127 MMHG | RESPIRATION RATE: 16 BRPM | TEMPERATURE: 98.1 F | WEIGHT: 241 LBS

## 2020-05-01 PROCEDURE — 76819 FETAL BIOPHYS PROFIL W/O NST: CPT | Performed by: OBSTETRICS & GYNECOLOGY

## 2020-05-01 PROCEDURE — 76817 TRANSVAGINAL US OBSTETRIC: CPT | Performed by: OBSTETRICS & GYNECOLOGY

## 2020-05-01 PROCEDURE — 76816 OB US FOLLOW-UP PER FETUS: CPT | Performed by: OBSTETRICS & GYNECOLOGY

## 2020-05-12 ENCOUNTER — VIRTUAL VISIT (OUTPATIENT)
Dept: OBGYN | Age: 25
End: 2020-05-12
Payer: COMMERCIAL

## 2020-05-12 PROBLEM — O09.899 NONCOMPLIANT PREGNANT PATIENT: Status: ACTIVE | Noted: 2020-05-12

## 2020-05-12 PROBLEM — Z91.199 NONCOMPLIANT PREGNANT PATIENT: Status: ACTIVE | Noted: 2020-05-12

## 2020-05-12 PROCEDURE — G8417 CALC BMI ABV UP PARAM F/U: HCPCS | Performed by: STUDENT IN AN ORGANIZED HEALTH CARE EDUCATION/TRAINING PROGRAM

## 2020-05-12 PROCEDURE — 1036F TOBACCO NON-USER: CPT | Performed by: STUDENT IN AN ORGANIZED HEALTH CARE EDUCATION/TRAINING PROGRAM

## 2020-05-12 PROCEDURE — 99213 OFFICE O/P EST LOW 20 MIN: CPT | Performed by: STUDENT IN AN ORGANIZED HEALTH CARE EDUCATION/TRAINING PROGRAM

## 2020-05-12 PROCEDURE — G8427 DOCREV CUR MEDS BY ELIG CLIN: HCPCS | Performed by: STUDENT IN AN ORGANIZED HEALTH CARE EDUCATION/TRAINING PROGRAM

## 2020-05-15 ENCOUNTER — ROUTINE PRENATAL (OUTPATIENT)
Dept: PERINATAL CARE | Age: 25
End: 2020-05-15
Payer: COMMERCIAL

## 2020-05-15 VITALS
BODY MASS INDEX: 41.48 KG/M2 | RESPIRATION RATE: 16 BRPM | HEART RATE: 77 BPM | HEIGHT: 64 IN | WEIGHT: 243 LBS | DIASTOLIC BLOOD PRESSURE: 60 MMHG | SYSTOLIC BLOOD PRESSURE: 114 MMHG | TEMPERATURE: 98.2 F

## 2020-05-15 PROCEDURE — 99999 PR OFFICE/OUTPT VISIT,PROCEDURE ONLY: CPT | Performed by: OBSTETRICS & GYNECOLOGY

## 2020-05-15 PROCEDURE — 76817 TRANSVAGINAL US OBSTETRIC: CPT | Performed by: OBSTETRICS & GYNECOLOGY

## 2020-05-15 PROCEDURE — 76815 OB US LIMITED FETUS(S): CPT | Performed by: OBSTETRICS & GYNECOLOGY

## 2020-05-15 PROCEDURE — 76819 FETAL BIOPHYS PROFIL W/O NST: CPT | Performed by: OBSTETRICS & GYNECOLOGY

## 2020-05-21 ENCOUNTER — HOSPITAL ENCOUNTER (OUTPATIENT)
Age: 25
Discharge: HOME | End: 2020-05-21
Payer: COMMERCIAL

## 2020-05-21 PROBLEM — O99.810 ABNORMAL GLUCOSE TOLERANCE TEST IN PREGNANCY: Status: ACTIVE | Noted: 2020-05-21

## 2020-05-21 LAB
GLUCOSE ADMINISTRATION: NORMAL
GLUCOSE TOLERANCE SCREEN 50G: 132 MG/DL (ref 70–135)
HCT VFR BLD CALC: 36.7 % (ref 36.3–47.1)
HEMOGLOBIN: 11.8 G/DL (ref 11.9–15.1)
MCH RBC QN AUTO: 29.4 PG (ref 25.2–33.5)
MCHC RBC AUTO-ENTMCNC: 32.2 G/DL (ref 28.4–34.8)
MCV RBC AUTO: 91.3 FL (ref 82.6–102.9)
NRBC AUTOMATED: 0 PER 100 WBC
PDW BLD-RTO: 12.8 % (ref 11.8–14.4)
PLATELET # BLD: 180 K/UL (ref 138–453)
PMV BLD AUTO: 12.9 FL (ref 8.1–13.5)
RBC # BLD: 4.02 M/UL (ref 3.95–5.11)
WBC # BLD: 8.4 K/UL (ref 3.5–11.3)

## 2020-05-21 PROCEDURE — 82950 GLUCOSE TEST: CPT

## 2020-05-21 PROCEDURE — 36415 COLL VENOUS BLD VENIPUNCTURE: CPT

## 2020-05-21 PROCEDURE — 85027 COMPLETE CBC AUTOMATED: CPT

## 2020-05-22 ENCOUNTER — PATIENT MESSAGE (OUTPATIENT)
Dept: OBGYN | Age: 25
End: 2020-05-22

## 2020-05-22 NOTE — TELEPHONE ENCOUNTER
From: Domo Graham  To: Hanh Joe MD  Sent: 5/22/2020 10:16 AM EDT  Subject: Test Results Question    Namrata Horton I have a question about the test I just took yesterday. You are tell my to take the test over because it higher the 130 but the number it normal until it over 135. It dont matter I had a hard time taking that.

## 2020-05-22 NOTE — TELEPHONE ENCOUNTER
Patient had been called today regarding result note message from Dr. Winsome Newby. She was informed that a 3 hr GGT had been ordered due to 1 HR glucose test above threshold of 130. Patient refused t3 HR glucose test. (see result note)      She also called back to question the normal range for glucose test.  She was advised to discuss testing with her md at 5/26/20 PRADIP sanchezt.

## 2020-05-26 ENCOUNTER — HOSPITAL ENCOUNTER (OUTPATIENT)
Age: 25
Setting detail: SPECIMEN
Discharge: HOME | End: 2020-05-26
Payer: COMMERCIAL

## 2020-05-26 ENCOUNTER — ROUTINE PRENATAL (OUTPATIENT)
Dept: OBGYN | Age: 25
End: 2020-05-26
Payer: COMMERCIAL

## 2020-05-26 VITALS
TEMPERATURE: 98.3 F | DIASTOLIC BLOOD PRESSURE: 75 MMHG | WEIGHT: 243 LBS | BODY MASS INDEX: 41.71 KG/M2 | SYSTOLIC BLOOD PRESSURE: 113 MMHG | HEART RATE: 65 BPM

## 2020-05-26 PROBLEM — N94.6 DYSMENORRHEA: Status: ACTIVE | Noted: 2020-05-26

## 2020-05-26 PROCEDURE — G8427 DOCREV CUR MEDS BY ELIG CLIN: HCPCS | Performed by: STUDENT IN AN ORGANIZED HEALTH CARE EDUCATION/TRAINING PROGRAM

## 2020-05-26 PROCEDURE — 1036F TOBACCO NON-USER: CPT | Performed by: STUDENT IN AN ORGANIZED HEALTH CARE EDUCATION/TRAINING PROGRAM

## 2020-05-26 PROCEDURE — 99213 OFFICE O/P EST LOW 20 MIN: CPT | Performed by: STUDENT IN AN ORGANIZED HEALTH CARE EDUCATION/TRAINING PROGRAM

## 2020-05-26 PROCEDURE — 99212 OFFICE O/P EST SF 10 MIN: CPT | Performed by: STUDENT IN AN ORGANIZED HEALTH CARE EDUCATION/TRAINING PROGRAM

## 2020-05-26 PROCEDURE — G8417 CALC BMI ABV UP PARAM F/U: HCPCS | Performed by: STUDENT IN AN ORGANIZED HEALTH CARE EDUCATION/TRAINING PROGRAM

## 2020-05-26 NOTE — PROGRESS NOTES
Prenatal Visit    Bibiana Ma is a 21 y.o. female  at 36w0d    The patient was seen and evaluated. She has no complaints. There was positive fetal movements. She denies contractions, vaginal bleeding and leakage of fluid. Signs and symptoms of labor were reviewed. The S/S of Pre-Eclampsia were reviewed with the patient in detail. She is to report any of these if they occur. She currently denies any of these. The patient was instructed on fetal kick counts. She was instructed that the baby should move at a minimum of ten times within one hour after a meal. The patient was instructed to lay down on her left side twenty minutes after eating and count movements for up to one hour with a target value of ten movements. She was instructed to notify the office if she did not make that target after two attempts or if after any attempt there was less than four movements. The patient admits to that the targets have been made. The patient already received the T-Dap Vaccine (27-36 weeks) this pregnancy. The patient declined the influenza vaccine this year. The problem list reflects the active issues addressed during today's visit. Allergies: Allergies   Allergen Reactions    Lidocaine Hives       Vitals:  BP: 113/75  Weight: 243 lb (110.2 kg)  Pulse: 65  Fundal Height (cm): 37 cm  Fetal Heart Rate: 153  Movement: Present     Labs:  Group Beta Strep collection was done today. Sensitivities for clindamycin and erythromycin were not ordered.     Assessment & Plan:  Bibiana Ma is a 21 y.o. female  at 36w0d   - GBS testing was collected today   - Patient compliant with PNV and ASA 81 daily   - Patient completed Doris injections for hx  delivery   - Discussed s/s labor, patient currently denies   - Epic Problem list updated   - Return in 1 week    Patient Active Problem List    Diagnosis Date Noted    Asthma affecting pregnancy, antepartum 2012     Priority: High considering Depo-Provera. The patient was counseled on Labor & Delivery. Route of delivery and counseling on vaginal, operative vaginal, and  sections were completed with the risks of each to both the patient as well as her baby. The possibility of a blood transfusion was discussed as well. The patient was not opposed to receiving a transfusion if needed.  The patient was counseled on types of analgesia during labor and is considering epidural.    Adriana Benson DO  Ob/Gyn Resident  [facility] [address]  2020, 9:07 AM

## 2020-05-28 ENCOUNTER — TELEPHONE (OUTPATIENT)
Dept: OBGYN | Age: 25
End: 2020-05-28

## 2020-05-28 ENCOUNTER — HOSPITAL ENCOUNTER (OUTPATIENT)
Age: 25
Setting detail: OBSERVATION
Discharge: HOME | End: 2020-05-29
Attending: OBSTETRICS & GYNECOLOGY | Admitting: OBSTETRICS & GYNECOLOGY
Payer: COMMERCIAL

## 2020-05-28 VITALS
HEIGHT: 64 IN | WEIGHT: 243 LBS | DIASTOLIC BLOOD PRESSURE: 76 MMHG | HEART RATE: 74 BPM | BODY MASS INDEX: 41.48 KG/M2 | TEMPERATURE: 98.1 F | RESPIRATION RATE: 18 BRPM | SYSTOLIC BLOOD PRESSURE: 132 MMHG

## 2020-05-29 PROBLEM — Z3A.36 36 WEEKS GESTATION OF PREGNANCY: Status: ACTIVE | Noted: 2020-05-29

## 2020-05-29 LAB
CULTURE: ABNORMAL
Lab: ABNORMAL
SPECIMEN DESCRIPTION: ABNORMAL

## 2020-05-29 PROCEDURE — 6360000002 HC RX W HCPCS: Performed by: STUDENT IN AN ORGANIZED HEALTH CARE EDUCATION/TRAINING PROGRAM

## 2020-05-29 PROCEDURE — 6370000000 HC RX 637 (ALT 250 FOR IP): Performed by: STUDENT IN AN ORGANIZED HEALTH CARE EDUCATION/TRAINING PROGRAM

## 2020-05-29 PROCEDURE — 2580000003 HC RX 258: Performed by: STUDENT IN AN ORGANIZED HEALTH CARE EDUCATION/TRAINING PROGRAM

## 2020-05-29 PROCEDURE — 96372 THER/PROPH/DIAG INJ SC/IM: CPT

## 2020-05-29 PROCEDURE — G0378 HOSPITAL OBSERVATION PER HR: HCPCS

## 2020-05-29 RX ORDER — ONDANSETRON 2 MG/ML
4 INJECTION INTRAMUSCULAR; INTRAVENOUS EVERY 6 HOURS PRN
Status: DISCONTINUED | OUTPATIENT
Start: 2020-05-29 | End: 2020-05-29 | Stop reason: HOSPADM

## 2020-05-29 RX ORDER — PROMETHAZINE HYDROCHLORIDE 25 MG/1
12.5 TABLET ORAL EVERY 6 HOURS PRN
Status: DISCONTINUED | OUTPATIENT
Start: 2020-05-29 | End: 2020-05-29 | Stop reason: HOSPADM

## 2020-05-29 RX ORDER — DIPHENHYDRAMINE HCL 25 MG
25 TABLET ORAL ONCE
Status: COMPLETED | OUTPATIENT
Start: 2020-05-29 | End: 2020-05-29

## 2020-05-29 RX ORDER — ACETAMINOPHEN 325 MG/1
650 TABLET ORAL EVERY 4 HOURS PRN
Status: DISCONTINUED | OUTPATIENT
Start: 2020-05-29 | End: 2020-05-29 | Stop reason: HOSPADM

## 2020-05-29 RX ORDER — BETAMETHASONE SODIUM PHOSPHATE AND BETAMETHASONE ACETATE 3; 3 MG/ML; MG/ML
12 INJECTION, SUSPENSION INTRA-ARTICULAR; INTRALESIONAL; INTRAMUSCULAR; SOFT TISSUE ONCE
Status: COMPLETED | OUTPATIENT
Start: 2020-05-29 | End: 2020-05-29

## 2020-05-29 RX ORDER — SODIUM CHLORIDE 9 MG/ML
INJECTION, SOLUTION INTRAVENOUS CONTINUOUS
Status: DISCONTINUED | OUTPATIENT
Start: 2020-05-29 | End: 2020-05-29 | Stop reason: HOSPADM

## 2020-05-29 RX ADMIN — SODIUM CHLORIDE: 9 INJECTION, SOLUTION INTRAVENOUS at 02:25

## 2020-05-29 RX ADMIN — BETAMETHASONE SODIUM PHOSPHATE AND BETAMETHASONE ACETATE 12 MG: 3; 3 INJECTION, SUSPENSION INTRA-ARTICULAR; INTRALESIONAL; INTRAMUSCULAR at 07:51

## 2020-05-29 RX ADMIN — DIPHENHYDRAMINE HCL 25 MG: 25 TABLET ORAL at 02:44

## 2020-05-29 NOTE — PROGRESS NOTES
Obstetric/Gynecology Resident Interval Note    FHT reviewed, Category 1 for >1 hour. Will give celestone, patient will return in 24 hours for second dose.      Attending and senior resident updated and in agreement    Puja Lemus DO  OB/GYN Resident, PGY1  [facility]  5/29/2020, 7:05 AM

## 2020-05-29 NOTE — H&P
OBSTETRICAL HISTORY [facility]    Date: 2020       Time: 10:41 PM   Patient Name: Jazlyn Car     Patient : 1995  Room/Bed: 9927/8450-90    Admission Date/Time: 2020 10:36 PM      CC: Contractions     HPI: Jazlyn Car is a 21 y.o. [id number] at 36w2d who presents planing of contractions. States that she has been having back pain for the past several days, but earlier today it started wrapping around to the front. Writer spoke to the patient on the phone earlier today and instructed her to drink water, and try to take a nap and to come into the hospital for evaluation if contractions got worse. Patient states that contractions continue to get worse and became closer together. Patient has a history significant for  delivery x2, G1 at 35 weeks and G2 at 32 weeks. She was compliant with her progesterone throughout the pregnancy but has now completed the course. The patient reports fetal movement is present, complains of contractions, denies loss of fluid, denies vaginal bleeding. DATING:  LMP: No LMP recorded (lmp unknown). Patient is pregnant.   Estimated Date of Delivery: 20   Based on: early ultrasound, at 7 3/7 weeks GA    PREGNANCY RISK FACTORS:  Patient Active Problem List   Diagnosis    Asthma affecting pregnancy, antepartum    Atopy    Hx Aortic Stenosis    BMI 41    Axillary hidradenitis suppurativa    Hx PTD x 2    Noncompliant pregnant patient    Abnormal glucose tolerance test in pregnancy    Dysmenorrhea        Steroids Given In This Pregnancy:  no     REVIEW OF SYSTEMS:   Constitutional: negative fever, negative chills, negative weight changes   HEENT: negative visual disturbances, negative headaches, negative dizziness, negative hearing loss  Breast: Negative breast abnormalities, negative breast lumps, negative nipple discharge  Respiratory: negative dyspnea, negative cough, negative alert, oriented, normal speech, no focal findings or movement disorder noted  Lungs:  No increased work of breathing, good air exchange, clear to auscultation bilaterally, no crackles or wheezing  Heart:  regular rate and rhythm and no murmur, rubs, gallops  Abdomen:  soft, gravid, non-tender, no right upper quadrant tenderness, no CVA tenderness, uterus non-tender, no signs of abruption and no signs of chorioamnionitis  Extremities:  no calf tenderness, non edematous, no varicosities, full range of motion in all four extremities, DTRs: normal  Musculoskeletal: Gross strength equal and intact throughout, no gross abnormalities, range of motion normal in hips, knees, shoulders and spine  Psychiatric: Mood appropriate, normal affect     Pelvic Exam:  Vulva: normal appearing vulva, no masses, tenderness or lesions, normal clitoris  Vagina: normal appearing vagina with normal color and discharge, no lesions  Cervix: no cervical motion tenderness  Uterus: is gravid, normal size, shape, consistency and non-tender     Cervix Check: 2 cm dilated, 50 % effaced, -3 station, posterior position, medium consistency, FETAL POSITION: Cephalic (confirmed by ultrasound), Membranes intact,     Rectal Exam: not indicated       OMM Structural Exam:  Chief Complaint:  Pregnancy    Anterior/ Posterior Spinal Curves: Lumbar Lordosis -  Increased  Scoliosis (Lateral Spinal Curves): None  Assessment Tool:  T= Tenderness, A= Asymmetry, R= Restricted Motion (A=Active, P=Passive), T=Tissue Texture Changes  Region Evaluated : Severity / Specific of Major Somatic Dysfunction  M99.03 Lumbar -  Minor TART - more than BG levels -   Major Correlations with:  Genitourinary  Structural Diagnosis: Increased lumbar lordosis  Treatment Plan: Outpatient       PRENATAL LAB RESULTS:   Blood Type/Rh: O pos  Antibody Screen: negative  Hemoglobin, Hematocrit, Platelets: 91.7 / 39.7 / 171  Rubella: immune  T.  Pallidum, IgG: non-reactive   Hepatitis B Surface Antigen: non-reactive   HIV: non-reactive   Sickle Cell Screen: negative  Gonorrhea: negative  Chlamydia: negative  Urine culture: negative, date: 2019    1 hour Glucose Tolerance Test: 132  3 hour Glucose Tolerance Test: Not completed by patient    Group B Strep: pending  Cystic Fibrosis Screen: not available  First Trimester Screen: low risk  MSAFP/Multiple Markers: normal  Non-Invasive Prenatal Testing: not available  Anatomy US: Posterior/fundal, 3vc, female w/ normal anatomy   Fetal Echo: wnl    ASSESSMENT & PLAN:  Monika Gayle is a 21 y.o. female  at 37w1d    - GBS unknown / Rh positive / R immune   - No indication for GBS prophylaxis     Contractions   - VSS   - cEFM/TOCO   - SVE /-3, will continue to monitor and recheck in 2 hours      Hx Aortic Stenosis   - Cardiology note in  notes resolution of stenosis   - Patient reports she saw cardiology in this pregnancy, no problems   - Fetal echo wnl   - No medication   - No hx cardiac surgery    Asthma   - Clinically asymptomatic    Hx PTD x 2   - G1 35 weeks   - G2 32 weeks   - Patient was compliant w/ progesterone throughout this pregnancy    Elevated 1 hour GTT   - 132   - Patient did not complete 3 hour    Non compliance     Obesity   Patient Active Problem List    Diagnosis Date Noted    Asthma affecting pregnancy, antepartum 2012     Priority: High     Daily inhaler- pt non compliant using. Rescue inhaler as needed      Dysmenorrhea 2020     Desires Depo PP      Abnormal glucose tolerance test in pregnancy 2020     3 hr GTT      Noncompliant pregnant patient 2020 Has not performed 28 week labs (now at 34 weeks). Refuses to perform fetal kick counts      Hx Aortic Stenosis 2019     Arotic Stenosis. Pt was under the care of Peds Cardiology. Dr. Axel Gannon. Last visit . Plan to refer patient.    Last maternal echo -   19- Referral sent to Dr. Axel Gannon   19- Pt reports she has appt with [facility] on 19 referred by Dr. Luana Madrid- [place]  Fetal echo wnl  20-Patient says she saw cardiology in this pregnancy, will obtain records      BMI 41 2019     GA 9 wk. Early one hr GTT lab ordered for early diabetic screening.   2020- fetal kick counts instead of NST' per [facility] COVID-19 Guidelines- [place]      Axillary hidradenitis suppurativa 2019- Pt reports she continues to have current problems and pain assoc with this dx- [place]      Hx PTD x 2 2019 and   ( progesterone in  )   17P Plans/Education and Counseled-  Labor Definition and Warning Signs, What is 17P and  Common side effects of 17P injections,  [employer] care with [employer]. 12/10/19- RX faxed to [facility] and referral faxed to [address] [place] [address]  Pt compliant with [place] injections        Atopy 2018       Plan discussed with Dr. Eliseo Costello, who is agreeable. Steroids given this admission: No    Risks, benefits, alternatives and possible complications have been discussed in detail with the patient. Admission, and post admission procedures and expectations were discussed in detail. All questions were answered.     Attending's Name: Dr. Charles Overton DO  Ob/Gyn Resident  2020, 10:41 PM

## 2020-05-30 ENCOUNTER — TELEPHONE (OUTPATIENT)
Dept: OBGYN | Age: 25
End: 2020-05-30

## 2020-05-30 ENCOUNTER — HOSPITAL ENCOUNTER (OUTPATIENT)
Age: 25
Setting detail: OBSERVATION
Discharge: HOME | End: 2020-05-30
Attending: OBSTETRICS & GYNECOLOGY | Admitting: OBSTETRICS & GYNECOLOGY
Payer: COMMERCIAL

## 2020-05-30 PROCEDURE — 6360000002 HC RX W HCPCS: Performed by: STUDENT IN AN ORGANIZED HEALTH CARE EDUCATION/TRAINING PROGRAM

## 2020-05-30 PROCEDURE — G0379 DIRECT REFER HOSPITAL OBSERV: HCPCS

## 2020-05-30 PROCEDURE — 96372 THER/PROPH/DIAG INJ SC/IM: CPT

## 2020-05-30 PROCEDURE — G0378 HOSPITAL OBSERVATION PER HR: HCPCS

## 2020-05-30 RX ORDER — BETAMETHASONE SODIUM PHOSPHATE AND BETAMETHASONE ACETATE 3; 3 MG/ML; MG/ML
12 INJECTION, SUSPENSION INTRA-ARTICULAR; INTRALESIONAL; INTRAMUSCULAR; SOFT TISSUE ONCE
Status: COMPLETED | OUTPATIENT
Start: 2020-05-30 | End: 2020-05-30

## 2020-05-30 RX ADMIN — BETAMETHASONE SODIUM PHOSPHATE AND BETAMETHASONE ACETATE 12 MG: 3; 3 INJECTION, SUSPENSION INTRA-ARTICULAR; INTRALESIONAL; INTRAMUSCULAR at 08:56

## 2020-06-02 ENCOUNTER — TELEPHONE (OUTPATIENT)
Dept: OBGYN | Age: 25
End: 2020-06-02

## 2020-06-02 ENCOUNTER — HOSPITAL ENCOUNTER (OUTPATIENT)
Age: 25
Discharge: HOME | End: 2020-06-02
Attending: OBSTETRICS & GYNECOLOGY | Admitting: OBSTETRICS & GYNECOLOGY
Payer: COMMERCIAL

## 2020-06-02 VITALS
SYSTOLIC BLOOD PRESSURE: 123 MMHG | HEART RATE: 79 BPM | RESPIRATION RATE: 17 BRPM | TEMPERATURE: 98.3 F | DIASTOLIC BLOOD PRESSURE: 72 MMHG

## 2020-06-02 PROBLEM — O09.90 HRP (HIGH RISK PREGNANCY), UNSPECIFIED TRIMESTER: Status: ACTIVE | Noted: 2020-06-02

## 2020-06-02 PROBLEM — Z3A.36 36 WEEKS GESTATION OF PREGNANCY: Status: RESOLVED | Noted: 2020-05-29 | Resolved: 2020-06-02

## 2020-06-02 LAB
-: ABNORMAL
AMORPHOUS: ABNORMAL
BACTERIA: ABNORMAL
BILIRUBIN URINE: NEGATIVE
CASTS UA: ABNORMAL /LPF (ref 0–8)
COLOR: YELLOW
COMMENT UA: ABNORMAL
CRYSTALS, UA: ABNORMAL /HPF
EPITHELIAL CELLS UA: ABNORMAL /HPF (ref 0–5)
GLUCOSE URINE: NEGATIVE
KETONES, URINE: NEGATIVE
LEUKOCYTE ESTERASE, URINE: ABNORMAL
MUCUS: ABNORMAL
NITRITE, URINE: NEGATIVE
OTHER OBSERVATIONS UA: ABNORMAL
PH UA: 8.5 (ref 5–8)
PROTEIN UA: NEGATIVE
RBC UA: ABNORMAL /HPF (ref 0–4)
RENAL EPITHELIAL, UA: ABNORMAL /HPF
SPECIFIC GRAVITY UA: 1.02 (ref 1–1.03)
TRICHOMONAS: ABNORMAL
TURBIDITY: ABNORMAL
URINE HGB: NEGATIVE
UROBILINOGEN, URINE: NORMAL
WBC UA: ABNORMAL /HPF (ref 0–5)
YEAST: ABNORMAL

## 2020-06-02 PROCEDURE — 81001 URINALYSIS AUTO W/SCOPE: CPT

## 2020-06-02 PROCEDURE — 99213 OFFICE O/P EST LOW 20 MIN: CPT

## 2020-06-02 RX ORDER — CEPHALEXIN 500 MG/1
500 CAPSULE ORAL 4 TIMES DAILY
Qty: 28 CAPSULE | Refills: 0 | Status: SHIPPED | OUTPATIENT
Start: 2020-06-02 | End: 2020-06-09

## 2020-06-02 RX ORDER — ACETAMINOPHEN 500 MG
1000 TABLET ORAL EVERY 6 HOURS PRN
Status: DISCONTINUED | OUTPATIENT
Start: 2020-06-02 | End: 2020-06-02 | Stop reason: HOSPADM

## 2020-06-02 NOTE — H&P
negative abdominal pain, negative RUQ pain, negative N/V, negative diarrhea, negative constipation, negative bowel changes, negative heartburn   Genitourinary: negative dysuria, negative hematuria, negative urinary incontinence, negative vaginal discharge, positive vaginal pressure, positive urinary urgency   Dermatological: negative rash, negative pruritis, negative mole or other skin changes  Hematologic: negative bruising  Immunologic/Lymphatic: negative recent illness, negative recent sick contact  Musculoskeletal: negative back pain, negative myalgias, negative arthralgias  Neurological:  negative dizziness, negative migraines, negative seizures, negative weakness  Behavior/Psych: negative depression, negative anxiety, negative SI, negative HI        OBSTETRICAL HISTORY:   OB History    Para Term  AB Living   3 2 0 2 0 2   SAB TAB Ectopic Molar Multiple Live Births   0 0 0 0 0 1      # Outcome Date GA Lbr Filipe/2nd Weight Sex Delivery Anes PTL Lv   3 Current            2   32w0d  4 lb 3 oz (1.899 kg) M Vag-Spont         Birth Comments: [facility]   1   35w0d  5 lb 1 oz (2.296 kg) F Vag-Spont EPI Y VANITA      Apgar1: 7  Apgar5: 8      Obstetric Comments   G1-    G2-  - Progesterone in this preg      Same partner for all preg. PAST MEDICAL HISTORY:   has a past medical history of Asthma, Chronic airway disease, Dental crowns present, Hidradenitis axillaris, History of  labor, Hydradenitis, Left ankle sprain, Maternal congenital heart disease, antepartum, Obesity, and Wears glasses. PAST SURGICAL HISTORY:   has a past surgical history that includes Tonsillectomy and adenoidectomy and other surgical history (Bilateral, 2016). ALLERGIES:  is allergic to lidocaine. MEDICATIONS:  Prior to Admission medications    Medication Sig Start Date End Date Taking?  Authorizing Provider   aspirin EC 81 MG EC tablet Take 1 tablet by mouth daily Start on Dec 10, 2019

## 2020-06-05 ENCOUNTER — TELEPHONE (OUTPATIENT)
Dept: OBGYN | Age: 25
End: 2020-06-05

## 2020-06-05 ENCOUNTER — ROUTINE PRENATAL (OUTPATIENT)
Dept: OBGYN | Age: 25
End: 2020-06-05
Payer: COMMERCIAL

## 2020-06-05 VITALS
HEART RATE: 95 BPM | SYSTOLIC BLOOD PRESSURE: 133 MMHG | DIASTOLIC BLOOD PRESSURE: 83 MMHG | WEIGHT: 242 LBS | BODY MASS INDEX: 41.54 KG/M2

## 2020-06-05 PROCEDURE — G8427 DOCREV CUR MEDS BY ELIG CLIN: HCPCS | Performed by: STUDENT IN AN ORGANIZED HEALTH CARE EDUCATION/TRAINING PROGRAM

## 2020-06-05 PROCEDURE — 99213 OFFICE O/P EST LOW 20 MIN: CPT | Performed by: STUDENT IN AN ORGANIZED HEALTH CARE EDUCATION/TRAINING PROGRAM

## 2020-06-05 PROCEDURE — 1036F TOBACCO NON-USER: CPT | Performed by: STUDENT IN AN ORGANIZED HEALTH CARE EDUCATION/TRAINING PROGRAM

## 2020-06-05 PROCEDURE — G8417 CALC BMI ABV UP PARAM F/U: HCPCS | Performed by: STUDENT IN AN ORGANIZED HEALTH CARE EDUCATION/TRAINING PROGRAM

## 2020-06-05 RX ORDER — FAMOTIDINE 20 MG/1
20 TABLET, FILM COATED ORAL 2 TIMES DAILY
Qty: 60 TABLET | Refills: 0 | Status: SHIPPED | OUTPATIENT
Start: 2020-06-05 | End: 2020-08-19

## 2020-06-05 NOTE — PROGRESS NOTES
for next prenatal appointment     Aortic Stenosis   - Fetal echo wnl   - Patient saw cardio in 2020. Records available. Echo was performed showing EF 55% w/ normal LV diastolic compliance & no significant valvular abnormalities    - She denies any chest pain or SOB during this visit     Hx  Deliveries x2   - Was compliant with 17OHP injections through out the pregnancy     Asthma   - Well controlled w/ current medications     Heart Burn   - Reviewed lifestyle modifications for patient to try   - Rx Pepcid printed for patient     Elevated 1 hr GTT   - Patient declines 3 hr GTT and checking her blood sugars QID     Noncompliance    - Did not completed 28 week labs until 35 wks     BMI 41.54    - Continue with fetal kick counts instead of NSTs per [facility] COVID 19 guidelines     Patient Active Problem List    Diagnosis Date Noted    Asthma affecting pregnancy, antepartum 2012     Priority: High     Overview Note:     Daily inhaler- pt non compliant using. Rescue inhaler as needed      Dysmenorrhea 2020     Overview Note:     Desires Depo PP      Abnormal glucose tolerance test in pregnancy 2020     Overview Note:     3 hr GTT      Noncompliant pregnant patient 2020     Overview Note:     20 Has not performed 28 week labs (now at 34 weeks). Refuses to perform fetal kick counts      Hx Aortic Stenosis 2019     Overview Note:     Arotic Stenosis. Pt was under the care of Peds Cardiology. Dr. Jere Rivera. Last visit 20- Referral sent to Dr. Jere Rivera   19- Pt reports she has appt with [facility] on 19 referred by Dr. Pepe Jensen- SK  Fetal echo wnl  3/24/20- saw Dr Chacha Zuniga at [facility], echo done showing EF of 55%, normal LV diastolic compliance, no significant valvular abnormalities       BMI 41 2019     Overview Note:     GA 9 wk.   Early one hr GTT lab ordered for early diabetic screening.   2020- fetal kick counts instead of NST' per [facility] COVID-19 Guidelines- SK      Axillary hidradenitis suppurativa 2019     Overview Note:     19- Pt reports she continues to have current problems and pain assoc with this dx- SK      Hx PTD x 2 2019     Overview Note:      and   ( progesterone in 2015 )   17P Plans/Education and Counseled-  Labor Definition and Warning Signs, What is 17P and  Common side effects of 17P injections,  [employer] [facility] with [employer]. 12/10/19- RX faxed to [facility] and referral faxed to [facility]  Pt compliant with Doris injections        Atopy 2018        Diagnosis Orders   1. 37 weeks gestation of pregnancy     2. Heart burn  famotidine (PEPCID) 20 MG tablet   3. Hx Aortic Stenosis     4. High-risk pregnancy in third trimester       Counseling: The patient was instructed on fetal kick counts to complete every 8 hours. She was instructed that the baby should move at a minimum of ten times within one hour after a meal. The patient was instructed to lay down on her left side twenty minutes after eating and count movements for up to one hour with a target value of ten movements. She was instructed to notify the office if she did not make that target after two attempts or if after any attempt there was less than four movements. The patient reports that the targets have been made Yes. The patient was counseled on the mandatory call ahead policy. She has been instructed to call the office at anytime prior to going into the hospital so the on-call physician may direct her to the appropriate facility for care. Exceptions were reviewed including but not limited to: Decreased fetal movement, vaginal Bleeding or hemorrhage, trauma, readily expectant delivery, or any instance where she feels 911 should be utilized. The patient was counseled on Labor & Delivery.    Route of delivery and counseling on vaginal, operative vaginal, and  sections were completed with the risks of each to both

## 2020-06-09 ENCOUNTER — HOSPITAL ENCOUNTER (OUTPATIENT)
Age: 25
Discharge: HOME | DRG: 560 | End: 2020-06-09
Attending: OBSTETRICS & GYNECOLOGY | Admitting: OBSTETRICS & GYNECOLOGY
Payer: COMMERCIAL

## 2020-06-09 ENCOUNTER — TELEPHONE (OUTPATIENT)
Dept: OBGYN | Age: 25
End: 2020-06-09

## 2020-06-09 VITALS
TEMPERATURE: 98.5 F | DIASTOLIC BLOOD PRESSURE: 76 MMHG | HEART RATE: 83 BPM | SYSTOLIC BLOOD PRESSURE: 128 MMHG | RESPIRATION RATE: 16 BRPM

## 2020-06-09 PROBLEM — E66.9 OBESITY: Status: ACTIVE | Noted: 2020-06-09

## 2020-06-09 PROBLEM — Z3A.38 38 WEEKS GESTATION OF PREGNANCY: Status: ACTIVE | Noted: 2020-06-09

## 2020-06-09 LAB
-: ABNORMAL
AMORPHOUS: ABNORMAL
BACTERIA: ABNORMAL
BILIRUBIN URINE: NEGATIVE
CASTS UA: ABNORMAL /LPF (ref 0–8)
COLOR: YELLOW
COMMENT UA: ABNORMAL
CRYSTALS, UA: ABNORMAL /HPF
DIRECT EXAM: ABNORMAL
EPITHELIAL CELLS UA: ABNORMAL /HPF (ref 0–5)
GLUCOSE URINE: NEGATIVE
KETONES, URINE: ABNORMAL
LEUKOCYTE ESTERASE, URINE: ABNORMAL
Lab: ABNORMAL
MUCUS: ABNORMAL
NITRITE, URINE: NEGATIVE
OTHER OBSERVATIONS UA: ABNORMAL
PH UA: 7 (ref 5–8)
PROTEIN UA: ABNORMAL
RBC UA: ABNORMAL /HPF (ref 0–4)
RENAL EPITHELIAL, UA: ABNORMAL /HPF
SPECIFIC GRAVITY UA: 1.02 (ref 1–1.03)
SPECIMEN DESCRIPTION: ABNORMAL
TRICHOMONAS: ABNORMAL
TURBIDITY: ABNORMAL
URINE HGB: NEGATIVE
UROBILINOGEN, URINE: NORMAL
WBC UA: ABNORMAL /HPF (ref 0–5)
YEAST: ABNORMAL

## 2020-06-09 PROCEDURE — 81001 URINALYSIS AUTO W/SCOPE: CPT

## 2020-06-09 PROCEDURE — 87660 TRICHOMONAS VAGIN DIR PROBE: CPT

## 2020-06-09 PROCEDURE — 87491 CHLMYD TRACH DNA AMP PROBE: CPT

## 2020-06-09 PROCEDURE — 87591 N.GONORRHOEAE DNA AMP PROB: CPT

## 2020-06-09 PROCEDURE — 87086 URINE CULTURE/COLONY COUNT: CPT

## 2020-06-09 PROCEDURE — 87480 CANDIDA DNA DIR PROBE: CPT

## 2020-06-09 PROCEDURE — 99213 OFFICE O/P EST LOW 20 MIN: CPT

## 2020-06-09 PROCEDURE — 87510 GARDNER VAG DNA DIR PROBE: CPT

## 2020-06-09 RX ORDER — FLUCONAZOLE 150 MG/1
150 TABLET ORAL ONCE
Qty: 1 TABLET | Refills: 0 | Status: SHIPPED | OUTPATIENT
Start: 2020-06-09 | End: 2020-06-09

## 2020-06-09 RX ORDER — ONDANSETRON 2 MG/ML
4 INJECTION INTRAMUSCULAR; INTRAVENOUS EVERY 6 HOURS PRN
Status: DISCONTINUED | OUTPATIENT
Start: 2020-06-09 | End: 2020-06-09 | Stop reason: HOSPADM

## 2020-06-09 RX ORDER — METRONIDAZOLE 500 MG/1
500 TABLET ORAL 2 TIMES DAILY
Qty: 14 TABLET | Refills: 0 | Status: ON HOLD | OUTPATIENT
Start: 2020-06-09 | End: 2020-06-11 | Stop reason: HOSPADM

## 2020-06-09 RX ORDER — ACETAMINOPHEN 500 MG
1000 TABLET ORAL EVERY 4 HOURS PRN
Status: DISCONTINUED | OUTPATIENT
Start: 2020-06-09 | End: 2020-06-09

## 2020-06-09 RX ORDER — ONDANSETRON 4 MG/1
4 TABLET, ORALLY DISINTEGRATING ORAL EVERY 8 HOURS PRN
Status: DISCONTINUED | OUTPATIENT
Start: 2020-06-09 | End: 2020-06-09 | Stop reason: HOSPADM

## 2020-06-09 RX ORDER — ACETAMINOPHEN 500 MG
1000 TABLET ORAL EVERY 6 HOURS PRN
Status: DISCONTINUED | OUTPATIENT
Start: 2020-06-09 | End: 2020-06-09 | Stop reason: HOSPADM

## 2020-06-09 NOTE — H&P
OBSTETRICAL HISTORY [facility]    Date: 2020       Time: 4:36 PM   Patient Name: Jazlyn Car     Patient : 1995  Room/Bed: [place] [address]    Admission Date/Time: 2020  2:51 PM      CC: Vaginal Discharge     HPI: Jazlyn Car is a 21 y.o. [id number] at 38w0d who presents with three days of vaginal discharge. She denies any burning or itching. It seems to be getting thicker as the days go on. She denies fever, chills, headache, change in vision, N/V, RUQ pain, urinary symptoms. The patient has been taking Keflex for a suspected UTI recently diagnosed on 20 and has a few days left. She is concerned the discharge is due to the recent medication. The patient reports fetal movement is present, complains of occasional contractions, denies loss of fluid, denies vaginal bleeding. Pregnancy is complicated by asthma, hx aortic stenosis, obesity, hx spontaneous  delivery x2, noncompliance, elevated 1hr, no 3hr GTT, dysmenorrhea. DATING:  LMP: No LMP recorded (lmp unknown). Patient is pregnant.   Estimated Date of Delivery: 20   Based on: early ultrasound, at 7 3/7 weeks GA    PREGNANCY RISK FACTORS:  Patient Active Problem List   Diagnosis    Asthma     Atopy    Hx Aortic Stenosis    BMI 41    Axillary hidradenitis suppurativa    Hx sPTD x 2    Noncompliant    Elev 1hr, no 3hr    Dysmenorrhea    38 weeks gestation of pregnancy    Obesity        Steroids Given In This Pregnancy:  no     REVIEW OF SYSTEMS:   Constitutional: negative fever, negative chills, negative weight changes   HEENT: negative visual disturbances, negative headaches, negative dizziness, negative hearing loss  Breast: Negative breast abnormalities, negative breast lumps, negative nipple discharge  Respiratory: negative dyspnea, negative cough, negative SOB  Cardiovascular: negative chest pain,  negative palpitations, negative arrhythmia, negative syncope Gastrointestinal: positive abdominal pain, negative RUQ pain, negative N/V, negative diarrhea, negative constipation, negative bowel changes, negative heartburn   Genitourinary: negative dysuria, negative hematuria, negative urinary incontinence, ppositive vaginal discharge  Dermatological: negative rash, negative pruritis, negative mole or other skin changes  Hematologic: negative bruising  Immunologic/Lymphatic: negative recent illness, negative recent sick contact  Musculoskeletal: negative back pain, negative myalgias, negative arthralgias  Neurological:  negative dizziness, negative migraines, negative seizures, negative weakness  Behavior/Psych: negative depression, negative anxiety, negative SI, negative HI        OBSTETRICAL HISTORY:   OB History    Para Term  AB Living   3 2 0 2 0 2   SAB TAB Ectopic Molar Multiple Live Births   0 0 0 0 0 2      # Outcome Date GA Lbr Filipe/2nd Weight Sex Delivery Anes PTL Lv   3 Current            2   32w0d  4 lb 3 oz (1.899 kg) M Vag-Spont         Birth Comments: [facility]   1   35w0d  5 lb 1 oz (2.296 kg) F Vag-Spont EPI Y VANITA      Apgar1: 7  Apgar5: 8      Obstetric Comments   G1-    G2-  - Progesterone in this preg      Same partner for all preg. PAST MEDICAL HISTORY:   has a past medical history of Asthma, Chronic airway disease, Dental crowns present, Hidradenitis axillaris, History of  labor, Hydradenitis, Left ankle sprain, Maternal congenital heart disease, antepartum, Obesity, and Wears glasses. PAST SURGICAL HISTORY:   has a past surgical history that includes Tonsillectomy and adenoidectomy and other surgical history (Bilateral, 2016). ALLERGIES:  is allergic to lidocaine. MEDICATIONS:  Prior to Admission medications    Medication Sig Start Date End Date Taking?  Authorizing Provider   cephALEXin (KEFLEX) 500 MG capsule Take 1 capsule by mouth 4 times daily for 7 days 20 Yes Mylinda Parent inhaler- pt non compliant using. Rescue inhaler as needed      38 weeks gestation of pregnancy 2020    Obesity 2020    Dysmenorrhea 2020     Desires Depo PP      Elev 1hr, no 3hr 2020     3 hr GTT      Noncompliant 2020 Has not performed 28 week labs (now at 34 weeks). Refuses to perform fetal kick counts      Hx Aortic Stenosis 2019     Arotic Stenosis. Pt was under the care of Peds Cardiology. Dr. Lauren Del Rosario. Last visit 20- Referral sent to Dr. Lauren Del Rosario- referral cancelled as recommended  per [facility]    19- Pt reports she has appt with [facility] on 19 referred by Dr. Oliver Ramirez- SK  Fetal echo wnl  3/24/20- saw Dr Balta Ramirez at [facility], echo done showing EF of 55%, normal LV diastolic compliance, no significant valvular abnormalities   Pt has f/u appt in 2020      BMI 41 2019     GA 9 wk. Early one hr GTT lab ordered for early diabetic screening.   2020- fetal kick counts instead of NST' per [facility] COVID-19 Guidelines- SK      Axillary hidradenitis suppurativa 2019- Pt reports she continues to have current problems and pain assoc with this dx- SK      Hx sPTD x 2 2019 and   ( progesterone in  )   17P Plans/Education and Counseled-  Labor Definition and Warning Signs, What is 17P and  Common side effects of 17P injections,  [employer] [facility] with [employer]. 12/10/19- RX faxed to [facility] and referral faxed to [facility]  Pt compliant with Doris injections        Atopy 2018       Plan discussed with Dr. Harley Adan, who is agreeable. Steroids given this admission: No    Risks, benefits, alternatives and possible complications have been discussed in detail with the patient. Admission, and post admission procedures and expectations were discussed in detail. All questions were answered.     Attending's Name: Dr. Shannan Lopez DO  Ob/Gyn Resident  2020, 4:36 PM

## 2020-06-09 NOTE — TELEPHONE ENCOUNTER
Patient c/o vaginal leakage and contraction every 15 minutes. She was advised to go to [facility] per Dr. Isabella Rice.

## 2020-06-10 ENCOUNTER — HOSPITAL ENCOUNTER (INPATIENT)
Age: 25
LOS: 1 days | Discharge: HOME | DRG: 560 | End: 2020-06-11
Attending: OBSTETRICS & GYNECOLOGY | Admitting: OBSTETRICS & GYNECOLOGY
Payer: COMMERCIAL

## 2020-06-10 ENCOUNTER — ANESTHESIA (OUTPATIENT)
Dept: LABOR AND DELIVERY | Age: 25
DRG: 560 | End: 2020-06-10
Payer: COMMERCIAL

## 2020-06-10 ENCOUNTER — ANESTHESIA EVENT (OUTPATIENT)
Dept: LABOR AND DELIVERY | Age: 25
DRG: 560 | End: 2020-06-10
Payer: COMMERCIAL

## 2020-06-10 PROBLEM — Z3A.38 38 WEEKS GESTATION OF PREGNANCY: Status: RESOLVED | Noted: 2020-06-09 | Resolved: 2020-06-10

## 2020-06-10 PROBLEM — O09.90 HRP (HIGH RISK PREGNANCY), UNSPECIFIED TRIMESTER: Status: ACTIVE | Noted: 2020-06-10

## 2020-06-10 PROBLEM — Z88.9 ATOPY: Status: RESOLVED | Noted: 2018-02-23 | Resolved: 2020-06-10

## 2020-06-10 PROBLEM — Z30.430 ENCOUNTER FOR INSERTION OF MIRENA IUD: Status: ACTIVE | Noted: 2020-06-10

## 2020-06-10 LAB
-: NORMAL
ABO/RH: NORMAL
ABSOLUTE EOS #: 0.08 K/UL (ref 0–0.44)
ABSOLUTE IMMATURE GRANULOCYTE: 0.03 K/UL (ref 0–0.3)
ABSOLUTE LYMPH #: 2 K/UL (ref 1.1–3.7)
ABSOLUTE MONO #: 0.6 K/UL (ref 0.1–1.2)
AMORPHOUS: NORMAL
AMPHETAMINE SCREEN URINE: NEGATIVE
ANTIBODY SCREEN: NEGATIVE
ARM BAND NUMBER: NORMAL
BACTERIA: NORMAL
BARBITURATE SCREEN URINE: NEGATIVE
BASOPHILS # BLD: 0 % (ref 0–2)
BASOPHILS ABSOLUTE: <0.03 K/UL (ref 0–0.2)
BENZODIAZEPINE SCREEN, URINE: NEGATIVE
BILIRUBIN URINE: NEGATIVE
BUPRENORPHINE URINE: NORMAL
C TRACH DNA GENITAL QL NAA+PROBE: NEGATIVE
CANNABINOID SCREEN URINE: NEGATIVE
CASTS UA: NORMAL /LPF (ref 0–2)
COCAINE METABOLITE, URINE: NEGATIVE
COLOR: YELLOW
COMMENT UA: ABNORMAL
CRYSTALS, UA: NORMAL /HPF
CULTURE: NORMAL
DIFFERENTIAL TYPE: ABNORMAL
EOSINOPHILS RELATIVE PERCENT: 1 % (ref 1–4)
EPITHELIAL CELLS UA: NORMAL /HPF (ref 0–5)
EXPIRATION DATE: NORMAL
GLUCOSE BLD-MCNC: 53 MG/DL (ref 65–105)
GLUCOSE URINE: NEGATIVE
HCT VFR BLD CALC: 35.1 % (ref 36.3–47.1)
HEMOGLOBIN: 11.4 G/DL (ref 11.9–15.1)
IMMATURE GRANULOCYTES: 0 %
KETONES, URINE: NEGATIVE
LEUKOCYTE ESTERASE, URINE: ABNORMAL
LYMPHOCYTES # BLD: 24 % (ref 24–43)
Lab: NORMAL
MCH RBC QN AUTO: 29.3 PG (ref 25.2–33.5)
MCHC RBC AUTO-ENTMCNC: 32.5 G/DL (ref 28.4–34.8)
MCV RBC AUTO: 90.2 FL (ref 82.6–102.9)
MDMA URINE: NORMAL
METHADONE SCREEN, URINE: NEGATIVE
METHAMPHETAMINE, URINE: NORMAL
MONOCYTES # BLD: 7 % (ref 3–12)
MUCUS: NORMAL
N. GONORRHOEAE DNA: NEGATIVE
NITRITE, URINE: NEGATIVE
NRBC AUTOMATED: 0 PER 100 WBC
OPIATES, URINE: NEGATIVE
OTHER OBSERVATIONS UA: NORMAL
OXYCODONE SCREEN URINE: NEGATIVE
PDW BLD-RTO: 13.1 % (ref 11.8–14.4)
PH UA: 7 (ref 5–8)
PHENCYCLIDINE, URINE: NEGATIVE
PLATELET # BLD: ABNORMAL K/UL (ref 138–453)
PLATELET ESTIMATE: ABNORMAL
PLATELET, FLUORESCENCE: 184 K/UL (ref 138–453)
PLATELET, IMMATURE FRACTION: 14.8 % (ref 1.1–10.3)
PMV BLD AUTO: ABNORMAL FL (ref 8.1–13.5)
PROPOXYPHENE, URINE: NORMAL
PROTEIN UA: NEGATIVE
RBC # BLD: 3.89 M/UL (ref 3.95–5.11)
RBC # BLD: ABNORMAL 10*6/UL
RBC UA: NORMAL /HPF (ref 0–2)
RENAL EPITHELIAL, UA: NORMAL /HPF
SARS-COV-2, PCR: NORMAL
SARS-COV-2, RAPID: NOT DETECTED
SARS-COV-2: NORMAL
SEG NEUTROPHILS: 67 % (ref 36–65)
SEGMENTED NEUTROPHILS ABSOLUTE COUNT: 5.54 K/UL (ref 1.5–8.1)
SOURCE: NORMAL
SPECIFIC GRAVITY UA: 1.01 (ref 1–1.03)
SPECIMEN DESCRIPTION: NORMAL
SPECIMEN DESCRIPTION: NORMAL
T. PALLIDUM, IGG: NONREACTIVE
TEST INFORMATION: NORMAL
TRICHOMONAS: NORMAL
TRICYCLIC ANTIDEPRESSANTS, UR: NORMAL
TURBIDITY: CLEAR
URINE HGB: ABNORMAL
UROBILINOGEN, URINE: NORMAL
WBC # BLD: 8.3 K/UL (ref 3.5–11.3)
WBC # BLD: ABNORMAL 10*3/UL
WBC UA: NORMAL /HPF (ref 0–5)
YEAST: NORMAL

## 2020-06-10 PROCEDURE — 6360000002 HC RX W HCPCS: Performed by: ANESTHESIOLOGY

## 2020-06-10 PROCEDURE — 3700000025 EPIDURAL BLOCK: Performed by: ANESTHESIOLOGY

## 2020-06-10 PROCEDURE — 86901 BLOOD TYPING SEROLOGIC RH(D): CPT

## 2020-06-10 PROCEDURE — 86780 TREPONEMA PALLIDUM: CPT

## 2020-06-10 PROCEDURE — 6360000002 HC RX W HCPCS: Performed by: STUDENT IN AN ORGANIZED HEALTH CARE EDUCATION/TRAINING PROGRAM

## 2020-06-10 PROCEDURE — 51701 INSERT BLADDER CATHETER: CPT

## 2020-06-10 PROCEDURE — 85055 RETICULATED PLATELET ASSAY: CPT

## 2020-06-10 PROCEDURE — 96360 HYDRATION IV INFUSION INIT: CPT

## 2020-06-10 PROCEDURE — 85025 COMPLETE CBC W/AUTO DIFF WBC: CPT

## 2020-06-10 PROCEDURE — 2500000003 HC RX 250 WO HCPCS: Performed by: NURSE ANESTHETIST, CERTIFIED REGISTERED

## 2020-06-10 PROCEDURE — 88307 TISSUE EXAM BY PATHOLOGIST: CPT

## 2020-06-10 PROCEDURE — 0UH97HZ INSERTION OF CONTRACEPTIVE DEVICE INTO UTERUS, VIA NATURAL OR ARTIFICIAL OPENING: ICD-10-PCS | Performed by: OBSTETRICS & GYNECOLOGY

## 2020-06-10 PROCEDURE — 86850 RBC ANTIBODY SCREEN: CPT

## 2020-06-10 PROCEDURE — 6370000000 HC RX 637 (ALT 250 FOR IP): Performed by: STUDENT IN AN ORGANIZED HEALTH CARE EDUCATION/TRAINING PROGRAM

## 2020-06-10 PROCEDURE — 2580000003 HC RX 258: Performed by: STUDENT IN AN ORGANIZED HEALTH CARE EDUCATION/TRAINING PROGRAM

## 2020-06-10 PROCEDURE — 86900 BLOOD TYPING SEROLOGIC ABO: CPT

## 2020-06-10 PROCEDURE — 80307 DRUG TEST PRSMV CHEM ANLYZR: CPT

## 2020-06-10 PROCEDURE — 81001 URINALYSIS AUTO W/SCOPE: CPT

## 2020-06-10 PROCEDURE — 82947 ASSAY GLUCOSE BLOOD QUANT: CPT

## 2020-06-10 PROCEDURE — 1220000000 HC SEMI PRIVATE OB R&B

## 2020-06-10 PROCEDURE — U0002 COVID-19 LAB TEST NON-CDC: HCPCS

## 2020-06-10 PROCEDURE — 96361 HYDRATE IV INFUSION ADD-ON: CPT

## 2020-06-10 PROCEDURE — 7200000001 HC VAGINAL DELIVERY

## 2020-06-10 RX ORDER — ONDANSETRON 2 MG/ML
4 INJECTION INTRAMUSCULAR; INTRAVENOUS EVERY 6 HOURS PRN
Status: DISCONTINUED | OUTPATIENT
Start: 2020-06-10 | End: 2020-06-10

## 2020-06-10 RX ORDER — LIDOCAINE HYDROCHLORIDE AND EPINEPHRINE 15; 5 MG/ML; UG/ML
INJECTION, SOLUTION EPIDURAL PRN
Status: DISCONTINUED | OUTPATIENT
Start: 2020-06-10 | End: 2020-06-10 | Stop reason: SDUPTHER

## 2020-06-10 RX ORDER — NALBUPHINE HCL 10 MG/ML
5 AMPUL (ML) INJECTION EVERY 4 HOURS PRN
Status: DISCONTINUED | OUTPATIENT
Start: 2020-06-10 | End: 2020-06-10

## 2020-06-10 RX ORDER — IBUPROFEN 800 MG/1
800 TABLET ORAL EVERY 8 HOURS PRN
Status: DISCONTINUED | OUTPATIENT
Start: 2020-06-10 | End: 2020-06-11 | Stop reason: HOSPADM

## 2020-06-10 RX ORDER — DOCUSATE SODIUM 100 MG/1
100 CAPSULE, LIQUID FILLED ORAL 2 TIMES DAILY
Status: DISCONTINUED | OUTPATIENT
Start: 2020-06-10 | End: 2020-06-11 | Stop reason: HOSPADM

## 2020-06-10 RX ORDER — ONDANSETRON 4 MG/1
4 TABLET, FILM COATED ORAL EVERY 6 HOURS PRN
Status: DISCONTINUED | OUTPATIENT
Start: 2020-06-10 | End: 2020-06-11 | Stop reason: HOSPADM

## 2020-06-10 RX ORDER — ROPIVACAINE HYDROCHLORIDE 2 MG/ML
INJECTION, SOLUTION EPIDURAL; INFILTRATION; PERINEURAL
Status: COMPLETED
Start: 2020-06-10 | End: 2020-06-10

## 2020-06-10 RX ORDER — ACETAMINOPHEN 500 MG
1000 TABLET ORAL EVERY 6 HOURS PRN
Status: DISCONTINUED | OUTPATIENT
Start: 2020-06-10 | End: 2020-06-11 | Stop reason: HOSPADM

## 2020-06-10 RX ORDER — LANOLIN 100 %
OINTMENT (GRAM) TOPICAL PRN
Status: DISCONTINUED | OUTPATIENT
Start: 2020-06-10 | End: 2020-06-11 | Stop reason: HOSPADM

## 2020-06-10 RX ORDER — NALOXONE HYDROCHLORIDE 0.4 MG/ML
0.4 INJECTION, SOLUTION INTRAMUSCULAR; INTRAVENOUS; SUBCUTANEOUS PRN
Status: DISCONTINUED | OUTPATIENT
Start: 2020-06-10 | End: 2020-06-10

## 2020-06-10 RX ORDER — SODIUM CHLORIDE, SODIUM LACTATE, POTASSIUM CHLORIDE, CALCIUM CHLORIDE 600; 310; 30; 20 MG/100ML; MG/100ML; MG/100ML; MG/100ML
INJECTION, SOLUTION INTRAVENOUS CONTINUOUS
Status: DISCONTINUED | OUTPATIENT
Start: 2020-06-10 | End: 2020-06-10

## 2020-06-10 RX ORDER — ACETAMINOPHEN 500 MG
1000 TABLET ORAL EVERY 6 HOURS PRN
Status: DISCONTINUED | OUTPATIENT
Start: 2020-06-10 | End: 2020-06-10

## 2020-06-10 RX ORDER — DIPHENHYDRAMINE HCL 25 MG
25 TABLET ORAL EVERY 4 HOURS PRN
Status: DISCONTINUED | OUTPATIENT
Start: 2020-06-10 | End: 2020-06-10

## 2020-06-10 RX ADMIN — SODIUM CHLORIDE, POTASSIUM CHLORIDE, SODIUM LACTATE AND CALCIUM CHLORIDE: 600; 310; 30; 20 INJECTION, SOLUTION INTRAVENOUS at 04:18

## 2020-06-10 RX ADMIN — ACETAMINOPHEN 1000 MG: 500 TABLET ORAL at 17:21

## 2020-06-10 RX ADMIN — DEXTROSE MONOHYDRATE 5 MILLION UNITS: 5 INJECTION INTRAVENOUS at 04:26

## 2020-06-10 RX ADMIN — Medication 2.5 MILLION UNITS: at 08:39

## 2020-06-10 RX ADMIN — ACETAMINOPHEN 1000 MG: 500 TABLET ORAL at 23:30

## 2020-06-10 RX ADMIN — Medication 1 MILLI-UNITS/MIN: at 09:24

## 2020-06-10 RX ADMIN — LEVONORGESTREL 1 EACH: 52 INTRAUTERINE DEVICE INTRAUTERINE at 09:08

## 2020-06-10 RX ADMIN — ACETAMINOPHEN 1000 MG: 500 TABLET ORAL at 11:16

## 2020-06-10 RX ADMIN — IBUPROFEN 800 MG: 800 TABLET, FILM COATED ORAL at 19:33

## 2020-06-10 RX ADMIN — LIDOCAINE HYDROCHLORIDE,EPINEPHRINE BITARTRATE 3 ML: 15; .005 INJECTION, SOLUTION EPIDURAL; INFILTRATION; INTRACAUDAL; PERINEURAL at 05:29

## 2020-06-10 RX ADMIN — SODIUM CHLORIDE, POTASSIUM CHLORIDE, SODIUM LACTATE AND CALCIUM CHLORIDE: 600; 310; 30; 20 INJECTION, SOLUTION INTRAVENOUS at 05:42

## 2020-06-10 RX ADMIN — IBUPROFEN 800 MG: 800 TABLET, FILM COATED ORAL at 11:16

## 2020-06-10 RX ADMIN — Medication 10 ML/HR: at 05:36

## 2020-06-10 ASSESSMENT — PAIN SCALES - GENERAL
PAINLEVEL_OUTOF10: 7
PAINLEVEL_OUTOF10: 5

## 2020-06-10 NOTE — DISCHARGE SUMMARY
exposure. She was counseled on various alternate recommendations to decrease the exposure to secondary smoke to her children.

## 2020-06-10 NOTE — ANESTHESIA PRE PROCEDURE
injection 4 mg  4 mg Intravenous Q6H PRN Edda Erasmo, DO        oxytocin (PITOCIN) 30 units in 500 mL infusion  1 brian-units/min Intravenous Continuous PRN Edda Erasmo, DO        penicillin G potassium in d5w IVPB 2.5 Million Units  2.5 Million Units Intravenous Q4H Edda Erasmo, DO        ropivacaine (NAROPIN) 0.2% injection 0.2%                Allergies:     Allergies   Allergen Reactions    Lidocaine Hives       Problem List:    Patient Active Problem List   Diagnosis Code    Asthma  O99.519, J45.909    Hx Aortic Stenosis Z87.74    BMI 39 Z68.41    Axillary hidradenitis suppurativa L73.2    Hx sPTD x 2 O09.211    Noncompliant O09.899, Z91.19    Elev 1hr, no 3hr O99.810    Dysmenorrhea N94.6    Obesity E66.9    HRP (high risk pregnancy), unspecified trimester O09.90       Past Medical History:        Diagnosis Date    Asthma     Chronic airway disease     Dental crowns present     Hidradenitis axillaris 2015    History of  labor 2010    Hydradenitis     Left ankle sprain 2015    Maternal congenital heart disease, antepartum 2014    Obesity     Wears glasses        Past Surgical History:        Procedure Laterality Date    OTHER SURGICAL HISTORY Bilateral 2016    excision axillary hydranitis    TONSILLECTOMY AND ADENOIDECTOMY         Social History:    Social History     Tobacco Use    Smoking status: Never Smoker    Smokeless tobacco: Never Used   Substance Use Topics    Alcohol use: No     Alcohol/week: 0.0 standard drinks                                Counseling given: Not Answered      Vital Signs (Current):   Vitals:    06/10/20 0400   BP: (!) 137/91   Pulse: 76   Resp: 18   Temp: 36.5 °C (97.7 °F)   TempSrc: Oral   Weight: 243 lb (110.2 kg)   Height: 5' 4\" (1.626 m)                                              BP Readings from Last 3 Encounters:   06/10/20 (!) 137/91   20 128/76   20 133/83       NPO Status: BMI:   Wt Readings from Last 3 Encounters:   06/10/20 243 lb (110.2 kg)   06/05/20 242 lb (109.8 kg)   05/28/20 243 lb (110.2 kg)     Body mass index is 41.71 kg/m². CBC:   Lab Results   Component Value Date    WBC 8.3 06/10/2020    RBC 3.89 06/10/2020    HGB 11.4 06/10/2020    HCT 35.1 06/10/2020    MCV 90.2 06/10/2020    RDW 13.1 06/10/2020    PLT See Reflexed IPF Result 06/10/2020       CMP:   Lab Results   Component Value Date     12/24/2019    K 3.4 12/24/2019     12/24/2019    CO2 21 12/24/2019    BUN 5 12/24/2019    CREATININE 0.35 12/24/2019    GFRAA >60 12/24/2019    LABGLOM >60 12/24/2019    GLUCOSE 132 05/21/2020    GLUCOSE 93 12/24/2019    PROT 7.3 12/24/2019    CALCIUM 9.2 12/24/2019    BILITOT 0.19 12/24/2019    ALKPHOS 73 12/24/2019    AST 23 12/24/2019    ALT 42 12/24/2019       POC Tests: No results for input(s): POCGLU, POCNA, POCK, POCCL, POCBUN, POCHEMO, POCHCT in the last 72 hours.     Coags: No results found for: PROTIME, INR, APTT    HCG (If Applicable):   Lab Results   Component Value Date    PREGTESTUR positive 10/24/2019    HCG NEGATIVE 02/12/2016    HCGQUANT 40,818 (H) 12/24/2019        ABGs: No results found for: PHART, PO2ART, QGR1EHK, FPI7VBS, BEART, V3RYVYWQ     Type & Screen (If Applicable):  No results found for: LABABO, LABRH    Drug/Infectious Status (If Applicable):  No results found for: HIV, HEPCAB    COVID-19 Screening (If Applicable):   Lab Results   Component Value Date    COVID19 Not Detected 06/10/2020         Anesthesia Evaluation  Patient summary reviewed and Nursing notes reviewed  Airway: Mallampati: II  TM distance: >3 FB   Neck ROM: full  Mouth opening: > = 3 FB Dental: normal exam         Pulmonary:normal exam  breath sounds clear to auscultation  (+) asthma: allergic asthma,                            Cardiovascular:    (+) valvular problems/murmurs: AS and no interval change,

## 2020-06-10 NOTE — FLOWSHEET NOTE
[address] at bedside. Epidural procedure explained, risks discussed. Pt verbalizes consent for epidural.   [id number] patient positioned for epidural. 0529 catheter placed. 0530 test dose given. Epidural catheter taped and secured per anesthesia. 0533 to low fowlers with left uterine displacement. 0548 loading dose given. 0548 pump initiated. Pt tolerated procedure well.

## 2020-06-10 NOTE — H&P
dysuria, negative hematuria, negative urinary incontinence, negative vaginal discharge, positive leaking fluid  Dermatological: negative rash, negative pruritis, negative mole or other skin changes  Hematologic: negative bruising  Immunologic/Lymphatic: negative recent illness, negative recent sick contact  Musculoskeletal: negative back pain, negative myalgias, negative arthralgias  Neurological:  negative dizziness, negative migraines, negative seizures, negative weakness  Behavior/Psych: negative depression, negative anxiety, negative SI, negative HI        OBSTETRICAL HISTORY:   OB History    Para Term  AB Living   3 2 0 2 0 2   SAB TAB Ectopic Molar Multiple Live Births   0 0 0 0 0 2      # Outcome Date GA Lbr Filipe/2nd Weight Sex Delivery Anes PTL Lv   3 Current            2   32w0d  4 lb 3 oz (1.899 kg) M Vag-Spont         Birth Comments: [facility]   1   35w0d  5 lb 1 oz (2.296 kg) F Vag-Spont EPI Y VANITA      Apgar1: 7  Apgar5: 8      Obstetric Comments   G1-    G2-  - Progesterone in this preg      Same partner for all preg. PAST MEDICAL HISTORY:   has a past medical history of Asthma, Chronic airway disease, Dental crowns present, Hidradenitis axillaris, History of  labor, Hydradenitis, Left ankle sprain, Maternal congenital heart disease, antepartum, Obesity, and Wears glasses. PAST SURGICAL HISTORY:   has a past surgical history that includes Tonsillectomy and adenoidectomy and other surgical history (Bilateral, 2016). ALLERGIES:  is allergic to lidocaine. MEDICATIONS:  Prior to Admission medications    Medication Sig Start Date End Date Taking?  Authorizing Provider   metroNIDAZOLE (FLAGYL) 500 MG tablet Take 1 tablet by mouth 2 times daily for 7 days 20  Sonny Peter,    famotidine (PEPCID) 20 MG tablet Take 1 tablet by mouth 2 times daily 20   Elmira Bueno,    aspirin EC 81 MG EC tablet Take 1 tablet by mouth increased work of breathing, good air exchange, clear to auscultation bilaterally, no crackles or wheezing  Heart:  regular rate and rhythm and no murmur, rubs, gallops  Abdomen:  soft, gravid, non-tender, no right upper quadrant tenderness, no CVA tenderness, uterus non-tender, no signs of abruption and no signs of chorioamnionitis  Extremities:  no calf tenderness, non edematous, no varicosities, full range of motion in all four extremities, DTRs: normal  Musculoskeletal: Gross strength equal and intact throughout, no gross abnormalities, range of motion normal in hips, knees, shoulders and spine  Psychiatric: Mood appropriate, normal affect     Pelvic Exam:  Vulva: normal appearing vulva, no masses, tenderness or lesions, normal clitoris  Vagina: normal appearing vagina with normal color and discharge, no lesions, Patient appears grossly ruptured. Cervix: no cervical motion tenderness  Uterus: is gravid, normal size, shape, consistency and non-tender     Cervix Check: 4 cm dilated, 90 % effaced, 0 station, anterior position, soft consistency, FETAL POSITION: Cephalic (confirmed by ultrasound), Membranes ruptured    DATA:  Membranes Ruptured: Yes. Grossly    LIMITED BEDSIDE US:  Position: Cephalic  Placental Location: posterior  Fetal Heart Tones: Present  Fetal Movement: Present  Amniotic Fluid Index/Volume:  adequate 2x2 cm fluid pocket  Estimated Fetal Weight:  6 lbs 5oz    Prenatal Labs      Blood Type/Rh: O pos  Antibody Screen: negative  Hemoglobin, Hematocrit, Platelets: 11.7 / 76.0 / 171  Rubella: immune  T.  Pallidum, IgG: non-reactive   Hepatitis B Surface Antigen: non-reactive   HIV: non-reactive   Sickle Cell Screen: negative  Gonorrhea: negative  Chlamydia: negative  Urine culture: negative, date: 12/24/2019     Early 1 hour Glucose Tolerance Test: ordered, not completed  1 hour Glucose Tolerance Test: 132  3 hour Glucose Tolerance Test:   ordered, not completed     Group B Strep: Positive  Cystic Fibrosis Screen: ordered, not completed  First Trimester Screen: low risk   MSAFP/Multiple Markers: MSAFP normal  Non-Invasive Prenatal Testing: No aneuploidy detected  Anatomy US: posterior/fundal, 3VC, Female, Normal Anatomy      ASSESSMENT & PLAN:  Sherice Thompson is a 21 y.o. female [id number] at 38w1d in spontaneous labor   - GBS positive / Rh positive / R immune   - Pen G for GBS prophylaxis   - VSS, Afebrile   - ATSO Dr. Gifford Conception   - cEFM/ TOCO   - SVE: 4/90/0   - Patient appears grossly ruptured   - LBUS: Cephalic EFW 6#5   - Diet: CLD   - IVF  ml/hr   - CBC, T&S, Tpal, Ua, UDS, ordered, Informed consent obtained   - COVID ordered   - Will admit for spontaneous labor     Asthma   - Stable on albuterol prn    Hx of Aortic Stenosis   - Patient has followed with Ped cardio   - She has seen [facility] during this pregnancy and they reccommended f/u PP   - ECHO completed within pregnancy showed EF of 55% w/o Aortic stenosis    Hx of sPTD x2   - Patient is compliant with 17-OHP    Elevated 1hr, 3 hr GTT    Obesity    Noncompliance    - SW consult PP     Patient Active Problem List    Diagnosis Date Noted    Asthma  03/20/2012     Priority: High     Daily inhaler- pt non compliant using. Rescue inhaler as needed      38 weeks gestation of pregnancy 06/09/2020    Obesity 06/09/2020    Dysmenorrhea 05/26/2020     Desires Depo PP      Elev 1hr, no 3hr 05/21/2020     3 hr GTT      Noncompliant 05/12/2020 5/12/20 Has not performed 28 week labs (now at 34 weeks). Refuses to perform fetal kick counts      Hx Aortic Stenosis 11/19/2019     Arotic Stenosis. Pt was under the care of Peds Cardiology. Dr. Keith Wadsworth.   Last visit 2014 12/6/19- Referral sent to Dr. Keith Wadsworth- referral cancelled as recommended  per [facility]    12/18/19- Pt reports she has appt with [facility] on 1/28/19 referred by Dr. Leopold Half  Fetal echo wnl  3/24/20- saw Dr Krishna Mishra at [facility], echo done showing EF of 55%, normal LV

## 2020-06-11 VITALS
HEIGHT: 64 IN | BODY MASS INDEX: 41.48 KG/M2 | RESPIRATION RATE: 18 BRPM | TEMPERATURE: 98.4 F | SYSTOLIC BLOOD PRESSURE: 135 MMHG | WEIGHT: 243 LBS | DIASTOLIC BLOOD PRESSURE: 80 MMHG | OXYGEN SATURATION: 100 % | HEART RATE: 76 BPM

## 2020-06-11 LAB — SURGICAL PATHOLOGY REPORT: NORMAL

## 2020-06-11 PROCEDURE — 6370000000 HC RX 637 (ALT 250 FOR IP): Performed by: STUDENT IN AN ORGANIZED HEALTH CARE EDUCATION/TRAINING PROGRAM

## 2020-06-11 PROCEDURE — 6360000002 HC RX W HCPCS: Performed by: STUDENT IN AN ORGANIZED HEALTH CARE EDUCATION/TRAINING PROGRAM

## 2020-06-11 RX ORDER — IBUPROFEN 600 MG/1
600 TABLET ORAL EVERY 6 HOURS PRN
Qty: 30 TABLET | Refills: 0 | Status: SHIPPED | OUTPATIENT
Start: 2020-06-11 | End: 2020-08-19

## 2020-06-11 RX ORDER — MEDROXYPROGESTERONE ACETATE 150 MG/ML
150 INJECTION, SUSPENSION INTRAMUSCULAR
Status: DISCONTINUED | OUTPATIENT
Start: 2020-06-11 | End: 2020-06-11 | Stop reason: HOSPADM

## 2020-06-11 RX ORDER — DOCUSATE SODIUM 100 MG/1
100 CAPSULE, LIQUID FILLED ORAL 2 TIMES DAILY
Qty: 60 CAPSULE | Refills: 1 | Status: SHIPPED | OUTPATIENT
Start: 2020-06-11 | End: 2020-07-11

## 2020-06-11 RX ADMIN — DOCUSATE SODIUM 100 MG: 100 CAPSULE, LIQUID FILLED ORAL at 08:27

## 2020-06-11 RX ADMIN — MEDROXYPROGESTERONE ACETATE 150 MG: 150 INJECTION, SUSPENSION INTRAMUSCULAR at 12:27

## 2020-06-11 RX ADMIN — IBUPROFEN 800 MG: 800 TABLET, FILM COATED ORAL at 08:26

## 2020-06-11 RX ADMIN — ACETAMINOPHEN 1000 MG: 500 TABLET ORAL at 08:26

## 2020-06-11 ASSESSMENT — PAIN SCALES - GENERAL: PAINLEVEL_OUTOF10: 6

## 2020-06-11 NOTE — PROGRESS NOTES
Encourage ambulation   - Postpartum Hgb/Hct if sx  2. Rh positive/Rubella immune  3. Breast feeding    - Denies s/s of mastitis  4. gHTN   - Patient has been normotensive overnight   - Denies s/s of PreE   - Will continue to monitor Bps closely  5. Hx of Aortic Stenosis   - Patient had echo in pregnancy that showed EF of >55% and no aortic stenosis   - Patient is to f/u PP  6. Asthma   - Stable on Albuterol prn  7. Noncompliant   - SW Consult PP  8. Continue post partum care    Counseling Completed:  Secondary Smoke risks and Sudden Infant Death Syndrome were reviewed with recommendations. Infant sleeping, \"back to sleep\" and avoidance of co-sleeping recommendations were reviewed. Signs and Symptoms of Post Partum Depression were reviewed. The patient is to call if any occur. Signs and symptoms of Mastitis were reviewed. The patient is to call if any occur for follow up. Discharge instructions including pelvic rest, no driving with pain medicine and office follow-up were reviewed with patient     Attending Physician: Dr. Valarie Anne, DO  Ob/Gyn Resident   2020, 2:37 AM      Date: 2020  Time: 9:26 AM      Patient Name: Cortes Han  Patient : 1995  Room/Bed: 5688/5800-62  Admission Date/Time: 6/10/2020  3:53 AM        Attending Physician Statement  I have personally seen, evaluated and discussed the care of Cortes Han, including pertinent history and exam findings with the resident. I have reviewed and edited their note in the electronic medical record. The key elements of all parts of the encounter have been performed/reviewed by me. I agree with the assessment, plan and orders as documented by the resident. The level of care submitted represents to the best of my ability the care documented in the medical record today. GC Modifier. This service has been performed in part by a resident under the direction of a teaching physician.         Attending's Name:

## 2020-06-11 NOTE — CARE COORDINATION
Discharge Planning:     Met with mother in her room. Social distancing maintained. Anticipate DC of couplet 2020 after  6/10/2020. Female Infant to [facility]    After speaking w/ patient no anticipated need for HC/DME at this time. CM continue to follow for DC needs.

## 2020-06-18 ENCOUNTER — POSTPARTUM VISIT (OUTPATIENT)
Dept: OBGYN | Age: 25
End: 2020-06-18
Payer: COMMERCIAL

## 2020-06-18 VITALS
BODY MASS INDEX: 38.93 KG/M2 | WEIGHT: 228 LBS | SYSTOLIC BLOOD PRESSURE: 127 MMHG | HEIGHT: 64 IN | HEART RATE: 70 BPM | DIASTOLIC BLOOD PRESSURE: 84 MMHG

## 2020-06-18 PROCEDURE — 99212 OFFICE O/P EST SF 10 MIN: CPT | Performed by: OBSTETRICS & GYNECOLOGY

## 2020-06-18 PROCEDURE — 99024 POSTOP FOLLOW-UP VISIT: CPT | Performed by: STUDENT IN AN ORGANIZED HEALTH CARE EDUCATION/TRAINING PROGRAM

## 2020-06-18 NOTE — PROGRESS NOTES
inspected  Extremities:  no calf tenderness, non edematous    Assessment:  Monika Gayle is a 31 y.o. female , 1 weeks Post Partum s/p spontaneous vaginal delivery on 6/10/2020   - Stable, continue routine post partum care   - BP stable, denies s/s of pre E    - EPDS 0, no s/s of postpartum depression    - Continue abstinence until 6 weeks postpartum     Dysmenorrhea    - Receivied Depo on postpartum    - Considering Mirena IUD, will provide information    Hx aortic stenosis    - ECHO wnl 2020   - Follow up with [facility] scheduled for 2020   - Denies CP, SOB, dizziness or presyncope    Patient Active Problem List    Diagnosis Date Noted    Asthma  2012     Priority: High     Daily inhaler- pt non compliant using. Rescue inhaler as needed      Postpartum state     HRP (high risk pregnancy), unspecified trimester 06/10/2020     w/ MIrena IUD 6/10/20 F APG 8/9 Wt 6#8 06/10/2020    Obesity 2020    Dysmenorrhea 2020     Desires Depo PP      Elev 1hr, no 3hr 2020     3 hr GTT      Noncompliant 2020 Has not performed 28 week labs (now at 34 weeks). Refuses to perform fetal kick counts      Hx Aortic Stenosis 2019     Arotic Stenosis. Pt was under the care of [facility]. Dr. Axel Gannon. Last visit 20- Referral sent to Dr. Axel Gnanon- referral cancelled as recommended  per [facility]    19- Pt reports she has appt with [facility] on 19 referred by Dr. Yusuf Doctor- SK  Fetal echo wnl  3/24/20- saw Dr Nasir Tovar at [facility], echo done showing EF of 55%, normal LV diastolic compliance, no significant valvular abnormalities   Pt has f/u appt in 2020      BMI 41 2019     GA 9 wk.   Early one hr GTT lab ordered for early diabetic screening.   2020- fetal kick counts instead of NST' per [facility] COVID-19 Guidelines- SK      Axillary hidradenitis suppurativa 2019- Pt reports she continues to

## 2020-06-18 NOTE — TELEPHONE ENCOUNTER
Patient called complaining of contractions. Instructed patient to drink water and take nap to see if contractions resolve. Instructed patient to report to hospital if contractions got closer together or more intense.

## 2020-08-19 ENCOUNTER — OFFICE VISIT (OUTPATIENT)
Dept: OBGYN | Age: 25
End: 2020-08-19
Payer: COMMERCIAL

## 2020-08-19 VITALS
BODY MASS INDEX: 40.34 KG/M2 | DIASTOLIC BLOOD PRESSURE: 66 MMHG | HEART RATE: 83 BPM | WEIGHT: 235 LBS | SYSTOLIC BLOOD PRESSURE: 111 MMHG

## 2020-08-19 PROBLEM — O13.9 GESTATIONAL HTN: Status: ACTIVE | Noted: 2020-08-19

## 2020-08-19 PROCEDURE — 1036F TOBACCO NON-USER: CPT | Performed by: STUDENT IN AN ORGANIZED HEALTH CARE EDUCATION/TRAINING PROGRAM

## 2020-08-19 PROCEDURE — G8417 CALC BMI ABV UP PARAM F/U: HCPCS | Performed by: STUDENT IN AN ORGANIZED HEALTH CARE EDUCATION/TRAINING PROGRAM

## 2020-08-19 PROCEDURE — G8427 DOCREV CUR MEDS BY ELIG CLIN: HCPCS | Performed by: STUDENT IN AN ORGANIZED HEALTH CARE EDUCATION/TRAINING PROGRAM

## 2020-08-19 RX ORDER — SELENIUM SULFIDE 2.5 MG/100ML
LOTION TOPICAL DAILY PRN
COMMUNITY

## 2020-08-19 RX ORDER — BUTALBITAL, ACETAMINOPHEN AND CAFFEINE 50; 325; 40 MG/1; MG/1; MG/1
1 TABLET ORAL EVERY 4 HOURS PRN
Qty: 30 TABLET | Refills: 0 | Status: SHIPPED | OUTPATIENT
Start: 2020-08-19 | End: 2021-09-14 | Stop reason: ALTCHOICE

## 2020-08-19 NOTE — PROGRESS NOTES
Postpartum Visit    Mat Whalen is a 31 y.o. female [id number], 10 weeks Post Partum from a  on 6/10/20. The patient was seen. Her pregnancy was complicated by gHTN, obesity, hx aortic stenosis, hx sPTD, asthma, noncompliance, and dysmenorrhea. She is not breast feeding and denies signs or symptoms of mastitis. The patient completed the E.P.D.S. Post Partum Depression Evaluation form and EPDS Score of 0. She does not have signs or symptoms of post partum depression. She denies any suicidal thoughts with a plan, intent to harm others and delusional ideas. She does admit to having good home support. Today her lochia is light she denies any dizziness or shortness of breath. Her bowels are regular and she denies any urinary tract symptomology. She is using Depo-Provera for history of dysmenorrhea  and condoms for STD prevention. Her IUD fell out during her postpartum recovery and she received depo prior to leaving the hospital. She wishes to continue with this. She is reporting on and off HAs. She denies a constant HA. She denies any vision changes, RUQ pain, chest pain, or SOB. Her BP is normal today. She is 10 weeks PP, so low suspician for preE. Encouraged hydration. Fioricet sent to pharmacy. Follow up with IM encouraged if no resolution. OB History    Para Term  AB Living   3 3 1 2 0 3   SAB TAB Ectopic Molar Multiple Live Births   0 0 0 0 0 3   Obstetric Comments   G1-    G2-  - Progesterone in this preg      Same partner for all preg.       Patient Active Problem List   Diagnosis    Asthma     Hx Aortic Stenosis    BMI 41    Axillary hidradenitis suppurativa    Hx sPTD x 2    Noncompliant    Elev 1hr, no 3hr    Dysmenorrhea    Obesity    HRP (high risk pregnancy), unspecified trimester     w/ MIrena IUD 6/10/20 F APG 8/9 Wt 6#8    Postpartum state    Gestational HTN--G3       Vitals:   Blood pressure 111/66, pulse 83, weight 235 lb (106.6 kg), unknown if currently breastfeeding. Physical Exam:  General:  no apparent distress, alert and cooperative  Lungs:  No increased work of breathing, good air exchange, clear to auscultation bilaterally, no crackles or wheezing  Heart:  regular rate and rhythm and no murmur    Abdomen: Abdomen soft, non-tender. BS normal. No masses,  No organomegaly  Fundus: non-tender, normal size, firm, below umbilicus  Perineum: not inspected  Extremities:  no calf tenderness, non edematous    Assessment:  Aminta Gonsalez is a 31 y.o. female , 10 weeks Post Partum from a  on 6/10/20.    - Doing well   - EPDS: 0   - Depo for dysmenorrhea. Encouraged condom use for STD prevention   - Follow up for annual exams and follow up with IM    LANDA   - She is reporting on and off HAs. She denies a constant HA.    - She denies any vision changes, RUQ pain, chest pain, or SOB. - Her BP is normal today. - She is 10 weeks PP, so low suspician for preE. Encouraged hydration. Fioricet sent to pharmacy. -  Follow up with IM encouraged if no resolution. Patient Active Problem List    Diagnosis Date Noted    Asthma  2012     Priority: High     Daily inhaler- pt non compliant using. Rescue inhaler as needed      Gestational HTN--G3 2020    Postpartum state     HRP (high risk pregnancy), unspecified trimester 06/10/2020     w/ MIrena IUD 6/10/20 F APG 8/9 Wt 6#8 06/10/2020     IUD fell out. Using depo.  Obesity 2020    Dysmenorrhea 2020     Desires Depo PP      Elev 1hr, no 3hr 2020     3 hr GTT      Noncompliant 2020 Has not performed 28 week labs (now at 34 weeks). Refuses to perform fetal kick counts      Hx Aortic Stenosis 2019     Arotic Stenosis. Pt was under the care of [facility]. Dr. Frankey Kinnier.   Last visit 20- Referral sent to Dr. Frankey Kinnier- referral cancelled as recommended  per [facility]    19- Pt reports she has appt with [facility] on 19 referred by Dr. Robyn Fishman- SK  Fetal echo wnl  3/24/20- saw Dr Samantha Solano at [facility], echo done showing EF of 55%, normal LV diastolic compliance, no significant valvular abnormalities   Pt has f/u appt in 2020      BMI 41 2019     GA 9 wk. Early one hr GTT lab ordered for early diabetic screening.   2020- fetal kick counts instead of NST' per [facility] COVID-19 Guidelines- SK      Axillary hidradenitis suppurativa 2019- Pt reports she continues to have current problems and pain assoc with this dx- SK      Hx sPTD x 2 2019 and   ( progesterone in  )   17P Plans/Education and Counseled-  Labor Definition and Warning Signs, What is 17P and  Common side effects of 17P injections,  Home [employer] care with [employer]. 12/10/19- RX faxed to [facility] and referral faxed to [address] [place] [address]  Pt compliant with [place] injections         Return in about 1 month (around 2020) for depo injection--nurse visit and then annual exams affter. Counseling Completed:  · Signs & Symptoms of mastitis and when to notify office discussed.   · Secondary smoke risks including increased risks of respiratory problems, Sudden infant death syndrome, and potential malignancies discussed  · Abstinence, family planning counseling and STD counseling discussed  · Continue with post operative restrictions until 6 weeks post partum-->patient is 10 weeks PP  · No heavy lifting or [place] until 6 weeks post partum    Aminata Leon DO  Ob/Gyn Resident  [facility], [facility]  2020, 3:58 PM

## 2020-08-20 NOTE — PROGRESS NOTES
Attending Physician Statement  I have discussed the care of [address], including pertinent history and exam findings,  with the resident. I have reviewed the key elements of all parts of the encounter with the resident. I agree with the assessment, plan and orders as documented by the resident.   (GE Modifier)

## 2020-09-15 ENCOUNTER — TELEPHONE (OUTPATIENT)
Dept: OBGYN | Age: 25
End: 2020-09-15

## 2020-09-15 RX ORDER — MEDROXYPROGESTERONE ACETATE 150 MG/ML
150 INJECTION, SUSPENSION INTRAMUSCULAR
Qty: 1 ML | Refills: 3 | Status: SHIPPED | OUTPATIENT
Start: 2020-09-15 | End: 2020-09-15 | Stop reason: SDUPTHER

## 2020-09-15 RX ORDER — MEDROXYPROGESTERONE ACETATE 150 MG/ML
150 INJECTION, SUSPENSION INTRAMUSCULAR
Qty: 1 ML | Refills: 3 | Status: SHIPPED | OUTPATIENT
Start: 2020-09-15 | End: 2021-09-14

## 2020-09-16 ENCOUNTER — NURSE ONLY (OUTPATIENT)
Dept: OBGYN | Age: 25
End: 2020-09-16
Payer: COMMERCIAL

## 2020-09-16 PROCEDURE — 99211 OFF/OP EST MAY X REQ PHY/QHP: CPT | Performed by: OBSTETRICS & GYNECOLOGY

## 2020-09-16 PROCEDURE — 96372 THER/PROPH/DIAG INJ SC/IM: CPT | Performed by: OBSTETRICS & GYNECOLOGY

## 2020-09-16 RX ORDER — MEDROXYPROGESTERONE ACETATE 150 MG/ML
150 INJECTION, SUSPENSION INTRAMUSCULAR ONCE
Status: COMPLETED | OUTPATIENT
Start: 2020-09-16 | End: 2020-09-16

## 2020-09-16 RX ADMIN — MEDROXYPROGESTERONE ACETATE 150 MG: 150 INJECTION, SUSPENSION INTRAMUSCULAR at 13:55

## 2020-10-22 ENCOUNTER — TELEPHONE (OUTPATIENT)
Dept: OBGYN | Age: 25
End: 2020-10-22

## 2020-10-22 NOTE — TELEPHONE ENCOUNTER
Pt Ms Zacarias Junior had question about her 6/18/20 Postpartum Visit appt, Pt states her insurance will not pay her, if Pt do not keep appt, and Visit type has to say Postpartum, Pt want print out of Visit, will come to  thank you.

## 2020-12-04 ENCOUNTER — APPOINTMENT (OUTPATIENT)
Dept: GENERAL RADIOLOGY | Age: 25
End: 2020-12-04
Payer: COMMERCIAL

## 2020-12-04 ENCOUNTER — HOSPITAL ENCOUNTER (EMERGENCY)
Age: 25
Discharge: HOME | End: 2020-12-04
Attending: EMERGENCY MEDICINE
Payer: COMMERCIAL

## 2020-12-04 VITALS
DIASTOLIC BLOOD PRESSURE: 63 MMHG | HEART RATE: 72 BPM | OXYGEN SATURATION: 100 % | SYSTOLIC BLOOD PRESSURE: 122 MMHG | BODY MASS INDEX: 41.48 KG/M2 | TEMPERATURE: 98.1 F | RESPIRATION RATE: 13 BRPM | HEIGHT: 64 IN | WEIGHT: 243 LBS

## 2020-12-04 LAB
ABSOLUTE EOS #: 0.13 K/UL (ref 0–0.44)
ABSOLUTE IMMATURE GRANULOCYTE: <0.03 K/UL (ref 0–0.3)
ABSOLUTE LYMPH #: 3.17 K/UL (ref 1.1–3.7)
ABSOLUTE MONO #: 0.42 K/UL (ref 0.1–1.2)
ANION GAP SERPL CALCULATED.3IONS-SCNC: 12 MMOL/L (ref 9–17)
BASOPHILS # BLD: 0 % (ref 0–2)
BASOPHILS ABSOLUTE: 0.03 K/UL (ref 0–0.2)
BUN BLDV-MCNC: 9 MG/DL (ref 6–20)
BUN/CREAT BLD: NORMAL (ref 9–20)
CALCIUM SERPL-MCNC: 9.3 MG/DL (ref 8.6–10.4)
CHLORIDE BLD-SCNC: 102 MMOL/L (ref 98–107)
CO2: 24 MMOL/L (ref 20–31)
CREAT SERPL-MCNC: 0.57 MG/DL (ref 0.5–0.9)
DIFFERENTIAL TYPE: NORMAL
EOSINOPHILS RELATIVE PERCENT: 2 % (ref 1–4)
GFR AFRICAN AMERICAN: >60 ML/MIN
GFR NON-AFRICAN AMERICAN: >60 ML/MIN
GFR SERPL CREATININE-BSD FRML MDRD: NORMAL ML/MIN/{1.73_M2}
GFR SERPL CREATININE-BSD FRML MDRD: NORMAL ML/MIN/{1.73_M2}
GLUCOSE BLD-MCNC: 92 MG/DL (ref 70–99)
HCT VFR BLD CALC: 41.2 % (ref 36.3–47.1)
HEMOGLOBIN: 13.1 G/DL (ref 11.9–15.1)
IMMATURE GRANULOCYTES: 0 %
LYMPHOCYTES # BLD: 42 % (ref 24–43)
MCH RBC QN AUTO: 28.9 PG (ref 25.2–33.5)
MCHC RBC AUTO-ENTMCNC: 31.8 G/DL (ref 28.4–34.8)
MCV RBC AUTO: 90.7 FL (ref 82.6–102.9)
MONOCYTES # BLD: 6 % (ref 3–12)
NRBC AUTOMATED: 0 PER 100 WBC
PDW BLD-RTO: 12.6 % (ref 11.8–14.4)
PLATELET # BLD: NORMAL K/UL (ref 138–453)
PLATELET ESTIMATE: NORMAL
PLATELET, FLUORESCENCE: 210 K/UL (ref 138–453)
PLATELET, IMMATURE FRACTION: 13.5 % (ref 1.1–10.3)
PMV BLD AUTO: NORMAL FL (ref 8.1–13.5)
POTASSIUM SERPL-SCNC: 3.7 MMOL/L (ref 3.7–5.3)
RBC # BLD: 4.54 M/UL (ref 3.95–5.11)
RBC # BLD: NORMAL 10*6/UL
SEG NEUTROPHILS: 50 % (ref 36–65)
SEGMENTED NEUTROPHILS ABSOLUTE COUNT: 3.72 K/UL (ref 1.5–8.1)
SODIUM BLD-SCNC: 138 MMOL/L (ref 135–144)
TROPONIN INTERP: NORMAL
TROPONIN T: NORMAL NG/ML
TROPONIN, HIGH SENSITIVITY: <6 NG/L (ref 0–14)
WBC # BLD: 7.5 K/UL (ref 3.5–11.3)
WBC # BLD: NORMAL 10*3/UL

## 2020-12-04 PROCEDURE — 85055 RETICULATED PLATELET ASSAY: CPT

## 2020-12-04 PROCEDURE — 84484 ASSAY OF TROPONIN QUANT: CPT

## 2020-12-04 PROCEDURE — 85025 COMPLETE CBC W/AUTO DIFF WBC: CPT

## 2020-12-04 PROCEDURE — 99285 EMERGENCY DEPT VISIT HI MDM: CPT

## 2020-12-04 PROCEDURE — 80048 BASIC METABOLIC PNL TOTAL CA: CPT

## 2020-12-04 PROCEDURE — 93005 ELECTROCARDIOGRAM TRACING: CPT | Performed by: STUDENT IN AN ORGANIZED HEALTH CARE EDUCATION/TRAINING PROGRAM

## 2020-12-04 PROCEDURE — 6370000000 HC RX 637 (ALT 250 FOR IP): Performed by: STUDENT IN AN ORGANIZED HEALTH CARE EDUCATION/TRAINING PROGRAM

## 2020-12-04 PROCEDURE — 71045 X-RAY EXAM CHEST 1 VIEW: CPT

## 2020-12-04 RX ORDER — ASPIRIN 81 MG/1
324 TABLET, CHEWABLE ORAL ONCE
Status: CANCELLED | OUTPATIENT
Start: 2020-12-04 | End: 2020-12-04

## 2020-12-04 RX ORDER — ACETAMINOPHEN 325 MG/1
650 TABLET ORAL ONCE
Status: COMPLETED | OUTPATIENT
Start: 2020-12-04 | End: 2020-12-04

## 2020-12-04 RX ORDER — ONDANSETRON 2 MG/ML
4 INJECTION INTRAMUSCULAR; INTRAVENOUS ONCE
Status: CANCELLED | OUTPATIENT
Start: 2020-12-04 | End: 2020-12-04

## 2020-12-04 RX ORDER — NITROGLYCERIN 0.4 MG/1
0.4 TABLET SUBLINGUAL EVERY 5 MIN PRN
Status: CANCELLED | OUTPATIENT
Start: 2020-12-04

## 2020-12-04 RX ORDER — SODIUM CHLORIDE, SODIUM LACTATE, POTASSIUM CHLORIDE, AND CALCIUM CHLORIDE .6; .31; .03; .02 G/100ML; G/100ML; G/100ML; G/100ML
1000 INJECTION, SOLUTION INTRAVENOUS ONCE
Status: CANCELLED | OUTPATIENT
Start: 2020-12-04 | End: 2020-12-04

## 2020-12-04 RX ORDER — ACETAMINOPHEN 325 MG/1
325 TABLET ORAL EVERY 4 HOURS PRN
Qty: 60 TABLET | Refills: 0 | Status: ON HOLD | OUTPATIENT
Start: 2020-12-04 | End: 2022-06-09 | Stop reason: HOSPADM

## 2020-12-04 RX ADMIN — ACETAMINOPHEN 650 MG: 325 TABLET ORAL at 19:52

## 2020-12-04 ASSESSMENT — PAIN DESCRIPTION - DIRECTION: RADIATING_TOWARDS: NECK AND BACK

## 2020-12-04 ASSESSMENT — PAIN DESCRIPTION - ORIENTATION: ORIENTATION: RIGHT

## 2020-12-04 ASSESSMENT — PAIN SCALES - GENERAL
PAINLEVEL_OUTOF10: 9
PAINLEVEL_OUTOF10: 9

## 2020-12-04 ASSESSMENT — PAIN DESCRIPTION - PAIN TYPE: TYPE: ACUTE PAIN

## 2020-12-04 ASSESSMENT — PAIN DESCRIPTION - DESCRIPTORS: DESCRIPTORS: STABBING

## 2020-12-04 ASSESSMENT — PAIN DESCRIPTION - LOCATION: LOCATION: CHEST

## 2020-12-04 ASSESSMENT — PAIN DESCRIPTION - FREQUENCY: FREQUENCY: CONTINUOUS

## 2020-12-05 NOTE — ED PROVIDER NOTES
[facility]     Emergency Department     Faculty Attestation    I performed a history and physical examination of the patient and discussed management with the resident. I reviewed the residents note and agree with the documented findings and plan of care. Any areas of disagreement are noted on the chart. I was personally present for the key portions of any procedures. I have documented in the chart those procedures where I was not present during the key portions. I have reviewed the emergency nurses triage note. I agree with the chief complaint, past medical history, past surgical history, allergies, medications, social and family history as documented unless otherwise noted below. For Physician Assistant/ Nurse Practitioner cases/documentation I have personally evaluated this patient and have completed at least one if not all key elements of the E/M (history, physical exam, and MDM). Additional findings are as noted. I have personally seen and evaluated the patient. I find the patient's history and physical exam are consistent with the NP/PA documentation. I agree with the care provided, treatment rendered, disposition and follow-up plan. Reproducible chest wall discomfort is noted on the right side of the chest the patient has no respiratory distress and lungs are clear bilaterally        EKG Interpretation    Interpreted by emergency department physician    Rhythm: normal sinus   Rate: normal  Axis: normal  Conduction: normal  ST Segments: no acute change  T Waves: Nonspecific in leads V2 3  Q Waves: no acute change    Clinical Impression:  nonspecific EKG. Critical Care     Bernabe Corcoran M.D.   Attending Emergency  Physician              Cheyenne Bryant MD  12/04/20 2018

## 2020-12-05 NOTE — ED NOTES
Pt to ED room 26 from home with c/o right sided chest pain radiating to the neck and back  Pt denies hx of injury or cardiac complications. Denies SOB, N/V/D, abdominal pain, and any other symptoms. Pt arrives alert and oriented x4 in no signs of acute distress with even respirations. Pt placed on continuous cardiac monitoring. Will continue to monitor.      Naz Orantes RN  12/04/20 1943

## 2020-12-05 NOTE — ED PROVIDER NOTES
[facility]  ED  Emergency Department Encounter  EmergencyMedicine Resident     Pt Lisandra Ortiz  MRN: [id number]  [place] 1995  Date of evaluation: 20  PCP:  Marlyn Donato MD    CHIEF COMPLAINT       Chief Complaint   Patient presents with    Chest Pain       HISTORY OF PRESENT ILLNESS  (Location/Symptom, Timing/Onset, Context/Setting, Quality, Duration, Modifying Factors, Severity.)      Ilene Iyer is a 31 y.o. female who presents with chest pain. Patient presents with 1 hour of chest pain, right anterior, radiating to her neck, does not know what makes it better or worse, started at rest, no associated symptoms. Patient denies fevers, chills, cough, congestion, lightheadedness, dizziness, visual changes, hearing changes, diaphoresis, shortness of breath, abdominal pain, nausea, vomiting, dysuria, hematuria, diarrhea, constipation, melena, hematochezia, lower extremity pain or swelling, rash. Patient has history of asthma, denies any other past medical history, does not take any medications. Patient denies HTN, HLD, DM, CAD, family history of CAD, smoking. Patient denies any recent surgeries, hemoptysis, lower extremity swelling or pain, not take any hormone medication, prior blood clots. PAST MEDICAL / SURGICAL / SOCIAL / FAMILY HISTORY      has a past medical history of Asthma, Chronic airway disease, Dental crowns present, Gestational HTN--G3, Hidradenitis axillaris, History of  labor, Hydradenitis, Left ankle sprain, Maternal congenital heart disease, antepartum, Obesity, and Wears glasses. has a past surgical history that includes Tonsillectomy and adenoidectomy and other surgical history (Bilateral, 2016).       Social History     Socioeconomic History    Marital status: Single     Spouse name: Not on file    Number of children: Not on file    Years of education: Not on file    Highest education level: Not on file   Occupational Troponin    Immature Platelet Fraction    EKG 12 Lead       MEDICATIONS ORDERED:  Orders Placed This Encounter   Medications    acetaminophen (TYLENOL) tablet 650 mg    acetaminophen (TYLENOL) 325 MG tablet     Sig: Take 1 tablet by mouth every 4 hours as needed for Pain     Dispense:  60 tablet     Refill:  0       DDX: Dysrhythmia, ACS, atypical chest pain    DIAGNOSTIC RESULTS / EMERGENCY DEPARTMENT COURSE / MDM   LAB RESULTS:  Results for orders placed or performed during the hospital encounter of 12/04/20   CBC Auto Differential   Result Value Ref Range    WBC 7.5 3.5 - 11.3 k/uL    RBC 4.54 3.95 - 5.11 m/uL    Hemoglobin 13.1 11.9 - 15.1 g/dL    Hematocrit 41.2 36.3 - 47.1 %    MCV 90.7 82.6 - 102.9 fL    MCH 28.9 25.2 - 33.5 pg    MCHC 31.8 28.4 - 34.8 g/dL    RDW 12.6 11.8 - 14.4 %    Platelets See Reflexed IPF Result 138 - 453 k/uL    MPV NOT REPORTED 8.1 - 13.5 fL    NRBC Automated 0.0 0.0 per 100 WBC    Differential Type NOT REPORTED     WBC Morphology NOT REPORTED     RBC Morphology NOT REPORTED     Platelet Estimate NOT REPORTED     Seg Neutrophils 50 36 - 65 %    Lymphocytes 42 24 - 43 %    Monocytes 6 3 - 12 %    Eosinophils % 2 1 - 4 %    Basophils 0 0 - 2 %    Immature Granulocytes 0 0 %    Segs Absolute 3.72 1.50 - 8.10 k/uL    Absolute Lymph # 3.17 1.10 - 3.70 k/uL    Absolute Mono # 0.42 0.10 - 1.20 k/uL    Absolute Eos # 0.13 0.00 - 0.44 k/uL    Basophils Absolute 0.03 0.00 - 0.20 k/uL    Absolute Immature Granulocyte <0.03 0.00 - 0.30 k/uL   Basic Metabolic Panel w/ Reflex to MG   Result Value Ref Range    Glucose 92 70 - 99 mg/dL    BUN 9 6 - 20 mg/dL    CREATININE 0.57 0.50 - 0.90 mg/dL    Bun/Cre Ratio NOT REPORTED 9 - 20    Calcium 9.3 8.6 - 10.4 mg/dL    Sodium 138 135 - 144 mmol/L    Potassium 3.7 3.7 - 5.3 mmol/L    Chloride 102 98 - 107 mmol/L    CO2 24 20 - 31 mmol/L    Anion Gap 12 9 - 17 mmol/L    GFR Non-African American >60 >60 mL/min    GFR African American >60 >60 mL/min GFR Comment          GFR Staging NOT REPORTED    Troponin   Result Value Ref Range    Troponin, High Sensitivity <6 0 - 14 ng/L    Troponin T NOT REPORTED <0.03 ng/mL    Troponin Interp NOT REPORTED    Immature Platelet Fraction   Result Value Ref Range    Platelet, Immature Fraction 13.5 (H) 1.1 - 10.3 %    Platelet, Fluorescence 210 138 - 453 k/uL       IMPRESSION: 50-year-old female with no cardiac history, no risk factors, presenting with chest pain, PERC negative, hemodynamically stable, no concern for dissection. Will obtain EKG, chest x-ray, basic labs. Will treat symptomatically with Tylenol. RADIOLOGY:  Xr Chest Portable    Result Date: 12/4/2020  EXAMINATION: ONE XRAY VIEW OF THE CHEST 12/4/2020 5:06 pm COMPARISON: 09/20/2009 HISTORY: ORDERING SYSTEM PROVIDED HISTORY: chest pain TECHNOLOGIST PROVIDED HISTORY: chest pain Reason for Exam: upr Acuity: Unknown Type of Exam: Unknown FINDINGS: The lungs are clear. There is no effusion or pneumothorax. The cardiomediastinal silhouette is within normal limits. No acute bony findings. No acute pulmonary process. EKG  EKG Interpretation    Interpreted by me    Rhythm: normal sinus   Rate: normal  Axis: normal  Ectopy: none  Conduction: normal  ST Segments: no acute change  T Waves: T wave inversions in leads V1 through V3  Q Waves: none    Clinical Impression: no acute changes and normal EKG    All EKG's are interpreted by the Emergency Department Physician who either signs or Co-signs this chart in the absence of a cardiologist.    EMERGENCY DEPARTMENT COURSE:  Patient came to emergency department, HPI and physical exam were conducted. All nursing notes were reviewed. EKG showed no acute changes, troponin less than 6, no concern for dysrhythmia or ACS. Chest x-ray found no acute changes, no concern for pneumonia or pneumothorax. Patient reported improvement in symptoms with Tylenol.   Patient remained stable emergency department with normal vital signs.  Gave strict return precautions to the emergency department and discharge patient home. Recommend patient follow-up with PCP in the next few days for reassessment. PROCEDURES:      CONSULTS:  None    CRITICAL CARE:  Please see attending note    FINAL IMPRESSION      1.  Chest pain, unspecified type          DISPOSITION / PLAN     DISPOSITION Decision To Discharge 12/04/2020 08:34:49 PM      PATIENT REFERRED TO:  Rashad Akers MD  [address] [address] [phone]    Schedule an appointment as soon as possible for a visit in 3 days  For reassessment    [facility]  [address] [facility] [address]  [phone]  Go to   As needed, If symptoms worsen      DISCHARGE MEDICATIONS:  Discharge Medication List as of 12/4/2020  8:35 PM          Anne Dominguez MD  Emergency Medicine Resident    (Please note that portions of thisnote were completed with a voice recognition program.  Efforts were made to edit the dictations but occasionally words are mis-transcribed.)        Anne Dominguez MD  Resident  12/05/20 2929

## 2020-12-05 NOTE — ED NOTES
Warm blankets provided.  Pt alert and oriented x4 in no signs of acute distress     Ron Angelo RN  12/04/20 2012

## 2020-12-07 LAB
EKG ATRIAL RATE: 62 BPM
EKG P AXIS: 12 DEGREES
EKG P-R INTERVAL: 166 MS
EKG Q-T INTERVAL: 404 MS
EKG QRS DURATION: 92 MS
EKG QTC CALCULATION (BAZETT): 410 MS
EKG R AXIS: 15 DEGREES
EKG T AXIS: 16 DEGREES
EKG VENTRICULAR RATE: 62 BPM

## 2020-12-09 ENCOUNTER — NURSE ONLY (OUTPATIENT)
Dept: OBGYN | Age: 25
End: 2020-12-09
Payer: COMMERCIAL

## 2020-12-09 VITALS
SYSTOLIC BLOOD PRESSURE: 127 MMHG | TEMPERATURE: 96.8 F | WEIGHT: 248.4 LBS | DIASTOLIC BLOOD PRESSURE: 81 MMHG | BODY MASS INDEX: 42.64 KG/M2 | HEART RATE: 83 BPM

## 2020-12-09 PROCEDURE — 99211 OFF/OP EST MAY X REQ PHY/QHP: CPT | Performed by: OBSTETRICS & GYNECOLOGY

## 2020-12-09 PROCEDURE — 96372 THER/PROPH/DIAG INJ SC/IM: CPT | Performed by: OBSTETRICS & GYNECOLOGY

## 2020-12-09 RX ORDER — MEDROXYPROGESTERONE ACETATE 150 MG/ML
150 INJECTION, SUSPENSION INTRAMUSCULAR ONCE
Status: COMPLETED | OUTPATIENT
Start: 2020-12-09 | End: 2020-12-09

## 2020-12-09 RX ADMIN — MEDROXYPROGESTERONE ACETATE 150 MG: 150 INJECTION, SUSPENSION INTRAMUSCULAR at 11:20

## 2021-01-13 DIAGNOSIS — G44.209 TENSION-TYPE HEADACHE, NOT INTRACTABLE, UNSPECIFIED CHRONICITY PATTERN: ICD-10-CM

## 2021-01-13 RX ORDER — BUTALBITAL, ACETAMINOPHEN AND CAFFEINE 50; 325; 40 MG/1; MG/1; MG/1
TABLET ORAL
Qty: 30 TABLET | Refills: 0 | OUTPATIENT
Start: 2021-01-13

## 2021-01-13 NOTE — TELEPHONE ENCOUNTER
I have declined this refill. Patient needs to follow up with her PCP about her headaches, if she does not have a PCP, then we can refer her to the IM clinic to establish care.

## 2021-03-03 ENCOUNTER — NURSE ONLY (OUTPATIENT)
Dept: OBGYN | Age: 26
End: 2021-03-03
Payer: COMMERCIAL

## 2021-03-03 DIAGNOSIS — Z30.09 FAMILY PLANNING: Primary | ICD-10-CM

## 2021-03-03 PROCEDURE — 96372 THER/PROPH/DIAG INJ SC/IM: CPT | Performed by: OBSTETRICS & GYNECOLOGY

## 2021-03-03 RX ORDER — MEDROXYPROGESTERONE ACETATE 150 MG/ML
150 INJECTION, SUSPENSION INTRAMUSCULAR ONCE
Status: COMPLETED | OUTPATIENT
Start: 2021-03-03 | End: 2021-03-03

## 2021-03-03 RX ADMIN — MEDROXYPROGESTERONE ACETATE 150 MG: 150 INJECTION, SUSPENSION INTRAMUSCULAR at 13:58

## 2021-03-03 NOTE — PROGRESS NOTES
After obtaining consent, and per orders of Dr. Rodney Santa, injection of Medroxyprogesterone 150 mg given in Right deltoid by Re John. Patient instructed to remain in clinic for 20 minutes afterwards, and to report any adverse reaction to me immediately.

## 2021-03-11 ENCOUNTER — TELEPHONE (OUTPATIENT)
Dept: OBGYN | Age: 26
End: 2021-03-11

## 2021-03-11 ENCOUNTER — NURSE TRIAGE (OUTPATIENT)
Dept: OTHER | Facility: CLINIC | Age: 26
End: 2021-03-11

## 2021-03-11 NOTE — TELEPHONE ENCOUNTER
Reason for Disposition   Periods last > 7 days    Answer Assessment - Initial Assessment Questions  1. AMOUNT: \"Describe the bleeding that you are having. \"     - SPOTTING: spotting, or pinkish / brownish mucous discharge; does not fill panti-liner or pad     - MILD:  less than 1 pad / hour; less than patient's usual menstrual bleeding    - MODERATE: 1-2 pads / hour; 1 menstrual cup every 6 hours; small-medium blood clots (e.g., pea, grape, small coin)    - SEVERE: soaking 2 or more pads/hour for 2 or more hours; 1 menstrual cup every 2 hours; bleeding not contained by pads or continuous red blood from vagina; large blood clots (e.g., golf ball, large coin)       Mild    2. ONSET: \"When did the bleeding begin? \" \"Is it continuing now? \"      States a couple of days at first then states ongoing for 3 weeks. 3. MENSTRUAL PERIOD: \"When was the last normal menstrual period? \" \"How is this different than your period? \"      Depo shot doesn't have regular periods    4. REGULARITY: \"How regular are your periods? \"      States she doesn't have reg periods d/t depo shot    5. ABDOMINAL PAIN: \"Do you have any pain? \" \"How bad is the pain? \"  (e.g., Scale 1-10; mild, moderate, or severe)    - MILD (1-3): doesn't interfere with normal activities, abdomen soft and not tender to touch     - MODERATE (4-7): interferes with normal activities or awakens from sleep, tender to touch     - SEVERE (8-10): excruciating pain, doubled over, unable to do any normal activities       Denies    6. PREGNANCY: \"Could you be pregnant? \" Madeleine Nguyens you sexually active? \" \"Did you recently give birth? \"      Denies    7. BREASTFEEDING: Madeleine Haddad you breastfeeding? \"      Denies    8. HORMONES: Madeleine Haddad you taking any hormone medications, prescription or OTC? \" (e.g., birth control pills, estrogen)      Depo shot    9. BLOOD THINNERS: \"Do you take any blood thinners? \" (e.g., Coumadin/warfarin, Pradaxa/dabigatran, aspirin)      Denies    10.  CAUSE: \"What do you think is causing the bleeding? \" (e.g., recent gyn surgery, recent gyn procedure; known bleeding disorder, cervical cancer, polycystic ovarian disease, fibroids)          Unknown    11. HEMODYNAMIC STATUS: \"Are you weak or feeling lightheaded? \" If so, ask: \"Can you stand and walk normally? \"         Denies    12. OTHER SYMPTOMS: \"What other symptoms are you having with the bleeding? \" (e.g., passed tissue, vaginal discharge, fever, menstrual-type cramps)        Denies    Protocols used: VAGINAL BLEEDING - ABNORMAL-ADULT-OH    Patient called david at [facility] pre-service [facility])  with red flag complaint. Brief description of triage: see above    Triage indicates for patient to be seen in 2 weeks    Care advice provided, patient verbalizes understanding; denies any other questions or concerns; instructed to call back for any new or worsening symptoms. Writer provided warm transfer to Herrera Greene at [facility] for appointment scheduling. Attention Provider: Thank you for allowing me to participate in the care of your patient. The patient was connected to triage in response to information provided to the E[facility]. Please do not respond through this encounter as the response is not directed to a shared pool.

## 2021-07-30 ENCOUNTER — HOSPITAL ENCOUNTER (EMERGENCY)
Age: 26
Discharge: HOME | End: 2021-07-31
Attending: EMERGENCY MEDICINE
Payer: COMMERCIAL

## 2021-07-30 VITALS
OXYGEN SATURATION: 100 % | HEIGHT: 64 IN | RESPIRATION RATE: 16 BRPM | SYSTOLIC BLOOD PRESSURE: 120 MMHG | TEMPERATURE: 97 F | HEART RATE: 65 BPM | DIASTOLIC BLOOD PRESSURE: 81 MMHG | BODY MASS INDEX: 41.48 KG/M2 | WEIGHT: 243 LBS

## 2021-07-30 DIAGNOSIS — B37.31 CANDIDAL VULVOVAGINITIS: ICD-10-CM

## 2021-07-30 DIAGNOSIS — N30.00 ACUTE CYSTITIS WITHOUT HEMATURIA: ICD-10-CM

## 2021-07-30 DIAGNOSIS — N89.8 VAGINAL DISCHARGE: Primary | ICD-10-CM

## 2021-07-30 PROCEDURE — 99283 EMERGENCY DEPT VISIT LOW MDM: CPT

## 2021-07-30 ASSESSMENT — PAIN DESCRIPTION - ORIENTATION: ORIENTATION: LEFT

## 2021-07-30 ASSESSMENT — PAIN DESCRIPTION - FREQUENCY: FREQUENCY: CONTINUOUS

## 2021-07-30 ASSESSMENT — PAIN DESCRIPTION - LOCATION: LOCATION: FLANK

## 2021-07-30 ASSESSMENT — PAIN SCALES - GENERAL: PAINLEVEL_OUTOF10: 6

## 2021-07-31 LAB
-: ABNORMAL
AMORPHOUS: ABNORMAL
BACTERIA: ABNORMAL
BILIRUBIN URINE: NEGATIVE
CASTS UA: ABNORMAL /LPF (ref 0–8)
COLOR: YELLOW
COMMENT UA: ABNORMAL
CRYSTALS, UA: ABNORMAL /HPF
DIRECT EXAM: ABNORMAL
EPITHELIAL CELLS UA: ABNORMAL /HPF (ref 0–5)
GLUCOSE URINE: NEGATIVE
HCG(URINE) PREGNANCY TEST: NEGATIVE
KETONES, URINE: ABNORMAL
LEUKOCYTE ESTERASE, URINE: ABNORMAL
Lab: ABNORMAL
MUCUS: ABNORMAL
NITRITE, URINE: NEGATIVE
OTHER OBSERVATIONS UA: ABNORMAL
PH UA: 6.5 (ref 5–8)
PROTEIN UA: ABNORMAL
RBC UA: ABNORMAL /HPF (ref 0–4)
RENAL EPITHELIAL, UA: ABNORMAL /HPF
SPECIFIC GRAVITY UA: 1.03 (ref 1–1.03)
SPECIMEN DESCRIPTION: ABNORMAL
TRICHOMONAS: ABNORMAL
TURBIDITY: ABNORMAL
URINE HGB: ABNORMAL
UROBILINOGEN, URINE: NORMAL
WBC UA: ABNORMAL /HPF (ref 0–5)
YEAST: ABNORMAL

## 2021-07-31 PROCEDURE — 87491 CHLMYD TRACH DNA AMP PROBE: CPT

## 2021-07-31 PROCEDURE — 81025 URINE PREGNANCY TEST: CPT

## 2021-07-31 PROCEDURE — 87660 TRICHOMONAS VAGIN DIR PROBE: CPT

## 2021-07-31 PROCEDURE — 87591 N.GONORRHOEAE DNA AMP PROB: CPT

## 2021-07-31 PROCEDURE — 87510 GARDNER VAG DNA DIR PROBE: CPT

## 2021-07-31 PROCEDURE — 81001 URINALYSIS AUTO W/SCOPE: CPT

## 2021-07-31 PROCEDURE — 87480 CANDIDA DNA DIR PROBE: CPT

## 2021-07-31 RX ORDER — FLUCONAZOLE 150 MG/1
150 TABLET ORAL ONCE
Qty: 1 TABLET | Refills: 0 | Status: SHIPPED | OUTPATIENT
Start: 2021-07-31 | End: 2021-07-31 | Stop reason: SDUPTHER

## 2021-07-31 RX ORDER — CEPHALEXIN 500 MG/1
500 CAPSULE ORAL 2 TIMES DAILY
Qty: 14 CAPSULE | Refills: 0 | Status: SHIPPED | OUTPATIENT
Start: 2021-07-31 | End: 2021-08-07

## 2021-07-31 RX ORDER — FLUCONAZOLE 150 MG/1
150 TABLET ORAL DAILY
Qty: 2 TABLET | Refills: 0 | Status: SHIPPED | OUTPATIENT
Start: 2021-07-31 | End: 2021-11-09

## 2021-07-31 ASSESSMENT — ENCOUNTER SYMPTOMS
ABDOMINAL PAIN: 0
BLOOD IN STOOL: 0
CONSTIPATION: 0
COLOR CHANGE: 0
ABDOMINAL DISTENTION: 0
SORE THROAT: 0
NAUSEA: 0
SHORTNESS OF BREATH: 0
VOMITING: 0
CHEST TIGHTNESS: 0
RHINORRHEA: 0
DIARRHEA: 0
BACK PAIN: 1

## 2021-07-31 NOTE — ED PROVIDER NOTES
[facility]  ED  Emergency Department Encounter  EmergencyMedicine Resident     Pt Cuba Castano  MRN: [id number]  [place] 1995  Date of evaluation: 21  PCP:  Gracia Choi MD      [address] [place] [address]       Chief Complaint   Patient presents with    Flank Pain    Vaginal Discharge       HISTORY OF PRESENT ILLNESS  (Location/Symptom, Timing/Onset, Context/Setting, Quality, Duration, Modifying Factors, Severity.)      Julee Recinos is a 31 y.o. female who presents with vaginal discharge and left flank pain has been ongoing since today. Patient describes discharge as clear with some brown spotting. States it is not foul-smelling and denies any vaginal irritation or pain. Patient denies any dysuria, urgency or frequency. Patient states that she is 3 weeks late on her Depo shot. Patient complaining of left flank pain that increases on palpation. Patient denying any recent sexual activity, last Pap was 1 year ago with no abnormalities. No history of sexually transmitted diseases. Patient denying any fever, chills, myalgias, CVA tenderness, nausea or vomiting. PAST MEDICAL / SURGICAL / SOCIAL / FAMILY HISTORY      has a past medical history of Asthma, Chronic airway disease, Dental crowns present, Gestational HTN--G3, Hidradenitis axillaris, History of  labor, Hydradenitis, Left ankle sprain, Maternal congenital heart disease, antepartum, Obesity, and Wears glasses. has a past surgical history that includes Tonsillectomy and adenoidectomy and other surgical history (Bilateral, 2016).       Social History     Socioeconomic History    Marital status: Single     Spouse name: Not on file    Number of children: Not on file    Years of education: Not on file    Highest education level: Not on file   Occupational History    Not on file   Tobacco Use    Smoking status: Never Smoker    Smokeless tobacco: Never Used   Vaping Use    Vaping Use: Never used Substance and Sexual Activity    Alcohol use: No     Alcohol/week: 0.0 standard drinks    Drug use: No    Sexual activity: Not Currently     Partners: Male     Birth control/protection: Injection   Other Topics Concern    Not on file   Social History Narrative    Not on file     Social Determinants of Health     Financial Resource Strain:     Difficulty of Paying Living Expenses:    Food Insecurity:     Worried About Running Out of Food in the Last Year:     [address] in the Last Year:    Transportation Needs:     Lack of Transportation (Medical):      Lack of Transportation (Non-Medical):    Physical Activity:     Days of Exercise per Week:     Minutes of Exercise per Session:    Stress:     Feeling of Stress :    Social Connections:     Frequency of Communication with Friends and Family:     Frequency of Social Gatherings with Friends and Family:     Attends [employer]od[employer] Services:     Active Member of Clubs or Organizations:     Attends Club or Organization Meetings:     Marital Status:    Intimate Partner Violence:     Fear of Current or Ex-Partner:     Emotionally Abused:     Physically Abused:     Sexually Abused:        Family History   Problem Relation Age of Onset    Other Brother          suicide     Diabetes Maternal Grandmother     Cancer Maternal Grandmother     Heart Disease Paternal Uncle     Early Death Paternal Uncle 62        MI    Learning Disabilities Mother     Diabetes Mother         Type 2 managed with insulin     No Known Problems Paternal Grandfather     No Known Problems Paternal Grandmother     Lung Cancer Maternal Grandfather     No Known Problems Father     No Known Problems Sister     No Known Problems Brother     Breast Cancer Neg Hx     Colon Cancer Neg Hx     Eclampsia Neg Hx     Hypertension Neg Hx     Ovarian Cancer Neg Hx      Labor Neg Hx     Spont Abortions Neg Hx     Stroke Neg Hx     Uterine Cancer Neg Hx        Allergies: Lidocaine    Home Medications:  Prior to Admission medications    Medication Sig Start Date End Date Taking? Authorizing Provider   acetaminophen (TYLENOL) 325 MG tablet Take 1 tablet by mouth every 4 hours as needed for Pain  Patient not taking: Reported on 12/9/2020 12/4/20   Maximo Cochran MD   medroxyPROGESTERone (DEPO-PROVERA) 150 MG/ML injection Inject 1 mL into the muscle every 3 months 9/15/20   Hayley Costello MD   selenium sulfide (SELSUN) 2.5 % lotion Apply topically daily as needed for Itching Apply topically daily as needed. Historical Provider, MD   butalbital-acetaminophen-caffeine (FIORICET, ESGIC) -21 MG per tablet Take 1 tablet by mouth every 4 hours as needed for Headaches  Patient not taking: Reported on 12/9/2020 8/19/20   Garcias Comings, DO       REVIEW OF SYSTEMS    (2-9 systems for level 4, 10 or more for level 5)      Review of Systems   Constitutional: Negative for chills, fatigue and fever. HENT: Negative for congestion, rhinorrhea and sore throat. Respiratory: Negative for chest tightness and shortness of breath. Cardiovascular: Negative for chest pain. Gastrointestinal: Negative for abdominal distention, abdominal pain, blood in stool, constipation, diarrhea, nausea and vomiting. Endocrine: Negative for polydipsia and polyuria. Genitourinary: Positive for flank pain and vaginal discharge. Negative for decreased urine volume, difficulty urinating, dysuria, hematuria, menstrual problem, pelvic pain, urgency, vaginal bleeding and vaginal pain. Musculoskeletal: Positive for back pain. Negative for myalgias. Skin: Negative for color change. Neurological: Negative for dizziness, light-headedness and headaches.          PHYSICAL EXAM   (up to 7 for level 4, 8 or more for level 5)      INITIAL VITALS:   /81   Pulse 65   Temp 97 °F (36.1 °C) (Oral)   Resp 16   Ht 5' 4\" (1.626 m)   Wt 243 lb (110.2 kg)   SpO2 100%   BMI 41.71 kg/m²     Physical Exam  Constitutional:  Well developed, Well nourished. HENT:  Normocephalic, Atraumatic, Bilateral external ears normal,  Nose normal.   Neck: Normal range of motion, No stridor. Eyes:   No discharge. Respiratory:   No respiratory distress, Normal breath sounds without any wheezing, rales or rhonchi. Cardiovascular: Normal S1, S2. No rubs, gallops or murmurs. Gastrointestinal:  No organomegaly, no tenderness, no rebound or guarding. Musculoskeletal:  No extremity deformity. Lymphatic: No lymphadenopathy noted  Skin:  Warm, Dry,  No rash. Neurologic:  Alert & oriented x 3, Normal motor function,  No focal deficits noted. Psychiatric:  Affect normal, Judgment normal, Mood normal.              DIFFERENTIAL  DIAGNOSIS     PLAN (LABS / IMAGING / EKG):  Orders Placed This Encounter   Procedures    VAGINITIS DNA PROBE    C.trachomatis N.gonorrhoeae DNA    URINALYSIS    PREGNANCY, URINE    Vaginal exam       MEDICATIONS ORDERED:  No orders of the defined types were placed in this encounter. DDX: UTI, pyelonephritis, cervicitis, bacterial vaginosis, yeast infection, menstrual cycle breakthrough, pregnancy    DIAGNOSTIC RESULTS / EMERGENCY DEPARTMENT COURSE / MDM   LAB RESULTS:  No results found for this visit on 07/30/21. RADIOLOGY:  No results found.        EKG      All EKG's are interpreted by the Emergency Department Physician who either signs or Co-signs this chart in the absence of a cardiologist.    EMERGENCY DEPARTMENT COURSE:  ED Course as of Jul 31 0057   Sat Jul 31, 2021   0052 Pelvic exam performed, patient tolerated well    [QC]   0053 Swab sent for GC DNA testing and bacterial probe    [QC]      ED Course User Index  [QC] Earl Cedeno MD       IMPRESSION: Care of patient transferred to Dr. Pedro Luis Miller, awaiting urinalysis results      PROCEDURES:      CONSULTS:  None    CRITICAL CARE:      FINAL IMPRESSION      Care of patient transferred to Dr. Earleen Kehr / Bhavna Dowd DISPOSITION        PATIENT REFERRED TO:  No follow-up provider specified.     DISCHARGE MEDICATIONS:  New Prescriptions    No medications on file       Marium Mcbride MD  Emergency Medicine Resident    (Please note that portions of thisnote were completed with a voice recognition program.  Efforts were made to edit the dictations but occasionally words are mis-transcribed.)      Marium Mcbride MD  Resident  07/31/21 [id number]       Marium Mcbride MD  Resident  08/03/21 4856

## 2021-07-31 NOTE — ED PROVIDER NOTES
[facility] ED  Emergency Department  Emergency Medicine Resident Sign-out     Care of Mando Hunter was assumed from Dr. Connor George and is being seen for Flank Pain and Vaginal Discharge  . The patient's initial evaluation and plan have been discussed with the prior provider who initially evaluated the patient. EMERGENCY DEPARTMENT COURSE / MEDICAL DECISION MAKING:       MEDICATIONS GIVEN:  No orders of the defined types were placed in this encounter. LABS / RADIOLOGY:     Labs Reviewed   VAGINITIS DNA PROBE   C.TRACHOMATIS N.GONORRHOEAE DNA   URINALYSIS   PREGNANCY, URINE       No results found. RECENT VITALS:     Temp: 97 °F (36.1 °C),  Pulse: 65, Resp: 16, BP: 120/81, SpO2: 100 %    This patient is a 31 y.o. Female with vaginal discharge x1d, no urine sx. Pending pregnancy/pelvic labs. OUTSTANDING TASKS / RECOMMENDATIONS:    1. F/u labs  2. Dispo - likely d/c     FINAL IMPRESSION:     1. Vaginal discharge        DISPOSITION:         DISPOSITION:  [x]  Discharge   []  Transfer to [facility]   []  Transfer -      []  Admission -      []  Admission to Internal Medicine   []  Admission to Intermed   []  Admission to Obs   []  Against Medical Advice   []  Eloped   FOLLOW-UP: No follow-up provider specified.    DISCHARGE MEDICATIONS: New Prescriptions    No medications on file           Barney Alexandra MD  Emergency Medicine Resident  [facility]        Barney Alexandra MD  Resident  07/31/21 1992

## 2021-07-31 NOTE — ED PROVIDER NOTES
[facility]     Emergency Department     Faculty Attestation    I performed a history and physical examination of the patient and discussed management with the resident. I reviewed the residents note and agree with the documented findings and plan of care. Any areas of disagreement are noted on the chart. I was personally present for the key portions of any procedures. I have documented in the chart those procedures where I was not present during the key portions. I have reviewed the emergency nurses triage note. I agree with the chief complaint, past medical history, past surgical history, allergies, medications, social and family history as documented unless otherwise noted below. For Physician Assistant/ Nurse Practitioner cases/documentation I have personally evaluated this patient and have completed at least one if not all key elements of the E/M (history, physical exam, and MDM). Additional findings are as noted. I have personally seen and evaluated the patient. I find the patient's history and physical exam are consistent with the NP/PA documentation. I agree with the care provided, treatment rendered, disposition and follow-up plan. 58-year-old female presenting with vaginal discharge that has been more copious than her usual.  She is approximately 3 weeks overdue for her Depo injection, believes it may be secondary to this. Denies any pain or dysuria. Vaginal discharge has been clear with brown specks in it. Does not believe that she is pregnant. Exam:  General: Sitting on the bed, awake, alert and in no acute distress  CV: normal rate and regular rhythm  Lungs: Breathing comfortably on room air with no tachypnea, hypoxia, or increased work of breathing    Plan:  Pelvic exam completed by resident, vaginitis swabs and GC/chlamydia sent. UA, urine pregnancy test.    We will treat patient according to vaginitis results. [facility]. Follow-up with OB for regularly scheduled Depo injection. Pregnancy test negative. UA shows TNTC WBCs, many bacteria. 10-20 epithelial cells. Patient signed out overnight physician pending vaginitis screen.         Fantasma Lopez MD   Attending Emergency  Physician    (Please note that portions of this note were completed with a voice recognition program. Efforts were made to edit the dictations but occasionally words are mis-transcribed.)             Fantasma Lopez MD  07/31/21 0974

## 2021-07-31 NOTE — ED TRIAGE NOTES
Patient report left flank pain x 1 week, vagina discharge since Tuesday. Patient reports she is late with her Depot shot and thinks that may be the reason.

## 2021-08-09 ENCOUNTER — HOSPITAL ENCOUNTER (OUTPATIENT)
Age: 26
Setting detail: SPECIMEN
Discharge: HOME | End: 2021-08-09
Payer: COMMERCIAL

## 2021-08-09 ENCOUNTER — TELEPHONE (OUTPATIENT)
Dept: OBGYN | Age: 26
End: 2021-08-09

## 2021-08-09 ENCOUNTER — NURSE ONLY (OUTPATIENT)
Dept: OBGYN | Age: 26
End: 2021-08-09
Payer: COMMERCIAL

## 2021-08-09 DIAGNOSIS — N91.2 AMENORRHEA: ICD-10-CM

## 2021-08-09 DIAGNOSIS — N91.2 AMENORRHEA: Primary | ICD-10-CM

## 2021-08-09 LAB
CONTROL: PRESENT
HCG QUANTITATIVE: 540 IU/L
PREGNANCY TEST URINE, POC: POSITIVE

## 2021-08-09 PROCEDURE — 81025 URINE PREGNANCY TEST: CPT | Performed by: OBSTETRICS & GYNECOLOGY

## 2021-08-09 NOTE — TELEPHONE ENCOUNTER
Patient is requesting a call to clarify results that she saw in her mychart. She can be reached at [phone]. Okay to leave a message.

## 2021-08-09 NOTE — PROGRESS NOTES
Pt present for UPT, LMP unknown ,UPT results positive, and results given to pt. Patient will have a quantitative pregnancy test today. She will be contacted after results are received.

## 2021-08-10 ENCOUNTER — TELEPHONE (OUTPATIENT)
Dept: OBGYN | Age: 26
End: 2021-08-10

## 2021-08-10 DIAGNOSIS — Z34.91 CURRENTLY PREGNANT IN FIRST TRIMESTER WITH UNKNOWN GESTATIONAL AGE: Primary | ICD-10-CM

## 2021-08-10 NOTE — TELEPHONE ENCOUNTER
Results of BHCG discussed. There has been lower abdominal pain and back pain since 7/31/21 when pt was seen in ED. UPT was negative at that time. Re repeat BHCG 8/11/21. S/S of ectopic reviewed. Pt advised to go to ED if she has any of those.

## 2021-08-10 NOTE — TELEPHONE ENCOUNTER
Message sent to Dr. Nathan White to review labs. Patient informed that she will be contacted after results are reviewed.

## 2021-08-11 ENCOUNTER — HOSPITAL ENCOUNTER (OUTPATIENT)
Age: 26
Setting detail: SPECIMEN
Discharge: HOME | End: 2021-08-11
Payer: COMMERCIAL

## 2021-08-11 DIAGNOSIS — Z34.91 CURRENTLY PREGNANT IN FIRST TRIMESTER WITH UNKNOWN GESTATIONAL AGE: ICD-10-CM

## 2021-08-11 DIAGNOSIS — Z34.91 CURRENTLY PREGNANT IN FIRST TRIMESTER WITH UNKNOWN GESTATIONAL AGE: Primary | ICD-10-CM

## 2021-08-11 LAB — HCG QUANTITATIVE: 1299 IU/L

## 2021-08-14 ENCOUNTER — HOSPITAL ENCOUNTER (EMERGENCY)
Age: 26
Discharge: HOME | End: 2021-08-14
Attending: EMERGENCY MEDICINE
Payer: COMMERCIAL

## 2021-08-14 ENCOUNTER — APPOINTMENT (OUTPATIENT)
Dept: GENERAL RADIOLOGY | Age: 26
End: 2021-08-14
Payer: COMMERCIAL

## 2021-08-14 VITALS
DIASTOLIC BLOOD PRESSURE: 69 MMHG | OXYGEN SATURATION: 100 % | HEART RATE: 69 BPM | WEIGHT: 253 LBS | RESPIRATION RATE: 21 BRPM | BODY MASS INDEX: 43.19 KG/M2 | SYSTOLIC BLOOD PRESSURE: 133 MMHG | HEIGHT: 64 IN | TEMPERATURE: 98.2 F

## 2021-08-14 DIAGNOSIS — M25.562 ACUTE PAIN OF LEFT KNEE: Primary | ICD-10-CM

## 2021-08-14 PROCEDURE — 96374 THER/PROPH/DIAG INJ IV PUSH: CPT

## 2021-08-14 PROCEDURE — 6360000002 HC RX W HCPCS: Performed by: PEDIATRICS

## 2021-08-14 PROCEDURE — 73564 X-RAY EXAM KNEE 4 OR MORE: CPT

## 2021-08-14 PROCEDURE — 96376 TX/PRO/DX INJ SAME DRUG ADON: CPT

## 2021-08-14 PROCEDURE — 99285 EMERGENCY DEPT VISIT HI MDM: CPT

## 2021-08-14 RX ORDER — FENTANYL CITRATE 50 UG/ML
50 INJECTION, SOLUTION INTRAMUSCULAR; INTRAVENOUS ONCE
Status: COMPLETED | OUTPATIENT
Start: 2021-08-14 | End: 2021-08-14

## 2021-08-14 RX ORDER — ACETAMINOPHEN AND CODEINE PHOSPHATE 300; 30 MG/1; MG/1
1 TABLET ORAL EVERY 6 HOURS PRN
Qty: 18 TABLET | Refills: 0 | Status: SHIPPED | OUTPATIENT
Start: 2021-08-14 | End: 2021-08-21

## 2021-08-14 RX ADMIN — FENTANYL CITRATE 50 MCG: 50 INJECTION, SOLUTION INTRAMUSCULAR; INTRAVENOUS at 20:47

## 2021-08-14 RX ADMIN — FENTANYL CITRATE 50 MCG: 50 INJECTION, SOLUTION INTRAMUSCULAR; INTRAVENOUS at 21:58

## 2021-08-14 ASSESSMENT — ENCOUNTER SYMPTOMS
COUGH: 0
SHORTNESS OF BREATH: 0
CHOKING: 0
SORE THROAT: 0
RHINORRHEA: 0
VOMITING: 0
DIARRHEA: 0
CONSTIPATION: 0
PHOTOPHOBIA: 0
COLOR CHANGE: 0
WHEEZING: 0

## 2021-08-14 ASSESSMENT — PAIN DESCRIPTION - PAIN TYPE: TYPE: ACUTE PAIN

## 2021-08-14 ASSESSMENT — PAIN DESCRIPTION - FREQUENCY: FREQUENCY: CONTINUOUS

## 2021-08-14 ASSESSMENT — PAIN SCALES - GENERAL
PAINLEVEL_OUTOF10: 8

## 2021-08-14 ASSESSMENT — PAIN DESCRIPTION - DESCRIPTORS: DESCRIPTORS: CONSTANT;RADIATING;SHOOTING

## 2021-08-14 ASSESSMENT — PAIN DESCRIPTION - LOCATION: LOCATION: KNEE

## 2021-08-15 NOTE — ED NOTES
Bed: 04  Expected date:   Expected time:   Means of arrival:   Comments:  Mildred Daly RN  08/14/21 2012

## 2021-08-15 NOTE — ED NOTES
Pt presents to the ED via EMS with C/O knee pain. Pt states she is pregnant, but unsure how far along she is. Pt states that she was playing volley ball and felt a pop in the left knee. Pt states her pain starts in the knee and radiates in both up ad down her leg. Pt does have a distal pulse in the left leg. Will continue to monitor and reassess.       Carlos Scruggs RN  08/14/21 2025

## 2021-08-15 NOTE — ED NOTES
Pt resting comfortably in no acute distress. Respirations even and unlabored. Call light remains within reach. No needs at this time.        Naresh Abreu RN  08/14/21 0339

## 2021-08-15 NOTE — ED PROVIDER NOTES
[facility] ED  Emergency Department Encounter  EmergencyMedicine Resident     Pt Gina Mccollum  MRN: [id number]  [place] 1995  Date of evaluation: 21  PCP:  Louann Eng MD    CHIEF COMPLAINT       Chief Complaint   Patient presents with    Knee Pain       HISTORY OF PRESENT ILLNESS  (Location/Symptom, Timing/Onset, Context/Setting, Quality, Duration, Modifying Factors, Severity.)      Cristian Brooks is a 31 y.o. female who presents with left knee pain. Patient was playing volleyball in [facility] today when she heard a popping sound on her left lateral knee and fell to the ground. Team member called 911 roughly 5 minutes after patient relates she is unable to ambulate and is having excruciating knee pain. Patient brought into ED via EMS. No medications given in route. Patient states she is currently pregnant unknown last menstrual period. Patient has 3 younger children delivered vaginally. Denies any history of prior injuries to the left leg, surgeries, daily medications, blood clots or broken bones. Patient states she has pain that travels along the lateral aspect of her entire left leg to her buttock. Denies back pain. Denies loss of consciousness. PAST MEDICAL / SURGICAL / SOCIAL / FAMILY HISTORY      has a past medical history of Asthma, Chronic airway disease, Dental crowns present, Gestational HTN--G3, Hidradenitis axillaris, History of  labor, Hydradenitis, Left ankle sprain, Maternal congenital heart disease, antepartum, Obesity, and Wears glasses. No new hx     has a past surgical history that includes Tonsillectomy and adenoidectomy and other surgical history (Bilateral, 2016).   No new surgeries      Social History     Socioeconomic History    Marital status: Single     Spouse name: Not on file    Number of children: Not on file    Years of education: Not on file    Highest education level: Not on file   Occupational History    Not on file   Tobacco Use    Smoking status: Never Smoker    Smokeless tobacco: Never Used   Vaping Use    Vaping Use: Never used   Substance and Sexual Activity    Alcohol use: No     Alcohol/week: 0.0 standard drinks    Drug use: No    Sexual activity: Not Currently     Partners: Male     Birth control/protection: Injection   Other Topics Concern    Not on file   Social History Narrative    Not on file     Social Determinants of Health     Financial Resource Strain:     Difficulty of Paying Living Expenses:    Food Insecurity:     Worried About Running Out of Food in the Last Year:     Ran Out of Food in the Last Year:    Transportation Needs:     Lack of Transportation (Medical):      Lack of Transportation (Non-Medical):    Physical Activity:     Days of Exercise per Week:     Minutes of Exercise per Session:    Stress:     Feeling of Stress :    Social Connections:     Frequency of Communication with Friends and Family:     Frequency of Social Gatherings with Friends and Family:     Attends Yazidism Services:     Active Member of Clubs or Organizations:     Attends Club or Organization Meetings:     Marital Status:    Intimate Partner Violence:     Fear of Current or Ex-Partner:     Emotionally Abused:     Physically Abused:     Sexually Abused:        Family History   Problem Relation Age of Onset    Other Brother          suicide     Diabetes Maternal Grandmother     Cancer Maternal Grandmother     Heart Disease Paternal Uncle     Early Death Paternal Uncle 62        MI    Learning Disabilities Mother     Diabetes Mother         Type 2 managed with insulin     No Known Problems Paternal Grandfather     No Known Problems Paternal Grandmother     Lung Cancer Maternal Grandfather     No Known Problems Father     No Known Problems Sister     No Known Problems Brother     Breast Cancer Neg Hx     Colon Cancer Neg Hx     Eclampsia Neg Hx     Hypertension Neg Hx  Ovarian Cancer Neg Hx      Labor Neg Hx     Spont Abortions Neg Hx     Stroke Neg Hx     Uterine Cancer Neg Hx        Allergies:  Lidocaine    Home Medications:  Prior to Admission medications    Medication Sig Start Date End Date Taking? Authorizing Provider   acetaminophen-codeine (TYLENOL/CODEINE #3) 300-30 MG per tablet Take 1 tablet by mouth every 6 hours as needed for Pain for up to 7 days. Intended supply: 3 days. Take lowest dose possible to manage pain 21 Yes Guicho Dewey MD   fluconazole (DIFLUCAN) 150 MG tablet Take 1 tablet by mouth daily Take the first dose tomorrow and the next dose after you finish your Keflex. Patient not taking: Reported on 2021   Gracie Liriano MD   acetaminophen (TYLENOL) 325 MG tablet Take 1 tablet by mouth every 4 hours as needed for Pain  Patient not taking: Reported on 2020   Rodolfo Olvera MD   medroxyPROGESTERone (DEPO-PROVERA) 150 MG/ML injection Inject 1 mL into the muscle every 3 months  Patient not taking: Reported on 2021 9/15/20   Sharron Sandhoff, MD   selenium sulfide (SELSUN) 2.5 % lotion Apply topically daily as needed for Itching Apply topically daily as needed. Patient not taking: Reported on 2021    Historical Provider, MD   butalbital-acetaminophen-caffeine (FIORICET, ESGIC) -54 MG per tablet Take 1 tablet by mouth every 4 hours as needed for Headaches  Patient not taking: Reported on 2020   Ivan Hernandez DO       REVIEW OF SYSTEMS    (2-9 systems for level 4, 10 or more for level 5)      Review of Systems   Constitutional: Negative for activity change, appetite change, fatigue and fever. HENT: Negative for congestion, rhinorrhea and sore throat. Eyes: Negative for photophobia. Respiratory: Negative for cough, choking, shortness of breath and wheezing. Cardiovascular: Negative for chest pain and leg swelling.    Gastrointestinal: Negative for constipation, diarrhea and vomiting. Endocrine: Negative for polydipsia and polyuria. Genitourinary: Negative for decreased urine volume, difficulty urinating, dysuria and menstrual problem. Endorses pregnancy     Musculoskeletal: Positive for gait problem and joint swelling. Skin: Negative for color change, rash and wound. Allergic/Immunologic: Negative for food allergies. Neurological: Negative for speech difficulty and headaches. Hematological: Negative for adenopathy. Does not bruise/bleed easily. Psychiatric/Behavioral: Negative for behavioral problems. PHYSICAL EXAM   (up to 7 for level 4, 8 or more for level 5)      INITIAL VITALS:   /69   Pulse 69   Temp 98.2 °F (36.8 °C) (Oral)   Resp 21   Ht 5' 4\" (1.626 m)   Wt 253 lb (114.8 kg)   SpO2 100%   BMI 43.43 kg/m²     Physical Exam  Vitals and nursing note reviewed. Constitutional:       General: She is in acute distress (due to pain). Appearance: Normal appearance. She is obese. She is diaphoretic. She is not toxic-appearing. HENT:      Head: Normocephalic and atraumatic. Right Ear: External ear normal.      Left Ear: External ear normal.      Nose: Nose normal. No congestion. Mouth/Throat:      Mouth: Mucous membranes are moist.   Eyes:      General: No scleral icterus. Conjunctiva/sclera: Conjunctivae normal.   Cardiovascular:      Rate and Rhythm: Normal rate and regular rhythm. Pulses: Normal pulses. Heart sounds: No murmur heard. Pulmonary:      Effort: Pulmonary effort is normal.      Breath sounds: Normal breath sounds. No wheezing. Abdominal:      General: Abdomen is flat. There is no distension. Palpations: Abdomen is soft. Tenderness: There is no abdominal tenderness. Musculoskeletal:         General: Swelling, tenderness and signs of injury present. No deformity. Cervical back: Normal range of motion and neck supple. No rigidity or tenderness.       Right lower leg: No edema. Left lower leg: No edema. Comments: Patient has baseline mild valgus deformity. patient initially with significant pain to the distal femur and proximal tibial knee joint region in the anterior and posterior as well as medial and lateral aspects. On initial presentation her left leg did not have obvious deformity. Distal left DP pulses slightly diminished compared to her right DP pulse, but present on Doppler however return to normal palpable 2+ over the course of 1 hour. Patient with good plantar and dorsiflexion able to wiggle her toes with normal eversion and inversion of ankle. After imaging to assess for dislocation or fracture patient left knee was reassessed for valgus and varus stress with some laxity with the MCL and LCL. Negative anterior and posterior drawer test.  There is no point tenderness to her ankle or foot or tibial shaft. There is no point tenderness to her patella. Her patella was mobile there is no pretibial joint effusion. No obvious abrasion or laceration to left knee. Skin:     General: Skin is warm. Capillary Refill: Capillary refill takes less than 2 seconds. Findings: No bruising, lesion or rash. Neurological:      General: No focal deficit present. Mental Status: She is alert and oriented to person, place, and time. Mental status is at baseline. Cranial Nerves: No cranial nerve deficit. Sensory: No sensory deficit. Coordination: Coordination normal.      Gait: Gait abnormal.   Psychiatric:         Mood and Affect: Mood normal.         Thought Content:  Thought content normal.         Judgment: Judgment normal.         DIFFERENTIAL  DIAGNOSIS     PLAN (LABS / IMAGING / EKG):  Orders Placed This Encounter   Procedures    XR KNEE LEFT (MIN 4 VIEWS)    Inpatient consult to Orthopedic Surgery    [address] [place] [address]; Pair, Left Side Injury; Med (5'2\"-5'10\")    [employer] Knee Immobilizer, Left; 12\"       MEDICATIONS ORDERED:  Orders Placed This Encounter   Medications    fentaNYL (SUBLIMAZE) injection 50 mcg    fentaNYL (SUBLIMAZE) injection 50 mcg    acetaminophen-codeine (TYLENOL/CODEINE #3) 300-30 MG per tablet     Sig: Take 1 tablet by mouth every 6 hours as needed for Pain for up to 7 days. Intended supply: 3 days. Take lowest dose possible to manage pain     Dispense:  18 tablet     Refill:  0       DDX: LEFT LEG (mcl injury, Lcl injury, meniscal injury), LEFT KNEE dislocation, patellar fx, distal femur fx, proximal tibial fx, proximal fib fx    DIAGNOSTIC RESULTS / EMERGENCY DEPARTMENT COURSE / MDM   LAB RESULTS:  No results found for this visit on 08/14/21. IMPRESSION: left knee MCL vs LCL vs meniscal injury    RADIOLOGY:  XR KNEE LEFT (MIN 4 VIEWS)   Final Result   Unremarkable left knee. EKG      All EKG's are interpreted by the Emergency Department Physician who either signs or Co-signs this chart in the absence of a cardiologist.    EMERGENCY DEPARTMENT COURSE:   34-year-old female presented to the emergency department by EMS for evaluation of sudden onset left knee pain. Patient was playing volleyball and heard a pop in her left knee and then subsequently fell to the ground. She was unable to get up and ambulate away from the scene. Patient states she has pain in her buttock that travels laterally to her knee. She denies back pain hitting her head or losing consciousness. Patient is shaking and in a significant amount of pain during HPI gathering. On initial evaluation patient had an ice pack to her left knee and it did not have obvious deformity. She did have a black marker to the site where her DP pulses were palpable. Her pulses were faint Doppler was obtained and she had good although faint pulsations with Doppler to her left DP. Palpable 2+ pulses in her right DPs. The difference in DPs were significant initially.   Imaging obtained did not reveal any acute abnormalities. Patient was reexamined and she had some laxity to the MCL and LCL without posterior anterior drawer sign. Patient had no prepatellar effusion her patella was mobile and nontender patient had medial and lateral knee pain and had normal range of motion of her ankle and toes. During entire encounter patient had good cap refill and was able to wiggle her toes with normal sensation without numbness or tingling. Her DP pulses after about 1 hour to an hour and a half spontaneously began to be strong and palpable on palpation in her left foot. Orthopedic surgery was consulted for this change in palpable strength of her pulses and is there was initial concern that she could have spontaneously relocated her knee. As 50% of knee dislocation spontaneously resolve. Orthopedic surgery reviewed images remotely and advised follow-up in clinic in 1 week. Patient was given a knee immobilizer by the nursing staff and crutches. Patient was discharged in clinically and hemodynamically stable condition with follow-up with Dr. Annamaria Vidales sports medicine as well as contact information for the orthopedic surgeons that were consulted during her stay in the emergency department. Patient was reassured and advised to not bear weight on her leg until she follows up with a specialist.  Patient verbalized understanding of this and her questions answered satisfaction. Patient was discharged with her mother to drive her home as she did receive opiates during encounter today. PROCEDURES:  none    CONSULTS:  IP CONSULT TO ORTHOPEDIC SURGERY    CRITICAL CARE:  Please see attending note    FINAL IMPRESSION      1.  Acute pain of left knee          DISPOSITION / PLAN     DISPOSITION Decision To Discharge 08/14/2021 10:36:44 PM      PATIENT REFERRED TO:  [facility]  [address] [facility] [address]  [phone]    If symptoms worsen    Bijal Carlos MD  [address] [place] [address]  [id number]  [phone]    In 1 week  For hospital follow-up    Sohail Matthews,   [address]  [phone]    Schedule an appointment as soon as possible for a visit       Elysia Serrato MD  [facility], [address], [address] [facility]  [phone]    In 1 week  For wound re-check      DISCHARGE MEDICATIONS:  Discharge Medication List as of 8/14/2021 10:44 PM      START taking these medications    Details   acetaminophen-codeine (TYLENOL/CODEINE #3) 300-30 MG per tablet Take 1 tablet by mouth every 6 hours as needed for Pain for up to 7 days. Intended supply: 3 days.  Take lowest dose possible to manage pain, Disp-18 tablet, R-0Print             Daniela Banda MD  Emergency Medicine Resident    (Please note that portions of thisnote were completed with a voice recognition program.  Efforts were made to edit the dictations but occasionally words are mis-transcribed.)        Daniela Banda MD  Resident  08/16/21 9449

## 2021-08-15 NOTE — ED PROVIDER NOTES
[facility]  Emergency Department  Faculty Attestation     I performed a history and physical examination of the patient and discussed management with the resident. I reviewed the residents note and agree with the documented findings and plan of care. Any areas of disagreement are noted on the chart. I was personally present for the key portions of any procedures. I have documented in the chart those procedures where I was not present during the key portions. I have reviewed the emergency nurses triage note. I agree with the chief complaint, past medical history, past surgical history, allergies, medications, social and family history as documented unless otherwise noted below. For Physician Assistant/ Nurse Practitioner cases/documentation I have personally evaluated this patient and have completed at least one if not all key elements of the E/M (history, physical exam, and MDM). Additional findings are as noted. Primary Care Physician:  Lawson Bearden MD    Screenings:  [unfilled]    CHIEF COMPLAINT       Chief Complaint   Patient presents with    Knee Pain       RECENT VITALS:   Temp: 98.2 °F (36.8 °C),  Pulse: 69, Resp: 21,      LABS:  Labs Reviewed - No data to display    Radiology  XR KNEE LEFT (MIN 4 VIEWS)    (Results Pending)         Attending Physician Additional  Notes    Patient has severe left knee pain. She is pregnant but does not know her last menstrual period. She was playing volleyball, came down on her left lower extremity had sudden severe left knee pain and has been unable to ambulate or flex or extend the knee since then. No direct trauma. She is uncertain whether the knee went backwards. She denies hip or ankle pain. She denies abdominal pain or vaginal bleeding. She arrives per EMS. No analgesics given due to pregnancy. On exam she is in severe distress secondary to pain, vital signs are normal.  Abdomen soft nontender.   Pulses are strong in the right foot, present in the left but diminished relatively. There is diffuse left knee tenderness but without palpable deformity such as patellar subluxation or effusion. Compartments are soft. Concern is for internal derangement versus less likely knee dislocation with spontaneous reduction. Plan is Doppler pulse, analgesics, imaging, reassess exam, anticipate orthopedic consultation. If there are findings of anterior/posterior drawer sign will start heparin and obtain CT angiogram.      Ridging is negative for fracture. Patient was reexamined but there is no effusion. There is no posterior drawer sign, no definite anterior drawer sign. There is questionable laxity of the MCL and LCL with valgus and varus strain. PT pulse is now quite strong and symmetrical with the other side. My suspicion for he spontaneously reduced knee dislocation is now quite low. Impression is internal derangement likely meniscus with lateral collateral injuries. Will discuss with orthopedics. Anticipate discharge with Ace wrap knee immobilizer crutches analgesics and follow-up. Eric Walters.  Dora Up MD, [facility]  Attending Emergency  Physician               Tammy Espinoza MD  08/14/21 [id number]       Tammy Espinoza MD  08/14/21 5752

## 2021-08-18 ENCOUNTER — OFFICE VISIT (OUTPATIENT)
Dept: ORTHOPEDIC SURGERY | Age: 26
End: 2021-08-18
Payer: COMMERCIAL

## 2021-08-18 VITALS — HEART RATE: 74 BPM | SYSTOLIC BLOOD PRESSURE: 131 MMHG | DIASTOLIC BLOOD PRESSURE: 76 MMHG

## 2021-08-18 DIAGNOSIS — M25.462 EFFUSION OF LEFT KNEE JOINT: ICD-10-CM

## 2021-08-18 DIAGNOSIS — M23.92 ACUTE INTERNAL DERANGEMENT OF LEFT KNEE: Primary | ICD-10-CM

## 2021-08-18 PROCEDURE — 99203 OFFICE O/P NEW LOW 30 MIN: CPT | Performed by: FAMILY MEDICINE

## 2021-08-18 PROCEDURE — 1036F TOBACCO NON-USER: CPT | Performed by: FAMILY MEDICINE

## 2021-08-18 PROCEDURE — G8427 DOCREV CUR MEDS BY ELIG CLIN: HCPCS | Performed by: FAMILY MEDICINE

## 2021-08-18 PROCEDURE — G8417 CALC BMI ABV UP PARAM F/U: HCPCS | Performed by: FAMILY MEDICINE

## 2021-08-18 NOTE — PROGRESS NOTES
8/9/2021) 1 mL 3    selenium sulfide (SELSUN) 2.5 % lotion Apply topically daily as needed for Itching Apply topically daily as needed. (Patient not taking: Reported on 8/9/2021)      butalbital-acetaminophen-caffeine (FIORICET, ESGIC) -40 MG per tablet Take 1 tablet by mouth every 4 hours as needed for Headaches (Patient not taking: Reported on 12/9/2020) 30 tablet 0     No current facility-administered medications for this visit. Allergies:  sheis allergic to lidocaine. ROS:  CV:  Denies chest pain; palpitations; shortness of breath; swelling of feet, ankles; and loss of consciousness. CON: Denies fever and dizziness. ENT:  Denies hearing loss / ringing, ear infections hoarseness, and swallowing problems. RESP:  Denies chronic cough, spitting up blood, and asthma/wheezing. GI: Denies abdominal pain, change in bowel habits, nausea or vomiting, and blood in stools. :  Denies frequent urination, burning or painful urination, blood in the urine, and bladder incontinence. NEURO:  Denies headache, memory loss, sleep disturbance, and tremor or movement disorder. PHYSICAL EXAM:   /76 (Site: Left Upper Arm)   Pulse 74   GENERAL: Jayleen Arredondo is a 31 y.o. female who is alert and oriented and sitting comfortably in our office. SKIN:  Intact without rashes, lesions or ulcerations. NEURO: Sensation to the extremity is intact. VASC:  Capillary refill is less than 3 seconds. Distal pulses are palpable. There is no lymphadenopathy.     Knee Exam  Musculoskeletal/Neurologic:  Inspection-Swelling: moderate, Ecchymosis: no  Palpation-Tenderness:medial knee joint  Pain with patellar grind: no  ROM- 0-45  Strength- WNL  Sensation-normal to light touch    Special Tests-  Varus Laxity: negative   Valgus Laxity:  positive for 2+ lax   Anterior Drawer: positive for soft endpoint   Posterior Drawer: negative  Lachman's: positive  Óscar's:positive pain and pop medially    Gait: stiff-legged

## 2021-08-23 ENCOUNTER — TELEPHONE (OUTPATIENT)
Dept: ORTHOPEDIC SURGERY | Age: 26
End: 2021-08-23

## 2021-08-23 ENCOUNTER — TELEPHONE (OUTPATIENT)
Dept: OBGYN | Age: 26
End: 2021-08-23

## 2021-08-23 NOTE — TELEPHONE ENCOUNTER
Called Ms Gissell Richardson back to see if she was ok. Patent was upset about not getting in sooner with her US appt. Tiffanie asked the patient if she can come in on a afternoon appt sooner, patient states no she want a morning appt. Ask patient was she sure, due to this is the soonest morning appt. 9/3/21 9am for [facility] [employer], patient states yes she is fine with it.      Thanks,    Ramesh Hurtado

## 2021-08-23 NOTE — TELEPHONE ENCOUNTER
Hima Duncane called asking if she can be working. 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 If she is unable to do her job with her current inabilities--crutches non wt bearing she will be required to obtain [facility] paper work to cover her through her testing and plan of care.

## 2021-09-03 ENCOUNTER — ANCILLARY PROCEDURE (OUTPATIENT)
Dept: OBGYN | Age: 26
End: 2021-09-03
Payer: COMMERCIAL

## 2021-09-03 DIAGNOSIS — Z34.91 CURRENTLY PREGNANT IN FIRST TRIMESTER WITH UNKNOWN GESTATIONAL AGE: ICD-10-CM

## 2021-09-03 PROCEDURE — 76801 OB US < 14 WKS SINGLE FETUS: CPT | Performed by: RADIOLOGY

## 2021-09-04 ENCOUNTER — HOSPITAL ENCOUNTER (OUTPATIENT)
Dept: MRI IMAGING | Age: 26
Discharge: HOME | End: 2021-09-06
Payer: COMMERCIAL

## 2021-09-04 DIAGNOSIS — M23.92 ACUTE INTERNAL DERANGEMENT OF LEFT KNEE: ICD-10-CM

## 2021-09-04 DIAGNOSIS — M25.462 EFFUSION OF LEFT KNEE JOINT: ICD-10-CM

## 2021-09-04 PROCEDURE — 73721 MRI JNT OF LWR EXTRE W/O DYE: CPT

## 2021-09-07 ENCOUNTER — APPOINTMENT (OUTPATIENT)
Dept: ULTRASOUND IMAGING | Age: 26
End: 2021-09-07
Payer: COMMERCIAL

## 2021-09-07 ENCOUNTER — HOSPITAL ENCOUNTER (EMERGENCY)
Age: 26
Discharge: HOME | End: 2021-09-08
Attending: EMERGENCY MEDICINE
Payer: COMMERCIAL

## 2021-09-07 VITALS
OXYGEN SATURATION: 100 % | DIASTOLIC BLOOD PRESSURE: 74 MMHG | SYSTOLIC BLOOD PRESSURE: 112 MMHG | TEMPERATURE: 99.5 F | HEART RATE: 66 BPM | RESPIRATION RATE: 18 BRPM

## 2021-09-07 DIAGNOSIS — O46.90 VAGINAL BLEEDING IN PREGNANCY: Primary | ICD-10-CM

## 2021-09-07 DIAGNOSIS — N30.00 ACUTE CYSTITIS WITHOUT HEMATURIA: ICD-10-CM

## 2021-09-07 LAB
ABSOLUTE EOS #: 0.1 K/UL (ref 0–0.44)
ABSOLUTE IMMATURE GRANULOCYTE: <0.03 K/UL (ref 0–0.3)
ABSOLUTE LYMPH #: 2.71 K/UL (ref 1.1–3.7)
ABSOLUTE MONO #: 0.44 K/UL (ref 0.1–1.2)
BASOPHILS # BLD: 0 % (ref 0–2)
BASOPHILS ABSOLUTE: <0.03 K/UL (ref 0–0.2)
DIFFERENTIAL TYPE: NORMAL
DIRECT EXAM: NORMAL
EOSINOPHILS RELATIVE PERCENT: 1 % (ref 1–4)
HCT VFR BLD CALC: 36.6 % (ref 36.3–47.1)
HEMOGLOBIN: 11.9 G/DL (ref 11.9–15.1)
IMMATURE GRANULOCYTES: 0 %
LYMPHOCYTES # BLD: 32 % (ref 24–43)
Lab: NORMAL
MCH RBC QN AUTO: 28.9 PG (ref 25.2–33.5)
MCHC RBC AUTO-ENTMCNC: 32.5 G/DL (ref 28.4–34.8)
MCV RBC AUTO: 88.8 FL (ref 82.6–102.9)
MONOCYTES # BLD: 5 % (ref 3–12)
NRBC AUTOMATED: 0 PER 100 WBC
PDW BLD-RTO: 12.6 % (ref 11.8–14.4)
PLATELET # BLD: NORMAL K/UL (ref 138–453)
PLATELET ESTIMATE: NORMAL
PLATELET, FLUORESCENCE: NORMAL K/UL (ref 138–453)
PLATELET, IMMATURE FRACTION: NORMAL % (ref 1.1–10.3)
PMV BLD AUTO: NORMAL FL (ref 8.1–13.5)
RBC # BLD: 4.12 M/UL (ref 3.95–5.11)
RBC # BLD: NORMAL 10*6/UL
SEG NEUTROPHILS: 61 % (ref 36–65)
SEGMENTED NEUTROPHILS ABSOLUTE COUNT: 5.14 K/UL (ref 1.5–8.1)
SPECIMEN DESCRIPTION: NORMAL
WBC # BLD: 8.4 K/UL (ref 3.5–11.3)
WBC # BLD: NORMAL 10*3/UL

## 2021-09-07 PROCEDURE — 93976 VASCULAR STUDY: CPT

## 2021-09-07 PROCEDURE — 85025 COMPLETE CBC W/AUTO DIFF WBC: CPT

## 2021-09-07 PROCEDURE — 87510 GARDNER VAG DNA DIR PROBE: CPT

## 2021-09-07 PROCEDURE — 87660 TRICHOMONAS VAGIN DIR PROBE: CPT

## 2021-09-07 PROCEDURE — 87491 CHLMYD TRACH DNA AMP PROBE: CPT

## 2021-09-07 PROCEDURE — 87086 URINE CULTURE/COLONY COUNT: CPT

## 2021-09-07 PROCEDURE — 76817 TRANSVAGINAL US OBSTETRIC: CPT

## 2021-09-07 PROCEDURE — 6370000000 HC RX 637 (ALT 250 FOR IP): Performed by: EMERGENCY MEDICINE

## 2021-09-07 PROCEDURE — 99283 EMERGENCY DEPT VISIT LOW MDM: CPT

## 2021-09-07 PROCEDURE — 85055 RETICULATED PLATELET ASSAY: CPT

## 2021-09-07 PROCEDURE — 87480 CANDIDA DNA DIR PROBE: CPT

## 2021-09-07 PROCEDURE — 87591 N.GONORRHOEAE DNA AMP PROB: CPT

## 2021-09-07 PROCEDURE — 81001 URINALYSIS AUTO W/SCOPE: CPT

## 2021-09-07 RX ORDER — ACETAMINOPHEN 325 MG/1
650 TABLET ORAL ONCE
Status: COMPLETED | OUTPATIENT
Start: 2021-09-07 | End: 2021-09-07

## 2021-09-07 RX ADMIN — ACETAMINOPHEN 650 MG: 325 TABLET ORAL at 22:11

## 2021-09-07 ASSESSMENT — ENCOUNTER SYMPTOMS
SORE THROAT: 0
DIARRHEA: 0
VOMITING: 0
COUGH: 0
RHINORRHEA: 0
CONSTIPATION: 0
SHORTNESS OF BREATH: 0
NAUSEA: 0
ABDOMINAL PAIN: 1
BLOOD IN STOOL: 0

## 2021-09-07 ASSESSMENT — PAIN DESCRIPTION - ORIENTATION: ORIENTATION: LOWER;MID

## 2021-09-07 ASSESSMENT — PAIN SCALES - GENERAL
PAINLEVEL_OUTOF10: 10
PAINLEVEL_OUTOF10: 10

## 2021-09-07 ASSESSMENT — PAIN DESCRIPTION - LOCATION: LOCATION: ABDOMEN

## 2021-09-08 LAB
-: ABNORMAL
AMORPHOUS: ABNORMAL
BACTERIA: ABNORMAL
BILIRUBIN URINE: ABNORMAL
C TRACH DNA GENITAL QL NAA+PROBE: NEGATIVE
CASTS UA: ABNORMAL /LPF (ref 0–2)
COLOR: ABNORMAL
COMMENT UA: ABNORMAL
CRYSTALS, UA: ABNORMAL /HPF
CULTURE: NORMAL
EPITHELIAL CELLS UA: ABNORMAL /HPF (ref 0–5)
GLUCOSE URINE: NEGATIVE
KETONES, URINE: NEGATIVE
LEUKOCYTE ESTERASE, URINE: ABNORMAL
Lab: NORMAL
MUCUS: ABNORMAL
N. GONORRHOEAE DNA: NEGATIVE
NITRITE, URINE: NEGATIVE
OTHER OBSERVATIONS UA: ABNORMAL
PH UA: 7 (ref 5–8)
PROTEIN UA: ABNORMAL
RBC UA: ABNORMAL /HPF (ref 0–2)
RENAL EPITHELIAL, UA: ABNORMAL /HPF
SPECIFIC GRAVITY UA: 1.03 (ref 1–1.03)
SPECIMEN DESCRIPTION: NORMAL
SPECIMEN DESCRIPTION: NORMAL
TRICHOMONAS: ABNORMAL
TURBIDITY: ABNORMAL
URINE HGB: ABNORMAL
UROBILINOGEN, URINE: NORMAL
WBC UA: ABNORMAL /HPF (ref 0–5)
YEAST: ABNORMAL

## 2021-09-08 RX ORDER — CEPHALEXIN 500 MG/1
500 CAPSULE ORAL 2 TIMES DAILY
Qty: 10 CAPSULE | Refills: 0 | Status: SHIPPED | OUTPATIENT
Start: 2021-09-08 | End: 2021-09-13

## 2021-09-08 NOTE — ED NOTES
Bed: 45  Expected date:   Expected time:   Means of arrival:   Comments:     Alex Kohli RN  09/07/21 2058

## 2021-09-08 NOTE — ED TRIAGE NOTES
Pt presents to ED ambulatory but reports weaakness and pain and req wheelchair. Pt is a&ox4, RR even and unlabored, NAD noted, VSS. Pt reports being at son's football game [place] when pt reports feeling a sensation similar to urinating, but pt reports large amount of blood noted. Pt reports finding out that she is 8wks and 3-4days gestation on Friday at OB/GYN. Pt reports having progressively increased lower abd pain radiating into pelvic area since bleeding started. Pt given urine sample, pad, and new underwear.

## 2021-09-08 NOTE — ED PROVIDER NOTES
[facility]  Emergency Department  Faculty Attestation     I performed a history and physical examination of the patient and discussed management with the resident. I reviewed the residents note and agree with the documented findings and plan of care. Any areas of disagreement are noted on the chart. I was personally present for the key portions of any procedures. I have documented in the chart those procedures where I was not present during the key portions. I have reviewed the emergency nurses triage note. I agree with the chief complaint, past medical history, past surgical history, allergies, medications, social and family history as documented unless otherwise noted below. For Physician Assistant/ Nurse Practitioner cases/documentation I have personally evaluated this patient and have completed at least one if not all key elements of the E/M (history, physical exam, and MDM). Additional findings are as noted. Primary Care Physician:  Lorena Barry MD    Screenings:  [unfilled]    CHIEF COMPLAINT       Chief Complaint   Patient presents with    Miscarriage     8wks 3-4days, bright blood red       RECENT VITALS:   Temp: 99.5 °F (37.5 °C),  Pulse: 66, Resp: 18, BP: 112/74    LABS:  Labs Reviewed - No data to display    Radiology  No orders to display         Attending Physician Additional  Notes    Patient has right-sided pelvic pain, 10/10 in intensity, radiation of the right thigh and right buttocks, heavy vaginal bleeding gush of blood. There is mild lightheadedness with standing. She has an IUP based on bedside ultrasound and she is 8 weeks 5 days gestation. No history of anemia. No urine symptoms. She also has a left knee pain, wears an immobilizer, takes Tylenol, scheduled for outpatient MRI. On exam she is uncomfortable but nontoxic afebrile vital signs are normal.  Abdomen has right pelvic tenderness without rebound or guarding. No CVA tenderness. Normal pallor noted. Impression is threatened miscarriage, first trimester bleeding, right adnexal pain consider torsion, very low suspicion, corpus luteal cyst.  Plan is pelvic exam, swabs, pelvic ultrasound, CBC, Tylenol, reassess. Nas Christian MD, [facility]  Attending Emergency  Physician               Adina Christianson MD  09/07/21 2052

## 2021-09-08 NOTE — ED PROVIDER NOTES
[facility]  ED  Emergency Department Encounter  EmergencyMedicine Resident     Pt Name:Clari Mauro  MRN: [id number]  [place] 1995  Date of evaluation: 9/7/21  PCP:  Lauren Ellsworth MD    This patient was evaluated in the Emergency Department for symptoms described in the history of present illness. The patient was evaluated in the context of the global COVID-19 pandemic, which necessitated consideration that the patient might be at risk for infection with the SARS-CoV-2 virus that causes COVID-19. Institutional protocols and algorithms that pertain to the evaluation of patients at risk for COVID-19 are in a state of rapid change based on information released by regulatory bodies including the CDC and federal and state organizations. These policies and algorithms were followed during the patient's care in the ED. CHIEF COMPLAINT       Chief Complaint   Patient presents with    Miscarriage     8wks 3-4days, bright blood red       HISTORY OF PRESENT ILLNESS  (Location/Symptom, Timing/Onset, Context/Setting, Quality, Duration, Modifying Factors, Severity.)      Jayleen Arredondo is a 31 y.o. female who presents with vaginal bleeding. Patient presents with vaginal bleeding, reports that she has approximately 8 weeks pregnant via ultrasound 4 days ago. Patient is G4, P3, 3 live vaginal births, no abortions or miscarriages in the past.  Patient reports that she was sitting down when she thought that she urinated over herself, found that she was having vaginal bleeding, associated with right lower abdominal cramping, nonradiating, mild severity, no other symptoms. Patient denies fevers, chills, cough, congestion, chest pain, shortness of breath, nausea, vomiting, dysuria, frequency, urgency, vaginal discharge, diarrhea, constipation. Patient did not take anything for pain prior to arrival.  Patient does follow-up with OB/GYN.     PAST MEDICAL / SURGICAL / SOCIAL / FAMILY HISTORY      has a past medical history of Asthma, Chronic airway disease, Dental crowns present, Gestational HTN--G3, Hidradenitis axillaris, History of  labor, Hydradenitis, Left ankle sprain, Maternal congenital heart disease, antepartum, Obesity, and Wears glasses. has a past surgical history that includes Tonsillectomy and adenoidectomy and other surgical history (Bilateral, 2016). Social History     Socioeconomic History    Marital status: Single     Spouse name: Not on file    Number of children: Not on file    Years of education: Not on file    Highest education level: Not on file   Occupational History    Not on file   Tobacco Use    Smoking status: Never Smoker    Smokeless tobacco: Never Used   Vaping Use    Vaping Use: Never used   Substance and Sexual Activity    Alcohol use: No     Alcohol/week: 0.0 standard drinks    Drug use: No    Sexual activity: Not Currently     Partners: Male     Birth control/protection: Injection   Other Topics Concern    Not on file   Social History Narrative    Not on file     Social Determinants of Health     Financial Resource Strain:     Difficulty of Paying Living Expenses:    Food Insecurity:     Worried About Running Out of Food in the Last Year:     Ran Out of Food in the Last Year:    Transportation Needs:     Lack of Transportation (Medical):      Lack of Transportation (Non-Medical):    Physical Activity:     Days of Exercise per Week:     Minutes of Exercise per Session:    Stress:     Feeling of Stress :    Social Connections:     Frequency of Communication with Friends and Family:     Frequency of Social Gatherings with Friends and Family:     Attends Mosque Services:     Active Member of Clubs or Organizations:     Attends Club or Organization Meetings:     Marital Status:    Intimate Partner Violence:     Fear of Current or Ex-Partner:     Emotionally Abused:     Physically Abused:     Sexually Abused:        Family History   Problem Relation Age of Onset    Other Brother          suicide     Diabetes Maternal Grandmother     Cancer Maternal Grandmother     Heart Disease Paternal Uncle     Early Death Paternal Uncle 62        MI    Learning Disabilities Mother     Diabetes Mother         Type 2 managed with insulin     No Known Problems Paternal Grandfather     No Known Problems Paternal Grandmother     Lung Cancer Maternal Grandfather     No Known Problems Father     No Known Problems Sister     No Known Problems Brother     Breast Cancer Neg Hx     Colon Cancer Neg Hx     Eclampsia Neg Hx     Hypertension Neg Hx     Ovarian Cancer Neg Hx      Labor Neg Hx     Spont Abortions Neg Hx     Stroke Neg Hx     Uterine Cancer Neg Hx        Allergies:  Lidocaine    Home Medications:  Prior to Admission medications    Medication Sig Start Date End Date Taking? Authorizing Provider   cephALEXin (KEFLEX) 500 MG capsule Take 1 capsule by mouth 2 times daily for 5 days 21 Yes Manjinder Vivas MD   fluconazole (DIFLUCAN) 150 MG tablet Take 1 tablet by mouth daily Take the first dose tomorrow and the next dose after you finish your Keflex. Patient not taking: Reported on 2021   Michaela Hurd MD   acetaminophen (TYLENOL) 325 MG tablet Take 1 tablet by mouth every 4 hours as needed for Pain  Patient not taking: Reported on 2020   Manjinder Vivas MD   medroxyPROGESTERone (DEPO-PROVERA) 150 MG/ML injection Inject 1 mL into the muscle every 3 months  Patient not taking: Reported on 2021 9/15/20   Marlo Echeverria MD   selenium sulfide (SELSUN) 2.5 % lotion Apply topically daily as needed for Itching Apply topically daily as needed.   Patient not taking: Reported on 2021    Historical Provider, MD   butalbital-acetaminophen-caffeine (FIORICET, ESGIC) -57 MG per tablet Take 1 tablet by mouth every 4 hours as needed for Headaches  Patient not taking: Reported on 2020 Palpations: Abdomen is soft. There is no mass. Tenderness: There is abdominal tenderness. There is no right CVA tenderness, left CVA tenderness or guarding. Comments: Abdomen soft, nondistended, minimally tender in the right adnexa, no other abdominal tenderness, no ecchymosis, no overlying skin changes, no CVA tenderness bilaterally. Genitourinary:     General: Normal vulva. Comments: Patient has blood in the introitus, no abnormalities of external genitalia, blood in the vaginal vault, closed cervical os. Musculoskeletal:         General: No tenderness. Normal range of motion. Cervical back: Normal range of motion and neck supple. No rigidity or tenderness. Right lower leg: No edema. Left lower leg: No edema. Skin:     General: Skin is warm. Capillary Refill: Capillary refill takes less than 2 seconds. Findings: No rash. Neurological:      Mental Status: She is alert and oriented to person, place, and time.    Psychiatric:         Behavior: Behavior normal.         DIFFERENTIAL  DIAGNOSIS     PLAN (LABS / IMAGING / EKG):  Orders Placed This Encounter   Procedures    C.trachomatis N.gonorrhoeae DNA    VAGINITIS DNA PROBE    Culture, Urine    US OB TRANSVAGINAL    US DUP ABD PEL RETRO SCROT LIMITED    CBC WITH AUTO DIFFERENTIAL    Immature Platelet Fraction    Urinalysis, reflex to microscopic    Microscopic Urinalysis       MEDICATIONS ORDERED:  Orders Placed This Encounter   Medications    acetaminophen (TYLENOL) tablet 650 mg    cephALEXin (KEFLEX) 500 MG capsule     Sig: Take 1 capsule by mouth 2 times daily for 5 days     Dispense:  10 capsule     Refill:  0       DDX: Miscarriage, bleeding in pregnancy, torsion, ectopic    DIAGNOSTIC RESULTS / EMERGENCY DEPARTMENT COURSE / MDM   LAB RESULTS:  Results for orders placed or performed during the hospital encounter of 09/07/21   VAGINITIS DNA PROBE    Specimen: Vaginal   Result Value Ref Range    Specimen Description . VAGINA     Special Requests NOT REPORTED     Direct Exam NEGATIVE for Gardnerella vaginalis     Direct Exam NEGATIVE for Candida sp. Direct Exam NEGATIVE for Trichomonas vaginalis     Direct Exam       Method of testing is a DNA probe intended for detection and identification of Candida species, Gardnerella vaginalis, and Trichomonas vaginalis nucleic acid in vaginal fluid specimens from patients with symptoms of vaginitis/vaginosis. CBC WITH AUTO DIFFERENTIAL   Result Value Ref Range    WBC 8.4 3.5 - 11.3 k/uL    RBC 4.12 3.95 - 5.11 m/uL    Hemoglobin 11.9 11.9 - 15.1 g/dL    Hematocrit 36.6 36.3 - 47.1 %    MCV 88.8 82.6 - 102.9 fL    MCH 28.9 25.2 - 33.5 pg    MCHC 32.5 28.4 - 34.8 g/dL    RDW 12.6 11.8 - 14.4 %    Platelets See Reflexed IPF Result 138 - 453 k/uL    MPV NOT REPORTED 8.1 - 13.5 fL    NRBC Automated 0.0 0.0 per 100 WBC    Differential Type NOT REPORTED     WBC Morphology NOT REPORTED     RBC Morphology NOT REPORTED     Platelet Estimate NOT REPORTED     Seg Neutrophils 61 36 - 65 %    Lymphocytes 32 24 - 43 %    Monocytes 5 3 - 12 %    Eosinophils % 1 1 - 4 %    Basophils 0 0 - 2 %    Immature Granulocytes 0 0 %    Segs Absolute 5.14 1.50 - 8.10 k/uL    Absolute Lymph # 2.71 1.10 - 3.70 k/uL    Absolute Mono # 0.44 0.10 - 1.20 k/uL    Absolute Eos # 0.10 0.00 - 0.44 k/uL    Basophils Absolute <0.03 0.00 - 0.20 k/uL    Absolute Immature Granulocyte <0.03 0.00 - 0.30 k/uL   Immature Platelet Fraction   Result Value Ref Range    Platelet, Immature Fraction NOT REPORTED 1.1 - 10.3 %    Platelet, Fluorescence Platelet clumps present, count appears adequate.  138 - 453 k/uL   Urinalysis, reflex to microscopic   Result Value Ref Range    Color, UA RED (A) YELLOW    Turbidity UA TURBID (A) CLEAR    Glucose, Ur NEGATIVE NEGATIVE    Bilirubin Urine NEGATIVE  Verified by ictotest. (A) NEGATIVE    Ketones, Urine NEGATIVE NEGATIVE    Specific Gravity, UA 1.027 1.005 - 1.030    Urine Hgb LARGE pole Anegam Rump Length:  20.9 mm Fetal Heart Rate:  150 beats per minute Right ovary: 2.9 cm x 2.3 cm x 2.0 cm normal Doppler flow. Left ovary: 2.8 cm x 2.5 cm x 2.0 cm normal Doppler flow. Free fluid: None Measurements: Estimated gestational age by current ultrasound: 8 weeks and 5 days Estimated gestational by LMP/prior ultrasound: Unknown Estimated Due Date: 04/14/2021     Single intrauterine gestational sac containing a yolk sac and a viable fetal pole of approximately 8 weeks and 5 days gestation. Small amount of heterogeneous fluid in the cervix, likely trace hemorrhage. EKG      All EKG's are interpreted by the Emergency Department Physician who either signs or Co-signs this chart in the absence of a cardiologist.    EMERGENCY DEPARTMENT COURSE:  Patient came to emergency department, HPI and physical exam were conducted. All nursing notes were reviewed. Patient has closed cervical os, SLIUP without any other abnormality, consistent with threatened miscarriage. Hgb stable, vaginitis negative, UA concerning for UTI, will treat with Keflex, have patient follow up with OB/GYN. Had in depth discussion with patient regarding concern for potential miscarriage, gave strict return precautions and recommended patient call OB immediately in the morning for re-evaluation. Patient remained stable in ER with improving vitals. Reiterated return precautions and discharged patient home. PROCEDURES:      CONSULTS:  None    CRITICAL CARE:      FINAL IMPRESSION      1. Vaginal bleeding in pregnancy    2.  Acute cystitis without hematuria          DISPOSITION / PLAN     DISPOSITION        PATIENT REFERRED TO:  Francisco Torres MD  [address]  [facility]  [phone]    Schedule an appointment as soon as possible for a visit in 1 day  For reassessment    [facility]  [address] [facility] [address] [phone]  Go to   If symptoms worsen, As needed      DISCHARGE

## 2021-09-09 ENCOUNTER — TELEPHONE (OUTPATIENT)
Dept: OBGYN | Age: 26
End: 2021-09-09

## 2021-09-09 ENCOUNTER — OFFICE VISIT (OUTPATIENT)
Dept: ORTHOPEDIC SURGERY | Age: 26
End: 2021-09-09
Payer: COMMERCIAL

## 2021-09-09 VITALS — HEART RATE: 89 BPM | DIASTOLIC BLOOD PRESSURE: 89 MMHG | SYSTOLIC BLOOD PRESSURE: 130 MMHG

## 2021-09-09 DIAGNOSIS — S83.512A NEW ACL TEAR, LEFT, INITIAL ENCOUNTER: Primary | ICD-10-CM

## 2021-09-09 PROCEDURE — 99213 OFFICE O/P EST LOW 20 MIN: CPT | Performed by: FAMILY MEDICINE

## 2021-09-09 PROCEDURE — G8427 DOCREV CUR MEDS BY ELIG CLIN: HCPCS | Performed by: FAMILY MEDICINE

## 2021-09-09 PROCEDURE — 1036F TOBACCO NON-USER: CPT | Performed by: FAMILY MEDICINE

## 2021-09-09 PROCEDURE — G8417 CALC BMI ABV UP PARAM F/U: HCPCS | Performed by: FAMILY MEDICINE

## 2021-09-09 NOTE — TELEPHONE ENCOUNTER
Pt called in stating she was seen at [facility] [address] [facility] ED on 09/07/21 for Vaginal bleeding in pregnancy     Acute cystitis without hematuria  Pt states she spoke with patricia yesterday but feel like waiting until the 27th for her appt is just too long to wait.

## 2021-09-09 NOTE — PROGRESS NOTES
Sports Medicine Consultation     CHIEF COMPLAINT:  Knee Pain (Left knee f/u. MRI results)      HPI:  Petra Townsend is a 31y.o. year old female who is a  established patient being seen for regarding follow up of a pre-existing problem left knee pain. The pain has been present for 3 week(s). The patient recalls a prev volleyball  injury. The patient has tried brace, rest with improvement. The pain is described as sharp occ. There is  pain on weightbearing. The knee does swell. There is is not painful popping and clicking. The knee does not catch or lock. It has given out. It is  stiff upon arising from sitting. It is  painful to go up and down stairs and sit for a prolonged period of time. she has a past medical history of Asthma, Chronic airway disease, Dental crowns present, Gestational HTN--G3, Hidradenitis axillaris, History of  labor, Hydradenitis, Left ankle sprain, Maternal congenital heart disease, antepartum, Obesity, and Wears glasses. she has a past surgical history that includes Tonsillectomy and adenoidectomy and other surgical history (Bilateral, 2016). family history includes Cancer in her maternal grandmother; Diabetes in her maternal grandmother and mother; Early Death (age of onset: 62) in her paternal uncle; Heart Disease in her paternal uncle; Learning Disabilities in her mother; Eddye Maillard in her maternal grandfather; No Known Problems in her brother, father, paternal grandfather, paternal grandmother, and sister; Other in her brother.     Social History     Socioeconomic History    Marital status: Single     Spouse name: Not on file    Number of children: Not on file    Years of education: Not on file    Highest education level: Not on file   Occupational History    Not on file   Tobacco Use    Smoking status: Never Smoker    Smokeless tobacco: Never Used   Vaping Use    Vaping Use: Never used   Substance and Sexual Activity    Alcohol use: No     Alcohol/week: 0.0 standard drinks    Drug use: No    Sexual activity: Not Currently     Partners: Male     Birth control/protection: Injection   Other Topics Concern    Not on file   Social History Narrative    Not on file     Social Determinants of Health     Financial Resource Strain:     Difficulty of Paying Living Expenses:    Food Insecurity:     Worried About Running Out of Food in the Last Year:     [address] in the Last Year:    Transportation Needs:     Lack of Transportation (Medical):  Lack of Transportation (Non-Medical):    Physical Activity:     Days of Exercise per Week:     Minutes of Exercise per Session:    Stress:     Feeling of Stress :    Social Connections:     Frequency of Communication with Friends and Family:     Frequency of Social Gatherings with Friends and Family:     Attends Mu-ism Services:     Active Member of Clubs or Organizations:     Attends Club or Organization Meetings:     Marital Status:    Intimate Partner Violence:     Fear of Current or Ex-Partner:     Emotionally Abused:     Physically Abused:     Sexually Abused:        Current Outpatient Medications   Medication Sig Dispense Refill    cephALEXin (KEFLEX) 500 MG capsule Take 1 capsule by mouth 2 times daily for 5 days 10 capsule 0    fluconazole (DIFLUCAN) 150 MG tablet Take 1 tablet by mouth daily Take the first dose tomorrow and the next dose after you finish your Keflex. (Patient not taking: Reported on 8/9/2021) 2 tablet 0    acetaminophen (TYLENOL) 325 MG tablet Take 1 tablet by mouth every 4 hours as needed for Pain (Patient not taking: Reported on 12/9/2020) 60 tablet 0    medroxyPROGESTERone (DEPO-PROVERA) 150 MG/ML injection Inject 1 mL into the muscle every 3 months (Patient not taking: Reported on 8/9/2021) 1 mL 3    selenium sulfide (SELSUN) 2.5 % lotion Apply topically daily as needed for Itching Apply topically daily as needed.  (Patient not taking: Reported on 8/9/2021)      butalbital-acetaminophen-caffeine (FIORICET, ESGIC) -40 MG per tablet Take 1 tablet by mouth every 4 hours as needed for Headaches (Patient not taking: Reported on 12/9/2020) 30 tablet 0     No current facility-administered medications for this visit. Allergies:  sheis allergic to lidocaine. ROS:  CV:  Denies chest pain; palpitations; shortness of breath; swelling of feet, ankles; and loss of consciousness. CON: Denies fever and dizziness. ENT:  Denies hearing loss / ringing, ear infections hoarseness, and swallowing problems. RESP:  Denies chronic cough, spitting up blood, and asthma/wheezing. GI: Denies abdominal pain, change in bowel habits, nausea or vomiting, and blood in stools. :  Denies frequent urination, burning or painful urination, blood in the urine, and bladder incontinence. NEURO:  Denies headache, memory loss, sleep disturbance, and tremor or movement disorder. PHYSICAL EXAM:   BP (!) 153/97   Pulse 89   GENERAL: Larry Gonzalez is a 31 y.o. female who is alert and oriented and sitting comfortably in our office. SKIN:  Intact without rashes, lesions or ulcerations. NEURO: Sensation to the extremity is intact. VASC:  Capillary refill is less than 3 seconds. Distal pulses are palpable. There is no lymphadenopathy. Knee Exam  Musculoskeletal/Neurologic:  Inspection-Swelling: mild, Ecchymosis: no  Palpation-Tenderness:mild  Pain with patellar grind: no  ROM- 0-110  Strength- WNL  Sensation-normal to light touch    Special Tests-  Varus Laxity: negative   Valgus Laxity:  negative   Anterior Drawer: negative   Posterior Drawer: negative  Lachman's: negative  Óscar's:negative    Gait: antalgic and stiff-legged    PSYCH:  Good fund of knowledge and displays understanding of exam.    RADIOLOGY:  MRI KNEE LEFT WO CONTRAST    Result Date: 9/7/2021  1.  Complete ACL tear and kissing bone contusion pattern with associated nondisplaced microtrabecular fracturing as detailed above. 2. Mild lateral and patellofemoral chondromalacia with areas of partial thickness fissuring. 3. Mild edema in the subcutaneous fat about the knee. 4. Small joint effusion. 5. Grade 1 MCL sprain. 6. Mild multifocal patellar tendinosis. 7. Degeneration in the posterior horn medial meniscus. No meniscal tear. IMPRESSION:     1. New ACL tear, left, initial encounter          PLAN:   We discussed some of the etiologies and natural histories of     ICD-10-CM    1. New ACL tear, left, initial encounter  S83.512A    . We discussed the various treatment alternatives including anti-inflammatory medications, physical therapy, injections, further imaging studies and as a last resort surgery. At this point patient has new anterior cruciate ligament tear I do think that treatment is completely reasonable for that I would recommend surgery currently she is pregnant and unable to have surgery on her knee we will set her up for follow-up once we are able to after her pregnancy and see her back at that time for surgical consultation. In the meantime we will place her in a proper fitting knee brace for stability patient voiced understanding agreement this plan    Return to clinic in No follow-ups on file. Dmitri Lazaro Please be aware portions of this note were completed using voice recognition software and unforeseen errors may have occurred    Electronically signed by Jonah Guadarrama DO, FAOASM  on 9/9/21 at 3:00 PM EDT        No orders of the defined types were placed in this encounter.

## 2021-09-13 ENCOUNTER — TELEPHONE (OUTPATIENT)
Dept: ORTHOPEDIC SURGERY | Age: 26
End: 2021-09-13

## 2021-09-13 NOTE — TELEPHONE ENCOUNTER
Pt is requesting this paper work to be faxed to   [phone]  Pt is also requesting a phone call when this is completed

## 2021-09-13 NOTE — TELEPHONE ENCOUNTER
Patient calling to request a phone call from clinical staff. She states that she needs her work release paperwork faxed over to her job right away. She says that the paperwork was to be faxed over on Thursday of last week but her employer is threatening to send her home unless it gets sent over to the office right away. She states that the fax number should be listed on the paperwork that was given to Tana Ramírez. Please advise.

## 2021-09-14 ENCOUNTER — VIRTUAL VISIT (OUTPATIENT)
Dept: OBGYN | Age: 26
End: 2021-09-14
Payer: COMMERCIAL

## 2021-09-14 DIAGNOSIS — O09.91 HIGH-RISK PREGNANCY, FIRST TRIMESTER: ICD-10-CM

## 2021-09-14 DIAGNOSIS — N94.6 DYSMENORRHEA: ICD-10-CM

## 2021-09-14 DIAGNOSIS — O99.519 ASTHMA AFFECTING PREGNANCY, ANTEPARTUM: ICD-10-CM

## 2021-09-14 DIAGNOSIS — Z82.49 FAMILY HISTORY OF AORTIC STENOSIS: ICD-10-CM

## 2021-09-14 DIAGNOSIS — L73.2 AXILLARY HIDRADENITIS SUPPURATIVA: ICD-10-CM

## 2021-09-14 DIAGNOSIS — O09.891 HISTORY OF PRETERM DELIVERY, CURRENTLY PREGNANT IN FIRST TRIMESTER: ICD-10-CM

## 2021-09-14 DIAGNOSIS — J45.909 ASTHMA AFFECTING PREGNANCY, ANTEPARTUM: ICD-10-CM

## 2021-09-14 DIAGNOSIS — Z87.74 HISTORY OF CONGENITAL HEART DEFECT: ICD-10-CM

## 2021-09-14 PROBLEM — Z91.199 NONCOMPLIANT PREGNANT PATIENT: Status: RESOLVED | Noted: 2020-05-12 | Resolved: 2021-09-14

## 2021-09-14 PROBLEM — O09.899 NONCOMPLIANT PREGNANT PATIENT: Status: RESOLVED | Noted: 2020-05-12 | Resolved: 2021-09-14

## 2021-09-14 PROBLEM — O99.810 ABNORMAL GLUCOSE TOLERANCE TEST IN PREGNANCY: Status: RESOLVED | Noted: 2020-05-21 | Resolved: 2021-09-14

## 2021-09-14 PROBLEM — E66.01 CLASS 3 SEVERE OBESITY IN ADULT (HCC): Status: ACTIVE | Noted: 2019-11-19

## 2021-09-14 PROBLEM — Z87.59 HISTORY OF GESTATIONAL HYPERTENSION: Status: ACTIVE | Noted: 2020-08-19

## 2021-09-14 PROBLEM — E66.813 CLASS 3 SEVERE OBESITY IN ADULT: Status: ACTIVE | Noted: 2019-11-19

## 2021-09-14 PROCEDURE — G8417 CALC BMI ABV UP PARAM F/U: HCPCS | Performed by: OBSTETRICS & GYNECOLOGY

## 2021-09-14 PROCEDURE — 99212 OFFICE O/P EST SF 10 MIN: CPT | Performed by: OBSTETRICS & GYNECOLOGY

## 2021-09-14 PROCEDURE — G8427 DOCREV CUR MEDS BY ELIG CLIN: HCPCS | Performed by: OBSTETRICS & GYNECOLOGY

## 2021-09-14 PROCEDURE — 99211 OFF/OP EST MAY X REQ PHY/QHP: CPT | Performed by: OBSTETRICS & GYNECOLOGY

## 2021-09-14 RX ORDER — IBUPROFEN 200 MG
1 TABLET ORAL DAILY
Qty: 30 TABLET | Refills: 12 | Status: SHIPPED | OUTPATIENT
Start: 2021-09-14 | End: 2022-09-14

## 2021-09-14 NOTE — PROGRESS NOTES
Vivek Jacobson is a 31 y.o. female evaluated via telephone on 9/14/2021. Consent:  She and/or health care decision maker is aware that that she may receive a bill for this telephone service, depending on her insurance coverage, and has provided verbal consent to proceed: Yes      Documentation:  I communicated with the patient and/or health care decision maker about the initial prenatal assessment. Details of this discussion including any medical advice provided: see notes      I affirm this is a Patient Initiated Episode with a Patient who has not had a related appointment within my department in the past 7 days or scheduled within the next 24 hours. Patient identification was verified at the start of the visit: No    Total Time: minutes: 21-30 minutes    The visit was conducted pursuant to the emergency declaration under the [address] [place] [address], [address] [place] [address] authority and the [employer] and [employer] Act. Patient identification was verified, and a caregiver was present when appropriate. The patient was located in a state where the provider was credentialed to provide care. Note: not billable if this call serves to triage the patient into an appointment for the relevant concern      Aurora Crowell RN  [facility]    First Trimester Plans/Education completed per ACOG Guidelines. Pt counseled and verbalizes understanding. Routine Prenatal Tests,  Risk Factors Identified By Prenatal History, Anticipated Course of Prenatal Care, Nutrition and Weight Gain Counseling, Toxoplasmosis Precautions ( cats/raw meat), Sexual Activity, Exercise, Influenza/Tdap Vaccine, Smoking Counseling, Environmental/Work Hazards, Travel, Alcohol, Illicit/Recreational Drugs, Use Of Any Medications, Indications for Ultrasound, Domestic Violence, Seat Belt Use, Dental Care , Childbirth Classes/Hospital Facilities.      Risk Factors- See prob/plan list      Ob education/Intake completed today.   Pt occupation-         [facility] Provider-  no  Recent ER visits- yes         Planned/Unplanned Pregnancy- unplanned  Father of Baby-  Same partner, involved              Pt lives with-  Her partner and their children  LMP-   known            Menses Monthly-    yes      BCP at [address]  Dating Ultrasound-completed   Section History/Vaginal Delivery History-h/o vag del x3  History of Spontaneous  Delivery- yes x2  Varicella History- hx of vaccines  Flu Vaccine-  Agreeable  TDAP- agreeable COVID- completed   Blood Transfusion Acceptable-  Agreeable   Last Pap-  2018                 Report Available-  yes  First Trimester Screen/MSAFP/Quad-   MFM referral placed   Initial Prenatal Labs given to pt today- entered into Epic   Smoker-no  BMI- rec wt gain 11-20#  Substance Abuse History-no  Depression/Anxiety History-no  Domestic Abuse History-no  Dental Care- education provided   Reviewed how to reach Ob/Gyn Resident afterhours-  Pt verb understanding

## 2021-09-27 ENCOUNTER — HOSPITAL ENCOUNTER (OUTPATIENT)
Age: 26
Discharge: HOME | End: 2021-09-27
Payer: COMMERCIAL

## 2021-09-27 ENCOUNTER — HOSPITAL ENCOUNTER (OUTPATIENT)
Age: 26
Setting detail: SPECIMEN
Discharge: HOME | End: 2021-09-27
Payer: COMMERCIAL

## 2021-09-27 ENCOUNTER — INITIAL PRENATAL (OUTPATIENT)
Dept: OBGYN | Age: 26
End: 2021-09-27
Payer: COMMERCIAL

## 2021-09-27 VITALS
SYSTOLIC BLOOD PRESSURE: 108 MMHG | BODY MASS INDEX: 41.52 KG/M2 | HEART RATE: 69 BPM | DIASTOLIC BLOOD PRESSURE: 67 MMHG | WEIGHT: 241.9 LBS

## 2021-09-27 DIAGNOSIS — Z28.09: ICD-10-CM

## 2021-09-27 DIAGNOSIS — O09.91 HIGH-RISK PREGNANCY IN FIRST TRIMESTER: ICD-10-CM

## 2021-09-27 DIAGNOSIS — Z3A.11 11 WEEKS GESTATION OF PREGNANCY: ICD-10-CM

## 2021-09-27 DIAGNOSIS — Z23 NEED FOR PROPHYLACTIC VACCINATION AND INOCULATION AGAINST INFLUENZA: ICD-10-CM

## 2021-09-27 DIAGNOSIS — O09.91 HIGH-RISK PREGNANCY, FIRST TRIMESTER: ICD-10-CM

## 2021-09-27 DIAGNOSIS — O09.91 HIGH-RISK PREGNANCY IN FIRST TRIMESTER: Primary | ICD-10-CM

## 2021-09-27 LAB
ABO/RH: NORMAL
ABSOLUTE EOS #: 0.1 K/UL (ref 0–0.44)
ABSOLUTE IMMATURE GRANULOCYTE: <0.03 K/UL (ref 0–0.3)
ABSOLUTE LYMPH #: 1.92 K/UL (ref 1.1–3.7)
ABSOLUTE MONO #: 0.37 K/UL (ref 0.1–1.2)
ANTIBODY SCREEN: NEGATIVE
BASOPHILS # BLD: 0 % (ref 0–2)
BASOPHILS ABSOLUTE: <0.03 K/UL (ref 0–0.2)
DIFFERENTIAL TYPE: ABNORMAL
EOSINOPHILS RELATIVE PERCENT: 1 % (ref 1–4)
HCT VFR BLD CALC: 35.2 % (ref 36.3–47.1)
HEMOGLOBIN: 11 G/DL (ref 11.9–15.1)
HEPATITIS B SURFACE ANTIGEN: NONREACTIVE
HEPATITIS C ANTIBODY: NONREACTIVE
HIV AG/AB: NONREACTIVE
IMMATURE GRANULOCYTES: 0 %
LYMPHOCYTES # BLD: 28 % (ref 24–43)
MCH RBC QN AUTO: 29.6 PG (ref 25.2–33.5)
MCHC RBC AUTO-ENTMCNC: 31.3 G/DL (ref 28.4–34.8)
MCV RBC AUTO: 94.6 FL (ref 82.6–102.9)
MONOCYTES # BLD: 5 % (ref 3–12)
NRBC AUTOMATED: 0 PER 100 WBC
PDW BLD-RTO: 12.6 % (ref 11.8–14.4)
PLATELET # BLD: ABNORMAL K/UL (ref 138–453)
PLATELET ESTIMATE: ABNORMAL
PLATELET, FLUORESCENCE: 165 K/UL (ref 138–453)
PLATELET, IMMATURE FRACTION: 14.1 % (ref 1.1–10.3)
PMV BLD AUTO: ABNORMAL FL (ref 8.1–13.5)
RBC # BLD: 3.72 M/UL (ref 3.95–5.11)
RBC # BLD: ABNORMAL 10*6/UL
RUBV IGG SER QL: 217.9 IU/ML
SEG NEUTROPHILS: 65 % (ref 36–65)
SEGMENTED NEUTROPHILS ABSOLUTE COUNT: 4.48 K/UL (ref 1.5–8.1)
T. PALLIDUM, IGG: NONREACTIVE
WBC # BLD: 6.9 K/UL (ref 3.5–11.3)
WBC # BLD: ABNORMAL 10*3/UL

## 2021-09-27 PROCEDURE — 87340 HEPATITIS B SURFACE AG IA: CPT

## 2021-09-27 PROCEDURE — 99213 OFFICE O/P EST LOW 20 MIN: CPT | Performed by: STUDENT IN AN ORGANIZED HEALTH CARE EDUCATION/TRAINING PROGRAM

## 2021-09-27 PROCEDURE — 85055 RETICULATED PLATELET ASSAY: CPT

## 2021-09-27 PROCEDURE — 99211 OFF/OP EST MAY X REQ PHY/QHP: CPT | Performed by: STUDENT IN AN ORGANIZED HEALTH CARE EDUCATION/TRAINING PROGRAM

## 2021-09-27 PROCEDURE — 85025 COMPLETE CBC W/AUTO DIFF WBC: CPT

## 2021-09-27 PROCEDURE — 86803 HEPATITIS C AB TEST: CPT

## 2021-09-27 PROCEDURE — 86762 RUBELLA ANTIBODY: CPT

## 2021-09-27 PROCEDURE — 86850 RBC ANTIBODY SCREEN: CPT

## 2021-09-27 PROCEDURE — 36415 COLL VENOUS BLD VENIPUNCTURE: CPT

## 2021-09-27 PROCEDURE — 86900 BLOOD TYPING SEROLOGIC ABO: CPT

## 2021-09-27 PROCEDURE — 86780 TREPONEMA PALLIDUM: CPT

## 2021-09-27 PROCEDURE — 86901 BLOOD TYPING SEROLOGIC RH(D): CPT

## 2021-09-27 PROCEDURE — 87389 HIV-1 AG W/HIV-1&-2 AB AG IA: CPT

## 2021-09-27 PROCEDURE — G0008 ADMIN INFLUENZA VIRUS VAC: HCPCS | Performed by: STUDENT IN AN ORGANIZED HEALTH CARE EDUCATION/TRAINING PROGRAM

## 2021-09-27 RX ORDER — FLUTICASONE PROPIONATE 50 MCG
SPRAY, SUSPENSION (ML) NASAL
COMMUNITY
Start: 2021-08-09 | End: 2022-05-16

## 2021-09-27 RX ORDER — ASPIRIN 81 MG/1
81 TABLET ORAL DAILY
Qty: 90 TABLET | Refills: 1 | Status: SHIPPED | OUTPATIENT
Start: 2021-09-27 | End: 2022-05-16

## 2021-09-27 NOTE — PROGRESS NOTES
[facility]  Initial Prenatal Visit    CC: Initial Prenatal Visit    HPI:   Stevie Mckeon is a 31 y.o. female [id number] at 11w4d  She is being seen today for her first obstetrical visit. Pregnancy history fully reviewed. This is not a planned pregnancy. Her LMP is No LMP recorded (lmp unknown). Patient is pregnant. Her obstetrical history is significant for  delivery x2 @ 35 weeks & 32 weeks. The patient was seen and evaluated. The patient complains of some light vaginal spotting. She has not yet felt fetal movements. She denies contractions, and leakage of fluid. She currently denies any signs or symptoms of pre-eclampsia which include headache, vision changes, RUQ pain. The patient requested the T-Dap Vaccine (27-36 weeks) this pregnancy. The patient is Rh positive and Rhogam is not indicated in this pregnancy  The patient requested the influenza vaccine this pregnancy and received today.     Relationship with FOB: friends   Mother's ethnicity:   Father's ethnicity:   Family History:    - Neural tube defects: No   - Congenital birth defects (congenital heart defects, polydactyly, cleft lip/palate): Yes: daughter G4 w/ aortic stenosis   - Intellectual disability: No   - Genetic disorders/chromosomal abnormalities: No   - Diabetes mellitus in first degree relatives: Yes: patient's mother  Genetic screening was discussed and patient is interested    OB History:  OB History    Para Term  AB Living   4 3 1 2 0 3   SAB TAB Ectopic Molar Multiple Live Births   0 0 0 0 0 3      # Outcome Date GA Lbr Filipe/2nd Weight Sex Delivery Anes PTL Lv   4 Current            3 Term 06/10/20 38w1d  6 lb 8.2 oz (2.955 kg) F Vag-Spont EPI N VANITA      Name: Mari Gonzáles: 8  Apgar5: 9   2   32w0d  4 lb 3 oz (1.899 kg) M Vag-Spont         Birth Comments: [facility]   1   35w0d  5 lb 1 oz (2.296 kg) F Vag-Spont EPI Y VANITA      Birth Comments: this delivery       Apgar1: 7  Apgar5: 8      Obstetric Comments   G1-    G2-  - Progesterone in this preg   G3- Progesterone in this preg. Injectable Progesterone with [facility]. Same partner for all preg. Past Medical History:  Past Medical History:   Diagnosis Date    Asthma     Chronic airway disease     Dental crowns present     Gestational HTN--G3 2020    Hidradenitis axillaris 2015    History of  labor     Hydradenitis     Left ankle sprain 2015    Maternal congenital heart disease, antepartum 2014    Obesity      w/ MIrena IUD 6/10/20 F APG  Wt 6#8 6/10/2020    IUD fell out. Using depo.  Wears glasses        Past Surgical History:  Past Surgical History:   Procedure Laterality Date    OTHER SURGICAL HISTORY Bilateral 2016    excision axillary hydranitis    TONSILLECTOMY AND ADENOIDECTOMY          Medications:  Current Outpatient Medications on File Prior to Visit   Medication Sig Dispense Refill    fluticasone (FLONASE) 50 MCG/ACT nasal spray instill 2 sprays into each nostril once daily      Prenatal Multivit-Min-Fe-FA (PRENATAL FORTE) TABS Take 1 tablet by mouth Daily May substitute with any prenatal vit pt insurance will cover 30 tablet 12    acetaminophen (TYLENOL) 325 MG tablet Take 1 tablet by mouth every 4 hours as needed for Pain 60 tablet 0    selenium sulfide (SELSUN) 2.5 % lotion Apply topically daily as needed for Itching Apply topically daily as needed.  fluconazole (DIFLUCAN) 150 MG tablet Take 1 tablet by mouth daily Take the first dose tomorrow and the next dose after you finish your Keflex. (Patient not taking: Reported on 2021) 2 tablet 0     No current facility-administered medications on file prior to visit. Allergies:   Allergies as of 2021 - Fully Reviewed 2021   Allergen Reaction Noted    Lidocaine Hives 2014       Social History:  Social History Socioeconomic History    Marital status: Single     Spouse name: Not on file    Number of children: Not on file    Years of education: Not on file    Highest education level: Not on file   Occupational History    Not on file   Tobacco Use    Smoking status: Never Smoker    Smokeless tobacco: Never Used   Vaping Use    Vaping Use: Never used   Substance and Sexual Activity    Alcohol use: No     Alcohol/week: 0.0 standard drinks    Drug use: No    Sexual activity: Not Currently     Partners: Male     Birth control/protection: Injection   Other Topics Concern    Not on file   Social History Narrative    Not on file     Social Determinants of Health     Financial Resource Strain:     Difficulty of Paying Living Expenses:    Food Insecurity:     Worried About Running Out of Food in the Last Year:     Ran Out of Food in the Last Year:    Transportation Needs:     Lack of Transportation (Medical):      Lack of Transportation (Non-Medical):    Physical Activity:     Days of Exercise per Week:     Minutes of Exercise per Session:    Stress:     Feeling of Stress :    Social Connections:     Frequency of Communication with Friends and Family:     Frequency of Social Gatherings with Friends and Family:     Attends Mandaeism Services:     Active Member of Clubs or Organizations:     Attends Club or Organization Meetings:     Marital Status:    Intimate Partner Violence:     Fear of Current or Ex-Partner:     Emotionally Abused:     Physically Abused:     Sexually Abused:        Family History:  Family History   Problem Relation Age of Onset    Other Brother          suicide     Diabetes Maternal Grandmother     Cancer Maternal Grandmother     Heart Disease Paternal Uncle     Early Death Paternal Uncle 62        MI    Learning Disabilities Mother     Diabetes Mother         Type 2 managed with insulin     No Known Problems Paternal Grandfather     No Known Problems Paternal Grandmother  Lung Cancer Maternal Grandfather     No Known Problems Father     No Known Problems Sister     No Known Problems Brother     Breast Cancer Neg Hx     Colon Cancer Neg Hx     Eclampsia Neg Hx     Hypertension Neg Hx     Ovarian Cancer Neg Hx      Labor Neg Hx     Spont Abortions Neg Hx     Stroke Neg Hx     Uterine Cancer Neg Hx        Vitals:  BP: 108/67  Weight: 241 lb 14.4 oz (109.7 kg)  Pulse: 69  Patient Position: Sitting  Albumin: 1+  Glucose: Negative      Physical Exam: Completed, See Epic Navigator   Chaperone for Intimate Exam: Chaperone was present for entire exam, Chaperone Name: Gilda Duffy       Assessment & Plan:  Petra Townsend is a 31 y.o. female [id number] at 11w4d Initial Obstetrical Visit   - The patient was seen full history and physical was completed/reviewed.     - Prenatal labs completed   - Prenatal vitamins prescription Given   - Aspirin indication: indicated due to High risk factors: none, Moderate risk factors: BMI >30 and personal history factors (low birthweight, SGA, previous adverse pregnancy outcome, more than 10 year pregnancy interval)- Rx given   - Problem list reviewed and updated   - Patient to complete first trimester screen 10/4/21   - Role of ultrasound in pregnancy discussed; requests fetal survey, MFM referral ordered   - Gc/Chlam Cultures & Vaginitis: collected today   - Last pap smear 2018- normal, pap smear collected today   - Tdap vaccination: discussed    - Influenza vaccination: discussed patient requested today    - Rhogam: not indicated     Vaginal Spotting   - Intermittent vaginal spotting   - Seen in ED for this 21, + FHT at that time    - US performed today showed FHT 120bpm, +FM for viability confirmation requested by patient    Hx sPTD x2   - Patient is a candidate for progesterone injections in this pregnancy   - MFM referral placed for consultation   - Patient has appointment for first trimester screen 10/4/2021    Hx Aortic Stenosis   - Patient last saw cardiology 3/24/20, ECHO wnl w/ EFF 55%   - Follow up as indicated per cardiology    FHx Aortic Stenosis    - Patient's daughter   - Following with peds cardiology    - MFM referral placed   - Will need fetal echo    BMI 41   - Early 1 hr GTT ordered and discussed with patient     Upon completion of the visit all questions were answered and the patients follow-up and testing schedule were reviewed. Patient Active Problem List    Diagnosis Date Noted    Asthma  2012     Priority: High     Pt did not report using inhalers at [facility] vaccinated 2021    Family history of aortic stenosis (G3)   2021     Pt daughter born in . Small defect, no interventions      High-risk pregnancy, first trimester 2021- MFM referral placed for first trim screen, NIPT and anatomy scan-SK      History of gestational hypertension  G3 2020    HRP (high risk pregnancy), unspecified trimester 06/10/2020    Dysmenorrhea 2020     History of management with Depo       Hx Aortic Stenosis 2019     Arotic Stenosis. 3/24/20- saw Dr Laurence Manriquez at [facility], echo done showing EF of 55%, normal LV diastolic compliance, no significant valvular abnormalities   Pt has f/u appt in 2020, Pt completed appt, to follow up as needed-SK        Pregravid BMI 42.03  2019       Early one hr GTT lab ordered for early diabetic screening.  Axillary hidradenitis suppurativa 2019    Hx sPTD x 2 2019 and   ( progesterone in  )   17P Plans/Education and Counseled-  Labor Definition and Warning Signs, What is 17P and  Common side effects of 17P injections,  [facility]. 21- GA 9w5d  Plan for referral to [facility] accordingly. Plan to obtain progesterone from [facility] accordingly closer to 16 weeks of pregnancy        Return in about 6 weeks (around 2021) for MYRIAM Cason, Nunu Carey,   Ob/Gyn Resident  [facility], [address]  9/27/2021, 3:30 PM

## 2021-09-28 LAB
C TRACH DNA GENITAL QL NAA+PROBE: NEGATIVE
DIRECT EXAM: ABNORMAL
Lab: ABNORMAL
N. GONORRHOEAE DNA: NEGATIVE
SPECIMEN DESCRIPTION: ABNORMAL
SPECIMEN DESCRIPTION: NORMAL

## 2021-10-01 LAB — CYTOLOGY REPORT: NORMAL

## 2021-10-04 ENCOUNTER — ROUTINE PRENATAL (OUTPATIENT)
Dept: PERINATAL CARE | Age: 26
End: 2021-10-04
Payer: COMMERCIAL

## 2021-10-04 ENCOUNTER — HOSPITAL ENCOUNTER (OUTPATIENT)
Age: 26
Discharge: HOME | End: 2021-10-04
Payer: COMMERCIAL

## 2021-10-04 VITALS
SYSTOLIC BLOOD PRESSURE: 114 MMHG | RESPIRATION RATE: 16 BRPM | WEIGHT: 241 LBS | HEIGHT: 64 IN | TEMPERATURE: 97.3 F | BODY MASS INDEX: 41.15 KG/M2 | HEART RATE: 65 BPM | DIASTOLIC BLOOD PRESSURE: 66 MMHG

## 2021-10-04 DIAGNOSIS — O09.299 CURRENT SINGLETON PREGNANCY WITH HISTORY OF CONGENITAL HEART DISEASE IN PRIOR CHILD, ANTEPARTUM: ICD-10-CM

## 2021-10-04 DIAGNOSIS — O99.211 OBESITY AFFECTING PREGNANCY IN FIRST TRIMESTER: ICD-10-CM

## 2021-10-04 DIAGNOSIS — O99.411 MATERNAL CONGENITAL CARDIOVASCULAR DISORDER DURING PREGNANCY IN FIRST TRIMESTER: Primary | ICD-10-CM

## 2021-10-04 DIAGNOSIS — O09.211 CURRENT PREGNANCY WITH HISTORY OF PRE-TERM LABOR IN FIRST TRIMESTER: ICD-10-CM

## 2021-10-04 DIAGNOSIS — Z3A.12 12 WEEKS GESTATION OF PREGNANCY: ICD-10-CM

## 2021-10-04 DIAGNOSIS — Z36.9 FIRST TRIMESTER SCREENING: ICD-10-CM

## 2021-10-04 DIAGNOSIS — Q28.9 MATERNAL CONGENITAL CARDIOVASCULAR DISORDER DURING PREGNANCY IN FIRST TRIMESTER: Primary | ICD-10-CM

## 2021-10-04 DIAGNOSIS — O36.80X0 ENCOUNTER TO DETERMINE FETAL VIABILITY OF PREGNANCY, SINGLE OR UNSPECIFIED FETUS: ICD-10-CM

## 2021-10-04 LAB
CRL: NORMAL
SAC DIAMETER: NORMAL

## 2021-10-04 PROCEDURE — G8427 DOCREV CUR MEDS BY ELIG CLIN: HCPCS | Performed by: OBSTETRICS & GYNECOLOGY

## 2021-10-04 PROCEDURE — 76813 OB US NUCHAL MEAS 1 GEST: CPT | Performed by: OBSTETRICS & GYNECOLOGY

## 2021-10-04 PROCEDURE — G8482 FLU IMMUNIZE ORDER/ADMIN: HCPCS | Performed by: OBSTETRICS & GYNECOLOGY

## 2021-10-04 PROCEDURE — 99244 OFF/OP CNSLTJ NEW/EST MOD 40: CPT | Performed by: OBSTETRICS & GYNECOLOGY

## 2021-10-04 PROCEDURE — 76801 OB US < 14 WKS SINGLE FETUS: CPT | Performed by: OBSTETRICS & GYNECOLOGY

## 2021-10-04 PROCEDURE — G8417 CALC BMI ABV UP PARAM F/U: HCPCS | Performed by: OBSTETRICS & GYNECOLOGY

## 2021-11-01 ENCOUNTER — ROUTINE PRENATAL (OUTPATIENT)
Dept: PERINATAL CARE | Age: 26
End: 2021-11-01
Payer: COMMERCIAL

## 2021-11-01 VITALS
TEMPERATURE: 97.2 F | BODY MASS INDEX: 40.97 KG/M2 | WEIGHT: 240 LBS | DIASTOLIC BLOOD PRESSURE: 67 MMHG | SYSTOLIC BLOOD PRESSURE: 109 MMHG | RESPIRATION RATE: 16 BRPM | HEART RATE: 67 BPM | HEIGHT: 64 IN

## 2021-11-01 DIAGNOSIS — O09.212 CURRENT PREGNANCY WITH HISTORY OF PRE-TERM LABOR IN SECOND TRIMESTER: Primary | ICD-10-CM

## 2021-11-01 DIAGNOSIS — Z13.89 ENCOUNTER FOR ROUTINE SCREENING FOR MALFORMATION USING ULTRASONICS: ICD-10-CM

## 2021-11-01 DIAGNOSIS — Z3A.16 16 WEEKS GESTATION OF PREGNANCY: ICD-10-CM

## 2021-11-01 DIAGNOSIS — O09.299 CURRENT SINGLETON PREGNANCY WITH HISTORY OF CONGENITAL HEART DISEASE IN PRIOR CHILD, ANTEPARTUM: ICD-10-CM

## 2021-11-01 DIAGNOSIS — O99.412 MATERNAL CONGENITAL CARDIOVASCULAR DISORDER AFFECTING PREGNANCY IN SECOND TRIMESTER: ICD-10-CM

## 2021-11-01 DIAGNOSIS — Q28.9 MATERNAL CONGENITAL CARDIOVASCULAR DISORDER AFFECTING PREGNANCY IN SECOND TRIMESTER: ICD-10-CM

## 2021-11-01 DIAGNOSIS — O99.212 OBESITY AFFECTING PREGNANCY IN SECOND TRIMESTER: ICD-10-CM

## 2021-11-01 LAB
ABDOMINAL CIRCUMFERENCE: NORMAL
ABDOMINAL CIRCUMFERENCE: NORMAL
BIPARIETAL DIAMETER: NORMAL
BIPARIETAL DIAMETER: NORMAL
ESTIMATED FETAL WEIGHT: NORMAL
ESTIMATED FETAL WEIGHT: NORMAL
FEMORAL DIAMETER: NORMAL
FEMORAL DIAMETER: NORMAL
HC/AC: NORMAL
HC/AC: NORMAL
HEAD CIRCUMFERENCE: NORMAL
HEAD CIRCUMFERENCE: NORMAL

## 2021-11-01 PROCEDURE — 76805 OB US >/= 14 WKS SNGL FETUS: CPT | Performed by: OBSTETRICS & GYNECOLOGY

## 2021-11-01 PROCEDURE — 76817 TRANSVAGINAL US OBSTETRIC: CPT | Performed by: OBSTETRICS & GYNECOLOGY

## 2021-11-01 RX ORDER — HYDROXYPROGESTERONE CAPROATE 250 MG/ML
250 INJECTION INTRAMUSCULAR
COMMUNITY
Start: 2021-10-29 | End: 2022-05-16

## 2021-11-05 ENCOUNTER — TELEPHONE (OUTPATIENT)
Dept: ORTHOPEDIC SURGERY | Age: 26
End: 2021-11-05

## 2021-11-05 NOTE — LETTER
[facility]  [address] [place] [id number]  Phone: [phone]  Fax: [id number] 25, GG        November 5, 2021     Patient: Juanell Soulier   YOB: 1995           To Whom it May Concern:    Pablo Irby was seen in my clinic. She may return to work on 11/8/2021 and work 5 days per week. .    If you have any questions or concerns, please don't hesitate to call.     Sincerely,         Herlinda Rosen, DO

## 2021-11-05 NOTE — LETTER
[facility]  [address] [place] [address]  Phone: [phone]  Fax: [phone] 25, RY        November 5, 2021     Patient: Jackie Michelle   YOB: 1995       To Whom it May Concern:    Yasmin Mendiola was seen in my clinic. She may return to work 5 days a week starting on 11/8/2021. She is to wear her knee brace during work hours. If you have any questions or concerns, please don't hesitate to call.     Sincerely,         Sandhya Graham DO Following progress notes by Steven J. Heyden, MD on 11/20/2018    ASSESSMENT:  R10.30 Lower abdominal pain  (primary encounter diagnosis)    PLAN:  Please see orders and medications.  I believe the patient's discomfort still, chronic constipation.  I have him take a half gallon of Gatorade or other liquid mix it with a half bottle of MiraLAX and treat this over the next 2-3 hours.  He should have fairly loose stools after the risks and that should help clean him out.  He still having symptoms after this he will contact me next week.      The indication, risks, benefits, potential side effects, and drug interactions of medication(s) were reviewed with the patient.  Alternative treatment options discussed and reviewed with the patient.  Medication instructions and consequences of not taking the medications were discussed with the patient.  Patient expressed understanding and he/she agreed to the plan.       ^^^^^^^^^^^^^^^^^^^^^^^^^^^^^^^^^^^^^^^^^^^^^^^^^^^^^^^^^^^^^^^^  ^^^^^^^^^^^^^^^^^^^^^^^^^^^^^^^^^^^^^^^^^^^^^^^^^^^^^^^^^^^^^^^^    Medical Assistant's notes reviewed and agreed with. Medical, surgical, family and social history reviewed with patient, along with medication and allergies.  Patient care team was reviewed.    SUBJECTIVE:  Elder is a 64 year old male who comes in today with complaints of having abdominal pain.  Patient states that he still having fairly normal BMs but he is having a couple filling the stomach.  Patient states he has been taking MiraLAX once a day every day.  He has also been taking.  Magnesium is still having issues.  His x-ray show evidence for a moderate volume of stool.  He denies any nausea vomiting but he always states he feels like he is not completely evacuated has more stool to come.  He denies any changes in size or caliber of her stool.  He states that he has had no blood in the stool.    PROBLEM LIST:    Patient Active Problem List   Diagnosis   • Lichen planus   •  SPRAIN SHOULDER/ARM NOS(PT)   • HAND ECZEMA   • Tobacco use disorder   • COPD, moderate (CMS/HCC)       MEDICATIONS:    Current Outpatient Medications   Medication Sig Dispense Refill   • PROAIR  (90 Base) MCG/ACT inhaler USE 2 PUFFS EVERY 4 HOURS  AS NEEDED FOR SHORTNESS OF  BREATH OR WHEEZING 3 Inhaler 1   • fluticasone (FLOVENT HFA) 220 MCG/ACT inhaler Inhale 2 puffs into the lungs 2 times daily. 3 Inhaler 5     No current facility-administered medications for this visit.        SOCIAL HISTORY:    Social History     Tobacco Use   • Smoking status: Current Some Day Smoker     Packs/day: 1.50     Years: 46.00     Pack years: 69.00     Types: Cigarettes   • Smokeless tobacco: Never Used   • Tobacco comment: Pt stopped smoking 9/2/15   Substance Use Topics   • Alcohol use: Yes     Alcohol/week: 0.0 oz     Comment: occasionally       ALLERGIES: no known allergies.     REVIEW OF SYSTEMS:  As in history of present of illness, otherwise negative     OBJECTIVE:  Blood pressure 114/66, pulse 85, weight 88.5 kg, SpO2 95 %.  WDWN in NAD  LUNGS: Chest symmetric with normal A/P diameter.  Lungs are clear to auscultation.  There is good aeration.  There are no wheezes, rales or rhonchi noted.  HEART: S1,S2, regular rate and rhythm no murmur,S3, or S4 auscultated

## 2021-11-05 NOTE — TELEPHONE ENCOUNTER
Pt called back to confirm RTW 5 days a week starting on 11/8/2021. We will generate note and have that faxed to her employer.

## 2021-11-05 NOTE — TELEPHONE ENCOUNTER
LVM for pt to call back and confirm when she would like to start back at work full time.  We will get that note written and dr Michael Henderson will sign when he is back in the office next week and fax to her employer

## 2021-11-09 ENCOUNTER — HOSPITAL ENCOUNTER (OUTPATIENT)
Age: 26
Setting detail: SPECIMEN
Discharge: HOME | End: 2021-11-09
Payer: COMMERCIAL

## 2021-11-09 ENCOUNTER — ROUTINE PRENATAL (OUTPATIENT)
Dept: OBGYN | Age: 26
End: 2021-11-09
Payer: COMMERCIAL

## 2021-11-09 VITALS
HEART RATE: 78 BPM | WEIGHT: 239.8 LBS | BODY MASS INDEX: 41.16 KG/M2 | DIASTOLIC BLOOD PRESSURE: 72 MMHG | SYSTOLIC BLOOD PRESSURE: 117 MMHG

## 2021-11-09 DIAGNOSIS — O09.92 HIGH-RISK PREGNANCY IN SECOND TRIMESTER: ICD-10-CM

## 2021-11-09 DIAGNOSIS — O09.891 HISTORY OF PRETERM DELIVERY, CURRENTLY PREGNANT IN FIRST TRIMESTER: ICD-10-CM

## 2021-11-09 DIAGNOSIS — O09.92 HIGH-RISK PREGNANCY IN SECOND TRIMESTER: Primary | ICD-10-CM

## 2021-11-09 DIAGNOSIS — Z87.59 HISTORY OF GESTATIONAL HYPERTENSION: ICD-10-CM

## 2021-11-09 DIAGNOSIS — O99.212 PREGNANCY COMPLICATED BY OBESITY, SECOND TRIMESTER: ICD-10-CM

## 2021-11-09 PROCEDURE — G8417 CALC BMI ABV UP PARAM F/U: HCPCS | Performed by: OBSTETRICS & GYNECOLOGY

## 2021-11-09 PROCEDURE — G8482 FLU IMMUNIZE ORDER/ADMIN: HCPCS | Performed by: OBSTETRICS & GYNECOLOGY

## 2021-11-09 PROCEDURE — 99211 OFF/OP EST MAY X REQ PHY/QHP: CPT | Performed by: OBSTETRICS & GYNECOLOGY

## 2021-11-09 PROCEDURE — 1036F TOBACCO NON-USER: CPT | Performed by: OBSTETRICS & GYNECOLOGY

## 2021-11-09 PROCEDURE — 99213 OFFICE O/P EST LOW 20 MIN: CPT | Performed by: OBSTETRICS & GYNECOLOGY

## 2021-11-09 PROCEDURE — G8427 DOCREV CUR MEDS BY ELIG CLIN: HCPCS | Performed by: OBSTETRICS & GYNECOLOGY

## 2021-11-09 ASSESSMENT — PATIENT HEALTH QUESTIONNAIRE - PHQ9
SUM OF ALL RESPONSES TO PHQ QUESTIONS 1-9: 0
2. FEELING DOWN, DEPRESSED OR HOPELESS: 0
SUM OF ALL RESPONSES TO PHQ QUESTIONS 1-9: 0
SUM OF ALL RESPONSES TO PHQ QUESTIONS 1-9: 0
SUM OF ALL RESPONSES TO PHQ9 QUESTIONS 1 & 2: 0
1. LITTLE INTEREST OR PLEASURE IN DOING THINGS: 0

## 2021-11-09 NOTE — PROGRESS NOTES
Blood pressure 117/72, pulse 78, weight 239 lb 12.8 oz (108.8 kg), unknown if currently breastfeeding. The patient was seen and evaluated. There was Positive fetal movements. No contractions or leakage of fluid. Signs and symptoms of  labor as well as labor were reviewed. The patient completed first trimester screen. She is being followed by MFM with Cx length check due to hx of SPTB X 2 , she is receiving 17OHP weekly. . A single marker MSAFP was ordered for a 15-20 week gestational age window. An 18-22 week anatomy ultrasound has been scheduled. The patient will return to the office for her next visit in 4 weeks. See antepartum flow sheet. She has completed flu and COVID vaccines. Patient Active Problem List   Diagnosis    Asthma     Hx Aortic Stenosis      Pregravid BMI 42.03     Axillary hidradenitis suppurativa    Hx sPTD x 2    Dysmenorrhea    HRP (high risk pregnancy), unspecified trimester    History of gestational hypertension  G3    Family history of aortic stenosis (G3)      High-risk pregnancy, first trimester    COVID vaccinated     1. High-risk pregnancy in second trimester    - Alpha Fetoprotein, Maternal; Future    2. Hx sPTD x 2  - continue weekly 17OHP    3. History of gestational hypertension  - continue daily ASA     4. Pregnancy complicated by obesity, second trimester  - still needs early 1 hr GTT, order reprinted.

## 2021-11-12 LAB
AFP INTERPRETATION: NORMAL
AFP MOM: 0.85
AFP SPECIMEN: NORMAL
AFP: 29 NG/ML
DATE OF BIRTH: NORMAL
DATING METHOD: NORMAL
DETERMINED BY: NORMAL
DIABETIC: NO
DONOR EGG?: NO
DUE DATE: NORMAL
ESTIMATED DUE DATE: NORMAL
FAMILY HISTORY NTD: NO
GESTATIONAL AGE: NORMAL
IN VITRO FERTILIZATION: NO
INSULIN REQ DIABETES: NO
LAST MENSTRUAL PERIOD: NORMAL
MATERNAL AGE AT EDD: 27.8 YR
MATERNAL WEIGHT: 240
MONOCHORIONIC TWINS: NORMAL
NUMBER OF FETUSES: NORMAL
PATIENT WEIGHT UNITS: NORMAL
PATIENT WEIGHT: NORMAL
RACE (MATERNAL): NORMAL
RACE: NORMAL
REPEAT SPECIMEN?: NO
SMOKING: NO
SMOKING: NO
VALPROIC/CARBAMAZEP: NO
ZZ NTE CLEAN UP: HISTORY: NO

## 2021-11-16 ENCOUNTER — ROUTINE PRENATAL (OUTPATIENT)
Dept: PERINATAL CARE | Age: 26
End: 2021-11-16
Payer: COMMERCIAL

## 2021-11-16 VITALS
TEMPERATURE: 97.2 F | BODY MASS INDEX: 40.8 KG/M2 | WEIGHT: 239 LBS | HEART RATE: 70 BPM | DIASTOLIC BLOOD PRESSURE: 60 MMHG | SYSTOLIC BLOOD PRESSURE: 110 MMHG | RESPIRATION RATE: 16 BRPM | HEIGHT: 64 IN

## 2021-11-16 DIAGNOSIS — O43.102 PLACENTAL ABNORMALITY IN SECOND TRIMESTER: ICD-10-CM

## 2021-11-16 DIAGNOSIS — O09.212 CURRENT PREGNANCY WITH HISTORY OF PRE-TERM LABOR IN SECOND TRIMESTER: Primary | ICD-10-CM

## 2021-11-16 DIAGNOSIS — Q28.9 MATERNAL CONGENITAL CARDIOVASCULAR DISORDER AFFECTING PREGNANCY IN SECOND TRIMESTER: ICD-10-CM

## 2021-11-16 DIAGNOSIS — Z3A.18 18 WEEKS GESTATION OF PREGNANCY: ICD-10-CM

## 2021-11-16 DIAGNOSIS — O99.212 OBESITY AFFECTING PREGNANCY IN SECOND TRIMESTER: ICD-10-CM

## 2021-11-16 DIAGNOSIS — O99.412 MATERNAL CONGENITAL CARDIOVASCULAR DISORDER AFFECTING PREGNANCY IN SECOND TRIMESTER: ICD-10-CM

## 2021-11-16 PROCEDURE — 76815 OB US LIMITED FETUS(S): CPT | Performed by: OBSTETRICS & GYNECOLOGY

## 2021-11-16 PROCEDURE — 76817 TRANSVAGINAL US OBSTETRIC: CPT | Performed by: OBSTETRICS & GYNECOLOGY

## 2021-11-29 ENCOUNTER — ROUTINE PRENATAL (OUTPATIENT)
Dept: PERINATAL CARE | Age: 26
End: 2021-11-29
Payer: COMMERCIAL

## 2021-11-29 VITALS
HEIGHT: 64 IN | HEART RATE: 80 BPM | DIASTOLIC BLOOD PRESSURE: 78 MMHG | WEIGHT: 239.2 LBS | BODY MASS INDEX: 40.84 KG/M2 | SYSTOLIC BLOOD PRESSURE: 118 MMHG | TEMPERATURE: 97.2 F

## 2021-11-29 DIAGNOSIS — O09.212 CURRENT PREGNANCY WITH HISTORY OF PRE-TERM LABOR IN SECOND TRIMESTER: ICD-10-CM

## 2021-11-29 DIAGNOSIS — O43.102 PLACENTAL ABNORMALITY IN SECOND TRIMESTER: ICD-10-CM

## 2021-11-29 DIAGNOSIS — O09.92 HIGH-RISK PREGNANCY IN SECOND TRIMESTER: Primary | ICD-10-CM

## 2021-11-29 DIAGNOSIS — O99.412 MATERNAL CONGENITAL CARDIOVASCULAR DISORDER AFFECTING PREGNANCY IN SECOND TRIMESTER: ICD-10-CM

## 2021-11-29 DIAGNOSIS — Q28.9 MATERNAL CONGENITAL CARDIOVASCULAR DISORDER AFFECTING PREGNANCY IN SECOND TRIMESTER: ICD-10-CM

## 2021-11-29 DIAGNOSIS — O09.299 CURRENT SINGLETON PREGNANCY WITH HISTORY OF CONGENITAL HEART DISEASE IN PRIOR CHILD, ANTEPARTUM: ICD-10-CM

## 2021-11-29 DIAGNOSIS — Z3A.20 20 WEEKS GESTATION OF PREGNANCY: ICD-10-CM

## 2021-11-29 DIAGNOSIS — O99.212 OBESITY AFFECTING PREGNANCY IN SECOND TRIMESTER: ICD-10-CM

## 2021-11-29 DIAGNOSIS — O35.HXX0 CLUB FOOT OF FETUS AFFECTING ANTEPARTUM CARE OF MOTHER, SINGLE OR UNSPECIFIED FETUS: Primary | ICD-10-CM

## 2021-11-29 PROCEDURE — 76817 TRANSVAGINAL US OBSTETRIC: CPT | Performed by: OBSTETRICS & GYNECOLOGY

## 2021-11-29 PROCEDURE — 76811 OB US DETAILED SNGL FETUS: CPT | Performed by: OBSTETRICS & GYNECOLOGY

## 2021-11-29 NOTE — PROGRESS NOTES
Patient declined invasive prenatal diagnostic testing today (including for evaluation and testing for fetal aneuploidy, fetal microdeletions, fetal single gene disorders, fetal microarray testing, fetal  infections, etc). Patient has opted for non-invasive prenatal diagnosis testing (with the [place] fetal aneuploidy test from [address] [place] [address]) today. Please refer to completed maternal carrier testing results (with the [place] test from [address] [place] [address]).

## 2021-11-30 ENCOUNTER — HOSPITAL ENCOUNTER (OUTPATIENT)
Age: 26
Setting detail: SPECIMEN
Discharge: HOME | End: 2021-11-30
Payer: COMMERCIAL

## 2021-11-30 LAB
SEND OUT REPORT: NORMAL
TEST NAME: NORMAL

## 2021-11-30 PROCEDURE — 36415 COLL VENOUS BLD VENIPUNCTURE: CPT

## 2021-12-07 ENCOUNTER — ROUTINE PRENATAL (OUTPATIENT)
Dept: OBGYN | Age: 26
End: 2021-12-07
Payer: COMMERCIAL

## 2021-12-07 VITALS
DIASTOLIC BLOOD PRESSURE: 66 MMHG | HEART RATE: 88 BPM | SYSTOLIC BLOOD PRESSURE: 113 MMHG | WEIGHT: 245 LBS | BODY MASS INDEX: 42.05 KG/M2

## 2021-12-07 DIAGNOSIS — Z3A.21 21 WEEKS GESTATION OF PREGNANCY: ICD-10-CM

## 2021-12-07 DIAGNOSIS — O09.92 HRP (HIGH RISK PREGNANCY), SECOND TRIMESTER: Primary | ICD-10-CM

## 2021-12-07 PROCEDURE — 1036F TOBACCO NON-USER: CPT | Performed by: OBSTETRICS & GYNECOLOGY

## 2021-12-07 PROCEDURE — 99213 OFFICE O/P EST LOW 20 MIN: CPT | Performed by: OBSTETRICS & GYNECOLOGY

## 2021-12-07 PROCEDURE — G8417 CALC BMI ABV UP PARAM F/U: HCPCS | Performed by: OBSTETRICS & GYNECOLOGY

## 2021-12-07 PROCEDURE — G8482 FLU IMMUNIZE ORDER/ADMIN: HCPCS | Performed by: OBSTETRICS & GYNECOLOGY

## 2021-12-07 PROCEDURE — G8427 DOCREV CUR MEDS BY ELIG CLIN: HCPCS | Performed by: OBSTETRICS & GYNECOLOGY

## 2021-12-07 NOTE — PROGRESS NOTES
Liliane Copeland is a 31 y.o. female 18w7d    [id number]    OB History    Para Term  AB Living   4 3 1 2 0 3   SAB IAB Ectopic Molar Multiple Live Births   0 0 0   0 3      # Outcome Date GA Lbr Filipe/2nd Weight Sex Delivery Anes PTL Lv   4 Current            3 Term 06/10/20 38w1d  6 lb 8.2 oz (2.955 kg) F Vag-Spont EPI N VANITA   2   32w0d  4 lb 3 oz (1.899 kg) M Vag-Spont         Birth Comments: [facility]   1   35w0d  5 lb 1 oz (2.296 kg) F Vag-Spont EPI Y VANITA      Birth Comments: this delivery       Obstetric Comments   G1-    G2-  - Progesterone in this preg   G3- Progesterone in this preg. Injectable Progesterone with [facility]. Same partner for all preg. Vitals  BP: 113/66  Weight: 245 lb (111.1 kg)  Pulse: 88  Patient Position: Sitting  Albumin: Trace  Glucose: Negative    The patient was seen and evaluated. There was positive fetal movements. No contractions or leakage of fluid. Signs and symptoms of  labor as well as labor were reviewed. The Nuchal Translucency testing was reviewed and found to be normal A single marker MSAFP was reviewed and found to be normal. The patients anatomy ultrasound has been completed and reviewed with patient. TOP ST OH Reviewed. A 28 week lab panel was ordered. This includes a (HH, 1 hr GTT, U/A C&S). The patient is to complete this in the next two to four weeks. The S/S of Pre-Eclampsia were reviewed with the patient in detail. She is to report any of these if they occur. She currently denies any of these. The patient is RH positive Rhogam Ordered no    The patient was instructed on fetal kick counts and was given a kick sheet to complete every 8 hours. This is to begin at 28 weeks gestation.  She was instructed that the baby should move at a minimum of ten times within one hour after a meal. The patient was instructed to lay down on her left side twenty minutes after eating and count movements for up to one hour with a target value of ten movements. She was instructed to notify the office if she did not make that target after two attempts or if after any attempt there was less than four movements. Risk Factors- Same partner, h/o CHD in patient and her daughter ( G3 ), PreGravid BMI- 42.43 Asthma, h/o SPTD x2  Agreeable to vaccinations in preg. 21- MFM referral placed for first trim screen, NIPT and anatomy scan. Pt is a candidate for progesterone in this preg. Plan for referral to [facility] accordingly. 21- Pt is only 9w5d at this time. 10/29/21- Pt started injections   1hr gtt lab ordered for early diabetic screening        Assessment:  1. Terell Bright is a 31 y.o. female  2. [id number]  3. 21w5d    Patient Active Problem List    Diagnosis Date Noted    Asthma  2012     Priority: High     Pt did not report using inhalers at [facility] vaccinated 2021    Family history of aortic stenosis (G3)   2021     Pt daughter born in . Small defect, no interventions      High-risk pregnancy, first trimester 2021- MFM referral placed for first trim screen, NIPT and anatomy scan-SK      History of gestational hypertension  G3 2020    HRP (high risk pregnancy), unspecified trimester 06/10/2020    Dysmenorrhea 2020     History of management with Depo       Hx Aortic Stenosis 2019     Arotic Stenosis. 3/24/20- saw Dr Heather Stern at [facility], echo done showing EF of 55%, normal LV diastolic compliance, no significant valvular abnormalities   Pt has f/u appt in 2020, Pt completed appt, to follow up as needed-SK        Pregravid BMI 42.03  2019       Early one hr GTT lab ordered for early diabetic screening.          Axillary hidradenitis suppurativa 2019    Hx sPTD x 2 2019 and   ( progesterone in  )   17P Plans/Education and Counseled-  Labor Definition and Warning Signs, What is 17P and  Common side effects of 17P injections,  [facility] with [facility]. 9/14/21- GA 9w5d  Plan for referral to [facility] accordingly. Plan to obtain progesterone from [facility] accordingly closer to 16 weeks of pregnancy   MF placed referral. Pt started injections 10/29/21 with [facility]          Diagnosis Orders   1. HRP (high risk pregnancy), second trimester     2. 21 weeks gestation of pregnancy           Plan:  The patient will return to the office for her next visit in 4 weeks. See antepartum flow sheet. Reviewed s/s of PTL, decreased FM and preeclampsia - encouraged pt to go to the hospital if these occur. Pt taking PNV and baby ASA    Pt scheduled for [facility] ultrasound on 12/13/2021 - encouraged compliance    Pt did not do early 1 hour GTT - will encouraged her to do 28 week labs when it is time. Pt receiving progesterone injections on Fridays    Pt already received her flu vaccine    Will recommend TDAP at 27 weeks    Patient was seen with total face to face time of 20 minutes. More than 50% of this visit was on counseling and education regarding her    Diagnosis Orders   1. HRP (high risk pregnancy), second trimester     2. 21 weeks gestation of pregnancy      and her options. She was also counseled on her preventative health maintenance recommendations and follow-up.     Kim Burrell DO

## 2021-12-13 ENCOUNTER — ROUTINE PRENATAL (OUTPATIENT)
Dept: PERINATAL CARE | Age: 26
End: 2021-12-13
Payer: COMMERCIAL

## 2021-12-13 VITALS
HEART RATE: 86 BPM | DIASTOLIC BLOOD PRESSURE: 76 MMHG | WEIGHT: 242 LBS | SYSTOLIC BLOOD PRESSURE: 116 MMHG | RESPIRATION RATE: 16 BRPM | BODY MASS INDEX: 41.32 KG/M2 | TEMPERATURE: 97.2 F | HEIGHT: 64 IN

## 2021-12-13 DIAGNOSIS — O35.HXX0 CLUB FOOT OF FETUS AFFECTING ANTEPARTUM CARE OF MOTHER, SINGLE OR UNSPECIFIED FETUS: Primary | ICD-10-CM

## 2021-12-13 DIAGNOSIS — Z3A.22 22 WEEKS GESTATION OF PREGNANCY: ICD-10-CM

## 2021-12-13 DIAGNOSIS — O09.212 CURRENT PREGNANCY WITH HISTORY OF PRE-TERM LABOR IN SECOND TRIMESTER: ICD-10-CM

## 2021-12-13 PROCEDURE — 76815 OB US LIMITED FETUS(S): CPT | Performed by: OBSTETRICS & GYNECOLOGY

## 2021-12-13 PROCEDURE — G8417 CALC BMI ABV UP PARAM F/U: HCPCS | Performed by: OBSTETRICS & GYNECOLOGY

## 2021-12-13 PROCEDURE — 99243 OFF/OP CNSLTJ NEW/EST LOW 30: CPT | Performed by: OBSTETRICS & GYNECOLOGY

## 2021-12-13 PROCEDURE — G8482 FLU IMMUNIZE ORDER/ADMIN: HCPCS | Performed by: OBSTETRICS & GYNECOLOGY

## 2021-12-13 PROCEDURE — G8427 DOCREV CUR MEDS BY ELIG CLIN: HCPCS | Performed by: OBSTETRICS & GYNECOLOGY

## 2021-12-13 PROCEDURE — 76817 TRANSVAGINAL US OBSTETRIC: CPT | Performed by: OBSTETRICS & GYNECOLOGY

## 2021-12-22 ENCOUNTER — TELEPHONE (OUTPATIENT)
Dept: ORTHOPEDIC SURGERY | Age: 26
End: 2021-12-22

## 2021-12-22 NOTE — TELEPHONE ENCOUNTER
If the mother has a clubfoot this should not affect the pregnancy at all. If they have found that the fetus is think it be done until the baby is born and seen in the clinic.

## 2021-12-27 ENCOUNTER — HOSPITAL ENCOUNTER (EMERGENCY)
Age: 26
Discharge: HOME | End: 2021-12-27
Attending: EMERGENCY MEDICINE
Payer: COMMERCIAL

## 2021-12-27 ENCOUNTER — ROUTINE PRENATAL (OUTPATIENT)
Dept: PERINATAL CARE | Age: 26
End: 2021-12-27
Payer: COMMERCIAL

## 2021-12-27 ENCOUNTER — HOSPITAL ENCOUNTER (OUTPATIENT)
Age: 26
Discharge: HOME | End: 2021-12-27
Attending: OBSTETRICS & GYNECOLOGY | Admitting: OBSTETRICS & GYNECOLOGY
Payer: COMMERCIAL

## 2021-12-27 ENCOUNTER — APPOINTMENT (OUTPATIENT)
Dept: CT IMAGING | Age: 26
End: 2021-12-27
Payer: COMMERCIAL

## 2021-12-27 VITALS
HEIGHT: 64 IN | HEART RATE: 80 BPM | SYSTOLIC BLOOD PRESSURE: 116 MMHG | WEIGHT: 237 LBS | DIASTOLIC BLOOD PRESSURE: 72 MMHG | TEMPERATURE: 97.1 F | RESPIRATION RATE: 16 BRPM | BODY MASS INDEX: 40.46 KG/M2

## 2021-12-27 VITALS
RESPIRATION RATE: 18 BRPM | BODY MASS INDEX: 41.15 KG/M2 | DIASTOLIC BLOOD PRESSURE: 60 MMHG | SYSTOLIC BLOOD PRESSURE: 124 MMHG | HEART RATE: 62 BPM | WEIGHT: 241 LBS | OXYGEN SATURATION: 97 % | TEMPERATURE: 98 F | HEIGHT: 64 IN

## 2021-12-27 VITALS
HEIGHT: 64 IN | SYSTOLIC BLOOD PRESSURE: 110 MMHG | HEART RATE: 72 BPM | BODY MASS INDEX: 41.15 KG/M2 | OXYGEN SATURATION: 94 % | WEIGHT: 241 LBS | TEMPERATURE: 98.4 F | DIASTOLIC BLOOD PRESSURE: 71 MMHG | RESPIRATION RATE: 17 BRPM

## 2021-12-27 DIAGNOSIS — Q24.9 MATERNAL CONGENITAL HEART DISEASE, ANTEPARTUM, SECOND TRIMESTER: ICD-10-CM

## 2021-12-27 DIAGNOSIS — B34.9 VIRAL SYNDROME: Primary | ICD-10-CM

## 2021-12-27 DIAGNOSIS — O09.219 PREVIOUS PRETERM DELIVERY, ANTEPARTUM: Primary | ICD-10-CM

## 2021-12-27 DIAGNOSIS — O99.412 MATERNAL CONGENITAL HEART DISEASE, ANTEPARTUM, SECOND TRIMESTER: ICD-10-CM

## 2021-12-27 DIAGNOSIS — O09.892 SHORT INTERVAL BETWEEN PREGNANCIES AFFECTING PREGNANCY IN SECOND TRIMESTER, ANTEPARTUM: ICD-10-CM

## 2021-12-27 DIAGNOSIS — E66.01 OBESITY, CLASS III, BMI 40-49.9 (MORBID OBESITY) (HCC): ICD-10-CM

## 2021-12-27 DIAGNOSIS — O35.HXX0 CLUB FOOT OF FETUS AFFECTING ANTEPARTUM CARE OF MOTHER, SINGLE OR UNSPECIFIED FETUS: ICD-10-CM

## 2021-12-27 PROBLEM — O09.92 HIGH-RISK PREGNANCY IN SECOND TRIMESTER: Status: ACTIVE | Noted: 2021-09-14

## 2021-12-27 LAB
ABDOMINAL CIRCUMFERENCE: NORMAL
ABDOMINAL CIRCUMFERENCE: NORMAL
BIPARIETAL DIAMETER: NORMAL
BIPARIETAL DIAMETER: NORMAL
DIRECT EXAM: NORMAL
ESTIMATED FETAL WEIGHT: NORMAL
ESTIMATED FETAL WEIGHT: NORMAL
FEMORAL DIAMETER: NORMAL
FEMORAL DIAMETER: NORMAL
HC/AC: NORMAL
HC/AC: NORMAL
HEAD CIRCUMFERENCE: NORMAL
HEAD CIRCUMFERENCE: NORMAL
Lab: NORMAL
SARS-COV-2, RAPID: NOT DETECTED
SPECIMEN DESCRIPTION: NORMAL
SPECIMEN DESCRIPTION: NORMAL

## 2021-12-27 PROCEDURE — 76817 TRANSVAGINAL US OBSTETRIC: CPT | Performed by: OBSTETRICS & GYNECOLOGY

## 2021-12-27 PROCEDURE — 59025 FETAL NON-STRESS TEST: CPT | Performed by: OBSTETRICS & GYNECOLOGY

## 2021-12-27 PROCEDURE — 87635 SARS-COV-2 COVID-19 AMP PRB: CPT

## 2021-12-27 PROCEDURE — 70450 CT HEAD/BRAIN W/O DYE: CPT

## 2021-12-27 PROCEDURE — 99213 OFFICE O/P EST LOW 20 MIN: CPT

## 2021-12-27 PROCEDURE — 99999 PR OFFICE/OUTPT VISIT,PROCEDURE ONLY: CPT | Performed by: OBSTETRICS & GYNECOLOGY

## 2021-12-27 PROCEDURE — 76816 OB US FOLLOW-UP PER FETUS: CPT | Performed by: OBSTETRICS & GYNECOLOGY

## 2021-12-27 PROCEDURE — 6370000000 HC RX 637 (ALT 250 FOR IP): Performed by: EMERGENCY MEDICINE

## 2021-12-27 PROCEDURE — 99284 EMERGENCY DEPT VISIT MOD MDM: CPT

## 2021-12-27 PROCEDURE — 87804 INFLUENZA ASSAY W/OPTIC: CPT

## 2021-12-27 RX ORDER — ONDANSETRON 4 MG/1
4 TABLET, ORALLY DISINTEGRATING ORAL ONCE
Status: COMPLETED | OUTPATIENT
Start: 2021-12-27 | End: 2021-12-27

## 2021-12-27 RX ORDER — ACETAMINOPHEN 500 MG
1000 TABLET ORAL ONCE
Status: COMPLETED | OUTPATIENT
Start: 2021-12-27 | End: 2021-12-27

## 2021-12-27 RX ORDER — OSELTAMIVIR PHOSPHATE 75 MG/1
75 CAPSULE ORAL 2 TIMES DAILY
Qty: 10 CAPSULE | Refills: 0 | Status: SHIPPED | OUTPATIENT
Start: 2021-12-27 | End: 2022-01-01

## 2021-12-27 RX ADMIN — ONDANSETRON 4 MG: 4 TABLET, ORALLY DISINTEGRATING ORAL at 05:29

## 2021-12-27 RX ADMIN — ACETAMINOPHEN 1000 MG: 500 TABLET ORAL at 04:57

## 2021-12-27 ASSESSMENT — ENCOUNTER SYMPTOMS
NAUSEA: 1
SHORTNESS OF BREATH: 0
SORE THROAT: 0
ABDOMINAL PAIN: 0
DIARRHEA: 0
CONSTIPATION: 0
VOMITING: 0

## 2021-12-27 ASSESSMENT — PAIN SCALES - GENERAL
PAINLEVEL_OUTOF10: 9
PAINLEVEL_OUTOF10: 10

## 2021-12-27 ASSESSMENT — PAIN DESCRIPTION - LOCATION: LOCATION: HEAD

## 2021-12-27 NOTE — PROGRESS NOTES
TESTING NOTE    Karmen Delgadillo is a 31 y.o. female [id number] at 18w2d    The patient was seen and examined. She is here from ER for [id number] tracing after being evaluated for headache and flu symptoms which have improved with symptomatic management. CT head performed and unremarkable. Flu and COVID negative. The baby is moving well and she denies any complaints. Patient Active Problem List   Diagnosis    Asthma     Hx Aortic Stenosis      Pregravid BMI 42.03     Axillary hidradenitis suppurativa    Hx sPTD x 2    Dysmenorrhea    HRP (high risk pregnancy), unspecified trimester    History of gestational hypertension  G3    Family history of aortic stenosis (G3)      High-risk pregnancy, first trimester    COVID vaccinated       Vitals:  Vitals:    21 0715 21 0721   BP: 124/60    Pulse: 62    Resp: 18    Temp: 98 °F (36.7 °C)    TempSrc: Oral    SpO2: 97%    Weight:  241 lb (109.3 kg)   Height:  5' 4\" (1.626 m)        NST:   Fetal heart rate baseline: 150, moderate variability, accelerations present, absent decelerations     The tracing has been reviewed and is considered reactive. Assessment/Plan:  1. Karmen Delgadillo is a 31 y.o. female [id number] at 24w4d  2. NST reactive, appropriate for gestational age   - The patient will continue with her scheduled office appointments. Next appointment on 21. Pt with MFM appointment this AM       - Signs and Symptoms of  labor and preeclampsia precautions were reviewed. - She was counseled on adequate hydration prior to discharge   - The patient was instructed on fetal kick counts. 3. Dr. Alecia Landry has reviewed this NST and agrees with the above stated assessment and plan.      Jennifer Merritt DO  Ob/Gyn Resident   2021, 8:30 AM

## 2021-12-27 NOTE — Clinical Note
Glenna Jordan was seen and treated in our emergency department on 12/27/2021. She may return to work on 12/30/2021. If you have any questions or concerns, please don't hesitate to call.       Aaron Mariano MD

## 2021-12-27 NOTE — ED PROVIDER NOTES
[facility]  ED  Emergency Department Encounter  EmergencyMedicine Resident     Pt Deangelo Moody  MRN: [id number]  [place] 1995  Date of evaluation: 21  PCP:  Zain Ford MD    [address] [place] [address]       Chief Complaint   Patient presents with    Headache     Currently 24 wks pregnant . Has prenatal care.  Pharyngitis    Otalgia       HISTORY OF PRESENT ILLNESS  (Location/Symptom, Timing/Onset, Context/Setting, Quality, Duration, Modifying Factors, Severity.)      Merrick Heimlich is a 31 y.o. female who presents to the emergency department with a 1 day history of nausea and bilateral periorbital headache which patient states is the worst headache of her life and states is extremely unusual for her because she typically never gets headaches. She is 24 weeks pregnant and has been feeling the baby move appropriately. High risk influenza A exposure from her daughter at home and her significant other is currently being evaluated in the emergency department for similar concerns. Patient's primary concern is headache although she endorses some generalized malaise, myalgias, and fatigue. Endorses photophobia but denies vision loss. Denies HEENT symptoms, chest pain, shortness of breath, abdominal pain, vomiting, problems with urination or bowel movements, or numbness or tingling anywhere. Feeling baby move. PAST MEDICAL / SURGICAL / SOCIAL / FAMILY HISTORY      has a past medical history of Asthma, Chronic airway disease, Dental crowns present, Gestational HTN--G3, Hidradenitis axillaris, History of  labor, Hydradenitis, Left ankle sprain, Maternal congenital heart disease, antepartum, Obesity,  w/ MIrena IUD 6/10/20 F APG / Wt 6#8, and Wears glasses. has a past surgical history that includes Tonsillectomy and adenoidectomy and other surgical history (Bilateral, 2016).     Social History     Socioeconomic History    Marital status: Single     Spouse name: Not on file    Number of children: Not on file    Years of education: Not on file    Highest education level: Not on file   Occupational History    Not on file   Tobacco Use    Smoking status: Never Smoker    Smokeless tobacco: Never Used   Vaping Use    Vaping Use: Never used   Substance and Sexual Activity    Alcohol use: No     Alcohol/week: 0.0 standard drinks    Drug use: Never    Sexual activity: Not Currently     Partners: Male   Other Topics Concern    Not on file   Social History Narrative    Not on file     Social Determinants of Health     Financial Resource Strain:     Difficulty of Paying Living Expenses: Not on file   Food Insecurity:     Worried About Running Out of Food in the Last Year: Not on file    Geri of Food in the Last Year: Not on file   Transportation Needs:     Lack of Transportation (Medical): Not on file    Lack of Transportation (Non-Medical):  Not on file   Physical Activity:     Days of Exercise per Week: Not on file    Minutes of Exercise per Session: Not on file   Stress:     Feeling of Stress : Not on file   Social Connections:     Frequency of Communication with Friends and Family: Not on file    Frequency of Social Gatherings with Friends and Family: Not on file    Attends Catholic Services: Not on file    Active Member of [address] [place] [address] [employer] or Organizations: Not on file    Attends Club or Organization Meetings: Not on file    Marital Status: Not on file   Intimate Partner Violence:     Fear of Current or Ex-Partner: Not on file    Emotionally Abused: Not on file    Physically Abused: Not on file    Sexually Abused: Not on file   Housing Stability:     Unable to Pay for Housing in the Last Year: Not on file    Number of [place] in the Last Year: Not on file    Unstable Housing in the Last Year: Not on file       Family History   Problem Relation Age of Onset    Other Brother          suicide     Diabetes Maternal Grandmother     Cancer Maternal Grandmother     Heart Disease Paternal Uncle     Early Death Paternal Uncle 62        MI    Learning Disabilities Mother     Diabetes Mother         Type 2 managed with insulin     No Known Problems Paternal Grandfather     No Known Problems Paternal Grandmother     Lung Cancer Maternal Grandfather     No Known Problems Father     No Known Problems Sister     No Known Problems Brother     Breast Cancer Neg Hx     Colon Cancer Neg Hx     Eclampsia Neg Hx     Hypertension Neg Hx     Ovarian Cancer Neg Hx      Labor Neg Hx     Spont Abortions Neg Hx     Stroke Neg Hx     Uterine Cancer Neg Hx        Allergies:  Lidocaine    Home Medications:  Prior to Admission medications    Medication Sig Start Date End Date Taking? Authorizing Provider   oseltamivir (TAMIFLU) 75 MG capsule Take 1 capsule by mouth 2 times daily for 5 days 21 Yes Salvador Mccallum MD   HYDROXYprogesterone caproate 250 MG/ML OIL oil injection Inject 250 mg into the muscle every 7 days 10/29/21   Historical Provider, MD   fluticasone (FLONASE) 50 MCG/ACT nasal spray instill 2 sprays into each nostril once daily 21   Historical Provider, MD   aspirin EC 81 MG EC tablet Take 1 tablet by mouth daily 21   Asotin Height, DO   Prenatal Multivit-Min-Fe-FA (PRENATAL FORTE) TABS Take 1 tablet by mouth Daily May substitute with any prenatal vit pt insurance will cover 21  Darnell Fernandez MD   acetaminophen (TYLENOL) 325 MG tablet Take 1 tablet by mouth every 4 hours as needed for Pain  Patient not taking: Reported on 2021   Krish Oquendo MD   selenium sulfide (SELSUN) 2.5 % lotion Apply topically daily as needed for Itching Apply topically daily as needed. Historical Provider, MD       REVIEW OF SYSTEMS    (2-9 systems for level 4, 10 or more for level 5)      Review of Systems   Constitutional: Negative for chills and fever.    HENT: Negative for ear pain, hearing loss and sore throat. Eyes: Negative for visual disturbance. Respiratory: Negative for shortness of breath. Cardiovascular: Negative for chest pain. Gastrointestinal: Positive for nausea. Negative for abdominal pain, constipation, diarrhea and vomiting. Genitourinary: Negative for difficulty urinating and dysuria. Musculoskeletal: Negative for arthralgias and myalgias. Neurological: Positive for headaches. Negative for weakness and numbness. Psychiatric/Behavioral: Negative for agitation and confusion. PHYSICAL EXAM   (up to 7 for level 4, 8 or more for level 5)      INITIAL VITALS:   /71   Pulse 72   Temp 98.4 °F (36.9 °C) (Oral)   Resp 17   Ht 5' 4\" (1.626 m)   Wt 241 lb (109.3 kg)   LMP  (LMP Unknown)   SpO2 94%   BMI 41.37 kg/m²     Physical Exam  Vitals and nursing note reviewed. Constitutional:       General: She is not in acute distress. Appearance: Normal appearance. She is well-developed. She is not ill-appearing or diaphoretic. HENT:      Head: Normocephalic and atraumatic. Right Ear: Tympanic membrane, ear canal and external ear normal.      Left Ear: Ear canal and external ear normal. There is impacted cerumen. Nose: Nose normal.      Mouth/Throat:      Mouth: Mucous membranes are moist.   Eyes:      Extraocular Movements: Extraocular movements intact. Conjunctiva/sclera: Conjunctivae normal.   Neck:      Trachea: No tracheal deviation. Cardiovascular:      Rate and Rhythm: Normal rate and regular rhythm. Heart sounds: Normal heart sounds. No murmur heard. No friction rub. No gallop. Pulmonary:      Effort: Pulmonary effort is normal. No respiratory distress. Breath sounds: Normal breath sounds. No wheezing, rhonchi or rales. Abdominal:      General: There is no distension. Palpations: There is no mass. Tenderness: There is no abdominal tenderness. There is no guarding or rebound.       Comments: Gravid abdomen, ultrasound demonstrates fetus in breech presentation with grossly appropriate amniotic fluid volume and fetal heart rate 152 bpm, good movement   Musculoskeletal:         General: No swelling, deformity or signs of injury. Normal range of motion. Cervical back: Normal range of motion and neck supple. Skin:     General: Skin is warm and dry. Coloration: Skin is not jaundiced. Findings: No bruising or lesion. Neurological:      General: No focal deficit present. Mental Status: She is alert and oriented to person, place, and time. Mental status is at baseline. Cranial Nerves: No cranial nerve deficit. Sensory: No sensory deficit. Motor: No weakness or abnormal muscle tone. Coordination: Coordination normal.      Deep Tendon Reflexes: Reflexes normal (Tested in patellar, biceps bilaterally, no clonus). Comments: NIH stroke scale zero, EOMI, pupils equal round and reactive, photophobia noted         DIFFERENTIAL  DIAGNOSIS     PLAN (LABS / IMAGING / EKG):  Orders Placed This Encounter   Procedures    RAPID INFLUENZA A/B ANTIGENS    COVID-19, Rapid    CT Head WO Contrast    Inpatient consult to Obstetrics / Gynecology       MEDICATIONS ORDERED:  Orders Placed This Encounter   Medications    acetaminophen (TYLENOL) tablet 1,000 mg    ondansetron (ZOFRAN-ODT) disintegrating tablet 4 mg    oseltamivir (TAMIFLU) 75 MG capsule     Sig: Take 1 capsule by mouth 2 times daily for 5 days     Dispense:  10 capsule     Refill:  0       DDX: Darrin Patel is a 31 y.o. female who presents to the emergency department with nausea and bilateral periorbital headache.  Differential diagnosis includes intracranial hemorrhage, influenza or other viral syndrome including COVID-19, preeclampsia    DIAGNOSTIC RESULTS / EMERGENCY DEPARTMENT COURSE / MDM   LAB RESULTS:  Results for orders placed or performed during the hospital encounter of 12/27/21   RAPID INFLUENZA A/B ANTIGENS    Specimen: Nasopharyngeal Swab   Result Value Ref Range    Specimen Description . NASOPHARYNGEAL SWAB     Special Requests NOT REPORTED     Direct Exam       NEGATIVE for Influenza A + B antigens. PCR testing to confirm this result is available upon request.  Specimen will be saved in the laboratory for 7 days. Please call [phone] if PCR testing is indicated. COVID-19, Rapid    Specimen: Nasopharyngeal Swab   Result Value Ref Range    Specimen Description . NASOPHARYNGEAL SWAB     SARS-CoV-2, Rapid Not Detected Not Detected       IMPRESSION: Nick Lau is a 31 y.o. female who presents to the emergency department with nausea and bilateral periorbital headache. On examination she is afebrile, vital signs unremarkable and blood pressure appropriate, examination demonstrates an uncomfortable appearing female of stated age with intact neurologic examination, no deep tendon reflex abnormalities, normal heart and lung sounds and a nontender abdomen with reassuring fetal ultrasound. We will speak with OB after obtaining CT scan of the head to check for bleed in the brain. Patient verbalizes understanding and agreement with plan. We will start with Tylenol and Zofran for symptoms. RADIOLOGY:  No results found. EKG  None    All EKG's are interpreted by the Emergency Department Physician who either signs or co-signs this chart in the absence of a cardiologist.    EMERGENCY DEPARTMENT COURSE:       PROCEDURES:  None    CONSULTS:  IP CONSULT TO OB GYN    CRITICAL CARE:  None    FINAL IMPRESSION      1.  Viral syndrome          DISPOSITION / PLAN     DISPOSITION        PATIENT REFERRED TO:  Cecile Terrazas MD  [address] [phone]    Schedule an appointment as soon as possible for a visit in 1 week  For followup    [facility]  [address]  Go to   As needed, If symptoms worsen      DISCHARGE MEDICATIONS:  New Prescriptions    OSELTAMIVIR (TAMIFLU) 75 MG CAPSULE    Take 1 capsule by mouth 2 times daily for 5 days       Judith Carey MD  Emergency Medicine Resident    This patient was evaluated in the Emergency Department for symptoms described in the history of present illness. He/she was evaluated in the context of the global COVID-19 pandemic, which necessitated consideration that the patient might be at risk for infection with the SARS-CoV-2 virus that causes COVID-19. Institutional protocols and algorithms that pertain to the evaluation of patients at risk for COVID-19 are in a state of rapid change based on information released by regulatory bodies including the CDC and federal and state organizations. These policies and algorithms were followed during the patient's care in the ED.     (Please note that portions of thisnote were completed with a voice recognition program.  Efforts were made to edit the dictations but occasionally words are mis-transcribed.)        Judith Carey MD  Resident  12/27/21 0283       Judith Carey MD  Resident  12/27/21 8851

## 2021-12-27 NOTE — PROGRESS NOTES
Pt presents to L and D, accompanied by self, via ambulatory. Pt here for 20 minute strip after being seen in the ED for flu like symptoms. Patient denies any contractions, LOF, or bleeding. Pt gowned and in bed, oriented to room and call light. EFM explained and applied. T's 157. Dr. Yamilet Wagner notified of admission.

## 2021-12-27 NOTE — ED TRIAGE NOTES
C/O HA (sinus pain), bilat ear pain and sore throat since yesterday. Tried tylenol with benadryl with no relief. Pt is 24 weeks pregnant and can't take much else. Pt states she doesn't get headaches usually. Recent exposure to influenza A (daughter).

## 2022-01-06 NOTE — ED PROVIDER NOTES
[facility]   Emergency Department  Faculty Attestation       I performed a history and physical examination of the patient and discussed management with the resident. I reviewed the residents note and agree with the documented findings including all diagnostic interpretations and plan of care. Any areas of disagreement are noted on the chart. I was personally present for the key portions of any procedures. I have documented in the chart those procedures where I was not present during the key portions. I have reviewed the emergency nurses triage note. I agree with the chief complaint, past medical history, past surgical history, allergies, medications, social and family history as documented unless otherwise noted below. Documentation of the HPI, Physical Exam and Medical Decision Making performed by scribpolo is based on my personal performance of the HPI, PE and MDM. For Physician Assistant/ Nurse Practitioner cases/documentation I have personally evaluated this patient and have completed at least one if not all key elements of the E/M (history, physical exam, and MDM). Additional findings are as noted. Pertinent Comments     Primary Care Physician: Radha Woodard MD      ED Triage Vitals [12/27/21 0421]   BP Temp Temp Source Pulse Resp SpO2 Height Weight   124/78 98.3 °F (36.8 °C) Oral 99 18 98 % 5' 4\" (1.626 m) 241 lb (109.3 kg)        History/Physical: This is a 31 y.o. female who is 24 weeks pregnant presenting with headache and body aches. Her daughter currently has influenza A. Patient states that his headache is severe, and with further questioning, states started yesterday initially completely resolved with Tylenol and then recurred today and she is not try taking anything for it today. States that it does present around both eyes, has some mild nausea and body aches. Significant other has similar symptoms. She denies any numbness or weakness. Denies any increased swelling. Denies abdominal pain. On exam, patient sitting in bed and appears to be uncomfortable, but nontoxic-appearing normocephalic atraumatic. Pupils are equal and reactive bilaterally with no exophthalmos. No nystagmus. And extraocular motions intact. No midline neck tenderness and no neck stiffness. Heart sounds are regular with no murmurs or gallops and lungs clear to auscultation. Abdomen is gravid with no tenderness. She is alert and oriented x4 with normal reflexes throughout. Normal strength and sensation and no pronator drift. No significant pitting edema. No rashes or jaundice    MDM/Plan:   24-week pregnant patient with headache. Most likely related to influenza A given the patient does have a high risk exposure at home and has viral-like symptoms. Given that she is pregnant high risk we will plan to treat with Tamiflu regardless of influenza A testing  Initially she did state is the worsening of her leg, but with further questioning she did state that after getting Tylenol yesterday completely resolved and then recurred today.   She is not tried any thing for today therefore we will give symptomatic treatment  Will get CT head  However she is nontender meningeal signs, no visual deficits and have a low suspicion of idiopathic hypertension or venous thrombosis at this time  No clinical signs or symptoms of preeclampsia    Will speak to  OB prior to D/C       Critical Care: None     Bryan Ma MD  Attending Emergency Physician         Bryan Ma MD  01/06/22 0111

## 2022-01-10 ENCOUNTER — TELEPHONE (OUTPATIENT)
Dept: PERINATAL CARE | Age: 27
End: 2022-01-10

## 2022-01-10 ENCOUNTER — ROUTINE PRENATAL (OUTPATIENT)
Dept: PERINATAL CARE | Age: 27
End: 2022-01-10
Payer: COMMERCIAL

## 2022-01-10 VITALS
BODY MASS INDEX: 40.97 KG/M2 | DIASTOLIC BLOOD PRESSURE: 64 MMHG | TEMPERATURE: 97.1 F | RESPIRATION RATE: 16 BRPM | WEIGHT: 240 LBS | HEIGHT: 64 IN | HEART RATE: 76 BPM | SYSTOLIC BLOOD PRESSURE: 108 MMHG

## 2022-01-10 DIAGNOSIS — O35.HXX0 CLUB FOOT OF FETUS AFFECTING ANTEPARTUM CARE OF MOTHER, SINGLE OR UNSPECIFIED FETUS: ICD-10-CM

## 2022-01-10 DIAGNOSIS — O09.299 CURRENT SINGLETON PREGNANCY WITH HISTORY OF CONGENITAL HEART DISEASE IN PRIOR CHILD, ANTEPARTUM: ICD-10-CM

## 2022-01-10 DIAGNOSIS — O99.412 MATERNAL CONGENITAL CARDIOVASCULAR DISORDER AFFECTING PREGNANCY IN SECOND TRIMESTER: Primary | ICD-10-CM

## 2022-01-10 DIAGNOSIS — Z3A.26 26 WEEKS GESTATION OF PREGNANCY: ICD-10-CM

## 2022-01-10 DIAGNOSIS — O09.212 CURRENT PREGNANCY WITH HISTORY OF PRE-TERM LABOR IN SECOND TRIMESTER: ICD-10-CM

## 2022-01-10 DIAGNOSIS — O99.212 OBESITY AFFECTING PREGNANCY IN SECOND TRIMESTER: ICD-10-CM

## 2022-01-10 DIAGNOSIS — Q28.9 MATERNAL CONGENITAL CARDIOVASCULAR DISORDER AFFECTING PREGNANCY IN SECOND TRIMESTER: Primary | ICD-10-CM

## 2022-01-10 PROCEDURE — 76825 ECHO EXAM OF FETAL HEART: CPT | Performed by: OBSTETRICS & GYNECOLOGY

## 2022-01-10 PROCEDURE — 76815 OB US LIMITED FETUS(S): CPT | Performed by: OBSTETRICS & GYNECOLOGY

## 2022-01-10 PROCEDURE — 76817 TRANSVAGINAL US OBSTETRIC: CPT | Performed by: OBSTETRICS & GYNECOLOGY

## 2022-01-10 PROCEDURE — 76827 ECHO EXAM OF FETAL HEART: CPT | Performed by: OBSTETRICS & GYNECOLOGY

## 2022-01-10 PROCEDURE — 93325 DOPPLER ECHO COLOR FLOW MAPG: CPT | Performed by: OBSTETRICS & GYNECOLOGY

## 2022-01-14 ENCOUNTER — TELEPHONE (OUTPATIENT)
Dept: OBGYN | Age: 27
End: 2022-01-14

## 2022-01-24 ENCOUNTER — ROUTINE PRENATAL (OUTPATIENT)
Dept: PERINATAL CARE | Age: 27
End: 2022-01-24
Payer: COMMERCIAL

## 2022-01-24 ENCOUNTER — ROUTINE PRENATAL (OUTPATIENT)
Dept: OBGYN | Age: 27
End: 2022-01-24
Payer: COMMERCIAL

## 2022-01-24 ENCOUNTER — FOLLOWUP TELEPHONE ENCOUNTER (OUTPATIENT)
Dept: OBGYN | Age: 27
End: 2022-01-24

## 2022-01-24 VITALS
TEMPERATURE: 97.3 F | WEIGHT: 241.4 LBS | RESPIRATION RATE: 16 BRPM | SYSTOLIC BLOOD PRESSURE: 109 MMHG | HEART RATE: 74 BPM | DIASTOLIC BLOOD PRESSURE: 63 MMHG | BODY MASS INDEX: 41.21 KG/M2 | HEIGHT: 64 IN

## 2022-01-24 VITALS
SYSTOLIC BLOOD PRESSURE: 135 MMHG | HEART RATE: 94 BPM | DIASTOLIC BLOOD PRESSURE: 76 MMHG | BODY MASS INDEX: 41.83 KG/M2 | WEIGHT: 243.7 LBS

## 2022-01-24 DIAGNOSIS — Z3A.28 28 WEEKS GESTATION OF PREGNANCY: ICD-10-CM

## 2022-01-24 DIAGNOSIS — O99.413 MATERNAL CONGENITAL CARDIOVASCULAR DISORDER AFFECTING PREGNANCY IN THIRD TRIMESTER: ICD-10-CM

## 2022-01-24 DIAGNOSIS — Z3A.28 28 WEEKS GESTATION OF PREGNANCY: Primary | ICD-10-CM

## 2022-01-24 DIAGNOSIS — O36.5990 PREGNANCY AFFECTED BY FETAL GROWTH RESTRICTION: ICD-10-CM

## 2022-01-24 DIAGNOSIS — R73.09 BLOOD SUGAR INCREASED: ICD-10-CM

## 2022-01-24 DIAGNOSIS — Z36.4 ANTENATAL SCREENING FOR FETAL GROWTH RETARDATION USING ULTRASONICS: ICD-10-CM

## 2022-01-24 DIAGNOSIS — O35.HXX0 CLUB FOOT OF FETUS AFFECTING ANTEPARTUM CARE OF MOTHER, SINGLE OR UNSPECIFIED FETUS: ICD-10-CM

## 2022-01-24 DIAGNOSIS — O09.299 CURRENT SINGLETON PREGNANCY WITH HISTORY OF CONGENITAL HEART DISEASE IN PRIOR CHILD, ANTEPARTUM: ICD-10-CM

## 2022-01-24 DIAGNOSIS — Z13.89 ENCOUNTER FOR ROUTINE SCREENING FOR MALFORMATION USING ULTRASONICS: ICD-10-CM

## 2022-01-24 DIAGNOSIS — O36.5930 INTRAUTERINE GROWTH RESTRICTION (IUGR) AFFECTING CARE OF MOTHER, THIRD TRIMESTER, SINGLE GESTATION: Primary | ICD-10-CM

## 2022-01-24 DIAGNOSIS — O99.213 OBESITY AFFECTING PREGNANCY IN THIRD TRIMESTER: ICD-10-CM

## 2022-01-24 DIAGNOSIS — O09.213 CURRENT PREGNANCY WITH HISTORY OF PRE-TERM LABOR IN THIRD TRIMESTER: ICD-10-CM

## 2022-01-24 DIAGNOSIS — Q28.9 MATERNAL CONGENITAL CARDIOVASCULAR DISORDER AFFECTING PREGNANCY IN THIRD TRIMESTER: ICD-10-CM

## 2022-01-24 PROBLEM — R73.9 BLOOD SUGAR INCREASED: Status: ACTIVE | Noted: 2022-01-24

## 2022-01-24 PROCEDURE — 99212 OFFICE O/P EST SF 10 MIN: CPT | Performed by: STUDENT IN AN ORGANIZED HEALTH CARE EDUCATION/TRAINING PROGRAM

## 2022-01-24 PROCEDURE — 99999 PR OFFICE/OUTPT VISIT,PROCEDURE ONLY: CPT | Performed by: OBSTETRICS & GYNECOLOGY

## 2022-01-24 PROCEDURE — G8417 CALC BMI ABV UP PARAM F/U: HCPCS | Performed by: STUDENT IN AN ORGANIZED HEALTH CARE EDUCATION/TRAINING PROGRAM

## 2022-01-24 PROCEDURE — 90715 TDAP VACCINE 7 YRS/> IM: CPT | Performed by: STUDENT IN AN ORGANIZED HEALTH CARE EDUCATION/TRAINING PROGRAM

## 2022-01-24 PROCEDURE — G8482 FLU IMMUNIZE ORDER/ADMIN: HCPCS | Performed by: STUDENT IN AN ORGANIZED HEALTH CARE EDUCATION/TRAINING PROGRAM

## 2022-01-24 PROCEDURE — 76820 UMBILICAL ARTERY ECHO: CPT | Performed by: OBSTETRICS & GYNECOLOGY

## 2022-01-24 PROCEDURE — 76819 FETAL BIOPHYS PROFIL W/O NST: CPT | Performed by: OBSTETRICS & GYNECOLOGY

## 2022-01-24 PROCEDURE — 76821 MIDDLE CEREBRAL ARTERY ECHO: CPT | Performed by: OBSTETRICS & GYNECOLOGY

## 2022-01-24 PROCEDURE — 1036F TOBACCO NON-USER: CPT | Performed by: STUDENT IN AN ORGANIZED HEALTH CARE EDUCATION/TRAINING PROGRAM

## 2022-01-24 PROCEDURE — 76817 TRANSVAGINAL US OBSTETRIC: CPT | Performed by: OBSTETRICS & GYNECOLOGY

## 2022-01-24 PROCEDURE — G8427 DOCREV CUR MEDS BY ELIG CLIN: HCPCS | Performed by: STUDENT IN AN ORGANIZED HEALTH CARE EDUCATION/TRAINING PROGRAM

## 2022-01-24 PROCEDURE — 99211 OFF/OP EST MAY X REQ PHY/QHP: CPT | Performed by: STUDENT IN AN ORGANIZED HEALTH CARE EDUCATION/TRAINING PROGRAM

## 2022-01-24 PROCEDURE — 76805 OB US >/= 14 WKS SNGL FETUS: CPT | Performed by: OBSTETRICS & GYNECOLOGY

## 2022-01-24 RX ORDER — BLOOD PRESSURE TEST KIT
1 KIT MISCELLANEOUS 4 TIMES DAILY
Qty: 100 EACH | Refills: 0 | Status: SHIPPED | OUTPATIENT
Start: 2022-01-24 | End: 2022-03-18 | Stop reason: CLARIF

## 2022-01-24 RX ORDER — GLUCOSAMINE HCL/CHONDROITIN SU 500-400 MG
CAPSULE ORAL
Qty: 100 STRIP | Refills: 0 | Status: SHIPPED | OUTPATIENT
Start: 2022-01-24 | End: 2022-03-18 | Stop reason: CLARIF

## 2022-01-24 RX ORDER — BLOOD-GLUCOSE METER
1 KIT MISCELLANEOUS DAILY
Qty: 1 KIT | Refills: 0 | Status: SHIPPED | OUTPATIENT
Start: 2022-01-24 | End: 2022-03-18 | Stop reason: CLARIF

## 2022-01-24 RX ORDER — LANCETS 30 GAUGE
1 EACH MISCELLANEOUS 4 TIMES DAILY
Qty: 300 EACH | Refills: 1 | Status: SHIPPED | OUTPATIENT
Start: 2022-01-24 | End: 2022-03-18 | Stop reason: CLARIF

## 2022-01-24 NOTE — TELEPHONE ENCOUNTER
SW met with Pt to discuss [facility] and current needs. Pt is 28 weeks at this time. Pt stated she has not been working with [facility] and is still in need of items and programs. Pt stated she is possibly in need of assistance with a car seat. SW was able to complete the rest of the assessment without concern. No MH concerns at this time. SW will follow up with [facility]. SW will follow up with Pt as needed and 4-6 weeks postpartum.

## 2022-01-24 NOTE — PROGRESS NOTES
Patient previously declined invasive prenatal diagnostic testing (including for evaluation and testing for fetal aneuploidy, fetal microdeletions, fetal single gene disorders, fetal microarray testing, fetal  infections, etc). Please refer to non-invasive prenatal testing/NIPT/maternal carrier screen results (with the [place] Complete test from [facility]).

## 2022-01-24 NOTE — PROGRESS NOTES
Prenatal Visit    Emry File is a 31 y.o. female [id number] at 33w3d    The patient was seen and evaluated. She has no complaints or concerns. She reports positive fetal movements. She denies contractions, vaginal bleeding and leakage of fluid. Signs and symptoms of labor and pre-eclampsia were reviewed with the patient in detail. She is to report any of these if they occur. She currently denies any of these. The patient is Rh positive and Rhogam is not indicated in this pregnancy and informed the patient  The patient is requesting the T-Dap Vaccine (27-36 weeks) this pregnancy. The patient already received  the influenza vaccine this year. The patient already received the COVID-19 vaccine this year. The problem list reflects the active issues addressed during today's visit    Vitals:  BP: 135/76  Weight: 243 lb 11.2 oz (110.5 kg)  Pulse: 94  Patient Position: Sitting  Fundal Height (cm): 29 cm  Fetal Heart Rate: 145     28 week labs: .  1hr GTT: patient declining and would like to check blood sugars for 2 weeks   28 week CBC: ordered  UA w/ Ur C&S: ordered     Assessment & Plan:  Emry File is a 31 y.o. female [id number] at 33w3d   - 28 week labs ordered   - Tdap vaccination: is requesting    - Influenza vaccination: already received     - Rhogam: is not indicated in this pregnancy    - COVID-19 vaccination: R/B/A discussed with increased risk of both maternal and fetal morbidity and mortality in unvaccinated pregnant patients who contract COVID-19- patient declined today   -  testing indication starting 32 weeks GA: FGR, scheduled with M   - Warning signs reviewed and recommendations when to call or present to the hospital if she experiences signs or symptoms of  labor and pre-eclampsia were reviewed. - The patient was instructed on fetal kick counts.  She was instructed that the baby should move at a minimum of ten times within one hour after a meal. The patient was instructed to lay down on her left side twenty minutes after eating and count movements for up to one hour with a target value of ten movements. She was instructed to notify the office if she did not make that target after two attempts or if after any attempt there was less than four movements. Patient Active Problem List    Diagnosis Date Noted    Asthma  2012     Priority: High     Pt did not report using inhalers at intake-SK      Pregnancy affected by fetal growth restriction 2022     AC 8%ile 22      Fetal club feet 2022    Declining 1hr GTT 2022     Patient states unable to complete 1hr GTT, supplies ordered to check blood sugars for two weeks      COVID vaccinated 2021    Family history of aortic stenosis (G3)   2021     Pt daughter born in . Small defect, no interventions  Fetal echo wnl      High-risk pregnancy in second trimester 2021- M referral placed for first trim screen, NIPT and anatomy scan-SK      History of gestational hypertension  G3 2020    HRP (high risk pregnancy), unspecified trimester 06/10/2020    Dysmenorrhea 2020     History of management with Depo       Hx Aortic Stenosis 2019     Arotic Stenosis. 3/24/20- saw Dr Rodrigo Frye at [facility], echo done showing EF of 55%, normal LV diastolic compliance, no significant valvular abnormalities   Pt has f/u appt in 2020, Pt completed appt, to follow up as needed-SK        Pregravid BMI 42.03  2019       Early one hr GTT lab ordered for early diabetic screening.  Axillary hidradenitis suppurativa 2019    Hx sPTD x 2 2019 and   ( progesterone in  )   17P Plans/Education and Counseled-  Labor Definition and Warning Signs, What is 17P and  Common side effects of 17P injections,  Home Base care with [facility]. 21- GA 9w5d  Plan for referral to [facility] accordingly.    Plan to obtain progesterone from [facility] accordingly closer to 16 weeks of pregnancy   MFM placed referral. Pt started injections 10/29/21 with [facility]       Return in about 4 weeks (around 2/21/2022) for PRADIPEvelyn Keller DO  Ob/Gyn Resident  [facility] [id number]  1/24/2022, 2:22 PM         Attending Physician Statement  I have discussed the care of Emry File, including pertinent history and exam findings,  with the resident. I have reviewed the key elements of all parts of the encounter with the resident. I agree with the assessment, plan and orders as documented by the resident.   (GE Modifier)      Kamran Griffith DO

## 2022-01-25 ENCOUNTER — TELEPHONE (OUTPATIENT)
Dept: PEDIATRICS | Age: 27
End: 2022-01-25

## 2022-01-25 NOTE — TELEPHONE ENCOUNTER
Patient was not sure how to dispose of her needles when she tests her blood sugars.   She was informed to put them in a thick jar or container such as an empty laundry detergent container

## 2022-02-07 ENCOUNTER — ROUTINE PRENATAL (OUTPATIENT)
Dept: PERINATAL CARE | Age: 27
End: 2022-02-07
Payer: COMMERCIAL

## 2022-02-07 VITALS
WEIGHT: 241 LBS | RESPIRATION RATE: 16 BRPM | BODY MASS INDEX: 41.15 KG/M2 | HEIGHT: 64 IN | TEMPERATURE: 97.1 F | SYSTOLIC BLOOD PRESSURE: 102 MMHG | DIASTOLIC BLOOD PRESSURE: 73 MMHG | HEART RATE: 83 BPM

## 2022-02-07 DIAGNOSIS — Q28.9 MATERNAL CONGENITAL CARDIOVASCULAR DISORDER AFFECTING PREGNANCY IN THIRD TRIMESTER: ICD-10-CM

## 2022-02-07 DIAGNOSIS — O99.413 MATERNAL CONGENITAL CARDIOVASCULAR DISORDER AFFECTING PREGNANCY IN THIRD TRIMESTER: ICD-10-CM

## 2022-02-07 DIAGNOSIS — O36.5930 INTRAUTERINE GROWTH RESTRICTION (IUGR) AFFECTING CARE OF MOTHER, THIRD TRIMESTER, SINGLE GESTATION: Primary | ICD-10-CM

## 2022-02-07 DIAGNOSIS — O35.HXX0 CLUB FOOT OF FETUS AFFECTING ANTEPARTUM CARE OF MOTHER, SINGLE OR UNSPECIFIED FETUS: ICD-10-CM

## 2022-02-07 DIAGNOSIS — Z3A.30 30 WEEKS GESTATION OF PREGNANCY: ICD-10-CM

## 2022-02-07 DIAGNOSIS — O99.213 OBESITY AFFECTING PREGNANCY IN THIRD TRIMESTER: ICD-10-CM

## 2022-02-07 DIAGNOSIS — O09.213 CURRENT PREGNANCY WITH HISTORY OF PRE-TERM LABOR IN THIRD TRIMESTER: ICD-10-CM

## 2022-02-07 PROCEDURE — 76815 OB US LIMITED FETUS(S): CPT | Performed by: OBSTETRICS & GYNECOLOGY

## 2022-02-07 PROCEDURE — 76821 MIDDLE CEREBRAL ARTERY ECHO: CPT | Performed by: OBSTETRICS & GYNECOLOGY

## 2022-02-07 PROCEDURE — 76817 TRANSVAGINAL US OBSTETRIC: CPT | Performed by: OBSTETRICS & GYNECOLOGY

## 2022-02-07 PROCEDURE — 76820 UMBILICAL ARTERY ECHO: CPT | Performed by: OBSTETRICS & GYNECOLOGY

## 2022-02-07 PROCEDURE — 99999 PR OFFICE/OUTPT VISIT,PROCEDURE ONLY: CPT | Performed by: OBSTETRICS & GYNECOLOGY

## 2022-02-07 PROCEDURE — 76819 FETAL BIOPHYS PROFIL W/O NST: CPT | Performed by: OBSTETRICS & GYNECOLOGY

## 2022-02-16 ENCOUNTER — TELEPHONE (OUTPATIENT)
Dept: OBGYN | Age: 27
End: 2022-02-16

## 2022-02-17 NOTE — TELEPHONE ENCOUNTER
OB/GYN Resident Telephone Encounter    Returned [employer] with no answer. Will try to contact patient again.     Kim Hawkins, DO PGY2

## 2022-02-21 ENCOUNTER — ROUTINE PRENATAL (OUTPATIENT)
Dept: OBGYN | Age: 27
End: 2022-02-21
Payer: COMMERCIAL

## 2022-02-21 ENCOUNTER — TELEPHONE (OUTPATIENT)
Dept: OBGYN | Age: 27
End: 2022-02-21

## 2022-02-21 ENCOUNTER — ROUTINE PRENATAL (OUTPATIENT)
Dept: PERINATAL CARE | Age: 27
End: 2022-02-21
Payer: COMMERCIAL

## 2022-02-21 VITALS
HEIGHT: 64 IN | TEMPERATURE: 97.2 F | RESPIRATION RATE: 16 BRPM | HEART RATE: 76 BPM | DIASTOLIC BLOOD PRESSURE: 60 MMHG | WEIGHT: 238 LBS | BODY MASS INDEX: 40.63 KG/M2 | SYSTOLIC BLOOD PRESSURE: 112 MMHG

## 2022-02-21 VITALS
DIASTOLIC BLOOD PRESSURE: 71 MMHG | SYSTOLIC BLOOD PRESSURE: 107 MMHG | HEART RATE: 97 BPM | BODY MASS INDEX: 41.02 KG/M2 | WEIGHT: 239 LBS

## 2022-02-21 DIAGNOSIS — O35.HXX0 CLUB FOOT OF FETUS AFFECTING ANTEPARTUM CARE OF MOTHER, SINGLE OR UNSPECIFIED FETUS: ICD-10-CM

## 2022-02-21 DIAGNOSIS — Z3A.32 32 WEEKS GESTATION OF PREGNANCY: Primary | ICD-10-CM

## 2022-02-21 DIAGNOSIS — O09.213 CURRENT PREGNANCY WITH HISTORY OF PRE-TERM LABOR IN THIRD TRIMESTER: ICD-10-CM

## 2022-02-21 DIAGNOSIS — Z3A.32 32 WEEKS GESTATION OF PREGNANCY: ICD-10-CM

## 2022-02-21 DIAGNOSIS — O36.5930 INTRAUTERINE GROWTH RESTRICTION (IUGR) AFFECTING CARE OF MOTHER, THIRD TRIMESTER, SINGLE GESTATION: Primary | ICD-10-CM

## 2022-02-21 DIAGNOSIS — O36.5990 PREGNANCY AFFECTED BY FETAL GROWTH RESTRICTION: ICD-10-CM

## 2022-02-21 DIAGNOSIS — Q28.9 MATERNAL CONGENITAL CARDIOVASCULAR DISORDER AFFECTING PREGNANCY IN THIRD TRIMESTER: ICD-10-CM

## 2022-02-21 DIAGNOSIS — O99.213 OBESITY AFFECTING PREGNANCY IN THIRD TRIMESTER: ICD-10-CM

## 2022-02-21 DIAGNOSIS — I35.0 AORTIC VALVE STENOSIS, ETIOLOGY OF CARDIAC VALVE DISEASE UNSPECIFIED: ICD-10-CM

## 2022-02-21 DIAGNOSIS — O99.413 MATERNAL CONGENITAL CARDIOVASCULAR DISORDER AFFECTING PREGNANCY IN THIRD TRIMESTER: ICD-10-CM

## 2022-02-21 DIAGNOSIS — Z36.4 ANTENATAL SCREENING FOR FETAL GROWTH RETARDATION USING ULTRASONICS: ICD-10-CM

## 2022-02-21 PROCEDURE — 99999 PR OFFICE/OUTPT VISIT,PROCEDURE ONLY: CPT | Performed by: OBSTETRICS & GYNECOLOGY

## 2022-02-21 PROCEDURE — 76821 MIDDLE CEREBRAL ARTERY ECHO: CPT | Performed by: OBSTETRICS & GYNECOLOGY

## 2022-02-21 PROCEDURE — 76820 UMBILICAL ARTERY ECHO: CPT | Performed by: OBSTETRICS & GYNECOLOGY

## 2022-02-21 PROCEDURE — 1036F TOBACCO NON-USER: CPT | Performed by: STUDENT IN AN ORGANIZED HEALTH CARE EDUCATION/TRAINING PROGRAM

## 2022-02-21 PROCEDURE — 76816 OB US FOLLOW-UP PER FETUS: CPT | Performed by: OBSTETRICS & GYNECOLOGY

## 2022-02-21 PROCEDURE — 76817 TRANSVAGINAL US OBSTETRIC: CPT | Performed by: OBSTETRICS & GYNECOLOGY

## 2022-02-21 PROCEDURE — G8417 CALC BMI ABV UP PARAM F/U: HCPCS | Performed by: STUDENT IN AN ORGANIZED HEALTH CARE EDUCATION/TRAINING PROGRAM

## 2022-02-21 PROCEDURE — 76819 FETAL BIOPHYS PROFIL W/O NST: CPT | Performed by: OBSTETRICS & GYNECOLOGY

## 2022-02-21 PROCEDURE — 99211 OFF/OP EST MAY X REQ PHY/QHP: CPT | Performed by: OBSTETRICS & GYNECOLOGY

## 2022-02-21 PROCEDURE — G8427 DOCREV CUR MEDS BY ELIG CLIN: HCPCS | Performed by: STUDENT IN AN ORGANIZED HEALTH CARE EDUCATION/TRAINING PROGRAM

## 2022-02-21 PROCEDURE — 99213 OFFICE O/P EST LOW 20 MIN: CPT | Performed by: OBSTETRICS & GYNECOLOGY

## 2022-02-21 PROCEDURE — G8482 FLU IMMUNIZE ORDER/ADMIN: HCPCS | Performed by: STUDENT IN AN ORGANIZED HEALTH CARE EDUCATION/TRAINING PROGRAM

## 2022-02-21 PROCEDURE — 99213 OFFICE O/P EST LOW 20 MIN: CPT | Performed by: STUDENT IN AN ORGANIZED HEALTH CARE EDUCATION/TRAINING PROGRAM

## 2022-02-21 NOTE — PROGRESS NOTES
Prenatal Visit    Lucas Hubbard is a 31 y.o. female [id number] at 32w4d    The patient was seen and evaluated. She has no complaints today. She reports positive fetal movements. She denies contractions, vaginal bleeding and leakage of fluid. Signs and symptoms of labor and pre-eclampsia were reviewed with the patient in detail. She is to report any of these if they occur. She currently denies any of these. The patient is Rh positive and Rhogam not indicated   The patient already received  the T-Dap Vaccine (27-36 weeks) this pregnancy. The patient already received  the influenza vaccine this year. The patient already received the COVID-19 vaccine this year. The problem list reflects the active issues addressed during today's visit    Vitals:    BP: 107/71  Weight: 239 lb (108.4 kg)  Pulse: 97  Patient Position: Sitting  Albumin: Trace  Glucose: Negative  Fundal Height (cm): 32 cm  Fetal Heart Rate: 145  Movement: Present     28 week labs: .  1hr GTT: patient refused, she was supposed to check her sugars for two weeks  28 week CBC:   Lab Results   Component Value Date    WBC 6.9 2021    HGB 11.0 (L) 2021    HCT 35.2 (L) 2021    MCV 94.6 2021    PLT See Reflexed IPF Result 2021     UA w/ Ur C&S: negative    Assessment & Plan:  Lucas Hubbard is a 31 y.o. female [id number] at 32w4d   - 28 week labs completed   - Tdap vaccination: already received     - Influenza vaccination: already received     - Rhogam: not indicated   - COVID-19 vaccination: R/B/A discussed with increased risk of both maternal and fetal morbidity and mortality in unvaccinated pregnant patients who contract COVID-19- patient already recieved   -  testing indication starting 32 weeks GA: needs NST weekly   - Will schedule her NST for next week- patient does not want to do NST in this clinic because she already sees MFM weekly.  She is going to call Dr. Nolasco Offer office tomorrow and see if she can get them done there instead   - Follow up in two weeks    FGR   - Follows with MFM   - Abdominal circumference under 3%    Aortic Stenosis   - Has been cleared by cardiology for vaginal delivery   - Told to follow up with them if she becomes symptomatic   - Echo ordered      Patient Active Problem List    Diagnosis Date Noted    Asthma  2012     Priority: High     Pt did not report using inhalers at intake-[facility]      FGR 2022     AC 8%ile 22  AC under 3% 22    Needs delivered at 38 and 0  Needs weekly NST      Fetal club feet 2022    Declining 1hr GTT 2022     Patient states unable to complete 1hr GTT, supplies ordered to check blood sugars for two weeks      COVID vaccinated 2021    FHxof aortic stenosis (G3)   2021     Pt daughter born in . Small defect, no interventions  Fetal echo wnl      Hx gHTN (G3) 2020    Dysmenorrhea 2020     History of management with Depo       Hx Aortic Stenosis 2019     Arotic Stenosis. 3/24/20- saw Dr Earl Urrutia at [facility], echo done showing EF of 55%, normal LV diastolic compliance, no significant valvular abnormalities   Pt has f/u appt in 2020, Pt completed appt, to follow up as needed-[facility]      Axillary hidradenitis suppurativa 2019    Hx sPTD x 2 2019 and   ( progesterone in  )   17P Plans/Education and Counseled-  Labor Definition and Warning Signs, What is 17P and  Common side effects of 17P injections,  [facility] with [facility]. 21- GA 9w5d  Plan for [facility] accordingly. Plan to obtain progesterone from [facility] accordingly closer to 16 weeks of pregnancy   MFM placed referral. Pt started injections 10/29/21 with [facility]       Return in about 2 weeks (around 3/7/2022) for PRADIP.     - Warning signs reviewed and recommendations when to call or present to the hospital if she experiences signs or symptoms of  labor and pre-eclampsia were reviewed. - The patient was instructed on fetal kick counts. She was instructed that the baby should move at a minimum of ten times within one hour after a meal. The patient was instructed to lay down on her left side twenty minutes after eating and count movements for up to one hour with a target value of ten movements. She was instructed to notify the office if she did not make that target after two attempts or if after any attempt there was less than four movements. Jasen Bermudez DO  Ob/Gyn Resident  [facility], [place]  2/21/2022, 1:32 PM         Attending Physician Statement  I have discussed the care of Lucas Hubbard, including pertinent history and exam findings,  with the resident. I have reviewed the key elements of all parts of the encounter with the resident. I agree with the assessment, plan and orders as documented by the resident.   (GE Modifier)      Chase Childers DO

## 2022-02-24 NOTE — TELEPHONE ENCOUNTER
Patient states [facility] told her that they will do the [address] but not the NST and she will have to come here for that portion.   Patient is confused why they will not do the NST (2) assistive person

## 2022-02-28 ENCOUNTER — ROUTINE PRENATAL (OUTPATIENT)
Dept: PERINATAL CARE | Age: 27
End: 2022-02-28
Payer: COMMERCIAL

## 2022-02-28 VITALS
DIASTOLIC BLOOD PRESSURE: 60 MMHG | TEMPERATURE: 97.2 F | WEIGHT: 235 LBS | RESPIRATION RATE: 16 BRPM | BODY MASS INDEX: 40.12 KG/M2 | HEART RATE: 80 BPM | HEIGHT: 64 IN | SYSTOLIC BLOOD PRESSURE: 110 MMHG

## 2022-02-28 DIAGNOSIS — Q28.9 MATERNAL CONGENITAL CARDIOVASCULAR DISORDER AFFECTING PREGNANCY IN THIRD TRIMESTER: ICD-10-CM

## 2022-02-28 DIAGNOSIS — Z3A.33 33 WEEKS GESTATION OF PREGNANCY: ICD-10-CM

## 2022-02-28 DIAGNOSIS — O99.213 OBESITY AFFECTING PREGNANCY IN THIRD TRIMESTER: ICD-10-CM

## 2022-02-28 DIAGNOSIS — O36.5930 INTRAUTERINE GROWTH RESTRICTION (IUGR) AFFECTING CARE OF MOTHER, THIRD TRIMESTER, SINGLE GESTATION: Primary | ICD-10-CM

## 2022-02-28 DIAGNOSIS — O99.413 MATERNAL CONGENITAL CARDIOVASCULAR DISORDER AFFECTING PREGNANCY IN THIRD TRIMESTER: ICD-10-CM

## 2022-02-28 DIAGNOSIS — O35.HXX0 CLUB FOOT OF FETUS AFFECTING ANTEPARTUM CARE OF MOTHER, SINGLE OR UNSPECIFIED FETUS: ICD-10-CM

## 2022-02-28 PROCEDURE — 76820 UMBILICAL ARTERY ECHO: CPT | Performed by: OBSTETRICS & GYNECOLOGY

## 2022-02-28 PROCEDURE — 99999 PR OFFICE/OUTPT VISIT,PROCEDURE ONLY: CPT | Performed by: OBSTETRICS & GYNECOLOGY

## 2022-02-28 PROCEDURE — 76815 OB US LIMITED FETUS(S): CPT | Performed by: OBSTETRICS & GYNECOLOGY

## 2022-02-28 PROCEDURE — 76821 MIDDLE CEREBRAL ARTERY ECHO: CPT | Performed by: OBSTETRICS & GYNECOLOGY

## 2022-02-28 PROCEDURE — 76819 FETAL BIOPHYS PROFIL W/O NST: CPT | Performed by: OBSTETRICS & GYNECOLOGY

## 2022-03-04 ENCOUNTER — HOSPITAL ENCOUNTER (OUTPATIENT)
Age: 27
Discharge: HOME | End: 2022-03-04
Attending: OBSTETRICS & GYNECOLOGY | Admitting: OBSTETRICS & GYNECOLOGY
Payer: COMMERCIAL

## 2022-03-04 VITALS
RESPIRATION RATE: 16 BRPM | HEART RATE: 87 BPM | WEIGHT: 235 LBS | DIASTOLIC BLOOD PRESSURE: 78 MMHG | SYSTOLIC BLOOD PRESSURE: 119 MMHG | BODY MASS INDEX: 40.12 KG/M2 | HEIGHT: 64 IN | TEMPERATURE: 98 F

## 2022-03-04 PROBLEM — O09.93 PREGNANCY, HIGH-RISK, THIRD TRIMESTER: Status: ACTIVE | Noted: 2022-03-04

## 2022-03-04 LAB
-: ABNORMAL
AMORPHOUS: ABNORMAL
BACTERIA: ABNORMAL
BILIRUBIN URINE: NEGATIVE
CANDIDA SPECIES, DNA PROBE: POSITIVE
CASTS UA: ABNORMAL /LPF (ref 0–2)
CASTS UA: ABNORMAL /LPF (ref 0–2)
COLOR: YELLOW
EPITHELIAL CELLS UA: ABNORMAL /HPF (ref 0–5)
GARDNERELLA VAGINALIS, DNA PROBE: POSITIVE
GLUCOSE URINE: NEGATIVE
KETONES, URINE: NEGATIVE
LEUKOCYTE ESTERASE, URINE: ABNORMAL
MUCUS: ABNORMAL
NITRITE, URINE: NEGATIVE
PH UA: 8 (ref 5–8)
PROTEIN UA: NEGATIVE
RBC UA: ABNORMAL /HPF (ref 0–2)
SOURCE: ABNORMAL
SPECIFIC GRAVITY UA: 1.02 (ref 1–1.03)
TRICHOMONAS VAGINALIS DNA: NEGATIVE
TURBIDITY: ABNORMAL
URINE HGB: ABNORMAL
UROBILINOGEN, URINE: NORMAL
WBC UA: ABNORMAL /HPF (ref 0–5)

## 2022-03-04 PROCEDURE — 87086 URINE CULTURE/COLONY COUNT: CPT

## 2022-03-04 PROCEDURE — 87510 GARDNER VAG DNA DIR PROBE: CPT

## 2022-03-04 PROCEDURE — 81001 URINALYSIS AUTO W/SCOPE: CPT

## 2022-03-04 PROCEDURE — 87591 N.GONORRHOEAE DNA AMP PROB: CPT

## 2022-03-04 PROCEDURE — 87480 CANDIDA DNA DIR PROBE: CPT

## 2022-03-04 PROCEDURE — 87660 TRICHOMONAS VAGIN DIR PROBE: CPT

## 2022-03-04 PROCEDURE — 87491 CHLMYD TRACH DNA AMP PROBE: CPT

## 2022-03-04 PROCEDURE — 99213 OFFICE O/P EST LOW 20 MIN: CPT

## 2022-03-04 PROCEDURE — 99215 OFFICE O/P EST HI 40 MIN: CPT | Performed by: OBSTETRICS & GYNECOLOGY

## 2022-03-04 RX ORDER — METRONIDAZOLE 500 MG/1
500 TABLET ORAL 2 TIMES DAILY
Qty: 14 TABLET | Refills: 0 | Status: SHIPPED | OUTPATIENT
Start: 2022-03-04 | End: 2022-03-07 | Stop reason: ALTCHOICE

## 2022-03-04 RX ORDER — ACETAMINOPHEN 325 MG/1
650 TABLET ORAL EVERY 4 HOURS PRN
Status: DISCONTINUED | OUTPATIENT
Start: 2022-03-04 | End: 2022-03-04 | Stop reason: HOSPADM

## 2022-03-04 RX ORDER — ONDANSETRON 4 MG/1
4 TABLET, ORALLY DISINTEGRATING ORAL EVERY 8 HOURS PRN
Status: DISCONTINUED | OUTPATIENT
Start: 2022-03-04 | End: 2022-03-04 | Stop reason: HOSPADM

## 2022-03-04 RX ORDER — NITROFURANTOIN 25; 75 MG/1; MG/1
100 CAPSULE ORAL 2 TIMES DAILY
Qty: 20 CAPSULE | Refills: 0 | Status: SHIPPED | OUTPATIENT
Start: 2022-03-04 | End: 2022-03-14

## 2022-03-04 RX ORDER — FLUCONAZOLE 150 MG/1
150 TABLET ORAL ONCE
Qty: 1 TABLET | Refills: 0 | Status: SHIPPED | OUTPATIENT
Start: 2022-03-04 | End: 2022-03-04

## 2022-03-04 RX ORDER — ONDANSETRON 2 MG/ML
4 INJECTION INTRAMUSCULAR; INTRAVENOUS EVERY 6 HOURS PRN
Status: DISCONTINUED | OUTPATIENT
Start: 2022-03-04 | End: 2022-03-04 | Stop reason: HOSPADM

## 2022-03-04 NOTE — FLOWSHEET NOTE
Pt presents with complaints of increased vaginal bleeding. Pt states, \" I've had spotting the whole time, but it got way worse today\" Pt Denies any loss of fluid, positive fetal movement. VS taken, EFM applied. Resident Dr Isabelle Alcantara notified.

## 2022-03-04 NOTE — H&P
[address] [facility]    Date: 3/4/2022       Time: 2:42 PM   Patient Name: Trinh Kim     Patient : 1995  Room/Bed: [place] [address]    Admission Date/Time: 3/4/2022  2:07 PM      CC: Vaginal Bleeding       HPI: Trinh Kim is a 31 y.o. [id number] at 34w1d who presents with vaginal bleeding started at work earlier today. This has happened a few other times this pregnancy. The patient reports fetal movement is present, denies contractions, denies loss of fluid, complains of vaginal bleeding. No recent intercourse or new discharge or itching. DATING:  LMP: No LMP recorded (lmp unknown). Patient is pregnant.   Estimated Date of Delivery: 22   Based on: early ultrasound, at 8 5/7 weeks GA    PREGNANCY RISK FACTORS:  Patient Active Problem List   Diagnosis    Asthma     Hx Aortic Stenosis    Axillary hidradenitis suppurativa    Hx sPTD x 2    Dysmenorrhea    Hx gHTN (G3)    FHx of aortic stenosis (G3)      COVID vaccinated    FGR    Fetal club feet    Declining 1hr GTT    Pregnancy, high-risk, third trimester        Steroids Given In This Pregnancy:  no     REVIEW OF SYSTEMS:   Constitutional: negative fever, negative chills, negative weight changes   HEENT: negative visual disturbances, negative headaches, negative dizziness, negative hearing loss  Breast: Negative breast abnormalities, negative breast lumps, negative nipple discharge  Respiratory: negative dyspnea, negative cough, negative SOB  Cardiovascular: negative chest pain,  negative palpitations, negative arrhythmia, negative syncope   Gastrointestinal: negative abdominal pain, negative RUQ pain, negative N/V, negative diarrhea, negative constipation, negative bowel changes, negative heartburn   Genitourinary: negative dysuria, negative hematuria, negative urinary incontinence, negative vaginal discharge, vaginal bleeding or spotting  Dermatological: negative rash, negative pruritis, negative mole or other skin changes  Hematologic: negative bruising  Immunologic/Lymphatic: negative recent illness, negative recent sick contact  Musculoskeletal: negative back pain, negative myalgias, negative arthralgias  Neurological:  negative dizziness, negative migraines, negative seizures, negative weakness  Behavior/Psych: negative depression, negative anxiety, negative SI, negative HI    OBSTETRICAL HISTORY:   OB History    Para Term  AB Living   4 3 1 2 0 3   SAB IAB Ectopic Molar Multiple Live Births   0 0 0 0 0 3      # Outcome Date GA Lbr Filipe/2nd Weight Sex Delivery Anes PTL Lv   4 Current            3 Term 06/10/20 38w1d  6 lb 8.2 oz (2.955 kg) F Vag-Spont EPI N VANITA      Name: Arcenio Drakeails: 8  Apgar5: 9   2   32w0d  4 lb 3 oz (1.899 kg) M Vag-Spont         Birth Comments: [facility]   1   35w0d  5 lb 1 oz (2.296 kg) F Vag-Spont EPI Y VANITA      Birth Comments: this delivery       Apgar1: 7  Apgar5: 8      Obstetric Comments   G1-    G2-  - Progesterone in this preg   G3- Progesterone in this preg. Injectable Progesterone with [facility]. Same partner for all preg. PAST MEDICAL HISTORY:   has a past medical history of ACL injury tear, Asthma, Chronic airway disease, Dental crowns present, Gestational HTN--G3, Hidradenitis axillaris, History of  labor, Hydradenitis, Left ankle sprain, Maternal congenital heart disease, antepartum, Obesity,  w/ MIrena IUD 6/10/20 F APG 8/9 Wt 6#8, and Wears glasses. PAST SURGICAL HISTORY:   has a past surgical history that includes Tonsillectomy and adenoidectomy and other surgical history (Bilateral, 2016). ALLERGIES:  is allergic to lidocaine. MEDICATIONS:  Prior to Admission medications    Medication Sig Start Date End Date Taking?  Authorizing Provider   glucose monitoring (FREESTYLE FREEDOM) kit 1 kit by Does not apply route daily  Patient not taking: Reported on 2/28/2022 1/24/22   Natalie Houston, DO   Alcohol Swabs PADS 1 Units by Does not apply route 4 times daily  Patient not taking: Reported on 2/28/2022 1/24/22   Natalie Houston, DO   Lancets MISC 1 each by Does not apply route 4 times daily  Patient not taking: Reported on 2/28/2022 1/24/22   Natalie Houston, DO   blood glucose monitor strips Test 4 times a day & as needed for symptoms of irregular blood glucose. Dispense sufficient amount for indicated testing frequency plus additional to accommodate PRN testing needs. Patient not taking: Reported on 2/28/2022 1/24/22   Natalie Houston, DO   HYDROXYprogesterone caproate 250 MG/ML OIL oil injection Inject 250 mg into the muscle every 7 days 10/29/21   Historical Provider, MD   fluticasone Shelba Solian) 50 MCG/ACT nasal spray instill 2 sprays into each nostril once daily 8/9/21   Historical Provider, MD   aspirin EC 81 MG EC tablet Take 1 tablet by mouth daily 9/27/21   Kassandra Martinez DO   Prenatal Multivit-Min-Fe-FA (PRENATAL FORTE) TABS Take 1 tablet by mouth Daily May substitute with any prenatal vit pt insurance will cover 9/14/21 9/14/22  Christiane Frost MD   acetaminophen (TYLENOL) 325 MG tablet Take 1 tablet by mouth every 4 hours as needed for Pain 12/4/20   Shivani Eid MD   selenium sulfide (SELSUN) 2.5 % lotion Apply topically daily as needed for Itching Apply topically daily as needed. Historical Provider, MD       FAMILY HISTORY:  family history includes Cancer in her maternal grandmother; Diabetes in her maternal grandmother and mother; Early Death (age of onset: 62) in her paternal uncle; Heart Disease in her paternal uncle; Learning Disabilities in her mother; Tresa Augustus in her maternal grandfather; No Known Problems in her brother, father, paternal grandfather, paternal grandmother, and sister; Other in her brother. SOCIAL HISTORY:   reports that she has never smoked.  She has never used smokeless tobacco. She reports that she does not drink alcohol and does not use drugs.     VITALS:  /78   Pulse 87   Temp 98 °F (36.7 °C) (Oral)   Resp 16   Ht 5' 4\" (1.626 m)   Wt 235 lb (106.6 kg)   LMP  (LMP Unknown)   BMI 40.34 kg/m²       PHYSICAL EXAM:  Fetal Heart Monitor:  Baseline Heart Rate 150, moderate variability, present accelerations, absent decelerations  [place]: contractions, irregular, every 10-15 minutes    General appearance:  no apparent distress, alert and cooperative  HEENT: head atraumatic, normocephalic, moist mucous membranes, trachea midline  Neurologic:  alert, oriented, normal speech, no focal findings or movement disorder noted  Lungs:  No increased work of breathing, good air exchange, clear to auscultation bilaterally, no crackles or wheezing  Heart:  regular rate and rhythm and no murmur, rubs, gallops  Abdomen:  soft, gravid, non-tender, no rebound, guarding, or rigidity, no RUQ or epigastric tenderness, no signs or symptoms of abruption, no signs or symptoms of chorioamnionitis, abdominal scars  Extremities:  no calf tenderness, non edematous, no varicosities, full range of motion in all four extremities, DTR's: +2/4 bilateral lower extremities   Musculoskeletal: Gross strength equal and intact throughout, no gross abnormalities, range of motion normal in hips, knees, shoulders and spine, CVA tenderness: none  Psychiatric: Mood appropriate, normal affect   Rectal Exam: not indicated  Pelvic Exam:   Chaperone for Intimate Exam: Chaperone was present for entire exam, Chaperone Name: Josefina Bowen RN  Sterile Speculum Exam:   Urethral meatus: normal appearing   Vulva: Normal hair distribution, normal appearing vulva, no masses, tenderness or lesions, normal clitoris   Vagina: Normal appearing vaginal mucosa without lesions, white vaginal vaginal discharge noted in the posterior vault, no lacerations   Cervix: Normal appearing cervix without lesions, external os visibly closed, no lacerations or abnormal lesions visualized  Sterile Vaginal Exam:  Cervix: No cervical motion tenderness   Uterus: Is gravid, Normal size, shape, consistency and non-tender   Adnexa: Non-tender, no palpable masses  Cervix: Closed dilated, 30 % effaced, -3 station, mid position (out of 3 station), soft consistency, FETAL POSITION: Cephalic (confirmed by ultrasound), Membranes intact,    Bishops Score: 2     0 1 2 3   Position Posterior Intermediate Anterior -   Consistency Firm Intermediate Soft -   Effacement 0-30% 31-50% 51-80% >80%   Dilation 0cm 1-2cm 3-4cm >5cm   Fetal Station -3 -2 -1, 0 +1, +2     DATA:  Membranes Ruptured: No  Fern: Not done  Nitrazine: Not done  Valsalva/Pooling: not examined  Vaginal Bleeding: absent    LIMITED BEDSIDE US:  Position: Cephalic  Placental Location: anterior  Fetal Heart Tones: Present  Fetal Movement: Present  Amniotic Fluid Index/Volume:  adequate 2x2 cm fluid pocket  Bedside BPP 8/8    PRENATAL LAB RESULTS:   Blood Type/Rh: O pos  Antibody Screen: negative  Hemoglobin, Hematocrit, Platelets: Hgb 08.3/NZT 35.2/Plt 210  Rubella: immune  T.  Pallidum, IgG: non-reactive  Hepatitis B Surface Antigen: non-reactive   Hepatitis C Antibody: non-reactive   HIV: negative   Sickle Cell Screen: not available  Gonorrhea: negative  Chlamydia: negative  Urine culture: negative, date: 9/7/21    Early 1 hour Glucose Tolerance Test: not available  Early 3 hour Glucose Tolerance Test: not done  1 hour Glucose Tolerance Test: not done  3 hour Glucose Tolerance Test: not done    Group B Strep: not done   Cystic Fibrosis Screen: not available  First Trimester Screen: no aneuploidy  MSAFP/Multiple Markers: no aneuploidy  Non-Invasive Prenatal Testing: low risk  Anatomy US: anterior placenta, 3 VC, female gender, bilateral club feet    ASSESSMENT & PLAN:  Sarahi Worthy is a 31 y.o. female [id number] at 34w1d with vaginal bleeding   - GBS not done / Rh positive / R immune   - No indication for GBS prophylaxis   - Affirm shows BV and yeast. Rx for Flagyl given   -suspected UTI based on symptoms and urinalysis results. Macrobid given, culture pending. Category II FHT   -1 spontaneous decel x 1 min with category I FHT before and after. BPP . FGR   -pt following with MFM, scheduled for weekly BPP and planning IOL at 38 weeks. Appt with  [facility] on 3/7/22. Patient Active Problem List    Diagnosis Date Noted    Asthma  2012     Priority: High     Pt did not report using inhalers at intake-[facility]      Pregnancy, high-risk, third trimester 2022    FGR 2022     AC 8%ile 22  AC under 3% 22    Needs delivered at 38 and 0  Needs weekly NST   Pt scheduled for weekly BPP with MFM -[facility]      Fetal club feet 2022    Declining 1hr GTT 2022     Patient states unable to complete 1hr GTT, supplies ordered to check blood sugars for two weeks      COVID vaccinated 2021    FHx of aortic stenosis (G3)   2021     Pt daughter born in . Small defect, no interventions  Fetal echo wnl      Hx gHTN (G3) 2020    Dysmenorrhea 2020     History of management with Depo       Hx Aortic Stenosis 2019     Arotic Stenosis. 3/24/20- saw Dr Winnie Joyner at [facility], echo done showing EF of 55%, normal LV diastolic compliance, no significant valvular abnormalities   Pt has f/u appt in 2020, Pt completed appt, to follow up as needed-[facility]      Axillary hidradenitis suppurativa 2019    Hx sPTD x 2 2019 and   ( progesterone in  )   17P Plans/Education and Counseled-  Labor Definition and Warning Signs, What is 17P and  Common side effects of 17P injections,  [facility] with [facility]. 21- GA 9w5d  Plan for referral to [facility] accordingly.    Plan to obtain progesterone from [facility] accordingly closer to 16 weeks of pregnancy   MFM placed referral. Pt started injections 10/29/21 with [facility]           Risks, benefits, alternatives and possible complications have been discussed in detail with the patient. Admission, and post admission procedures and expectations were discussed in detail. All questions were answered.       Segun Pierce MD  OB/GYN Hospitalist  3/4/2022, 2:42 PM

## 2022-03-05 LAB
CULTURE: NORMAL
SPECIMEN DESCRIPTION: NORMAL

## 2022-03-07 ENCOUNTER — ROUTINE PRENATAL (OUTPATIENT)
Dept: OBGYN | Age: 27
End: 2022-03-07
Payer: COMMERCIAL

## 2022-03-07 ENCOUNTER — ROUTINE PRENATAL (OUTPATIENT)
Dept: PERINATAL CARE | Age: 27
End: 2022-03-07
Payer: COMMERCIAL

## 2022-03-07 VITALS
HEART RATE: 81 BPM | BODY MASS INDEX: 41.14 KG/M2 | DIASTOLIC BLOOD PRESSURE: 74 MMHG | SYSTOLIC BLOOD PRESSURE: 123 MMHG | WEIGHT: 239.7 LBS

## 2022-03-07 VITALS
RESPIRATION RATE: 16 BRPM | HEART RATE: 76 BPM | SYSTOLIC BLOOD PRESSURE: 108 MMHG | TEMPERATURE: 97.5 F | DIASTOLIC BLOOD PRESSURE: 72 MMHG | BODY MASS INDEX: 40.12 KG/M2 | WEIGHT: 235 LBS | HEIGHT: 64 IN

## 2022-03-07 DIAGNOSIS — O36.5930 INTRAUTERINE GROWTH RESTRICTION (IUGR) AFFECTING CARE OF MOTHER, THIRD TRIMESTER, SINGLE GESTATION: Primary | ICD-10-CM

## 2022-03-07 DIAGNOSIS — O36.5990 PREGNANCY AFFECTED BY FETAL GROWTH RESTRICTION: ICD-10-CM

## 2022-03-07 DIAGNOSIS — Z3A.34 34 WEEKS GESTATION OF PREGNANCY: Primary | ICD-10-CM

## 2022-03-07 DIAGNOSIS — R73.09 BLOOD SUGAR INCREASED: ICD-10-CM

## 2022-03-07 DIAGNOSIS — O99.413 MATERNAL CONGENITAL CARDIOVASCULAR DISORDER AFFECTING PREGNANCY IN THIRD TRIMESTER: ICD-10-CM

## 2022-03-07 DIAGNOSIS — O35.HXX0 CLUB FOOT OF FETUS AFFECTING ANTEPARTUM CARE OF MOTHER, SINGLE OR UNSPECIFIED FETUS: ICD-10-CM

## 2022-03-07 DIAGNOSIS — O99.213 OBESITY AFFECTING PREGNANCY IN THIRD TRIMESTER: ICD-10-CM

## 2022-03-07 DIAGNOSIS — O09.891 HISTORY OF PRETERM DELIVERY, CURRENTLY PREGNANT IN FIRST TRIMESTER: ICD-10-CM

## 2022-03-07 DIAGNOSIS — Z3A.34 34 WEEKS GESTATION OF PREGNANCY: ICD-10-CM

## 2022-03-07 DIAGNOSIS — Q28.9 MATERNAL CONGENITAL CARDIOVASCULAR DISORDER AFFECTING PREGNANCY IN THIRD TRIMESTER: ICD-10-CM

## 2022-03-07 PROCEDURE — 76820 UMBILICAL ARTERY ECHO: CPT | Performed by: OBSTETRICS & GYNECOLOGY

## 2022-03-07 PROCEDURE — G8482 FLU IMMUNIZE ORDER/ADMIN: HCPCS | Performed by: STUDENT IN AN ORGANIZED HEALTH CARE EDUCATION/TRAINING PROGRAM

## 2022-03-07 PROCEDURE — 99999 PR OFFICE/OUTPT VISIT,PROCEDURE ONLY: CPT | Performed by: OBSTETRICS & GYNECOLOGY

## 2022-03-07 PROCEDURE — 76815 OB US LIMITED FETUS(S): CPT | Performed by: OBSTETRICS & GYNECOLOGY

## 2022-03-07 PROCEDURE — 1036F TOBACCO NON-USER: CPT | Performed by: STUDENT IN AN ORGANIZED HEALTH CARE EDUCATION/TRAINING PROGRAM

## 2022-03-07 PROCEDURE — G8427 DOCREV CUR MEDS BY ELIG CLIN: HCPCS | Performed by: STUDENT IN AN ORGANIZED HEALTH CARE EDUCATION/TRAINING PROGRAM

## 2022-03-07 PROCEDURE — 76819 FETAL BIOPHYS PROFIL W/O NST: CPT | Performed by: OBSTETRICS & GYNECOLOGY

## 2022-03-07 PROCEDURE — G8417 CALC BMI ABV UP PARAM F/U: HCPCS | Performed by: STUDENT IN AN ORGANIZED HEALTH CARE EDUCATION/TRAINING PROGRAM

## 2022-03-07 PROCEDURE — 99211 OFF/OP EST MAY X REQ PHY/QHP: CPT | Performed by: STUDENT IN AN ORGANIZED HEALTH CARE EDUCATION/TRAINING PROGRAM

## 2022-03-07 PROCEDURE — 99213 OFFICE O/P EST LOW 20 MIN: CPT | Performed by: STUDENT IN AN ORGANIZED HEALTH CARE EDUCATION/TRAINING PROGRAM

## 2022-03-07 PROCEDURE — 76821 MIDDLE CEREBRAL ARTERY ECHO: CPT | Performed by: OBSTETRICS & GYNECOLOGY

## 2022-03-07 RX ORDER — FLUCONAZOLE 150 MG/1
TABLET ORAL
COMMUNITY
Start: 2022-03-04 | End: 2022-03-18 | Stop reason: CLARIF

## 2022-03-07 NOTE — PROGRESS NOTES
Prenatal Visit    Mikel Ritter is a 31 y.o. female [id number] at 34w4d    The patient was seen and evaluated. She has no complaints today. She reports positive fetal movements. She denies contractions, vaginal bleeding and leakage of fluid. Signs and symptoms of labor and pre-eclampsia were reviewed with the patient in detail. She is to report any of these if they occur. She currently denies any of these. The patient is Rh positive and Rhogam not indicated   The patient already received  the T-Dap Vaccine (27-36 weeks) this pregnancy. The patient already received  the influenza vaccine this year. The patient already received the COVID-19 vaccine this year. The problem list reflects the active issues addressed during today's visit    Vitals:    BP: 123/74  Weight: 239 lb 11.2 oz (108.7 kg)  Pulse: 81  Patient Position: Sitting  Albumin: Trace  Glucose: Negative  Fundal Height (cm): 33 cm  Fetal Heart Rate: 151  Movement: Present     28 week labs: .  1hr GTT: patient declined- checked her sugars for two weeks  28 week CBC:   Lab Results   Component Value Date    WBC 6.9 09/27/2021    HGB 11.0 (L) 09/27/2021    HCT 35.2 (L) 09/27/2021    MCV 94.6 09/27/2021    PLT See Reflexed IPF Result 09/27/2021     UA w/ Ur C&S: negative     Assessment & Plan:  Mikel Ritter is a 31 y.o. female [id number] at 34w4d   - 28 week labs completed, patient refused 1 hr GTT   - Tdap vaccination: already received     - Influenza vaccination: already received     - Rhogam: not indicated   - COVID-19 vaccination: R/B/A discussed with increased risk of both maternal and fetal morbidity and mortality in unvaccinated pregnant patients who contract COVID-19- patient already received    FGR   - Following with MFM for weekly dopplers and BPP   - Last scan was today   - Next scan is 3/18/22   - Last growth showed   - AC under 3% 2/21/22   - Will need IOL at 38 weeks    Declining 1 hr   - patient declined 1 hr GTT   - 2 week sugars were wnl     Hx iPTD x2   - on IM progesterone through [facility]    Aortic Stenosis              - Has been cleared by cardiology for vaginal delivery              - Told to follow up with them if she becomes symptomatic              - Echo ordered 2 weeks ago, patient encouraged to go get that done   - 3/24/20- saw Dr Bernadette Mo at [facility], echo done showing EF of 55%, normal LV diastolic compliance, no significant valvular abnormalities    Patient Active Problem List    Diagnosis Date Noted    Asthma  2012     Priority: High     Pt did not report using inhalers at intake-[facility] 2022     AC 8%ile 22  AC under 3% 22    Needs delivered at 38 and 0  Needs weekly NST   Pt scheduled for weekly BPP with [facility] -[facility]      Fetal club feet 2022    Declining 1hr GTT 2022     Patient states unable to complete 1hr GTT, supplies ordered to check blood sugars for two weeks      COVID vaccinated 2021    FHx of aortic stenosis (G3)   2021     Pt daughter born in . Small defect, no interventions  Fetal echo wnl      Hx gHTN (G3) 2020    Dysmenorrhea 2020     History of management with Depo       Hx Aortic Stenosis 2019     Arotic Stenosis. 3/24/20- saw Dr Bernadette Mo at [facility], echo done showing EF of 55%, normal LV diastolic compliance, no significant valvular abnormalities   Pt has f/u appt in 2020, Pt completed appt, to follow up as needed-[facility]      Axillary hidradenitis suppurativa 2019    Hx sPTD x 2 2019 and   ( progesterone in  )   17P Plans/Education and Counseled-  Labor Definition and Warning Signs, What is 17P and  Common side effects of 17P injections,  [facility] with [facility]. 21- GA 9w5d  Plan for referral to [facility] accordingly.    Plan to obtain progesterone from [facility] accordingly closer to 16 weeks of pregnancy   [facility] placed referral. Pt started injections 10/29/21 with [facility] Return in about 1 week (around 3/14/2022) for Nela Cortez 9038. - Warning signs reviewed and recommendations when to call or present to the hospital if she experiences signs or symptoms of  labor and pre-eclampsia were reviewed. - The patient was instructed on fetal kick counts. She was instructed that the baby should move at a minimum of ten times within one hour after a meal. The patient was instructed to lay down on her left side twenty minutes after eating and count movements for up to one hour with a target value of ten movements. She was instructed to notify the office if she did not make that target after two attempts or if after any attempt there was less than four movements. Spring Hyde DO  Ob/Gyn Resident  [facility], [address]  3/7/2022, 4:02 PM         Attending Physician Statement  I have discussed the care of Rob Garcia, including pertinent history and exam findings,  with the resident. I have reviewed the key elements of all parts of the encounter with the resident. I agree with the assessment, plan and orders as documented by the resident.   (GE Modifier)      Ana Albright DO

## 2022-03-18 ENCOUNTER — ROUTINE PRENATAL (OUTPATIENT)
Dept: PERINATAL CARE | Age: 27
End: 2022-03-18
Payer: COMMERCIAL

## 2022-03-18 VITALS
SYSTOLIC BLOOD PRESSURE: 119 MMHG | TEMPERATURE: 97.2 F | DIASTOLIC BLOOD PRESSURE: 76 MMHG | HEIGHT: 64 IN | WEIGHT: 241 LBS | HEART RATE: 76 BPM | BODY MASS INDEX: 41.15 KG/M2

## 2022-03-18 DIAGNOSIS — Z3A.36 36 WEEKS GESTATION OF PREGNANCY: ICD-10-CM

## 2022-03-18 DIAGNOSIS — O35.HXX0 CLUB FOOT OF FETUS AFFECTING ANTEPARTUM CARE OF MOTHER, SINGLE OR UNSPECIFIED FETUS: ICD-10-CM

## 2022-03-18 DIAGNOSIS — Q28.9 MATERNAL CONGENITAL CARDIOVASCULAR DISORDER AFFECTING PREGNANCY IN THIRD TRIMESTER: ICD-10-CM

## 2022-03-18 DIAGNOSIS — O99.413 MATERNAL CONGENITAL CARDIOVASCULAR DISORDER AFFECTING PREGNANCY IN THIRD TRIMESTER: ICD-10-CM

## 2022-03-18 DIAGNOSIS — Z13.89 ENCOUNTER FOR ROUTINE SCREENING FOR MALFORMATION USING ULTRASONICS: ICD-10-CM

## 2022-03-18 DIAGNOSIS — O36.5930 INTRAUTERINE GROWTH RESTRICTION (IUGR) AFFECTING CARE OF MOTHER, THIRD TRIMESTER, SINGLE GESTATION: Primary | ICD-10-CM

## 2022-03-18 DIAGNOSIS — O99.213 OBESITY AFFECTING PREGNANCY IN THIRD TRIMESTER: ICD-10-CM

## 2022-03-18 DIAGNOSIS — Z36.4 ANTENATAL SCREENING FOR FETAL GROWTH RETARDATION USING ULTRASONICS: ICD-10-CM

## 2022-03-18 PROCEDURE — 99999 PR OFFICE/OUTPT VISIT,PROCEDURE ONLY: CPT | Performed by: OBSTETRICS & GYNECOLOGY

## 2022-03-18 PROCEDURE — 76819 FETAL BIOPHYS PROFIL W/O NST: CPT | Performed by: OBSTETRICS & GYNECOLOGY

## 2022-03-18 PROCEDURE — 76820 UMBILICAL ARTERY ECHO: CPT | Performed by: OBSTETRICS & GYNECOLOGY

## 2022-03-18 PROCEDURE — 76821 MIDDLE CEREBRAL ARTERY ECHO: CPT | Performed by: OBSTETRICS & GYNECOLOGY

## 2022-03-18 PROCEDURE — 76805 OB US >/= 14 WKS SNGL FETUS: CPT | Performed by: OBSTETRICS & GYNECOLOGY

## 2022-03-23 ENCOUNTER — ROUTINE PRENATAL (OUTPATIENT)
Dept: OBGYN | Age: 27
End: 2022-03-23
Payer: COMMERCIAL

## 2022-03-23 ENCOUNTER — HOSPITAL ENCOUNTER (OUTPATIENT)
Age: 27
Setting detail: SPECIMEN
Discharge: HOME | End: 2022-03-23

## 2022-03-23 VITALS
DIASTOLIC BLOOD PRESSURE: 78 MMHG | HEART RATE: 79 BPM | BODY MASS INDEX: 41.54 KG/M2 | WEIGHT: 242 LBS | SYSTOLIC BLOOD PRESSURE: 119 MMHG

## 2022-03-23 DIAGNOSIS — O36.5990 PREGNANCY AFFECTED BY FETAL GROWTH RESTRICTION: ICD-10-CM

## 2022-03-23 DIAGNOSIS — Z3A.36 36 WEEKS GESTATION OF PREGNANCY: ICD-10-CM

## 2022-03-23 DIAGNOSIS — Z3A.36 36 WEEKS GESTATION OF PREGNANCY: Primary | ICD-10-CM

## 2022-03-23 PROCEDURE — 1036F TOBACCO NON-USER: CPT | Performed by: STUDENT IN AN ORGANIZED HEALTH CARE EDUCATION/TRAINING PROGRAM

## 2022-03-23 PROCEDURE — G8417 CALC BMI ABV UP PARAM F/U: HCPCS | Performed by: STUDENT IN AN ORGANIZED HEALTH CARE EDUCATION/TRAINING PROGRAM

## 2022-03-23 PROCEDURE — 99212 OFFICE O/P EST SF 10 MIN: CPT | Performed by: STUDENT IN AN ORGANIZED HEALTH CARE EDUCATION/TRAINING PROGRAM

## 2022-03-23 PROCEDURE — 99211 OFF/OP EST MAY X REQ PHY/QHP: CPT | Performed by: OBSTETRICS & GYNECOLOGY

## 2022-03-23 PROCEDURE — G8482 FLU IMMUNIZE ORDER/ADMIN: HCPCS | Performed by: STUDENT IN AN ORGANIZED HEALTH CARE EDUCATION/TRAINING PROGRAM

## 2022-03-23 PROCEDURE — G8427 DOCREV CUR MEDS BY ELIG CLIN: HCPCS | Performed by: STUDENT IN AN ORGANIZED HEALTH CARE EDUCATION/TRAINING PROGRAM

## 2022-03-23 NOTE — PROGRESS NOTES
Prenatal Visit    Farhana Carlos is a 31 y.o. female G6C1041 at 36w7d    The patient was seen and evaluated. She complains of irregular contractions but denies concerns for labor. She reports positive fetal movements. She denies vaginal bleeding and leakage of fluid. Signs and symptoms of labor and pre-eclampsia were reviewed with the patient in detail. She is to report any of these if they occur. She currently denies any of these. The patient is Rh positive and Rhogam is not indicated in this pregnancy and informed the patient  The patient already received  the T-Dap Vaccine (27-36 weeks) this pregnancy. The patient already received the COVID-19 vaccine this year. The problem list reflects the active issues addressed during today's visit    Vitals:    BP: 119/78  Weight: 242 lb (109.8 kg)  Pulse: 79  Patient Position: Sitting  Albumin: Negative  Glucose: Negative  Fundal Height (cm): 35 cm  Fetal Heart Rate: 135  Movement: Present     28 week labs: .  1hr GTT: declined - checked sugars x2 weeks  28 week CBC:   Lab Results   Component Value Date    WBC 6.9 2021    HGB 11.0 (L) 2021    HCT 35.2 (L) 2021    MCV 94.6 2021    PLT See Reflexed IPF Result 2021     UA w/ Ur C&S: no growth     Assessment & Plan:  Farhana Carlos is a 31 y.o. female [id number] at 36w7d   - 28 week labs completed   - Tdap vaccination: already received     - Rhogam: is not indicated in this pregnancy   - COVID-19 vaccination: R/B/A discussed with increased risk of both maternal and fetal morbidity and mortality in unvaccinated pregnant patients who contract COVID-19- patient already received   -  testing indication starting 32 weeks GA: FGR- scheduled for [facility] 3/25/22   - Warning signs reviewed and recommendations when to call or present to the hospital if she experiences signs or symptoms of  labor and pre-eclampsia were reviewed. - The patient was instructed on fetal kick counts.  She was instructed that the baby should move at a minimum of ten times within one hour after a meal. The patient was instructed to lay down on her left side twenty minutes after eating and count movements for up to one hour with a target value of ten movements. She was instructed to notify the office if she did not make that target after two attempts or if after any attempt there was less than four movements. - IOL scheduled for 3/31/22 at 8am at 38w0d. Patient Active Problem List    Diagnosis Date Noted    Asthma  2012     Priority: High     Pt did not report using inhalers at intake-[facility] 2022     AC 8%ile 22  AC under 3% 22    Needs delivered at 38 and 0  Needs weekly NST   Pt scheduled for weekly BPP with [facility] -[facility]      Fetal club feet 2022    Declining 1hr GTT 2022     Patient states unable to complete 1hr GTT, supplies ordered to check blood sugars for two weeks  Patient states that she was told her sugars are wnl and threw away the sheets      COVID vaccinated 2021    FHx of aortic stenosis (G3)   2021     Pt daughter born in . Small defect, no interventions  Fetal echo wnl      Hx gHTN (G3) 2020    Dysmenorrhea 2020     History of management with Depo       Hx Aortic Stenosis 2019     Arotic Stenosis. 3/24/20- saw Dr Molly Parks at [facility], echo done showing EF of 55%, normal LV diastolic compliance, no significant valvular abnormalities   Pt has f/u appt in 2020, Pt completed appt, to follow up as needed-[facility]      Axillary hidradenitis suppurativa 2019    Hx sPTD x 2 2019 and   ( progesterone in  )   17P Plans/Education and Counseled-  Labor Definition and Warning Signs, What is 17P and  Common side effects of 17P injections,  [facility] with [facility]. 21- GA 9w5d  Plan for referral to [facility] accordingly.    Plan to obtain progesterone from [place] accordingly closer to 16 weeks of pregnancy   MFM placed referral. Pt started injections 10/29/21 with [facility]       Return in about 1 week (around 3/30/2022) for MYRIAM Ness DO  Ob/Gyn Resident  [facility] [place] [facility], [address]  3/23/2022, 5:15 PM

## 2022-03-25 ENCOUNTER — ROUTINE PRENATAL (OUTPATIENT)
Dept: PERINATAL CARE | Age: 27
End: 2022-03-25
Payer: COMMERCIAL

## 2022-03-25 VITALS
HEART RATE: 81 BPM | DIASTOLIC BLOOD PRESSURE: 69 MMHG | SYSTOLIC BLOOD PRESSURE: 108 MMHG | WEIGHT: 240.3 LBS | TEMPERATURE: 97.1 F | HEIGHT: 64 IN | RESPIRATION RATE: 16 BRPM | BODY MASS INDEX: 41.03 KG/M2

## 2022-03-25 DIAGNOSIS — Q28.9 MATERNAL CONGENITAL CARDIOVASCULAR DISORDER AFFECTING PREGNANCY IN THIRD TRIMESTER: ICD-10-CM

## 2022-03-25 DIAGNOSIS — O99.213 OBESITY AFFECTING PREGNANCY IN THIRD TRIMESTER: ICD-10-CM

## 2022-03-25 DIAGNOSIS — O35.HXX0 CLUB FOOT OF FETUS AFFECTING ANTEPARTUM CARE OF MOTHER, SINGLE OR UNSPECIFIED FETUS: ICD-10-CM

## 2022-03-25 DIAGNOSIS — O36.5930 INTRAUTERINE GROWTH RESTRICTION (IUGR) AFFECTING CARE OF MOTHER, THIRD TRIMESTER, SINGLE GESTATION: Primary | ICD-10-CM

## 2022-03-25 DIAGNOSIS — Z3A.37 37 WEEKS GESTATION OF PREGNANCY: ICD-10-CM

## 2022-03-25 DIAGNOSIS — O99.413 MATERNAL CONGENITAL CARDIOVASCULAR DISORDER AFFECTING PREGNANCY IN THIRD TRIMESTER: ICD-10-CM

## 2022-03-25 PROCEDURE — 76819 FETAL BIOPHYS PROFIL W/O NST: CPT | Performed by: OBSTETRICS & GYNECOLOGY

## 2022-03-25 PROCEDURE — 99999 PR OFFICE/OUTPT VISIT,PROCEDURE ONLY: CPT | Performed by: OBSTETRICS & GYNECOLOGY

## 2022-03-25 PROCEDURE — 76820 UMBILICAL ARTERY ECHO: CPT | Performed by: OBSTETRICS & GYNECOLOGY

## 2022-03-25 PROCEDURE — 76815 OB US LIMITED FETUS(S): CPT | Performed by: OBSTETRICS & GYNECOLOGY

## 2022-03-25 PROCEDURE — 76821 MIDDLE CEREBRAL ARTERY ECHO: CPT | Performed by: OBSTETRICS & GYNECOLOGY

## 2022-03-26 LAB
CULTURE: ABNORMAL
SPECIMEN DESCRIPTION: ABNORMAL

## 2022-03-28 PROBLEM — B95.1 POSITIVE GBS TEST: Status: ACTIVE | Noted: 2022-03-28

## 2022-03-29 ENCOUNTER — ROUTINE PRENATAL (OUTPATIENT)
Dept: OBGYN | Age: 27
End: 2022-03-29
Payer: COMMERCIAL

## 2022-03-29 VITALS
HEART RATE: 87 BPM | DIASTOLIC BLOOD PRESSURE: 81 MMHG | BODY MASS INDEX: 41.71 KG/M2 | SYSTOLIC BLOOD PRESSURE: 122 MMHG | WEIGHT: 243 LBS

## 2022-03-29 DIAGNOSIS — Z3A.37 37 WEEKS GESTATION OF PREGNANCY: ICD-10-CM

## 2022-03-29 DIAGNOSIS — O09.93 HIGH-RISK PREGNANCY IN THIRD TRIMESTER: Primary | ICD-10-CM

## 2022-03-29 PROCEDURE — G8482 FLU IMMUNIZE ORDER/ADMIN: HCPCS | Performed by: STUDENT IN AN ORGANIZED HEALTH CARE EDUCATION/TRAINING PROGRAM

## 2022-03-29 PROCEDURE — 1036F TOBACCO NON-USER: CPT | Performed by: STUDENT IN AN ORGANIZED HEALTH CARE EDUCATION/TRAINING PROGRAM

## 2022-03-29 PROCEDURE — G8427 DOCREV CUR MEDS BY ELIG CLIN: HCPCS | Performed by: STUDENT IN AN ORGANIZED HEALTH CARE EDUCATION/TRAINING PROGRAM

## 2022-03-29 PROCEDURE — 99213 OFFICE O/P EST LOW 20 MIN: CPT | Performed by: STUDENT IN AN ORGANIZED HEALTH CARE EDUCATION/TRAINING PROGRAM

## 2022-03-29 PROCEDURE — G8417 CALC BMI ABV UP PARAM F/U: HCPCS | Performed by: STUDENT IN AN ORGANIZED HEALTH CARE EDUCATION/TRAINING PROGRAM

## 2022-03-29 NOTE — PROGRESS NOTES
attempts or if after any attempt there was less than four movements. The patient reports that the targets have been made Yes. Risk Factors- Same partner, h/o CHD in patient and her daughter ( G3 ), PreGravid BMI- 42.43 Asthma, h/o SPTD x2  Agreeable to vaccinations in preg. 21- MFM referral placed for first trim screen, NIPT and anatomy scan. Pt is a candidate for progesterone in this preg. Plan for referral to [facility] accordingly. 21- Pt is only 9w5d at this time. 10/29/21- Pt started injections   1hr gtt lab ordered for early diabetic screening        T-Dap Vaccine Completed (27-36 weeks): Completed - 22    Allergies: Allergies as of 2022 - Fully Reviewed 2022   Allergen Reaction Noted    Lidocaine Hives 2014         Group Beta Strep collection was completed. Yes  GBS Results:   Hospital Outpatient Visit on 2022   Component Date Value Ref Range Status    Specimen Description 2022 . VAGINA   Final    Culture 2022 STREPTOCOCCI, BETA HEMOLYTIC GROUP B ISOLATED*  Final   ]        Cervical Exam was:   1 cm dilated, 50 effaced, -2 station            The patient was counseled on Labor & Delivery. Route of delivery and counseling on vaginal, operative vaginal, and  sections were completed with the risks of each to both the patient as well as her baby. The possibility of a blood transfusion was discussed as well. The patient was not opposed to receiving a transfusion if needed. The patient was counseled on types of analgesia during labor and is considering either Regional or IV medication the risks, benefits and alternatives were discussed.  Testing:  Indicated for Fetal Growth Restriction. Weekly BPP scheduled with [facility]. Assessment:  1. Elier Hilario is a 31 y.o. female  2. [id number]  3. 37w5d  4. Fetal Growth Restriction   - Delivery recommended at 38w0d.  IOL Scheduled for 3/31/22, however, patient wishes to change date to 4/3/22. IOL rescheduled. - Weekly BPP scheduled with MFM. Encouraged to keep this appointment 22       Patient Active Problem List    Diagnosis Date Noted    [facility] 2022     Priority: High     Overview Note:     AC 8%ile 22  AC under 3% 22    Needs delivered at 38 and 0  Needs weekly NST   Pt scheduled for [facility]PP with [facility]      Asthma  2012     Priority: High     Overview Note:     Pt did not report using inhalers at intake-[facility]      Positive GBS test 2022    Fetal club feet 2022    Declining 1hr GTT 2022     Overview Note:     Patient states unable to complete 1hr GTT, supplies ordered to check blood sugars for two weeks  Patient states that she was told her sugars are wnl and threw away the sheets      COVID vaccinated 2021    FHx of aortic stenosis (G3)   2021     Overview Note:     Pt daughter born in . Small defect, no interventions  Fetal echo wnl      Hx gHTN (G3) 2020    Dysmenorrhea 2020     Overview Note:     History of management with Depo       Hx Aortic Stenosis 2019     Overview Note:     Arotic Stenosis. 3/24/20- saw Dr Tonie Pop [facility], echo done showing EF of 55%, normal LV diastolic compliance, no significant valvular abnormalities   Pt has f/u appt in 2020, Pt completed appt, to follow up as needed-[facility]      Axillary hidradenitis suppurativa 2019    Hx sPTD x 2 2019     Overview Note:      and   ( progesterone in  )   17P Plans/Education and Counseled-  Labor Definition and Warning Signs, What is 17P and  Common side effects of 17P injections,  [facility] with [facility]. 21- GA 9w5d  Plan for referral to [facility] accordingly. Plan to obtain progesterone from [place] accordingly closer to 16 weeks of pregnancy   MFM placed referral. Pt started injections 10/29/21 with [facility]          Diagnosis Orders   1.  High-risk pregnancy in third trimester 2. 37 weeks gestation of pregnancy             Plan:  The patient will return to the office for her postpartum visit in 2-3 weeks. See antepartum flow sheet. Patient was seen with total face to face time of 20 minutes. More than 50% of this visit was on counseling and education regarding her    Diagnosis Orders   1. High-risk pregnancy in third trimester     2. 37 weeks gestation of pregnancy      and her options. She was also counseled on her preventative health maintenance recommendations and follow-up.

## 2022-03-30 NOTE — PROGRESS NOTES
Attending Physician Statement  I have discussed the care of [address], including pertinent history and exam findings,  with the resident. I have reviewed the key elements of all parts of the encounter with the resident. I agree with the assessment, plan and orders as documented by the resident.   (GE Modifier)    Electronically signed by Shavon Joiner MD at 11:42 AM 3/30/22

## 2022-04-01 ENCOUNTER — ROUTINE PRENATAL (OUTPATIENT)
Dept: PERINATAL CARE | Age: 27
DRG: 560 | End: 2022-04-01
Payer: COMMERCIAL

## 2022-04-01 VITALS
SYSTOLIC BLOOD PRESSURE: 110 MMHG | HEIGHT: 64 IN | RESPIRATION RATE: 16 BRPM | BODY MASS INDEX: 40.95 KG/M2 | HEART RATE: 74 BPM | WEIGHT: 239.86 LBS | DIASTOLIC BLOOD PRESSURE: 69 MMHG | TEMPERATURE: 97.2 F

## 2022-04-01 DIAGNOSIS — O40.9XX0 POLYHYDRAMNIOS, ANTEPARTUM, SINGLE OR UNSPECIFIED FETUS: ICD-10-CM

## 2022-04-01 DIAGNOSIS — O99.413 MATERNAL CONGENITAL CARDIOVASCULAR DISORDER AFFECTING PREGNANCY IN THIRD TRIMESTER: ICD-10-CM

## 2022-04-01 DIAGNOSIS — Z3A.38 38 WEEKS GESTATION OF PREGNANCY: ICD-10-CM

## 2022-04-01 DIAGNOSIS — O35.HXX0 CLUB FOOT OF FETUS AFFECTING ANTEPARTUM CARE OF MOTHER, SINGLE OR UNSPECIFIED FETUS: ICD-10-CM

## 2022-04-01 DIAGNOSIS — Q28.9 MATERNAL CONGENITAL CARDIOVASCULAR DISORDER AFFECTING PREGNANCY IN THIRD TRIMESTER: ICD-10-CM

## 2022-04-01 DIAGNOSIS — O36.5930 INTRAUTERINE GROWTH RESTRICTION (IUGR) AFFECTING CARE OF MOTHER, THIRD TRIMESTER, SINGLE GESTATION: Primary | ICD-10-CM

## 2022-04-01 DIAGNOSIS — O99.213 OBESITY AFFECTING PREGNANCY IN THIRD TRIMESTER: ICD-10-CM

## 2022-04-01 PROCEDURE — 76816 OB US FOLLOW-UP PER FETUS: CPT | Performed by: OBSTETRICS & GYNECOLOGY

## 2022-04-01 PROCEDURE — 76820 UMBILICAL ARTERY ECHO: CPT | Performed by: OBSTETRICS & GYNECOLOGY

## 2022-04-01 PROCEDURE — 76819 FETAL BIOPHYS PROFIL W/O NST: CPT | Performed by: OBSTETRICS & GYNECOLOGY

## 2022-04-01 PROCEDURE — 76821 MIDDLE CEREBRAL ARTERY ECHO: CPT | Performed by: OBSTETRICS & GYNECOLOGY

## 2022-04-01 PROCEDURE — 76815 OB US LIMITED FETUS(S): CPT | Performed by: OBSTETRICS & GYNECOLOGY

## 2022-04-03 ENCOUNTER — APPOINTMENT (OUTPATIENT)
Dept: LABOR AND DELIVERY | Age: 27
DRG: 560 | End: 2022-04-03
Payer: COMMERCIAL

## 2022-04-03 ENCOUNTER — HOSPITAL ENCOUNTER (INPATIENT)
Age: 27
LOS: 2 days | Discharge: HOME | DRG: 560 | End: 2022-04-05
Attending: OBSTETRICS & GYNECOLOGY | Admitting: OBSTETRICS & GYNECOLOGY
Payer: COMMERCIAL

## 2022-04-03 PROBLEM — O09.92 HIGH-RISK PREGNANCY IN SECOND TRIMESTER: Status: ACTIVE | Noted: 2022-04-03

## 2022-04-03 LAB
ABO/RH: NORMAL
ABSOLUTE EOS #: 0.08 K/UL (ref 0–0.44)
ABSOLUTE IMMATURE GRANULOCYTE: 0.03 K/UL (ref 0–0.3)
ABSOLUTE LYMPH #: 1.83 K/UL (ref 1.1–3.7)
ABSOLUTE MONO #: 0.54 K/UL (ref 0.1–1.2)
AMPHETAMINE SCREEN URINE: NEGATIVE
ANTIBODY SCREEN: NEGATIVE
ARM BAND NUMBER: NORMAL
BARBITURATE SCREEN URINE: NEGATIVE
BASOPHILS # BLD: 0 % (ref 0–2)
BASOPHILS ABSOLUTE: <0.03 K/UL (ref 0–0.2)
BENZODIAZEPINE SCREEN, URINE: NEGATIVE
CANNABINOID SCREEN URINE: NEGATIVE
COCAINE METABOLITE, URINE: NEGATIVE
EOSINOPHILS RELATIVE PERCENT: 1 % (ref 1–4)
EXPIRATION DATE: NORMAL
HCT VFR BLD CALC: 33.5 % (ref 36.3–47.1)
HEMOGLOBIN: 10.9 G/DL (ref 11.9–15.1)
IMMATURE GRANULOCYTES: 0 %
LYMPHOCYTES # BLD: 22 % (ref 24–43)
MCH RBC QN AUTO: 29.2 PG (ref 25.2–33.5)
MCHC RBC AUTO-ENTMCNC: 32.5 G/DL (ref 28.4–34.8)
MCV RBC AUTO: 89.8 FL (ref 82.6–102.9)
METHADONE SCREEN, URINE: NEGATIVE
MONOCYTES # BLD: 7 % (ref 3–12)
NRBC AUTOMATED: 0 PER 100 WBC
OPIATES, URINE: NEGATIVE
OXYCODONE SCREEN URINE: NEGATIVE
PDW BLD-RTO: 12.9 % (ref 11.8–14.4)
PHENCYCLIDINE, URINE: NEGATIVE
PLATELET # BLD: 179 K/UL (ref 138–453)
PMV BLD AUTO: 12.8 FL (ref 8.1–13.5)
RBC # BLD: 3.73 M/UL (ref 3.95–5.11)
SEG NEUTROPHILS: 70 % (ref 36–65)
SEGMENTED NEUTROPHILS ABSOLUTE COUNT: 5.67 K/UL (ref 1.5–8.1)
T. PALLIDUM, IGG: NONREACTIVE
TEST INFORMATION: NORMAL
WBC # BLD: 8.2 K/UL (ref 3.5–11.3)

## 2022-04-03 PROCEDURE — 2580000003 HC RX 258: Performed by: STUDENT IN AN ORGANIZED HEALTH CARE EDUCATION/TRAINING PROGRAM

## 2022-04-03 PROCEDURE — 86850 RBC ANTIBODY SCREEN: CPT

## 2022-04-03 PROCEDURE — 86780 TREPONEMA PALLIDUM: CPT

## 2022-04-03 PROCEDURE — 85025 COMPLETE CBC W/AUTO DIFF WBC: CPT

## 2022-04-03 PROCEDURE — 86900 BLOOD TYPING SEROLOGIC ABO: CPT

## 2022-04-03 PROCEDURE — 6360000002 HC RX W HCPCS: Performed by: STUDENT IN AN ORGANIZED HEALTH CARE EDUCATION/TRAINING PROGRAM

## 2022-04-03 PROCEDURE — 86901 BLOOD TYPING SEROLOGIC RH(D): CPT

## 2022-04-03 PROCEDURE — 1220000000 HC SEMI PRIVATE OB R&B

## 2022-04-03 PROCEDURE — 93005 ELECTROCARDIOGRAM TRACING: CPT | Performed by: STUDENT IN AN ORGANIZED HEALTH CARE EDUCATION/TRAINING PROGRAM

## 2022-04-03 PROCEDURE — 80307 DRUG TEST PRSMV CHEM ANLYZR: CPT

## 2022-04-03 PROCEDURE — 6370000000 HC RX 637 (ALT 250 FOR IP): Performed by: STUDENT IN AN ORGANIZED HEALTH CARE EDUCATION/TRAINING PROGRAM

## 2022-04-03 RX ORDER — SODIUM CHLORIDE, SODIUM LACTATE, POTASSIUM CHLORIDE, CALCIUM CHLORIDE 600; 310; 30; 20 MG/100ML; MG/100ML; MG/100ML; MG/100ML
INJECTION, SOLUTION INTRAVENOUS CONTINUOUS
Status: DISCONTINUED | OUTPATIENT
Start: 2022-04-03 | End: 2022-04-04

## 2022-04-03 RX ORDER — SODIUM CHLORIDE 9 MG/ML
25 INJECTION, SOLUTION INTRAVENOUS PRN
Status: DISCONTINUED | OUTPATIENT
Start: 2022-04-03 | End: 2022-04-04 | Stop reason: HOSPADM

## 2022-04-03 RX ORDER — ONDANSETRON 2 MG/ML
4 INJECTION INTRAMUSCULAR; INTRAVENOUS EVERY 6 HOURS PRN
Status: DISCONTINUED | OUTPATIENT
Start: 2022-04-03 | End: 2022-04-04 | Stop reason: SDUPTHER

## 2022-04-03 RX ORDER — SODIUM CHLORIDE, SODIUM LACTATE, POTASSIUM CHLORIDE, AND CALCIUM CHLORIDE .6; .31; .03; .02 G/100ML; G/100ML; G/100ML; G/100ML
1000 INJECTION, SOLUTION INTRAVENOUS PRN
Status: DISCONTINUED | OUTPATIENT
Start: 2022-04-03 | End: 2022-04-04 | Stop reason: HOSPADM

## 2022-04-03 RX ORDER — SODIUM CHLORIDE 0.9 % (FLUSH) 0.9 %
5-40 SYRINGE (ML) INJECTION EVERY 12 HOURS SCHEDULED
Status: DISCONTINUED | OUTPATIENT
Start: 2022-04-03 | End: 2022-04-04 | Stop reason: HOSPADM

## 2022-04-03 RX ORDER — SODIUM CHLORIDE 0.9 % (FLUSH) 0.9 %
5-40 SYRINGE (ML) INJECTION PRN
Status: DISCONTINUED | OUTPATIENT
Start: 2022-04-03 | End: 2022-04-04 | Stop reason: HOSPADM

## 2022-04-03 RX ORDER — ACETAMINOPHEN 500 MG
1000 TABLET ORAL EVERY 6 HOURS PRN
Status: DISCONTINUED | OUTPATIENT
Start: 2022-04-03 | End: 2022-04-04 | Stop reason: HOSPADM

## 2022-04-03 RX ORDER — SODIUM CHLORIDE, SODIUM LACTATE, POTASSIUM CHLORIDE, AND CALCIUM CHLORIDE .6; .31; .03; .02 G/100ML; G/100ML; G/100ML; G/100ML
500 INJECTION, SOLUTION INTRAVENOUS PRN
Status: DISCONTINUED | OUTPATIENT
Start: 2022-04-03 | End: 2022-04-04 | Stop reason: HOSPADM

## 2022-04-03 RX ADMIN — Medication 25 MCG: at 20:37

## 2022-04-03 RX ADMIN — SODIUM CHLORIDE, POTASSIUM CHLORIDE, SODIUM LACTATE AND CALCIUM CHLORIDE: 600; 310; 30; 20 INJECTION, SOLUTION INTRAVENOUS at 18:53

## 2022-04-03 RX ADMIN — Medication 2.5 MILLION UNITS: at 23:24

## 2022-04-03 RX ADMIN — DEXTROSE MONOHYDRATE: 5 INJECTION INTRAVENOUS at 19:29

## 2022-04-03 NOTE — FLOWSHEET NOTE
Pt presents to L&D for scheduled induction of labor for FGR. Denies LOF, vaginal bleeding. Reports + fetal movement and feeling some contractions. To 704, gowned and urine specimen requested.

## 2022-04-03 NOTE — DISCHARGE SUMMARY
Obstetric Discharge Summary  [facility]    Patient Name: Vivek Jacobson  Patient : 1995  Primary Care Physician: Bijal Carlos MD  Admit Date: 4/3/2022    Principal Diagnosis: IUP at 38w3d, admitted for Scheduled IOL 2/2 FGR     Her pregnancy has been complicated by:   Patient Active Problem List   Diagnosis    Asthma     Hx Aortic Stenosis    Axillary hidradenitis suppurativa    Hx sPTD x 2    Dysmenorrhea    38 weeks gestation of pregnancy    Hx gHTN (G3)    FHx of aortic stenosis (G3)      COVID vaccinated    FGR    Fetal club feet    Declining 1hr GTT     22 M Apg 8/8 Wt 5#15        Infection Present?: No  Hospital Acquired: N/A    Surgical Operations & Procedures:  Analgesia: epidural  Delivery Type: Spontaneous Vaginal Delivery: See Labor and Delivery Summary   Laceration(s): Left periurethral hemostatic    Consultations: NICU and Anesthesia    Pertinent Findings & Procedures:   Vivek Jacobson is a 31 y.o. female [id number] at 38w3d admitted for IOL 2/2 FGR; received Cytotec 25 PO x 3, Pitocin, epidural.     The patient has a history of aortic stenosis. Cardiology was consulted and recommended follow up as outpatient and no indication for Echocardiogram prior to delivery. She delivered by spontaneous vaginal a Live Born infant on 22. Information for the patient's :  Mya Florez [[id number]]   male   Birth Weight: 5 lb 15.4 oz (2.705 kg)       Apgars: 8 at 1 minute and 8 at 5 minutes. Postpartum course: normal.    PPD#1 Hgb 10.1. Patient received Depo-Provera.     Course of patient: uncomplicated    Discharge to: Home    Readmission planned: no     Recommendations on Discharge:     Medications:      Medication List      START taking these medications    docusate sodium 100 MG capsule  Commonly known as: Colace  Take 1 capsule by mouth 2 times daily     ibuprofen 600 MG tablet  Commonly known as: ADVIL;MOTRIN  Take 1 tablet by mouth every 6 hours as needed for Pain        CONTINUE taking these medications    acetaminophen 325 MG tablet  Commonly known as: Tylenol  Take 1 tablet by mouth every 4 hours as needed for Pain     aspirin EC 81 MG EC tablet  Take 1 tablet by mouth daily     fluticasone 50 MCG/ACT nasal spray  Commonly known as: FLONASE     HYDROXYprogesterone caproate 250 MG/ML Oil oil injection     Prenatal Forte Tabs  Take 1 tablet by mouth Daily May substitute with any prenatal vit pt insurance will cover     selenium sulfide 2.5 % lotion  Commonly known as: SELSUN           Where to Get Your Medications      These medications were sent to [facility] [address] [facility], [address]  [address], [address] [address]    Phone: [phone]   · docusate sodium 100 MG capsule  · ibuprofen 600 MG tablet           Activity: pelvic rest x 6 weeks, no lifting greater than 15 lbs  Diet: regular diet  Follow up: 2 WEEKS    Condition on discharge: stable    Discharge date: 4/5/22    Reddy Santoyo DO  Ob/Gyn Resident    Comments:  Home care and follow-up care were reviewed. Pelvic rest, and birth control were reviewed. Signs and symptoms of mastitis and post partum depression were reviewed. The patient is to notify her physician if any of these occur. The patient was counseled on secondary smoke risks and the increased risk of sudden infant death syndrome and respiratory problems to her baby with exposure. She was counseled on various alternate recommendations to decrease the exposure to secondary smoke to her children.

## 2022-04-04 ENCOUNTER — ANESTHESIA (OUTPATIENT)
Dept: LABOR AND DELIVERY | Age: 27
DRG: 560 | End: 2022-04-04
Payer: COMMERCIAL

## 2022-04-04 ENCOUNTER — ANESTHESIA EVENT (OUTPATIENT)
Dept: LABOR AND DELIVERY | Age: 27
DRG: 560 | End: 2022-04-04
Payer: COMMERCIAL

## 2022-04-04 LAB
EKG ATRIAL RATE: 65 BPM
EKG P AXIS: 56 DEGREES
EKG P-R INTERVAL: 156 MS
EKG Q-T INTERVAL: 444 MS
EKG QRS DURATION: 88 MS
EKG QTC CALCULATION (BAZETT): 461 MS
EKG R AXIS: 45 DEGREES
EKG T AXIS: 33 DEGREES
EKG VENTRICULAR RATE: 65 BPM

## 2022-04-04 PROCEDURE — 2580000003 HC RX 258: Performed by: STUDENT IN AN ORGANIZED HEALTH CARE EDUCATION/TRAINING PROGRAM

## 2022-04-04 PROCEDURE — 6370000000 HC RX 637 (ALT 250 FOR IP): Performed by: STUDENT IN AN ORGANIZED HEALTH CARE EDUCATION/TRAINING PROGRAM

## 2022-04-04 PROCEDURE — 2500000003 HC RX 250 WO HCPCS: Performed by: NURSE ANESTHETIST, CERTIFIED REGISTERED

## 2022-04-04 PROCEDURE — 59409 OBSTETRICAL CARE: CPT | Performed by: OBSTETRICS & GYNECOLOGY

## 2022-04-04 PROCEDURE — 6360000002 HC RX W HCPCS: Performed by: ANESTHESIOLOGY

## 2022-04-04 PROCEDURE — 3700000025 EPIDURAL BLOCK: Performed by: ANESTHESIOLOGY

## 2022-04-04 PROCEDURE — 6360000002 HC RX W HCPCS: Performed by: STUDENT IN AN ORGANIZED HEALTH CARE EDUCATION/TRAINING PROGRAM

## 2022-04-04 PROCEDURE — 1220000000 HC SEMI PRIVATE OB R&B

## 2022-04-04 PROCEDURE — 88307 TISSUE EXAM BY PATHOLOGIST: CPT

## 2022-04-04 PROCEDURE — 93010 ELECTROCARDIOGRAM REPORT: CPT | Performed by: INTERNAL MEDICINE

## 2022-04-04 PROCEDURE — 7200000001 HC VAGINAL DELIVERY

## 2022-04-04 PROCEDURE — 3E0DXGC INTRODUCTION OF OTHER THERAPEUTIC SUBSTANCE INTO MOUTH AND PHARYNX, EXTERNAL APPROACH: ICD-10-PCS | Performed by: OBSTETRICS & GYNECOLOGY

## 2022-04-04 RX ORDER — NALBUPHINE HCL 10 MG/ML
5 AMPUL (ML) INJECTION EVERY 4 HOURS PRN
Status: DISCONTINUED | OUTPATIENT
Start: 2022-04-04 | End: 2022-04-04 | Stop reason: HOSPADM

## 2022-04-04 RX ORDER — HYDROCORTISONE 25 MG/G
CREAM TOPICAL
Status: DISCONTINUED | OUTPATIENT
Start: 2022-04-04 | End: 2022-04-05 | Stop reason: HOSPADM

## 2022-04-04 RX ORDER — ONDANSETRON 2 MG/ML
4 INJECTION INTRAMUSCULAR; INTRAVENOUS EVERY 6 HOURS PRN
Status: DISCONTINUED | OUTPATIENT
Start: 2022-04-04 | End: 2022-04-04 | Stop reason: HOSPADM

## 2022-04-04 RX ORDER — ONDANSETRON 2 MG/ML
4 INJECTION INTRAMUSCULAR; INTRAVENOUS EVERY 4 HOURS PRN
Status: DISCONTINUED | OUTPATIENT
Start: 2022-04-04 | End: 2022-04-05 | Stop reason: HOSPADM

## 2022-04-04 RX ORDER — DOCUSATE SODIUM 100 MG/1
100 CAPSULE, LIQUID FILLED ORAL 2 TIMES DAILY
Qty: 60 CAPSULE | Refills: 0 | Status: SHIPPED | OUTPATIENT
Start: 2022-04-04

## 2022-04-04 RX ORDER — DOCUSATE SODIUM 100 MG/1
100 CAPSULE, LIQUID FILLED ORAL 2 TIMES DAILY
Status: DISCONTINUED | OUTPATIENT
Start: 2022-04-04 | End: 2022-04-05 | Stop reason: HOSPADM

## 2022-04-04 RX ORDER — ACETAMINOPHEN 500 MG
1000 TABLET ORAL EVERY 6 HOURS PRN
Status: DISCONTINUED | OUTPATIENT
Start: 2022-04-04 | End: 2022-04-05 | Stop reason: HOSPADM

## 2022-04-04 RX ORDER — IBUPROFEN 600 MG/1
600 TABLET ORAL EVERY 6 HOURS
Status: DISCONTINUED | OUTPATIENT
Start: 2022-04-04 | End: 2022-04-05 | Stop reason: HOSPADM

## 2022-04-04 RX ORDER — MEDROXYPROGESTERONE ACETATE 150 MG/ML
150 INJECTION, SUSPENSION INTRAMUSCULAR ONCE
Status: COMPLETED | OUTPATIENT
Start: 2022-04-05 | End: 2022-04-05

## 2022-04-04 RX ORDER — SIMETHICONE 80 MG
80 TABLET,CHEWABLE ORAL EVERY 6 HOURS PRN
Status: DISCONTINUED | OUTPATIENT
Start: 2022-04-04 | End: 2022-04-05 | Stop reason: HOSPADM

## 2022-04-04 RX ORDER — BISACODYL 10 MG
10 SUPPOSITORY, RECTAL RECTAL DAILY PRN
Status: DISCONTINUED | OUTPATIENT
Start: 2022-04-04 | End: 2022-04-05 | Stop reason: HOSPADM

## 2022-04-04 RX ORDER — IBUPROFEN 600 MG/1
600 TABLET ORAL EVERY 6 HOURS PRN
Qty: 30 TABLET | Refills: 1 | Status: SHIPPED | OUTPATIENT
Start: 2022-04-04

## 2022-04-04 RX ORDER — LIDOCAINE HYDROCHLORIDE AND EPINEPHRINE 15; 5 MG/ML; UG/ML
INJECTION, SOLUTION EPIDURAL PRN
Status: DISCONTINUED | OUTPATIENT
Start: 2022-04-04 | End: 2022-04-04 | Stop reason: SDUPTHER

## 2022-04-04 RX ORDER — NALOXONE HYDROCHLORIDE 0.4 MG/ML
0.4 INJECTION, SOLUTION INTRAMUSCULAR; INTRAVENOUS; SUBCUTANEOUS PRN
Status: DISCONTINUED | OUTPATIENT
Start: 2022-04-04 | End: 2022-04-04 | Stop reason: HOSPADM

## 2022-04-04 RX ORDER — LANOLIN 72 %
OINTMENT (GRAM) TOPICAL PRN
Status: DISCONTINUED | OUTPATIENT
Start: 2022-04-04 | End: 2022-04-05 | Stop reason: HOSPADM

## 2022-04-04 RX ORDER — ROPIVACAINE HYDROCHLORIDE 2 MG/ML
INJECTION, SOLUTION EPIDURAL; INFILTRATION; PERINEURAL
Status: COMPLETED
Start: 2022-04-04 | End: 2022-04-04

## 2022-04-04 RX ADMIN — ACETAMINOPHEN 1000 MG: 500 TABLET ORAL at 20:37

## 2022-04-04 RX ADMIN — Medication 25 MCG: at 05:50

## 2022-04-04 RX ADMIN — Medication 2.5 MILLION UNITS: at 11:32

## 2022-04-04 RX ADMIN — SODIUM CHLORIDE, POTASSIUM CHLORIDE, SODIUM LACTATE AND CALCIUM CHLORIDE: 600; 310; 30; 20 INJECTION, SOLUTION INTRAVENOUS at 02:51

## 2022-04-04 RX ADMIN — IBUPROFEN 600 MG: 600 TABLET, FILM COATED ORAL at 15:28

## 2022-04-04 RX ADMIN — Medication 1 MILLI-UNITS/MIN: at 10:35

## 2022-04-04 RX ADMIN — LIDOCAINE HYDROCHLORIDE,EPINEPHRINE BITARTRATE 3 ML: 15; .005 INJECTION, SOLUTION EPIDURAL; INFILTRATION; INTRACAUDAL; PERINEURAL at 12:31

## 2022-04-04 RX ADMIN — Medication 10 ML/HR: at 12:38

## 2022-04-04 RX ADMIN — Medication 25 MCG: at 01:29

## 2022-04-04 RX ADMIN — Medication 2.5 MILLION UNITS: at 07:29

## 2022-04-04 RX ADMIN — Medication 2.5 MILLION UNITS: at 03:34

## 2022-04-04 RX ADMIN — DOCUSATE SODIUM 100 MG: 100 CAPSULE ORAL at 20:37

## 2022-04-04 RX ADMIN — Medication 166.7 ML: at 14:31

## 2022-04-04 RX ADMIN — Medication 87.3 MILLI-UNITS/MIN: at 14:46

## 2022-04-04 ASSESSMENT — PAIN SCALES - GENERAL
PAINLEVEL_OUTOF10: 3
PAINLEVEL_OUTOF10: 5
PAINLEVEL_OUTOF10: 5

## 2022-04-04 ASSESSMENT — PAIN DESCRIPTION - ORIENTATION: ORIENTATION: LOWER

## 2022-04-04 ASSESSMENT — PAIN DESCRIPTION - DESCRIPTORS: DESCRIPTORS: CRAMPING;DISCOMFORT

## 2022-04-04 ASSESSMENT — PAIN DESCRIPTION - PAIN TYPE: TYPE: ACUTE PAIN

## 2022-04-04 ASSESSMENT — PAIN DESCRIPTION - LOCATION: LOCATION: ABDOMEN

## 2022-04-04 NOTE — CONSULTS
Attestation signed by      Attending Physician Statement:    I have discussed the care of  [facility] [address] , including pertinent history and exam findings, with the Cardiology fellow/resident. I have seen and examined the patient and the key elements of all parts of the encounter have been performed by me. I agree with the assessment, plan and orders as documented by the fellow/resident, after I modified exam findings and plan of treatments, and the final version is my approved version of the assessment. Additional Comments:   Seen and examined. Echo in  EF is normal with no valvular disease. No chest pain or SOB. ECG within normal. No further cardiac work up needed. [facility] Cardiology    Consult / H&P               Today's Date: 2022  Patient Name: [facility]  Date of admission: 4/3/2022  6:16 PM  Patient's age: 31 y.o., 1995  Admission Dx: High-risk pregnancy in second trimester [O09.92]    Reason for Consult:  Cardiac evaluation    Requesting Physician: Hillary Brito MD        History Obtained From:  patient    HISTORY OF PRESENT ILLNESS:    79-year-old female is admitted for induction of labor. Cardiology was consulted because of questionable history of aortic stenosis in the past.  Previous echo reviewed. No evidence of aortic stenosis. Patient does not have any murmur on physical examination. Denies any symptoms related to aortic stenosis. ECG    Past Medical History:   has a past medical history of ACL injury tear, Asthma, Chronic airway disease, Dental crowns present, Gestational HTN--G3, Hidradenitis axillaris, History of  labor, Hydradenitis, Left ankle sprain, Maternal congenital heart disease, antepartum, Obesity,  w/ MIrena IUD 6/10/20 F APG 8/9 Wt 6#8, and Wears glasses. Past Surgical History:   has a past surgical history that includes Tonsillectomy and adenoidectomy and other surgical history (Bilateral, 2016).      Home Medications:    Prior to Admission medications    Medication Sig Start Date End Date Taking? Authorizing Provider   HYDROXYprogesterone caproate 250 MG/ML OIL oil injection Inject 250 mg into the muscle every 7 days  Patient not taking: Reported on 3/25/2022 10/29/21   Historical Provider, MD   fluticasone (FLONASE) 50 MCG/ACT nasal spray instill 2 sprays into each nostril once daily  Patient not taking: Reported on 3/7/2022 8/9/21   Historical Provider, MD   aspirin EC 81 MG EC tablet Take 1 tablet by mouth daily 9/27/21   Darrell Dale DO   Prenatal Multivit-Min-Fe-FA (PRENATAL FORTE) TABS Take 1 tablet by mouth Daily May substitute with any prenatal vit pt insurance will cover 9/14/21 9/14/22  Ene Marinelli MD   acetaminophen (TYLENOL) 325 MG tablet Take 1 tablet by mouth every 4 hours as needed for Pain  Patient not taking: Reported on 4/3/2022 12/4/20   Curt Blake MD   selenium sulfide (SELSUN) 2.5 % lotion Apply topically daily as needed for Itching Apply topically daily as needed.     Historical Provider, MD      Current Facility-Administered Medications: lactated ringers infusion, , IntraVENous, Continuous  lactated ringers bolus, 500 mL, IntraVENous, PRN **OR** lactated ringers bolus, 1,000 mL, IntraVENous, PRN  sodium chloride flush 0.9 % injection 5-40 mL, 5-40 mL, IntraVENous, 2 times per day  sodium chloride flush 0.9 % injection 5-40 mL, 5-40 mL, IntraVENous, PRN  0.9 % sodium chloride infusion, 25 mL, IntraVENous, PRN  ondansetron (ZOFRAN) injection 4 mg, 4 mg, IntraVENous, Q6H PRN  acetaminophen (TYLENOL) tablet 1,000 mg, 1,000 mg, Oral, Q6H PRN  benzocaine-menthol (DERMOPLAST) 20-0.5 % spray, , Topical, PRN  [COMPLETED] penicillin G potassium 5 Million Units in dextrose 5 % 100 mL IVPB (mini-bag), 5 Million Units, IntraVENous, Once **FOLLOWED BY** penicillin G potassium in d5w IVPB 2.5 Million Units, 2.5 Million Units, IntraVENous, Q4H    Allergies:  Lidocaine    Social History:   reports that she has never smoked. She has never used smokeless tobacco. She reports that she does not drink alcohol and does not use drugs. Family History: family history includes Cancer in her maternal grandmother; Diabetes in her maternal grandmother and mother; Early Death (age of onset: 62) in her paternal uncle; Heart Disease in her paternal uncle; Learning Disabilities in her mother; Irish Resendez in her maternal grandfather; No Known Problems in her brother, father, paternal grandfather, paternal grandmother, and sister; Other in her brother. No h/o sudden cardiac death. No for premature CAD    REVIEW OF SYSTEMS:    · Constitutional: there has been no unanticipated weight loss. There's been No change in energy level, No change in activity level. · Eyes: No visual changes or diplopia. No scleral icterus. · ENT: No Headaches  · Cardiovascular: No cardiac history  · Respiratory: No previous pulmonary problems, No cough  · Gastrointestinal: No abdominal pain. No change in bowel or bladder habits. · Genitourinary: No dysuria, trouble voiding, or hematuria. · Musculoskeletal:  No gait disturbance, No weakness or joint complaints. ·       PHYSICAL EXAM:      /70   Pulse 68   Temp 98.1 °F (36.7 °C) (Oral)   Resp 16   Ht 5' 4\" (1.626 m)   Wt 239 lb (108.4 kg)   LMP  (LMP Unknown)   SpO2 97%   BMI 41.02 kg/m²    Constitutional and General Appearance: alert, cooperative, no distress and appears stated age  HEENT: PERRL, no cervical lymphadenopathy. No masses palpable. Normal oral mucosa  Respiratory:  · Normal excursion and expansion without use of accessory muscles  · Resp Auscultation: Good respiratory effort. No for increased work of breathing.  On auscultation: clear to auscultation bilaterally  Cardiovascular:  · The apical impulse is not displaced  · Heart tones are crisp and normal. regular S1 and S2.  · Jugular venous pulsation Normal  · The carotid upstroke is normal in amplitude and contour without delay or bruit  · Peripheral pulses are symmetrical and full     Extremities:  ·  No Cyanosis or Clubbing  ·  Lower extremity edema: No  ·  Skin: Warm and dry  SRIKANTH    CBC:   Recent Labs     04/03/22  1904   WBC 8.2   HGB 10.9*   HCT 33.5*        BMP: No results for input(s): NA, K, CO2, BUN, CREATININE, LABGLOM, GLUCOSE in the last 72 hours. BNP: No results for input(s): BNP in the last 72 hours. PT/INR: No results for input(s): PROTIME, INR in the last 72 hours. APTT:No results for input(s): APTT in the last 72 hours. CARDIAC ENZYMES:No results for input(s): CKTOTAL, CKMB, CKMBINDEX, TROPONINI in the last 72 hours. FASTING LIPID PANEL:No results found for: HDL, LDLDIRECT, LDLCALC, TRIG  LIVER PROFILE:No results for input(s): AST, ALT, LABALBU in the last 72 hours. IMPRESSION:    Patient Active Problem List   Diagnosis    Asthma     Hx Aortic Stenosis    Axillary hidradenitis suppurativa    Hx sPTD x 2    Dysmenorrhea    Hx gHTN (G3)    FHx of aortic stenosis (G3)      COVID vaccinated    FGR    Fetal club feet    Declining 1hr GTT     1. 38 week pregnant   2 ? H/o Aortic stenosis in past     RECOMMENDATIONS:  1. ECG showed NSR without significant ST T wave changes   2. Cardiology was consulted for ?aortic stenosis. Previous echo was reviewed. Echo from 2014 showed no evidence of aortic stenosis. Patient had repeat echo in April 2020 which showed normal ejection fraction without any valvular abnormality. Does not have any murmur or physical examination. Discussed with patient and Nurse.     Electronically signed by Helen Arriaga MD on 4/4/2022 at 6:49 [address] [place] [address] [facility]

## 2022-04-04 NOTE — PROGRESS NOTES
Ob Attending    In to see patient to discuss her PMH including history of aortic stenosis diagnosed as a child. She had limited follow up with last echo in 2020 with no evidence of aortic stenosis at that time. The plan at that time was for a follow up echo in 1 year. She had not been to see cardiology. A referral was placed this pregnancy for follow up but she state she was not aware of this referral. We discussed that based on her last imaging it appears she does not have a high risk cardiac condition that would increase her risk of peripartum morbidity or mortality but that we are making that decision based off the 2020 echo from a prior pregnancy. She has a medical indication for delivery at this time due to fetal growth restriction. We discussed the possible risk of unknown progression or worsening of her underlying cardiac condition and she was offered the following options. IOL tonight as she has been asymptomatic and her prior imaging from 2020 has been negative for aortic stenosis. I also offered her the option of a cardiology consult and echocardiogram as soon as possible tomorrow (Monday 4/4/2022). She will consider these options and we will discuss further when she is ready. She is most upset that she was unaware of these recommendations for cardiology follow up and recommendation for echocardiogram this pregnancy. She states \"I could have just stayed home I have other kids to take care of\" Ensured her that my greatest desire is to keep her safe and keep her baby safe and that we should make a decision together based on shared decision making.        Eleazar Antony MD

## 2022-04-04 NOTE — CARE COORDINATION
ANTEPARTUM NOTE    High-risk pregnancy in second trimester [O09.92]    Светлана Drew was admitted to L&D on 4/3/2022 for IOL 2/2 FGR  @ 38w3d    OB GYN Provider: [facility]    Will meet with patient after delivery to verify name/address/phone/insurance and discuss discharge planning. Anticipate DC home 2 nights after vaginal delivery or 4 nights after C/S delivery as long as hemodynamically stable.

## 2022-04-04 NOTE — PROGRESS NOTES
Labor Progress Note    Mando Hunter is a 31 y.o. female [id number] at 38w3d  The patient was seen and examined. Her pain is well controlled. She reports fetal movement is present, denies contractions, denies loss of fluid, denies vaginal bleeding.        Vital Signs:  Vitals:    04/03/22 2145 04/04/22 0130 04/04/22 0533 04/04/22 0535   BP: 122/75 125/62  124/70   Pulse: 70 77  68   Resp: 16 16 16    Temp:  98.1 °F (36.7 °C) 98.1 °F (36.7 °C)    TempSrc:  Oral Oral    SpO2:       Weight:       Height:             FHT: 135, moderate variability, accelerations present, decelerations absent  Contractions: none    Chaperone for Intimate Exam: Chaperone was present for entire exam, Chaperone Name: Akira Cormier RN  Cervical Exam: 3/30/-3  Pitocin: @ 0 mu/min    Membranes: Intact  Scalp Electrode in place: absent  Intrauterine Pressure Catheter in Place: absent    Interventions: none    Assessment/Plan:  Mando Hunter is a 31 y.o. female [id number] at 38w4d admitted for IUP   - GBS positive, Pen G for GBS prophylaxis   - VSS, afebrile   - cEFM/TOCO- Cat 1 tracing, no contractions   - IVF LR @ 125 ml/hr    IOL 2/2 FGR (AC <10%)   - S/p cytotec 25 PO x 2   - Will order another dose of cytotec   - Continue to monitor    Attending updated and in agreement with plan    Pan Higgins DO  Ob/Gyn Resident  4/4/2022, 5:40 AM

## 2022-04-04 NOTE — L&D DELIVERY NOTE
Mother's Information    Labor Events    Cervical Ripening:   Now         Bel Carter  Karlo [[id number]]    Labor Events    Cervical Ripening Date/Time:     Rupture Date/Time: 22 12:55:00   Rupture Type: SROM  Fluid Color: Meconium  Fluid Odor: None     Anesthesia       Start Pushing    Labor onset date/time:   Now   Dilation complete date/time:   Now   Start pushing date/time:    Decision date/time (emergent ):       Delivery ()    Delivery Date/Time:  22 14:28:19   Delivery Type: Vaginal, Spontaneous    Details:         Presentation    Presentation: Vertex  Position: Left  _: Occiput  _: Anterior     Shoulder Dystocia    Shoulder Dystocia Present?: No  Add Second Maneuver  Add Third Maneuver  Add Fourth Maneuver  Add Fifth Maneuver  Add Sixth Maneuver  Add Seventh Maneuver  Add Eighth Maneuver  Add Ninth Maneuver     Assisted Delivery Details    Forceps Attempted?: No  Vacuum Extractor Attempted?: No     Document Additional Attempt       Document Additional Attempt             Cord       Placenta       Lacerations       Vaginal Counts      Sponges Needles Instruments   Initial Counts      Final Counts      If the count is incorrect due to Intentionally Retained Foreign Object (IRFO) add the IRFO LDA in Lines/Drains.   Add LDA: Link to [facility]     Blood Loss  Mother: Bonnie Kessler #[id number]   Start of Mother's Information    Delivery Blood Loss  22 0228 - 22 1446    None           End of Mother's Information  Mother: Bonnie Kessler #[id number]          Delivery Providers    Delivering clinician:      Provider Role     Obstetrician     Primary Nurse     Primary [place] Nurse     NICU Nurse     Neonatologist     Anesthesiologist     Nurse Anesthetist     Nurse Practitioner     Midwife     Nursery Nurse          [place] Assessment     Apgar Scoring Key:    0 1 2    Skin Color: Blue or pale Acrocyanotic Completely pink    Heart Rate: Absent <100 bpm >100 bpm Reflex Irritability: No response Grimace Cry or active withdrawal    Muscle Tone: Limp Some flexion Active motion    Respiratory Effort: Absent Weak cry; hypoventilation Good, crying                  Skin Color:   Heart Rate:   Reflex Irritability:   Muscle Tone:   Respiratory Effort: Total:            1 Minute:        Apgar 1 total from OB History    5 Minute:        Apgar 5 total from OB History    10 Minute:              15 Minute:              20 Minute:                             Resuscitation            Measurements           Title    Skin to Skin Initiation Date/Time:     Skin to Skin End Date/Time:              Vaginal Delivery Note  Department of Obstetrics and Gynecology  [facility]       Patient: Batsheva Quiros   : 1995  MRN: [id number]   Date of delivery: 22     Pre-operative Diagnosis: Batsheva Quiros [id number] at 38w4d  IOL 2/2 FGR (AC<10%ile)    Fetal club feet (echo wnl)    Hx Aortic Stenosis    Hx sPTD x 2    Hx gHTN (G3)    FHx of aortic stenosis (G3)      Declining 1hr GTT    Asthma     Axillary hidradenitis suppurativa    Dysmenorrhea    Anemia (10.9)    BMI 41    Post-operative Diagnosis: Batsheva Quiors [id number] at 38w4d  Same as above, live born male infant    Delivering Obstetrician & Assistant(s): Dr. Chai Newton; Cheri Leyva DO PGY4    Infant Information:   Information for the patient's :  Luis Alberto Benson [[id number]]        Information for the patient's :  Luis Alberto Benson [[id number]]          Weight: pending  Apgar scores: 8 at 1 minute and 8 at 5 minutes. Anesthesia:  epidural anesthesia    Application and Delivery:    She was known to be GBS positive and received PenG antibiotic prophylaxis. The patient progressed well, received an epidural, became complete and felt the urge to push.  After pushing with contractions the fetal head delivered Cephalic, right occiput anterior over an intact perineum, nuchal cord was not present. The anterior, then posterior shoulder delivered easily and atraumatically followed by the rest of the infant. Nose and mouth suctioned with bulb suction, infant was stimulated and dried. Cord was clamped and cut after 20 seconds delayed cord clamping due to lack of muscle tone and infant was placed on patient, and attended by RN for evaluation. The delivery of the placenta was spontaneous and appeared intact and whole. Pitocin was started. The vagina was swept of all clots and debris. The perineum and vagina were evaluated and left periurethral laceration found and hemostatic. Mother and baby tolerated procedure well. Dr. Marta Lew was present for entire delivery.     Delivery Summary:       Specimen: placenta sent to pathology, cord blood and cord gases  Quantitative blood loss:  100ml immediately following delivery  Condition:  infant stable to general nursery  Counts: instrument and sponge counts correct  Blood Type and Rh: O POSITIVE    Rubella Immunity Status: immune    Gaurav Long DO  Ob/Gyn Resident  4/4/2022, 2:46 PM

## 2022-04-04 NOTE — ANESTHESIA PRE PROCEDURE
Department of Anesthesiology  Preprocedure Note       Name:  Petra Townsend   Age:  31 y.o.  :  1995                                          MRN:  [id number]         Date:  2022      Surgeon: * No surgeons listed *    Procedure: * No procedures listed *    Medications prior to admission:   Prior to Admission medications    Medication Sig Start Date End Date Taking? Authorizing Provider   HYDROXYprogesterone caproate 250 MG/ML OIL oil injection Inject 250 mg into the muscle every 7 days  Patient not taking: Reported on 3/25/2022 10/29/21   Historical Provider, MD   fluticasone (FLONASE) 50 MCG/ACT nasal spray instill 2 sprays into each nostril once daily  Patient not taking: Reported on 3/7/2022 8/9/21   Historical Provider, MD   aspirin EC 81 MG EC tablet Take 1 tablet by mouth daily 21   Madina Mcfadden DO   Prenatal Multivit-Min-Fe-FA (PRENATAL FORTE) TABS Take 1 tablet by mouth Daily May substitute with any prenatal vit pt insurance will cover 21  Yifan Edwards MD   acetaminophen (TYLENOL) 325 MG tablet Take 1 tablet by mouth every 4 hours as needed for Pain  Patient not taking: Reported on 4/3/2022 12/4/20   Jacob Walker MD   selenium sulfide (SELSUN) 2.5 % lotion Apply topically daily as needed for Itching Apply topically daily as needed.     Historical Provider, MD       Current medications:    Current Facility-Administered Medications   Medication Dose Route Frequency Provider Last Rate Last Admin    oxytocin (PITOCIN) 30 units in 500 mL infusion  1-20 brian-units/min IntraVENous Continuous Franchesca Lanettetz, DO 4 mL/hr at 22 1133 4 brian-units/min at 22 1133    ropivacaine (NAROPIN) 0.2% injection 0.2%             lactated ringers infusion   IntraVENous Continuous Raynaldo [place],  mL/hr at 22 0532 Rate Verify at 22 0532    lactated ringers bolus  500 mL IntraVENous PRN Raynaldo [place], DO        Or    lactated ringers bolus  1,000 mL IntraVENous PRN Shahram Pierce, DO        sodium chloride flush 0.9 % injection 5-40 mL  5-40 mL IntraVENous 2 times per day Shahram Pierce, DO        sodium chloride flush 0.9 % injection 5-40 mL  5-40 mL IntraVENous PRN Shahram Pierce, DO        0.9 % sodium chloride infusion  25 mL IntraVENous PRN Shahram Pierce, DO        [facility]et[facility]) injection 4 mg  4 mg IntraVENous Q6H PRN Shahram Pierce, DO        acetaminophen (TYLENOL) tablet 1,000 mg  1,000 mg Oral Q6H PRN Shahram Pierce, DO        benzocaine-menthol (DERMOPLAST) 20-0.5 % spray   Topical PRN Shahram Pierce, DO        penicillin G potassium in d5w IVPB 2.5 Million Units  2.5 Million Units IntraVENous Q4H Shahram Pierce,  mL/hr at 22 1132 2.5 Million Units at 22 1132       Allergies: Allergies   Allergen Reactions    Lidocaine Hives     Pt states that dentist told her that she was allergic to lidocaine but thought it could have been novacaine, skin wheal test per CRNA demonstrated no rash       Problem List:    Patient Active Problem List   Diagnosis Code    Asthma  O99.519, J45.909    Hx Aortic Stenosis Z87.74    Axillary hidradenitis suppurativa L73.2    Hx sPTD x 2 O09.891    Dysmenorrhea N94.6    Hx gHTN (G3) Z87.59    FHx of aortic stenosis (G3)   Z82.49    COVID vaccinated Z28.09    FGR O36.5990    Fetal club feet O35. 8XX0    Declining 1hr GTT R73.09       Past Medical History:        Diagnosis Date    ACL injury tear     Left     Asthma     Chronic airway disease     Dental crowns present     Gestational HTN--G3 2020    Hidradenitis axillaris 2015    History of  labor 2010    Hydradenitis     Left ankle sprain 2015    Maternal congenital heart disease, antepartum 2014    Obesity      w/ MIrena IUD 6/10/20 F APG  Wt 6#8 6/10/2020    IUD fell out. Using depo.      Wears glasses        Past Surgical History:        Procedure Laterality Date    OTHER SURGICAL HISTORY Bilateral 2016 excision axillary hydranitis    TONSILLECTOMY AND ADENOIDECTOMY         Social History:    Social History     Tobacco Use    Smoking status: Never Smoker    Smokeless tobacco: Never Used   Substance Use Topics    Alcohol use: No     Alcohol/week: 0.0 standard drinks                                Counseling given: Not Answered      Vital Signs (Current):   Vitals:    04/04/22 0802 04/04/22 1036 04/04/22 1037 04/04/22 1101   BP: 124/79  129/75 137/80   Pulse: 64  83 77   Resp: 20 20  20   Temp: 36.8 °C (98.2 °F)      TempSrc:       SpO2: 99%      Weight:       Height:                                                  BP Readings from Last 3 Encounters:   04/04/22 137/80   04/01/22 110/69   03/29/22 122/81       NPO Status:                                                                                 BMI:   Wt Readings from Last 3 Encounters:   04/03/22 239 lb (108.4 kg)   04/01/22 239 lb 13.8 oz (108.8 kg)   03/29/22 243 lb (110.2 kg)     Body mass index is 41.02 kg/m². CBC:   Lab Results   Component Value Date    WBC 8.2 04/03/2022    RBC 3.73 04/03/2022    HGB 10.9 04/03/2022    HCT 33.5 04/03/2022    MCV 89.8 04/03/2022    RDW 12.9 04/03/2022     04/03/2022       CMP:   Lab Results   Component Value Date     12/04/2020    K 3.7 12/04/2020     12/04/2020    CO2 24 12/04/2020    BUN 9 12/04/2020    CREATININE 0.57 12/04/2020    GFRAA >60 12/04/2020    LABGLOM >60 12/04/2020    GLUCOSE 92 12/04/2020    PROT 7.3 12/24/2019    CALCIUM 9.3 12/04/2020    BILITOT 0.19 12/24/2019    ALKPHOS 73 12/24/2019    AST 23 12/24/2019    ALT 42 12/24/2019       POC Tests: No results for input(s): POCGLU, POCNA, POCK, POCCL, POCBUN, POCHEMO, POCHCT in the last 72 hours.     Coags: No results found for: PROTIME, INR, APTT    HCG (If Applicable):   Lab Results   Component Value Date    PREGTESTUR positive 08/09/2021    HCG NEGATIVE 02/12/2016    HCGQUANT 1,299 (H) 08/11/2021        ABGs: No results found for: PHART, PO2ART, OLB4LSS, HKG6OJR, BEART, S9WJVPDV     Type & Screen (If Applicable):  No results found for: LABABO, LABRH    Drug/Infectious Status (If Applicable):  Lab Results   Component Value Date    HEPCAB NONREACTIVE 09/27/2021       COVID-19 Screening (If Applicable):   Lab Results   Component Value Date    COVID19 Not Detected 12/27/2021           Anesthesia Evaluation  Patient summary reviewed and Nursing notes reviewed  Airway: Mallampati: II  TM distance: >3 FB   Neck ROM: full  Mouth opening: > = 3 FB Dental: normal exam         Pulmonary:normal exam    (+) asthma:                            Cardiovascular:  Exercise tolerance: good (>4 METS),   (+) hypertension: no interval change,                   Neuro/Psych:   Negative Neuro/Psych ROS              GI/Hepatic/Renal: Neg GI/Hepatic/Renal ROS            Endo/Other: Negative Endo/Other ROS                    Abdominal:   (+) obese,           Vascular: negative vascular ROS. Other Findings:             Anesthesia Plan      epidural     ASA 2             Anesthetic plan and risks discussed with patient. cleared by [facility]    Additional Comments:   Seen and examined. Echo in 2020 EF is normal with no valvular disease. No chest pain or SOB.  ECG within normal. No further cardiac work up needed.            ASIA Brownlee - CRNA   4/4/2022

## 2022-04-04 NOTE — FLOWSHEET NOTE
Patient to room 746 per w/c, patient was in NICU for an hour after recovery visiting with infant. Writer oriented patient to room and surroundings plan of care discussed questions and concerns addressed.

## 2022-04-04 NOTE — PROGRESS NOTES
[facility]  Documentation Note                Admission Dx: High-risk pregnancy in second trimester [O09.92]    Past Medical History:   has a past medical history of ACL injury tear, Asthma, Chronic airway disease, Dental crowns present, Gestational HTN--G3, Hidradenitis axillaris, History of  labor, Hydradenitis, Left ankle sprain, Maternal congenital heart disease, antepartum, Obesity,  w/ MIrena IUD 6/10/20 F APG 8/9 Wt 6#8, and Wears glasses. Previous Testing:     ECHO 2020: EF 55%, no valvular abnormalities. Previous office/hospital visit:   Dr. Oracio Wilkes 2020:   1. Possible mild aortic stenosis diagnosed in the past but echocardiogram done 2020 showed no significant valvular abnormalities. 2. Echocardiogram  showed normal structural heart with normal ejection fraction and no evidence of congenital abnormalities and no obvious bicuspid aortic valve or aortic stenosis. 3. Asthma  4. Currently 7 months pregnant with her 3rd pregnancy. 5. Previous 2 premature deliveries  6. GERD  7. Echocardiogram 2020 showed normal systolic and diastolic function with ejection fraction of 55% and no significant valvular abnormalities. Plan --   1. Questionable history of mild aortic stenosis in the past, with repeat echocardiogram showed no significant valvular abnormalities and patient is completely asymptomatic  2. Benign flow murmur on physical examination related to pregnancy resolved on physical examination today  3. Follow up on an as-needed basis.     Arianna Kelly [facility]

## 2022-04-04 NOTE — ANESTHESIA PROCEDURE NOTES
Epidural Block    Patient location during procedure: OB  Start time: 4/4/2022 12:31 PM  Reason for block: labor epidural  Staffing  Resident/CRNA: ASIA Badillo CRNA  Preanesthetic Checklist  Completed: patient identified, IV checked, site marked, risks and benefits discussed, surgical consent, monitors and equipment checked, pre-op evaluation, timeout performed, anesthesia consent given, oxygen available and patient being monitored  Epidural  Patient position: sitting  Prep: Betadine  Patient monitoring: continuous pulse ox and frequent blood pressure checks  Approach: midline  Location: lumbar (1-5)  Injection technique: SUHA air  Provider prep: mask and sterile gloves  Needle  Needle type: Tuohy   Needle gauge: 17 G  Needle length: 3.5 in  Needle insertion depth: 8 cm  Catheter type: end hole  Catheter size: 18 G  Catheter at skin depth: 12 cm  Test dose: negative  Assessment  Sensory level: T6  Hemodynamics: stable  Attempts: 1

## 2022-04-04 NOTE — PROGRESS NOTES
Labor Progress Note    Julee Recinos is a 31 y.o. female [id number] at 38w3d  The patient was seen and examined. Her pain is not well controlled. She reports fetal movement is present, complains of contractions, denies loss of fluid, denies vaginal bleeding. Pt requesting epidural.    Vital Signs:  Vitals:    04/04/22 1036 04/04/22 1037 04/04/22 1101 04/04/22 1201   BP:  129/75 137/80 129/77   Pulse:  83 77 65   Resp: 20  20 20   Temp:    98.4 °F (36.9 °C)   TempSrc:       SpO2:       Weight:       Height:           FHT: 140, moderate variability, accelerations present, intermittent late decelerations and variable decelerations that resolve spontaneously  Contractions: regular, every 3-4 minutes  Nesbit Units: not able to be calculated    Chaperone for Intimate Exam: Chaperone was present for entire exam, Chaperone Name: Saloni Flores RN  Cervical Exam: 3 dilated, 50% effaced, -1 station (out of 3 station)  Pitocin: @ 4 mu/min    Membranes: Intact  Scalp Electrode in place: absent  Intrauterine Pressure Catheter in Place: absent    Interventions: none    Assessment/Plan:  Julee Recinos is a 31 y.o. female [id number] at 38w3d here for IOL 2/2 FGR (AC <10th%)   - GBS positive, Pen G for GBS prophylaxis   - Overall Cat I FHT, TOCO q3-4 min   - MVUs: not able to be calculated   - SVE 3/50/-1 (out of 3 station)   - VSS   - Continue pit per protocol   - Epidural ordered   - S/p cytotec 25 PO x3     Hx aortic stenosis    - Cardio consult- no concerns and no Echo needed during this admission    Dr. Earle Gastelum updated and in agreement with plan.     Cheryle Harpin, DO  Ob/Gyn Resident  4/4/2022, 12:21 PM

## 2022-04-04 NOTE — FLOWSHEET NOTE
Pt wishes to proceed with induction at this time. Pt states \"I cant wait till tomorrow\" dr. Galina Novak update and in to see pt. Dr. Galina Novak expresses her concern to keep her and her baby safe during this induction. Plan of care discussed. No questions from pt or SO at this time.

## 2022-04-04 NOTE — PROGRESS NOTES
Labor Progress Note    Christina Craft is a 31 y.o. female [id number] at 38w3d  The patient was seen and examined. Her pain is well controlled with epidural. Patient reports more pressure. She reports fetal movement is present, complains of contractions, denies loss of fluid, denies vaginal bleeding. SROM ([facility]) [id number] grossly ruptured. Vital Signs:  Vitals:    04/04/22 1245 04/04/22 1246 04/04/22 1250 04/04/22 1251   BP:  131/66  135/73   Pulse:  74  74   Resp:  (P) 20  (P) 20   Temp:       TempSrc:       SpO2: 100%  100%    Weight:       Height:             FHT: 140, moderate variability, accelerations present, variable decelerations   Contractions: regular, every 2-3 minutes  Lamy Units: not able to be calculated    Chaperone for Intimate Exam: Chaperone was present for entire exam, Chaperone Name: Pete Delgadillo RN  Cervical Exam: 3 dilated, 40% effaced, -1 station (out of 3 station)  Pitocin: @ 0 mu/min    Membranes: Ruptured meconium stained  Scalp Electrode in place: absent  Intrauterine Pressure Catheter in Place: absent    Interventions: maternal position changes, fluid bolus, pitocin discontinued    Assessment/Plan:  Christina Craft is a 31 y.o. female [id number] at 38w3d here for IOL 2/2 FGR (AC <10th%)              - GBS positive, Pen G for GBS prophylaxis              - Cat II FHT, TOCO q3-4 min              - MVUs: not able to be calculated              - SVE 3/70/-1 (out of 3 station)              - VSS              - maternal position changes, fluid bolus, pitocin discontinued. - Epidural in place and functioning              - S/p cytotec 25 PO x3    - FHT improved with high fowlers position     Hx aortic stenosis               - Cardio consult- no concerns and no Echo needed during this admission    Dr. Tammi Sherwood updated and in agreement with plan.     Gabbie Palmer DO  Ob/Gyn Resident  4/4/2022, 1:10 PM

## 2022-04-04 NOTE — PROGRESS NOTES
Labor Progress Note    Teetee Blancas is a 31 y.o. female [id number] at 38w3d  The patient was seen and examined. Her pain is well controlled. She reports fetal movement is present, denies contractions, denies loss of fluid, denies vaginal bleeding.        Vital Signs:  Vitals:    04/03/22 1831 04/03/22 1833 04/03/22 2035 04/03/22 2145   BP: 131/66  126/69 122/75   Pulse: 93  78 70   Resp: 16  16 16   Temp: 97.9 °F (36.6 °C)  98.2 °F (36.8 °C)    TempSrc: Oral  Oral    SpO2: 95%  97%    Weight:  239 lb (108.4 kg)     Height:  5' 4\" (1.626 m)           FHT: 145, moderate variability, accelerations present, decelerations absent  Contractions: none    Chaperone for Intimate Exam: Chaperone was present for entire exam, Chaperone Name: Bruno Pino RN  Cervical Exam: deferred  Pitocin: @ 0 mu/min    Membranes: Intact  Scalp Electrode in place: absent  Intrauterine Pressure Catheter in Place: absent    Interventions: none    Assessment/Plan:  Teetee Blancas is a 31 y.o. female [id number] at 38w4d admitted for IUP   - GBS positive, Pen G for GBS prophylaxis   - VSS, afebrile   - cEFM/TOCO- Cat 1 tracing, no contractions   - IVF LR @ 125 ml/hr    IOL 2/2 FGR (AC <10%)   - S/p cytotec 25 PO x 1   - Will order another dose of cytotec   - Continue to monitor    Attending updated and in agreement with plan    Piyush Escobar DO  Ob/Gyn Resident  4/4/2022, 1:05 AM

## 2022-04-05 ENCOUNTER — TELEPHONE (OUTPATIENT)
Dept: OBGYN | Age: 27
End: 2022-04-05

## 2022-04-05 VITALS
WEIGHT: 239 LBS | HEIGHT: 64 IN | RESPIRATION RATE: 14 BRPM | SYSTOLIC BLOOD PRESSURE: 119 MMHG | HEART RATE: 71 BPM | BODY MASS INDEX: 40.8 KG/M2 | TEMPERATURE: 97.7 F | DIASTOLIC BLOOD PRESSURE: 66 MMHG | OXYGEN SATURATION: 97 %

## 2022-04-05 LAB
ABSOLUTE EOS #: 0.07 K/UL (ref 0–0.44)
ABSOLUTE IMMATURE GRANULOCYTE: 0.03 K/UL (ref 0–0.3)
ABSOLUTE LYMPH #: 1.9 K/UL (ref 1.1–3.7)
ABSOLUTE MONO #: 0.58 K/UL (ref 0.1–1.2)
BASOPHILS # BLD: 0 % (ref 0–2)
BASOPHILS ABSOLUTE: 0.03 K/UL (ref 0–0.2)
EOSINOPHILS RELATIVE PERCENT: 1 % (ref 1–4)
HCT VFR BLD CALC: 31.6 % (ref 36.3–47.1)
HEMOGLOBIN: 10.1 G/DL (ref 11.9–15.1)
IMMATURE GRANULOCYTES: 0 %
LYMPHOCYTES # BLD: 24 % (ref 24–43)
MCH RBC QN AUTO: 28.9 PG (ref 25.2–33.5)
MCHC RBC AUTO-ENTMCNC: 32 G/DL (ref 28.4–34.8)
MCV RBC AUTO: 90.3 FL (ref 82.6–102.9)
MONOCYTES # BLD: 7 % (ref 3–12)
NRBC AUTOMATED: 0 PER 100 WBC
PDW BLD-RTO: 13 % (ref 11.8–14.4)
PLATELET # BLD: ABNORMAL K/UL (ref 138–453)
PLATELET, FLUORESCENCE: 159 K/UL (ref 138–453)
PLATELET, IMMATURE FRACTION: 12 % (ref 1.1–10.3)
RBC # BLD: 3.5 M/UL (ref 3.95–5.11)
SEG NEUTROPHILS: 67 % (ref 36–65)
SEGMENTED NEUTROPHILS ABSOLUTE COUNT: 5.23 K/UL (ref 1.5–8.1)
WBC # BLD: 7.8 K/UL (ref 3.5–11.3)

## 2022-04-05 PROCEDURE — 6370000000 HC RX 637 (ALT 250 FOR IP): Performed by: STUDENT IN AN ORGANIZED HEALTH CARE EDUCATION/TRAINING PROGRAM

## 2022-04-05 PROCEDURE — 6360000002 HC RX W HCPCS: Performed by: STUDENT IN AN ORGANIZED HEALTH CARE EDUCATION/TRAINING PROGRAM

## 2022-04-05 PROCEDURE — 85025 COMPLETE CBC W/AUTO DIFF WBC: CPT

## 2022-04-05 PROCEDURE — 85055 RETICULATED PLATELET ASSAY: CPT

## 2022-04-05 PROCEDURE — 36415 COLL VENOUS BLD VENIPUNCTURE: CPT

## 2022-04-05 PROCEDURE — 99238 HOSP IP/OBS DSCHRG MGMT 30/<: CPT | Performed by: OBSTETRICS & GYNECOLOGY

## 2022-04-05 RX ADMIN — DOCUSATE SODIUM 100 MG: 100 CAPSULE ORAL at 09:40

## 2022-04-05 RX ADMIN — MEDROXYPROGESTERONE ACETATE 150 MG: 150 INJECTION, SUSPENSION INTRAMUSCULAR at 13:36

## 2022-04-05 RX ADMIN — IBUPROFEN 600 MG: 600 TABLET, FILM COATED ORAL at 13:09

## 2022-04-05 RX ADMIN — IBUPROFEN 600 MG: 600 TABLET, FILM COATED ORAL at 00:07

## 2022-04-05 RX ADMIN — ACETAMINOPHEN 1000 MG: 500 TABLET ORAL at 03:07

## 2022-04-05 RX ADMIN — IBUPROFEN 600 MG: 600 TABLET, FILM COATED ORAL at 06:09

## 2022-04-05 ASSESSMENT — PAIN SCALES - GENERAL
PAINLEVEL_OUTOF10: 6
PAINLEVEL_OUTOF10: 4
PAINLEVEL_OUTOF10: 6
PAINLEVEL_OUTOF10: 5
PAINLEVEL_OUTOF10: 0

## 2022-04-05 NOTE — PLAN OF CARE
Problem: Anxiety:  Goal: Level of anxiety will decrease  Description: Level of anxiety will decrease  4/5/2022 1418 by Dominic Ceballos RN  Outcome: Completed  4/5/2022 0134 by Ryley Crandall RN  Outcome: Ongoing     Problem: Breathing Pattern - Ineffective:  Goal: Able to breathe comfortably  Description: Able to breathe comfortably  4/5/2022 1418 by Dominic Ceballos RN  Outcome: Completed  4/5/2022 0134 by Ryley Crandall RN  Outcome: Ongoing     Problem: Fluid Volume - Imbalance:  Goal: Absence of imbalanced fluid volume signs and symptoms  Description: Absence of imbalanced fluid volume signs and symptoms  4/5/2022 1418 by Dominic Ceballos RN  Outcome: Completed  4/5/2022 0134 by Ryley Crandall RN  Outcome: Ongoing  Goal: Absence of intrapartum hemorrhage signs and symptoms  Description: Absence of intrapartum hemorrhage signs and symptoms  4/5/2022 0134 by Ryley Crandall RN  Outcome: Completed  Goal: Absence of postpartum hemorrhage signs and symptoms  Description: Absence of postpartum hemorrhage signs and symptoms  4/5/2022 1418 by Dominic Ceballos RN  Outcome: Completed  4/5/2022 0134 by Ryley Crandall RN  Outcome: Ongoing     Problem: Infection - Intrapartum Infection:  Goal: Will show no infection signs and symptoms  Description: Will show no infection signs and symptoms  4/5/2022 0134 by Ryley Crandall RN  Outcome: Completed     Problem: Labor Process - Prolonged:  Goal: Labor progression, first stage, within specified pattern  Description: Labor progression, first stage, within specified pattern  4/5/2022 0134 by Ryley Crandall RN  Outcome: Completed  Goal: Labor progession, second stage, within specified pattern  Description: Labor progession, second stage, within specified pattern  4/5/2022 0134 by Ryley Crandall RN  Outcome: Completed  Goal: Uterine contractions within specified parameters  Description: Uterine contractions within specified parameters  4/5/2022 134 by Davon Smyth RN  Outcome: Completed     Problem:  Screening:  Goal: Ability to make informed decisions regarding treatment has improved  Description: Ability to make informed decisions regarding treatment has improved  2022 by Davon Smyth RN  Outcome: Completed     Problem: Pain - Acute:  Goal: Pain level will decrease  Description: Pain level will decrease  2022 1418 by Zohra Jaimes RN  Outcome: Completed  2022 by Davon Smyth RN  Outcome: Ongoing  Goal: Able to cope with pain  Description: Able to cope with pain  2022 by Davon Smyth RN  Outcome: Completed     Problem: Tissue Perfusion - Uteroplacental, Altered:  Goal: Absence of abnormal fetal heart rate pattern  Description: Absence of abnormal fetal heart rate pattern  2022 by Davon Smyth RN  Outcome: Completed     Problem: Urinary Retention:  Goal: Experiences of bladder distention will decrease  Description: Experiences of bladder distention will decrease  2022 by Davon Smyth RN  Outcome: Completed  Goal: Urinary elimination within specified parameters  Description: Urinary elimination within specified parameters  2022 by Davon Smyth RN  Outcome: Completed     Problem: Discharge Planning:  Goal: Discharged to appropriate level of care  Description: Discharged to appropriate level of care  2022 1418 by Zohra Jaimes RN  Outcome: Completed  2022 by Davon Smyth RN  Outcome: Ongoing     Problem: Constipation:  Goal: Bowel elimination is within specified parameters  Description: Bowel elimination is within specified parameters  2022 1418 by Zohra Jaimes RN  Outcome: Completed  2022 by Davon Smyth RN  Outcome: Ongoing     Problem: Infection - Risk of, Puerperal Infection:  Goal: Will show no infection signs and symptoms  Description: Will show no infection signs and symptoms  2022 by Lui Stark RN  Outcome: Completed     Problem: Mood - Altered:  Goal: Mood stable  Description: Mood stable  4/5/2022 1418 by Minerva Busch RN  Outcome: Completed  4/5/2022 0134 by Lui Stark RN  Outcome: Ongoing     Problem: Pain:  Goal: Pain level will decrease  Description: Pain level will decrease  4/5/2022 1418 by Minerva Busch RN  Outcome: Completed  4/5/2022 0134 by Lui Stark RN  Outcome: Ongoing  Goal: Control of acute pain  Description: Control of acute pain  4/5/2022 1418 by Minerva Busch RN  Outcome: Completed  4/5/2022 0134 by Lui Stark RN  Outcome: Ongoing  Goal: Control of chronic pain  Description: Control of chronic pain  4/5/2022 1418 by Minerva Busch RN  Outcome: Completed  4/5/2022 0134 by Lui Stark RN  Outcome: Ongoing

## 2022-04-05 NOTE — PLAN OF CARE
Problem: Anxiety:  Goal: Level of anxiety will decrease  Description: Level of anxiety will decrease  Outcome: Ongoing     Problem: Breathing Pattern - Ineffective:  Goal: Able to breathe comfortably  Description: Able to breathe comfortably  Outcome: Ongoing     Problem: Pain - Acute:  Goal: Pain level will decrease  Description: Pain level will decrease  Outcome: Ongoing  Goal: Able to cope with pain  Description: Able to cope with pain  Outcome: Completed     Problem: Constipation:  Goal: Bowel elimination is within specified parameters  Description: Bowel elimination is within specified parameters  Outcome: Ongoing     Problem: Mood - Altered:  Goal: Mood stable  Description: Mood stable  Outcome: Ongoing     Problem: Pain:  Goal: Pain level will decrease  Description: Pain level will decrease  Outcome: Ongoing  Goal: Control of acute pain  Description: Control of acute pain  Outcome: Ongoing  Goal: Control of chronic pain  Description: Control of chronic pain  Outcome: Ongoing

## 2022-04-05 NOTE — CARE COORDINATION
CASE MANAGEMENT POST-PARTUM TRANSITIONAL CARE PLAN    High-risk pregnancy in second trimester [O09.92]    OB Provider: [facility]-email to [facility] for appointment. Scheduled for  @ 4pm w/ Dr. Billie Pollard met w/ Guerrero Pacheco at bedside to discuss DCP. She is S/P  on 2022 @ 1428 at 38w4d of male infant. Due to some respiratory failure infant was transferred to [address] [place] [address] NICU for management    Writer verified name/address/phone number correct on facesheet. She states she lives with her boyfriend Conchita Naylor and 3 children. Guerrero Pacheco verbalized no problems with transportation to and from doctors appointments or with paying for medications upon discharge home. [facility] insurance correct. Writer notified Guerrero Pacheco she has 30 days from date of birth to add  to insurance policy. She verbalized understanding. Guerrero Pacheco confirmed a safe place for infant to sleep at home. Infant name on BC: Hollie Bolaños. Infant PCP ASIA Santos, CNP  FOB: [facility]. DME: Glucometer and supplies for glucose checks  HOME CARE: none current, had [facility] for progesterone injections    Anticipate DC 2022. Guerrero Pacheco wanted to be DC'd today so she can be with her other children. CM discussed NICU, rounding and answered questions about NICU and what Jessenia will need to do to go home. Guerrero Pacheco became tearful during discussion and CM allowed her to express her thoughts. She said she had to get it out now because she didn't want to cry in front of her kids and scare them. CM encouraged her to utilize her support system and express her feelings. CM provided her w/ number to NICU so she can call when home. Her 2 older children were in the waiting room and FOB Conchita Naylor came into room to take Guerrero Pacheco home. He said the kids had questions about their brother. CM went to waiting room to help explain what we were doing to try and help their brother.      Readmission Risk              Risk of Unplanned Readmission:  0

## 2022-04-05 NOTE — CARE COORDINATION
Social Work    Sw attempted to meet with mom twice and was unable to, for brief assessment to make sure there werent any current needs or concerns. No social concerns. Sw will meet with mom in the NICU.            Tyra Birch, Social Work

## 2022-04-05 NOTE — PROGRESS NOTES
POST PARTUM DAY # 1    Jorge Ryan is a 31 y.o. female  This patient was seen & examined today.  on 22    Her pregnancy was complicated by:   Patient Active Problem List   Diagnosis    Asthma     Hx Aortic Stenosis    Axillary hidradenitis suppurativa    Hx sPTD x 2    Dysmenorrhea    38 weeks gestation of pregnancy    Hx gHTN (G3)    FHx of aortic stenosis (G3)      COVID vaccinated    FGR    Fetal club feet    Declining 1hr GTT     22 M Apg 8/8 Wt 5#15        Today she is doing well without any chief complaint. Her lochia is light. She denies chest pain, shortness of breath, headache, lightheadedness and blurred vision. She is bottle feeding and she denies any breast tenderness. She is ambulating well. Her voiding pattern is normal. I reviewed signs and symptoms of post partum depression with the patient, she currently denies any of these symptoms. She is tolerating solids. Vital Signs:  Vitals:    22 1646 22 1902 22 0004   BP: 132/68 121/73 111/72 125/77   Pulse: 76 86 64 56   Resp: 20 19 19 16   Temp:  97.9 °F (36.6 °C) 98.6 °F (37 °C) 98.4 °F (36.9 °C)   TempSrc:  Oral Oral Oral   SpO2:  99% 99% 100%   Weight:       Height:            Physical Exam:  General:  no apparent distress, alert and cooperative  Neurologic:  alert, oriented, normal speech, no focal findings or movement disorder noted  Lungs:  No increased work of breathing, good air exchange, clear to auscultation bilaterally, no crackles or wheezing  Heart:  Normal apical impulse, regular rate and rhythm, normal S1 and S2, no S3 or S4, and no murmur noted    Abdomen: abdomen soft, non-distended, non-tender  Fundus: non-tender, firm, below umbilicus  Extremities:  no calf tenderness, non edematous    Lab:  Lab Results   Component Value Date    HGB 10.9 (L) 2022     Lab Results   Component Value Date    HCT 33.5 (L) 2022       Assessment/Plan:  1.  Jorge Ryan is a [id number] PPD # 1 s/p    - Doing well, VSS overnight   - Male infant in NICU, circumcision desired   - Encourage ambulation   - Postpartum Hgb/Hct ordered this AM   - Motrin/Tylenol for pain  2. Rh positive/Rubella immune  3. Bottle feeding  4. Elevated BPs   - Patient with some elevated BP through admission, has not met criteria for gestational hypertension at this time   - Patient denies s/s preE   - Will obtain preE labs if meets criteria   - Pt has history of gHTN in previous pregnancy  5. Hx Aortic Stenosis   - Pt reports history diagnosed in    - Reviewed multiple past echoes which do not show evidence of aortic stenosis, most recent    - Encourage follow up with cardiology, consultation completed during admission   - EKG demonstrates NSR on admission   - Currently asymptomatic  6. Asthma   - Stable on no medications, asymptomatic  7. Dysmenorrhea   - Depo ordered PP  8. Anemia   - Hgb of 10.9 on admission   - Repeat CBC ordered this AM   - Currently asymptomatic  9. BMI 41  10. Continue post partum care    Counseling Completed:  Secondary Smoke risks and Sudden Infant Death Syndrome were reviewed with recommendations. Infant sleeping, \"back to sleep\" and avoidance of co-sleeping recommendations were reviewed. Signs and Symptoms of Post Partum Depression were reviewed. The patient is to call if any occur. Signs and symptoms of Mastitis were reviewed. The patient is to call if any occur for follow up.   Discharge instructions including pelvic rest, no driving with pain medicine and office follow-up were reviewed with patient     Attending Physician: Dr. Grayson Manuel DO  Ob/Gyn Resident   2022, 2:19 AM     Date: 2022  Time: 7:38 PM      Patient Name: Cleve Pan  Patient : 1995  Room/Bed: 3505/9983-91  Admission Date/Time: 4/3/2022  6:16 PM        Attending Physician Statement  I have discussed the care of Cleve Pan, including pertinent history and exam findings with the resident. I have reviewed and edited their note in the electronic medical record. The key elements of all parts of the encounter have been performed/reviewed by me . I agree with the assessment, plan and orders as documented by the resident. The level of care submitted represents to the best of my ability the care documented in the medical record today. GC Modifier. This service has been performed in part by a resident under the direction of a teaching physician.         Attending's Name:  Dominga Wells DO

## 2022-04-05 NOTE — FLOWSHEET NOTE
I have reviewed all AWHONN Post-Birth Warning Signs and essential teaching points for pulmonary embolism, cardiac disease, hypertensive disorders of pregnancy, obstetric hemorrhage, venous thromboembolism, infection, and postpartum depression with the patient  I have informed the patient on when to call their healthcare provider and when to call 911. I have discussed with the patient  the importance of scheduling a follow-up visit with their physician, nurse practitioner or midwife and provided them with correct contact information for appointment. I have provided the patient with a copy of the \"Save Your Life\" handout. The patient has acknowledged receiving and understanding this education with her signature.

## 2022-04-06 LAB — SURGICAL PATHOLOGY REPORT: NORMAL

## 2022-04-12 ENCOUNTER — POSTPARTUM VISIT (OUTPATIENT)
Dept: OBGYN | Age: 27
End: 2022-04-12
Payer: COMMERCIAL

## 2022-04-12 VITALS
DIASTOLIC BLOOD PRESSURE: 70 MMHG | HEART RATE: 60 BPM | WEIGHT: 231.4 LBS | BODY MASS INDEX: 39.5 KG/M2 | HEIGHT: 64 IN | SYSTOLIC BLOOD PRESSURE: 127 MMHG

## 2022-04-12 PROCEDURE — 99024 POSTOP FOLLOW-UP VISIT: CPT | Performed by: OBSTETRICS & GYNECOLOGY

## 2022-04-12 PROCEDURE — 99211 OFF/OP EST MAY X REQ PHY/QHP: CPT | Performed by: OBSTETRICS & GYNECOLOGY

## 2022-04-12 SDOH — ECONOMIC STABILITY: FOOD INSECURITY: WITHIN THE PAST 12 MONTHS, YOU WORRIED THAT YOUR FOOD WOULD RUN OUT BEFORE YOU GOT MONEY TO BUY MORE.: NEVER TRUE

## 2022-04-12 SDOH — ECONOMIC STABILITY: FOOD INSECURITY: WITHIN THE PAST 12 MONTHS, THE FOOD YOU BOUGHT JUST DIDN'T LAST AND YOU DIDN'T HAVE MONEY TO GET MORE.: NEVER TRUE

## 2022-04-12 ASSESSMENT — SOCIAL DETERMINANTS OF HEALTH (SDOH): HOW HARD IS IT FOR YOU TO PAY FOR THE VERY BASICS LIKE FOOD, HOUSING, MEDICAL CARE, AND HEATING?: NOT HARD AT ALL

## 2022-04-12 NOTE — PROGRESS NOTES
Mando Hunter  2022  9:36 AM        Mando Hunter is a 31 y.o. female [id number]      The patient was seen for postpartum exam and BP check. She has no chief complaints today. She delivered vaginally on 22. She is not breast feeding and there is not any signs or symptoms of mastitis. The patient completed the E.P.D.S. Evaluation form and scored 0. She does not have any signs or symptoms of post partum depression. She denies any suicidal thoughts with a plan, intent to harm others and delusional ideas. Today her lochia is light she denies any dizziness or shortness of breath. Her  son remains in the NICU requiring nasal O2 and NG feedings, due to \" narrow airway \" . The patient received Depo Provera for contraception prior to discharge. The patient is planning cardiology  follow up for history of aortic stenosis which per patient was not seen on her most recent evaluation. OB History    Para Term  AB Living   4 4 2 2 0 4   SAB IAB Ectopic Molar Multiple Live Births   0 0 0 0 0 4      # Outcome Date GA Lbr Filipe/2nd Weight Sex Delivery Anes PTL Lv   4 Term 22 38w4d 08:54 / 00:04 5 lb 15.4 oz (2.705 kg) M Vag-Spont EPI N VANITA      Name: Kaitlin Hughes: 8  Apgar5: 8   3 Term 06/10/20 38w1d  6 lb 8.2 oz (2.955 kg) F Vag-Spont EPI N VANITA      Name: Gricelda Albarado: 8  Apgar5: 9   2   32w0d  4 lb 3 oz (1.899 kg) M Vag-Spont         Birth Comments: [facility]   1   35w0d  5 lb 1 oz (2.296 kg) F Vag-Spont EPI Y VANITA      Birth Comments: this delivery       Apgar1: 7  Apgar5: 8      Obstetric Comments   G1-    G2-  - Progesterone in this preg   G3- Progesterone in this preg. Injectable Progesterone with [facility] home care. Same partner for all preg.         Patient Active Problem List   Diagnosis    Asthma     Hx Aortic Stenosis    Axillary hidradenitis suppurativa    Hx sPTD x 2    Dysmenorrhea  38 weeks gestation of pregnancy    Hx gHTN (G3)    FHx of aortic stenosis (G3)      COVID vaccinated    FGR    Fetal club feet    Declining 1hr GTT     / M Apg  Wt 5#15        Blood pressure 127/70, pulse 60, height 5' 4\" (1.626 m), weight 231 lb 6.4 oz (105 kg), unknown if currently breastfeeding. Assessment:   Diagnosis Orders   1. Postpartum state         Chief Complaint   Patient presents with    Postpartum Care     1 week post partum bp check     EPDS Score of 0        Plan:  1. Return to the office in  4 weeks  2. Signs & Symptoms of mastitis reviewed; notify if occurs  3. Secondary smoke risks reviewed. Increased risks of respiratory problems, Sudden     infant death syndrome, and potential malignancies. 4. Abstinence  5. Family planning counseling and STD counseling completed  6. Cardiology follow up for history of aortic stenosis.    7. Pt planning ortho follow up for torn ACL

## 2022-05-16 ENCOUNTER — OFFICE VISIT (OUTPATIENT)
Dept: ORTHOPEDIC SURGERY | Age: 27
End: 2022-05-16
Payer: COMMERCIAL

## 2022-05-16 DIAGNOSIS — F43.21 GRIEF AT LOSS OF CHILD: ICD-10-CM

## 2022-05-16 DIAGNOSIS — S83.512A NEW ACL TEAR, LEFT, INITIAL ENCOUNTER: Primary | ICD-10-CM

## 2022-05-16 DIAGNOSIS — Z63.4 GRIEF AT LOSS OF CHILD: ICD-10-CM

## 2022-05-16 PROCEDURE — 1036F TOBACCO NON-USER: CPT | Performed by: FAMILY MEDICINE

## 2022-05-16 PROCEDURE — G8417 CALC BMI ABV UP PARAM F/U: HCPCS | Performed by: FAMILY MEDICINE

## 2022-05-16 PROCEDURE — 99213 OFFICE O/P EST LOW 20 MIN: CPT | Performed by: FAMILY MEDICINE

## 2022-05-16 PROCEDURE — G8427 DOCREV CUR MEDS BY ELIG CLIN: HCPCS | Performed by: FAMILY MEDICINE

## 2022-05-16 SDOH — SOCIAL STABILITY - SOCIAL INSECURITY: DISSAPEARANCE AND DEATH OF FAMILY MEMBER: Z63.4

## 2022-05-16 NOTE — PROGRESS NOTES
Sports Medicine Consultation     CHIEF COMPLAINT:  Knee Pain (Left follow up. worsening pain in last 6wks. no new injury. still unstable )      HPI:  Garth Madrigal is a 31y.o. year old female who is a  established patient being seen for regarding follow up of a pre-existing problem left knee pain. The pain has been present for 9 month(s). The patient recalls a pop while playing sand volleyball injury. She has recently delivered her child who unfortunately passed away a week ago and is currently grieving the loss of the child and she has tried brace, rest, HEP with improvement. The pain is described as achy. There is  pain on weightbearing. The knee does swell. There is is not painful popping and clicking. The knee does not catch or lock. It has given out. It is  stiff upon arising from sitting. It is  painful to go up and down stairs and sit for a prolonged period of time. she has a past medical history of ACL injury tear, Asthma, Chronic airway disease, Dental crowns present, Gestational HTN--G3, Hidradenitis axillaris, History of  labor, Hydradenitis, Left ankle sprain, Maternal congenital heart disease, antepartum, Obesity,  w/ MIrena IUD 6/10/20 F APG / Wt 6#8, and Wears glasses. she has a past surgical history that includes Tonsillectomy and adenoidectomy and other surgical history (Bilateral, 2016). family history includes Cancer in her maternal grandmother; Diabetes in her maternal grandmother and mother; Early Death (age of onset: 62) in her paternal uncle; Heart Disease in her paternal uncle; Learning Disabilities in her mother; Erica Crock in her maternal grandfather; No Known Problems in her brother, father, paternal grandfather, paternal grandmother, and sister; Other in her brother.     Social History     Socioeconomic History    Marital status: Single     Spouse name: Not on file    Number of children: Not on file    Years of education: Not on file    Highest education level: Not on file   Occupational History    Not on file   Tobacco Use    Smoking status: Never Smoker    Smokeless tobacco: Never Used   Vaping Use    Vaping Use: Never used   Substance and Sexual Activity    Alcohol use: No     Alcohol/week: 0.0 standard drinks    Drug use: Never    Sexual activity: Not Currently     Partners: Male   Other Topics Concern    Not on file   Social History Narrative    Not on file     Social Determinants of Health     Financial Resource Strain: Low Risk     Difficulty of Paying Living Expenses: Not hard at all   Food Insecurity: No Food Insecurity    Worried About Running Out of Food in the Last Year: Never true    Geri of Food in the Last Year: Never true   Transportation Needs:     Lack of Transportation (Medical): Not on file    Lack of Transportation (Non-Medical):  Not on file   Physical Activity:     Days of Exercise per Week: Not on file    Minutes of Exercise per Session: Not on file   Stress:     Feeling of Stress : Not on file   Social Connections:     Frequency of Communication with Friends and Family: Not on file    Frequency of Social Gatherings with Friends and Family: Not on file    Attends Yazdanism Services: Not on file    Active Member of [address] [place] [address] or Organizations: Not on file    Attends Club or Organization Meetings: Not on file    Marital Status: Not on file   Intimate Partner Violence:     Fear of Current or Ex-Partner: Not on file    Emotionally Abused: Not on file    Physically Abused: Not on file    Sexually Abused: Not on file   Housing Stability:     Unable to Pay for Housing in the Last Year: Not on file    Number of [place] in the Last Year: Not on file    Unstable Housing in the Last Year: Not on file       Current Outpatient Medications   Medication Sig Dispense Refill    ibuprofen (ADVIL;MOTRIN) 600 MG tablet Take 1 tablet by mouth every 6 hours as needed for Pain 30 tablet 1    docusate sodium (COLACE) 100 MG capsule Take 1 capsule by mouth 2 times daily 60 capsule 0    Prenatal Multivit-Min-Fe-FA (PRENATAL FORTE) TABS Take 1 tablet by mouth Daily May substitute with any prenatal vit pt insurance will cover 30 tablet 12    acetaminophen (TYLENOL) 325 MG tablet Take 1 tablet by mouth every 4 hours as needed for Pain 60 tablet 0    selenium sulfide (SELSUN) 2.5 % lotion Apply topically daily as needed for Itching Apply topically daily as needed. No current facility-administered medications for this visit. Allergies:  sheis allergic to lidocaine. ROS:  CV:  Denies chest pain; palpitations; shortness of breath; swelling of feet, ankles; and loss of consciousness. CON: Denies fever and dizziness. ENT:  Denies hearing loss / ringing, ear infections hoarseness, and swallowing problems. RESP:  Denies chronic cough, spitting up blood, and asthma/wheezing. GI: Denies abdominal pain, change in bowel habits, nausea or vomiting, and blood in stools. :  Denies frequent urination, burning or painful urination, blood in the urine, and bladder incontinence. NEURO:  Denies headache, memory loss, sleep disturbance, and tremor or movement disorder. PHYSICAL EXAM:   LMP  (LMP Unknown)   GENERAL: Julee Recinos is a 31 y.o. female who is alert and oriented and sitting comfortably in our office. SKIN:  Intact without rashes, lesions or ulcerations. NEURO: Sensation to the extremity is intact. VASC:  Capillary refill is less than 3 seconds. Distal pulses are palpable. There is no lymphadenopathy.     Knee Exam  Musculoskeletal/Neurologic:  Inspection-Swelling: none, Ecchymosis: no  Palpation-Tenderness:medial joint line  Pain with patellar grind: no  ROM- 0-125  Strength- WNL  Sensation-normal to light touch    Special Tests-  Varus Laxity: negative   Valgus Laxity:  negative   Anterior Drawer: positive for known acl tear   Posterior Drawer: negative  Lachman's: positive  Óscar's:negative    Gait: antalgic    PSYCH:  Good fund of knowledge and displays understanding of exam.    RADIOLOGY: No results found. IMPRESSION:     1. New ACL tear, left, initial encounter    2. Grief at loss of child          PLAN:   We discussed some of the etiologies and natural histories of     ICD-10-CM    1. New ACL tear, left, initial encounter  [address] MD, Orthopedic Surgery, [place]   2. Grief at loss of child  F43.21     Z63.4    . We discussed the various treatment alternatives including anti-inflammatory medications, physical therapy, injections, further imaging studies and as a last resort surgery. At this point I counseled the patient about making sure she has an appropriate time for grief reaction and she reported the  for the baby is on Friday. In regards to her knee I explained that we can fix this at any time there is no significant rush behind and she needs to make sure that she is taking care of her self and her mental health we will have her see Dr. Eric Santana for surgical consultation if she would like to have her ACL fixed prior to her start of school in the fall. That referral was placed her follow-up with me will be as needed. She voiced understanding agreement this plan. Return to clinic in No follow-ups on file. Cherelle Johnson Please be aware portions of this note were completed using voice recognition software and unforeseen errors may have occurred    Electronically signed by Benjamín Mckeon DO, FAOASM  on 22 at 11:05 AM EDT        No orders of the defined types were placed in this encounter.

## 2022-05-24 ENCOUNTER — POSTPARTUM VISIT (OUTPATIENT)
Dept: OBGYN | Age: 27
End: 2022-05-24
Payer: COMMERCIAL

## 2022-05-24 ENCOUNTER — FOLLOWUP TELEPHONE ENCOUNTER (OUTPATIENT)
Dept: OBGYN | Age: 27
End: 2022-05-24

## 2022-05-24 VITALS
DIASTOLIC BLOOD PRESSURE: 82 MMHG | SYSTOLIC BLOOD PRESSURE: 126 MMHG | HEIGHT: 64 IN | HEART RATE: 64 BPM | BODY MASS INDEX: 39.72 KG/M2

## 2022-05-24 DIAGNOSIS — Z84.82 FAMILY HISTORY OF SIDS (SUDDEN INFANT DEATH SYNDROME): ICD-10-CM

## 2022-05-24 DIAGNOSIS — N94.6 DYSMENORRHEA: ICD-10-CM

## 2022-05-24 PROBLEM — Z3A.38 38 WEEKS GESTATION OF PREGNANCY: Status: RESOLVED | Noted: 2020-06-09 | Resolved: 2022-05-24

## 2022-05-24 PROCEDURE — 99211 OFF/OP EST MAY X REQ PHY/QHP: CPT | Performed by: OBSTETRICS & GYNECOLOGY

## 2022-05-24 RX ORDER — MEDROXYPROGESTERONE ACETATE 150 MG/ML
150 INJECTION, SUSPENSION INTRAMUSCULAR
Qty: 1 ML | Refills: 3 | Status: SHIPPED | OUTPATIENT
Start: 2022-05-24

## 2022-05-24 SDOH — HEALTH STABILITY: PHYSICAL HEALTH: ON AVERAGE, HOW MANY MINUTES DO YOU ENGAGE IN EXERCISE AT THIS LEVEL?: 10 MIN

## 2022-05-24 SDOH — HEALTH STABILITY: PHYSICAL HEALTH: ON AVERAGE, HOW MANY DAYS PER WEEK DO YOU ENGAGE IN MODERATE TO STRENUOUS EXERCISE (LIKE A BRISK WALK)?: 7 DAYS

## 2022-05-24 ASSESSMENT — SOCIAL DETERMINANTS OF HEALTH (SDOH)
WITHIN THE LAST YEAR, HAVE YOU BEEN HUMILIATED OR EMOTIONALLY ABUSED IN OTHER WAYS BY YOUR PARTNER OR EX-PARTNER?: NO
WITHIN THE LAST YEAR, HAVE YOU BEEN AFRAID OF YOUR PARTNER OR EX-PARTNER?: NO
WITHIN THE LAST YEAR, HAVE TO BEEN RAPED OR FORCED TO HAVE ANY KIND OF SEXUAL ACTIVITY BY YOUR PARTNER OR EX-PARTNER?: NO
WITHIN THE LAST YEAR, HAVE YOU BEEN KICKED, HIT, SLAPPED, OR OTHERWISE PHYSICALLY HURT BY YOUR PARTNER OR EX-PARTNER?: NO

## 2022-05-24 NOTE — PROGRESS NOTES
Postpartum Visit    Dominic Polanco is a 31 y.o. female [id number], 7 weeks Post Partum s/p spontaneous vaginal delivery on 22    The patient was seen. She denies complaints today. Her pregnancy was complicated by personal history of aortic stenosis, hx of sPTD x2, hx of gHTN. Patient reported that her baby passed away on 22 due to SIDS. The patient completed the E.P.D.S. Post Partum Depression Evaluation form and EPDS Score of 13. She does have signs or symptoms of post partum depression. She denies any suicidal thoughts with a plan, intent to harm others and delusional ideas. She does admit to having good home support. She was offered counseling services, medication, and other resources and support. Patient declined all services. She denies signs or symptoms of mastitis. Today her lochia is light she denies any dizziness or shortness of breath. Her bowels are regular and she denies any urinary tract symptomology. She is using Depo-Provera, Mirena IUD for history of dysmenorrhea.       Her pregnancy was complicated by:  Patient Active Problem List    Diagnosis Date Noted    Asthma  2012     Priority: High     Overview Note:     Pt did not report using inhalers at intake-SK      Child passed of SIDS 2022     Priority: Medium     Overview Note:     Patient's G4  passed at 3 weeks        22 M Apg 8/8 Wt 5#15  2022    FGR 2022     Overview Note:     AC 8%ile 22  AC under 3% 22    Needs delivered at 45 and 0  Needs weekly NST   Pt scheduled for [facility] with [facility] -[facility]      Fetal club feet 2022    Declining 1hr GTT 2022     Overview Note:     Patient states unable to complete 1hr GTT, supplies ordered to check blood sugars for two weeks  Patient states that she was told her sugars are wnl and threw away the sheets      COVID vaccinated 2021    FHx of aortic stenosis (G3)   2021     Overview Note:     Pt daughter born in . Small defect, no interventions  Fetal echo wnl      Hx gHTN (G3) 2020    Dysmenorrhea 2020     Overview Note:     History of management with Depo       Hx Aortic Stenosis 2019     Overview Note:     Arotic Stenosis. 3/24/20- saw Dr Suly Barnes at [facility], echo done showing EF of 55%, normal LV diastolic compliance, no significant valvular abnormalities   Pt has f/u appt in 2020, Pt completed appt, to follow up as needed-SK      Axillary hidradenitis suppurativa 2019    Hx sPTD x 2 2019     Overview Note:      and   ( progesterone in  )   17P Plans/Education and Counseled-  Labor Definition and Warning Signs, What is 17P and  Common side effects of 17P injections,  [facility] with [facility]. 21- GA 9w5d  Plan for referral to [facility] accordingly. Plan to obtain progesterone from [facility] accordingly closer to 16 weeks of pregnancy   MFM placed referral. Pt started injections 10/29/21 with [facility]         Vitals:   Blood pressure 126/82, pulse 64, height 5' 4\" (1.626 m), unknown if currently breastfeeding.     Physical Exam:  General:  Moderate emotional distress, alert, and cooperative  Lungs:  No increased work of breathing, good air exchange, clear to auscultation bilaterally, no crackles or wheezing  Heart:  normal S1 and S2, regular rate and rhythm, and no murmurs, rubs, gallops  Abdomen: Abdomen soft, non-tender, no rebound, guarding, rigidity, BS normal. no masses  Fundus: non-tender, normal size, firm, below umbilicus  Perineum: exam declined  Extremities:  no calf tenderness, non edematous, non erythematous         Assessment:  Tyrese Nava is a 31 y.o. female [id number], 7 weeks Post Partum s/p spontaneous vaginal delivery on 22   - Doing well, continue routine post partum care   - EPDS: 13   - Lochia light    - Denies s/sx mastitis    - COVID-19 vaccination: given  and 3/6/21   - Gardasil vaccination: completed    - Last pap smear: , insufficient pap smear. Offered patient repeat pap smear she she declined   - Indications for 2hr 75g GTT: none     SIDS of G4 infant    - Patient reports baby passed away 22    - Reports significant grief but declined all services and medications at this time    - Patient given support, seen by Beau Graham. Instructed patient to call at any time if she decides to pursue medical or counseling treatment    Patient Active Problem List    Diagnosis Date Noted    Asthma  2012     Priority: High     Pt did not report using inhalers at intake-SK      Child passed of SIDS 2022     Priority: Medium     Patient's G4  passed at 3 weeks        22 M Apg 8/8 Wt 5#15  2022    FGR 2022     AC 8%ile 22  AC under 3% 22    Needs delivered at 45 and 0  Needs weekly NST   Pt scheduled for weekly BPP with [facility]FM -[facility]      Fetal club feet 2022    Declining 1hr GTT 2022     Patient states unable to complete 1hr GTT, supplies ordered to check blood sugars for two weeks  Patient states that she was told her sugars are wnl and threw away the sheets      COVID vaccinated 2021    FHx of aortic stenosis (G3)   2021     Pt daughter born in . Small defect, no interventions  Fetal echo wnl      Hx gHTN (G3) 2020    Dysmenorrhea 2020     History of management with Depo       Hx Aortic Stenosis 2019     Arotic Stenosis. 3/24/20- saw Dr Laurence Manriquez at [facility], echo done showing EF of 55%, normal LV diastolic compliance, no significant valvular abnormalities   Pt has f/u appt in 2020, Pt completed appt, to follow up as needed-[facility]      Axillary hidradenitis suppurativa 2019    Hx sPTD x 2 2019 and   ( progesterone in  )   17P Plans/Education and Counseled-  Labor Definition and Warning Signs, What is 17P and  Common side effects of 17P injections,  [facility] with [facility]. 9/14/21- GA 9w5d  Plan for referral to [facility] accordingly. Plan to obtain progesterone from [facility] accordingly closer to 16 weeks of pregnancy   MFM placed referral. Pt started injections 10/29/21 with [facility]       Return in about 5 weeks (around 6/28/2022) for Depo Provera Nurse injection. Counseling Completed:  · Signs & Symptoms of mastitis and when to notify office discussed.   · Secondary smoke risks including increased risks of respiratory problems, Sudden infant death syndrome, and potential malignancies discussed  · Abstinence, family planning counseling and STD counseling discussed  · Return to normal activity as tolerated    Brisa Pena DO  Ob/Gyn Resident  [facility], [place]  5/24/2022, 11:38 AM

## 2022-05-24 NOTE — TELEPHONE ENCOUNTER
SW met with Pt to discuss [facility] and current needs. Pt is 6 weeks postpartum at this time. Pt stated she has been working with [facility] and is no longer in need of items and programs. Pt stated she is not ready to close her case. SW was able to complete the rest of the assessment with concerns. Pt is grieving the loss of her child at this time. Pt did not want any referrals but will continue to work with [facility]. SW offered assistance as needed. SW will follow up with [facility]. SW will follow up with Pt as needed.

## 2022-05-25 ENCOUNTER — OFFICE VISIT (OUTPATIENT)
Dept: ORTHOPEDIC SURGERY | Age: 27
End: 2022-05-25
Payer: COMMERCIAL

## 2022-05-25 VITALS — HEIGHT: 64 IN | WEIGHT: 231 LBS | BODY MASS INDEX: 39.44 KG/M2

## 2022-05-25 DIAGNOSIS — S83.512A NEW ACL TEAR, LEFT, INITIAL ENCOUNTER: Primary | ICD-10-CM

## 2022-05-25 PROCEDURE — G8427 DOCREV CUR MEDS BY ELIG CLIN: HCPCS | Performed by: ORTHOPAEDIC SURGERY

## 2022-05-25 PROCEDURE — 99204 OFFICE O/P NEW MOD 45 MIN: CPT | Performed by: ORTHOPAEDIC SURGERY

## 2022-05-25 PROCEDURE — 1036F TOBACCO NON-USER: CPT | Performed by: ORTHOPAEDIC SURGERY

## 2022-05-25 PROCEDURE — G8417 CALC BMI ABV UP PARAM F/U: HCPCS | Performed by: ORTHOPAEDIC SURGERY

## 2022-05-25 NOTE — PROGRESS NOTES
Orthopedic Knee Encounter Note     Chief complaint: Left knee pain and instability    HPI: Mando Hunter is a 31 y.o. female who presents for evaluation of her left knee. She indicates that back on 2022 while playing volleyball in the sand she went for the ball and her knee gave out on her. She was seen at the time by Dr. Adair Curling and diagnosed with an ACL tear. She was at the beginning of her pregnancy at that time and so did not pursue any additional treatment. She has since had her baby and so presents today to discuss additional treatment for her knee. Since initial injury she continues to have feelings of instability and some pain with prolonged standing. She denies any locking or catching of the knee. She initially was swollen but has not been since. Previous treatment:    NSAIDs: Ibuprofen    Injections: None    Physical therapy: No    Surgeries: None    Review of Systems:     Constitution: no fever or chills   Pain level: 6-7/10  Musculoskeletal: As noted in the HPI   Neurologic: no neurologic symptoms    Past Medical History  Juan M Jo  has a past medical history of ACL injury tear, Asthma, Child passed of SIDS, Chronic airway disease, Dental crowns present, Gestational HTN--G3, Hidradenitis axillaris, History of  labor, Hydradenitis, Left ankle sprain, Maternal congenital heart disease, antepartum, Obesity,  w/ MIrena IUD 6/10/20 F APG  Wt 6#8, and Wears glasses. Past Surgical History  Juan M Jo  has a past surgical history that includes Tonsillectomy and adenoidectomy and other surgical history (Bilateral, 2016).     Current Medications  Current Outpatient Medications   Medication Sig Dispense Refill    medroxyPROGESTERone (DEPO-PROVERA) 150 MG/ML injection Inject 1 mL into the muscle every 3 months 1 mL 3    ibuprofen (ADVIL;MOTRIN) 600 MG tablet Take 1 tablet by mouth every 6 hours as needed for Pain 30 tablet 1    docusate sodium (COLACE) 100 MG capsule Take 1 capsule by mouth 2 times daily 60 capsule 0    Prenatal Multivit-Min-Fe-FA (PRENATAL FORTE) TABS Take 1 tablet by mouth Daily May substitute with any prenatal vit pt insurance will cover 30 tablet 12    acetaminophen (TYLENOL) 325 MG tablet Take 1 tablet by mouth every 4 hours as needed for Pain 60 tablet 0    selenium sulfide (SELSUN) 2.5 % lotion Apply topically daily as needed for Itching Apply topically daily as needed. No current facility-administered medications for this visit. Allergies  Allergies have been reviewed. Krystina Carrion is allergic to lidocaine. Social History  Krystina Carrion  reports that she has never smoked. She has never used smokeless tobacco. She reports that she does not drink alcohol and does not use drugs. Family History  Clari's family history includes Cancer in her maternal grandmother; Diabetes in her maternal grandmother and mother; Early Death (age of onset: 62) in her paternal uncle; Heart Disease in her paternal uncle; Learning Disabilities in her mother; Lili Pagoda in her maternal grandfather; No Known Problems in her brother, father, paternal grandfather, paternal grandmother, and sister; Other in her brother.      Physical Exam:     Ht 5' 4\" (1.626 m)   Wt 231 lb (104.8 kg)   LMP  (LMP Unknown)   BMI 39.65 kg/m²    General Appearance: alert, well appearing, and in no distress  Mental Status: alert, oriented to person, place, and time  Gait: antalgic  Hips: Good pain-free ROM without crepitation    Knee: Bilateral    Skin: warm and dry, no rash or erythema  Vasculature: 2+ pedal pulses bilaterally  Neuro: Sensation grossly intact to light touch diffusely  Alignment: Normal  Tenderness: Tender to palpation along the medial joint line of the left knee    ROM: (Degrees)    Right   A P   Left   A P    Extension  0    Extension  0   Flexion   110    Flexion   100      Crepitation  No    Crepitation  No      Muscle strength:    Right       Left    Flexion   5    Flexion   5  Extension  5    Extension  5  SLR   5    SLR   5    Extensor lag   n    Extensor lag  n      Special testing:    Right          Left    n    Pain with deep knee flexion   y  n    Patellar grind     n  n    Patellar apprehension    n  n    Patellar glide     n    n    Lachman     y  n    Anterior drawer    y  n    Pivot shift     y  n    Posterior drawer    n  n    Dial test     n  n    Posterolateral drawer    n  n    Posterior Sag     n  n    MCL      n  n    LCL      n    n    Medial joint line tenderness   y  n    Lateral joint line tenderness   n  n    Appley's     y      Imaging: An MRI of the left knee completed on 9/4/2021 was reviewed independently demonstrating a complete ACL rupture. PCL, MCL, LCL are intact. Lateral meniscus is intact. Questionable small tear involving the medial meniscus at the posterior third. Impression/Plan:     Salazar Stern is a 31 y.o. old female with a left knee ACL tear. While this certainly can be treated conservatively, given her young age, activity level, and clinical instability, I recommend surgical stabilization of her knee by way of an arthroscopic assisted ACL reconstruction to restore her to her desired level of function and avoid recurrent instability episodes that place her chondral surfaces and menisci at risk. I had an in-depth discussion with the patient with regards to what surgery would entail in terms of the actual procedure, risks and benefits of surgery, the expected outcome, and postoperative recovery course. The importance of appropriate postoperative rehabilitation on the success of surgery was explained and emphasized.  The risks as discussed included but were not limited to risk of infection, wound healing complications, vascular injury/bleeding, temporary or permanent nerve injury, knee pain, knee stiffness,  DVT and subsequent PE, graft failure with recurrent instability, and anesthesia. We also discussed graft options available to her including allografts and autografts. Risks and benefits of these grafts were fully discussed. she demonstrated a good understanding of our discussion and all questions were appropriately answered. At this time she would like to proceed with surgery as outlined above using allograft. We will get her scheduled for surgery at her convenience once she is medically cleared. Preoperative labs i.e. CBC and BMP were ordered. she was encouraged to return or call prior to surgery or any scheduled appointments with any additional  questions or concerns.        NA = Not assessed  n = No  y = Yes  SLR = Straight leg raise  MCL = Medial collateral ligament  LCL = Lateral collateral ligament

## 2022-05-25 NOTE — LETTER
[address]  [address] [address]  Phone: [phone]  Fax: [phone]    Chad De La Cruz MD        May 25, 2022     Patient: Christina Craft   YOB: 1995   Date of Visit: 5/25/2022       To Whom It May Concern: It is my medical opinion that Gabe Bennett should remain out of work until she is cleared. Estimated return to work date 9/8/22 but could be earlier or later than that date depending on how she is healing from surgery. If you have any questions or concerns, please don't hesitate to call.     Sincerely,        Chad De La Cruz MD

## 2022-05-26 NOTE — PROGRESS NOTES
Attending Physician Statement  I have discussed the care of [address], including pertinent history and exam findings,  with the resident. I have reviewed the key elements of all parts of the encounter with the resident. I agree with the assessment, plan and orders as documented by the resident.   (GE Modifier)    Electronically signed by Lemuel Hunter MD at 8:15 PM 5/25/22

## 2022-05-27 NOTE — H&P (VIEW-ONLY)
HISTORY and [facility]       NAME:  Vivek Jacobson  MRN: [id number]   YOB: 1995   Date: 5/31/2022   Age: 31 y.o. Gender: female       COMPLAINT AND PRESENT HISTORY:   Vivek Jacobson is 31 y.o.,  female, undergoing preadmission testing for    LEFT KNEE ACL TEAR. Patient will be having:   KNEE ACL REPAIR ARTHROSCOPIC RECONSTRUCTION, LEFT    Patient had injury to the left knee. on 8/14/2020 while playing volleyball in the sand she went for the ball and her knee gave out on her . pt went to ER originally and nothing was found. Pt went to another sport medicine doctor and had MRI done that revealed  an ACL tear. Patient was at the beginning of her pregnancy at that time, so did not pursue any additional treatment. She has since had her baby, unfortunately she lost her baby at 3 weeks. Patient denies any prior knee surgery or injury to left knee. Patient c/o pain to left Knee. Pt describes as sometimes sharp and sometimes throbbing, she reports that pain occurs on the inside of her knee;   the pain is rated  as 7/10 in intensity at times. Patient pain intensifies with activity. patient states that she currently works and is very active. Onset of symptoms started 2 years ago. Patient reports that she  has been using  keeping her leg elevated , takingTylenol or Motrin when pain is too intolerable to help in pain relief. \" Most of the time I just take the pain\"    Patient reports that prolonged walking, or anything involving activity aggravates sxs. Patient reports that she has feelings of instability and some pain with prolonged standing. She denies any locking or catching of the knee. Pt knee does not  experience a buckling or given away sensation  under the knee. No locking up of the knee. No recent falls or trauma. Patient denies any joint warmth, redness, fever or chills.       Significant medical history:  Asthma- stable , cardiac murmur-stable    Associated medications: albuterol    Anticoagulants or blood thinners: Motrin    Pt denies any chest pain or SOB. No recent URI . Patient denies any personal or family problems with anesthesia. Patient had Labs done. EKG done previous NSR with arrhthymias. Medical  clearance required per surgeon. Surgery scheduling will notify Dr. Ashraf Staff office who will be responsible for making sure the clearance is obtained and is in the chart for surgery. Xrays or Imaging:     Impression MRI knee left without contrast 2021  1. Complete ACL tear and kissing bone contusion pattern with associated  nondisplaced microtrabecular fracturing as detailed above. 2. Mild lateral and patellofemoral chondromalacia with areas of partial  thickness fissuring. 3. Mild edema in the subcutaneous fat about the knee. 4. Small joint effusion. 5. Grade 1 MCL sprain. 6. Mild multifocal patellar tendinosis. 7. Degeneration in the posterior horn medial meniscus.  No meniscal tear.     Lab Results   Component Value Date    WBC 5.1 2022    HGB 12.1 2022    HCT 37.6 2022    MCV 88.1 2022     2022     Lab Results   Component Value Date     2022    K 4.3 2022     2022    CO2 26 2022    BUN 7 2022    CREATININE 0.58 2022    GLUCOSE 96 2022    CALCIUM 9.1 2022          PAST MEDICAL HISTORY     Past Medical History:   Diagnosis Date    ACL injury tear     Left     Asthma     Child passed of SIDS     Patient's G4  passed at 3 weeks (mother states that cause of death is not known at this time 22)   Mera Santiago 892 present     Gestational HTN--G3 2020    Hidradenitis axillaris 2015    History of  labor     Left ankle sprain 2015    Maternal congenital heart disease, antepartum 2014    Murmur, cardiac     Obesity     Wears glasses        SURGICAL HISTORY       Past Surgical History:   Procedure Laterality Date    OTHER SURGICAL HISTORY Bilateral 2016    excision axillary hydranitis    TONSILLECTOMY AND ADENOIDECTOMY         FAMILY HISTORY       Family History   Problem Relation Age of Onset    Other Brother          suicide     Diabetes Maternal Grandmother     Cancer Maternal Grandmother     Heart Disease Paternal Uncle     Early Death Paternal Uncle 62        MI    Learning Disabilities Mother     Diabetes Mother         Type 2 managed with insulin     No Known Problems Paternal Grandfather     No Known Problems Paternal Grandmother     Lung Cancer Maternal Grandfather     No Known Problems Father     No Known Problems Sister     No Known Problems Brother     Breast Cancer Neg Hx     Colon Cancer Neg Hx     Eclampsia Neg Hx     Hypertension Neg Hx     Ovarian Cancer Neg Hx      Labor Neg Hx     Spont Abortions Neg Hx     Stroke Neg Hx     Uterine Cancer Neg Hx        SOCIAL HISTORY       Social History     Socioeconomic History    Marital status: Single     Spouse name: None    Number of children: None    Years of education: None    Highest education level: None   Occupational History    None   Tobacco Use    Smoking status: Never Smoker    Smokeless tobacco: Never Used   Vaping Use    Vaping Use: Never used   Substance and Sexual Activity    Alcohol use: No     Alcohol/week: 0.0 standard drinks    Drug use: Never    Sexual activity: Not Currently     Partners: Male   Other Topics Concern    None   Social History Narrative    None     Social Determinants of Health     Financial Resource Strain: Low Risk     Difficulty of Paying Living Expenses: Not hard at all   Food Insecurity: No Food Insecurity    Worried About Running Out of Food in the Last Year: Never true    Geri of Food in the Last Year: Never true   Transportation Needs:     Lack of Transportation (Medical):  Not on file    Lack of Transportation (Non-Medical):  Not on file   Physical Activity: Insufficiently Active    Days of Exercise per Week: 7 days    Minutes of Exercise per Session: 10 min   Stress:     Feeling of Stress : Not on file   Social Connections:     Frequency of Communication with Friends and Family: Not on file    Frequency of Social Gatherings with Friends and Family: Not on file    Attends Anabaptist Services: Not on file    Active Member of Clubs or Organizations: Not on file    Attends Club or Organization Meetings: Not on file    Marital Status: Not on file   Intimate Partner Violence: Not At Risk    Fear of Current or Ex-Partner: No    Emotionally Abused: No    Physically Abused: No    Sexually Abused: No   Housing Stability:     Unable to Pay for Housing in the Last Year: Not on file    Number of [place] in the Last Year: Not on file    Unstable Housing in the Last Year: Not on file      REVIEW OF SYSTEMS      Allergies   Allergen Reactions    Lidocaine Hives     Pt states that dentist told her that she was allergic to lidocaine but thought it could have been novacaine, skin wheal test per CRNA demonstrated no rash       Current Outpatient Medications on File Prior to Encounter   Medication Sig Dispense Refill    albuterol sulfate HFA (VENTOLIN HFA) 108 (90 Base) MCG/ACT inhaler Inhale 2 puffs into the lungs every 6 hours as needed for Wheezing or Shortness of Breath      medroxyPROGESTERone (DEPO-PROVERA) 150 MG/ML injection Inject 1 mL into the muscle every 3 months 1 mL 3    ibuprofen (ADVIL;MOTRIN) 600 MG tablet Take 1 tablet by mouth every 6 hours as needed for Pain 30 tablet 1    docusate sodium (COLACE) 100 MG capsule Take 1 capsule by mouth 2 times daily 60 capsule 0    Prenatal Multivit-Min-Fe-FA (PRENATAL FORTE) TABS Take 1 tablet by mouth Daily May substitute with any prenatal vit pt insurance will cover 30 tablet 12    acetaminophen (TYLENOL) 325 MG tablet Take 1 tablet by mouth every 4 hours as needed for Pain 60 tablet 0    selenium sulfide (SELSUN) 2.5 % lotion Apply topically daily as needed for Itching Apply topically daily as needed. No current facility-administered medications on file prior to encounter. General health:  Fairly good. No fever or chills. Skin:  No itching, redness or rash. HEENT:  No headache, epistaxis or sore throat. Neck:  No pain, stiffness or masses. Cardiovascular/Respiratory system:  No chest pain, palpitation or shortness of breath. Gastrointestinal tract: No abdominal pain, Dysphagia, nausea, vomiting, diarrhea or constipation. Genitourinary:  No burning on micturition. No hesitancy, urgency, frequency or discoloration of urine. Locomotor:  See HPI. Neuropsychiatric:  No referable complaints. GENERAL PHYSICAL EXAM:     Vitals: /71   Pulse 57   Temp 98 °F (36.7 °C) (Temporal)   Resp 16   Ht 5' 4\" (1.626 m)   Wt 231 lb (104.8 kg)   LMP  (LMP Unknown)   SpO2 99%   BMI 39.65 kg/m²  Body mass index is 39.65 kg/m². GENERAL APPEARANCE:   Munira Melgoza is 31 y.o., female, moderately obese, nourished, conscious, alert. Does not appear to be distress or pain at this time. SKIN:  Warm, dry, no cyanosis or jaundice. HEAD:  Normocephalic, atraumatic, no swelling or tenderness. EYES:  Pupils equal, reactive to light. EARS:  No discharge, no marked hearing loss. NOSE:  No rhinorrhea, epistaxis or septal deformity. THROAT:  Not congested. No ulceration bleeding or discharge. NECK:  No stiffness, trachea central.                  CHEST:  Symmetrical and equal on expansion. HEART:  RRR S1 > S2. No audible murmurs or gallops.                  LUNGS:  Equal on expansion, normal breath sounds. No adventitious sounds. ABDOMEN:  Obese. Soft on palpation. No localized tenderness. No guarding or rigidity. LYMPHATICS:  No palpable cervical lymphadenopathy. LOCOMOTOR, BACK AND SPINE:  No tenderness or deformities. EXTREMITIES:  Symmetrical, no pretibial edema. No calf tenderness. No discoloration or ulcerations. Tenderness on palpation of the left Knee joint . NEUROLOGIC:  The patient is conscious, alert, oriented,Cranial nerve II-XII intact, taste and smell were not examined. No apparent focal sensory or motor deficits.                                                                                      PROVISIONAL DIAGNOSES / SURGERY:      KNEE ACL REPAIR ARTHROSCOPIC RECONSTRUCTION    LEFT KNEE ACL TEAR      Patient Active Problem List    Diagnosis Date Noted    Asthma  2012    Child passed of SIDS 2022     22 M Apg  Wt 5#15  2022    FGR 2022    Fetal club feet 2022    Declining 1hr GTT 2022    COVID vaccinated 2021    FHx of aortic stenosis (G3)   2021    Hx gHTN (G3) 2020    Dysmenorrhea 2020    Hx Aortic Stenosis 2019    Axillary hidradenitis suppurativa 2019    Hx sPTD x 2 2019       MYNOR FONG, ASIA - CNP on 2022 at 10:34 AM    Total time spent on encounter- PAT provider minutes: 31-40 minutes

## 2022-05-27 NOTE — H&P
HISTORY and [facility]       NAME:  Teetee Blancas  MRN: [id number]   YOB: 1995   Date: 5/31/2022   Age: 31 y.o. Gender: female       COMPLAINT AND PRESENT HISTORY:   Teetee Blancas is 31 y.o.,  female, undergoing preadmission testing for    LEFT KNEE ACL TEAR. Patient will be having:   KNEE ACL REPAIR ARTHROSCOPIC RECONSTRUCTION, LEFT    Patient had injury to the left knee. on 8/14/2020 while playing volleyball in the sand she went for the ball and her knee gave out on her . pt went to ER originally and nothing was found. Pt went to another sport medicine doctor and had MRI done that revealed  an ACL tear. Patient was at the beginning of her pregnancy at that time, so did not pursue any additional treatment. She has since had her baby, unfortunately she lost her baby at 3 weeks. Patient denies any prior knee surgery or injury to left knee. Patient c/o pain to left Knee. Pt describes as sometimes sharp and sometimes throbbing, she reports that pain occurs on the inside of her knee;   the pain is rated  as 7/10 in intensity at times. Patient pain intensifies with activity. patient states that she currently works and is very active. Onset of symptoms started 2 years ago. Patient reports that she  has been using  keeping her leg elevated , takingTylenol or Motrin when pain is too intolerable to help in pain relief. \" Most of the time I just take the pain\"    Patient reports that prolonged walking, or anything involving activity aggravates sxs. Patient reports that she has feelings of instability and some pain with prolonged standing. She denies any locking or catching of the knee. Pt knee does not  experience a buckling or given away sensation  under the knee. No locking up of the knee. No recent falls or trauma. Patient denies any joint warmth, redness, fever or chills.       Significant medical history:  Asthma- stable , cardiac murmur-stable    Associated medications: albuterol    Anticoagulants or blood thinners: Motrin    Pt denies any chest pain or SOB. No recent URI . Patient denies any personal or family problems with anesthesia. Patient had Labs done. EKG done previous NSR with arrhthymias. Medical  clearance required per surgeon. Surgery scheduling will notify Dr. Adam Gan office who will be responsible for making sure the clearance is obtained and is in the chart for surgery. Xrays or Imaging:     Impression MRI knee left without contrast 2021  1. Complete ACL tear and kissing bone contusion pattern with associated  nondisplaced microtrabecular fracturing as detailed above. 2. Mild lateral and patellofemoral chondromalacia with areas of partial  thickness fissuring. 3. Mild edema in the subcutaneous fat about the knee. 4. Small joint effusion. 5. Grade 1 MCL sprain. 6. Mild multifocal patellar tendinosis. 7. Degeneration in the posterior horn medial meniscus.  No meniscal tear.     Lab Results   Component Value Date    WBC 5.1 2022    HGB 12.1 2022    HCT 37.6 2022    MCV 88.1 2022     2022     Lab Results   Component Value Date     2022    K 4.3 2022     2022    CO2 26 2022    BUN 7 2022    CREATININE 0.58 2022    GLUCOSE 96 2022    CALCIUM 9.1 2022          PAST MEDICAL HISTORY     Past Medical History:   Diagnosis Date    ACL injury tear     Left     Asthma     Child passed of SIDS     Patient's G4  passed at 3 weeks (mother states that cause of death is not known at this time 22)   Mera Roblerolety 892 present     Gestational HTN--G3 2020    Hidradenitis axillaris 2015    History of  labor     Left ankle sprain 2015    Maternal congenital heart disease, antepartum 2014    Murmur, cardiac     Obesity     Wears glasses        SURGICAL HISTORY       Past Surgical History:   Procedure Laterality Date    OTHER SURGICAL HISTORY Bilateral 2016    excision axillary hydranitis    TONSILLECTOMY AND ADENOIDECTOMY         FAMILY HISTORY       Family History   Problem Relation Age of Onset    Other Brother          suicide     Diabetes Maternal Grandmother     Cancer Maternal Grandmother     Heart Disease Paternal Uncle     Early Death Paternal Uncle 62        MI    Learning Disabilities Mother     Diabetes Mother         Type 2 managed with insulin     No Known Problems Paternal Grandfather     No Known Problems Paternal Grandmother     Lung Cancer Maternal Grandfather     No Known Problems Father     No Known Problems Sister     No Known Problems Brother     Breast Cancer Neg Hx     Colon Cancer Neg Hx     Eclampsia Neg Hx     Hypertension Neg Hx     Ovarian Cancer Neg Hx      Labor Neg Hx     Spont Abortions Neg Hx     Stroke Neg Hx     Uterine Cancer Neg Hx        SOCIAL HISTORY       Social History     Socioeconomic History    Marital status: Single     Spouse name: None    Number of children: None    Years of education: None    Highest education level: None   Occupational History    None   Tobacco Use    Smoking status: Never Smoker    Smokeless tobacco: Never Used   Vaping Use    Vaping Use: Never used   Substance and Sexual Activity    Alcohol use: No     Alcohol/week: 0.0 standard drinks    Drug use: Never    Sexual activity: Not Currently     Partners: Male   Other Topics Concern    None   Social History Narrative    None     Social Determinants of Health     Financial Resource Strain: Low Risk     Difficulty of Paying Living Expenses: Not hard at all   Food Insecurity: No Food Insecurity    Worried About Running Out of Food in the Last Year: Never true    Geri of Food in the Last Year: Never true   Transportation Needs:     Lack of Transportation (Medical):  Not on file    Lack of Transportation (Non-Medical):  Not on file   Physical Activity: Insufficiently Active    Days of Exercise per Week: 7 days    Minutes of Exercise per Session: 10 min   Stress:     Feeling of Stress : Not on file   Social Connections:     Frequency of Communication with Friends and Family: Not on file    Frequency of Social Gatherings with Friends and Family: Not on file    Attends Faith Services: Not on file    Active Member of Clubs or Organizations: Not on file    Attends Club or Organization Meetings: Not on file    Marital Status: Not on file   Intimate Partner Violence: Not At Risk    Fear of Current or Ex-Partner: No    Emotionally Abused: No    Physically Abused: No    Sexually Abused: No   Housing Stability:     Unable to Pay for Housing in the Last Year: Not on file    Number of [place] in the Last Year: Not on file    Unstable Housing in the Last Year: Not on file      REVIEW OF SYSTEMS      Allergies   Allergen Reactions    Lidocaine Hives     Pt states that dentist told her that she was allergic to lidocaine but thought it could have been novacaine, skin wheal test per CRNA demonstrated no rash       Current Outpatient Medications on File Prior to Encounter   Medication Sig Dispense Refill    albuterol sulfate HFA (VENTOLIN HFA) 108 (90 Base) MCG/ACT inhaler Inhale 2 puffs into the lungs every 6 hours as needed for Wheezing or Shortness of Breath      medroxyPROGESTERone (DEPO-PROVERA) 150 MG/ML injection Inject 1 mL into the muscle every 3 months 1 mL 3    ibuprofen (ADVIL;MOTRIN) 600 MG tablet Take 1 tablet by mouth every 6 hours as needed for Pain 30 tablet 1    docusate sodium (COLACE) 100 MG capsule Take 1 capsule by mouth 2 times daily 60 capsule 0    Prenatal Multivit-Min-Fe-FA (PRENATAL FORTE) TABS Take 1 tablet by mouth Daily May substitute with any prenatal vit pt insurance will cover 30 tablet 12    acetaminophen (TYLENOL) 325 MG tablet Take 1 tablet by mouth every 4 hours as needed for Pain 60 tablet 0    selenium sulfide (SELSUN) 2.5 % lotion Apply topically daily as needed for Itching Apply topically daily as needed. No current facility-administered medications on file prior to encounter. General health:  Fairly good. No fever or chills. Skin:  No itching, redness or rash. HEENT:  No headache, epistaxis or sore throat. Neck:  No pain, stiffness or masses. Cardiovascular/Respiratory system:  No chest pain, palpitation or shortness of breath. Gastrointestinal tract: No abdominal pain, Dysphagia, nausea, vomiting, diarrhea or constipation. Genitourinary:  No burning on micturition. No hesitancy, urgency, frequency or discoloration of urine. Locomotor:  See HPI. Neuropsychiatric:  No referable complaints. GENERAL PHYSICAL EXAM:     Vitals: /71   Pulse 57   Temp 98 °F (36.7 °C) (Temporal)   Resp 16   Ht 5' 4\" (1.626 m)   Wt 231 lb (104.8 kg)   LMP  (LMP Unknown)   SpO2 99%   BMI 39.65 kg/m²  Body mass index is 39.65 kg/m². GENERAL APPEARANCE:   Teetee Blancas is 31 y.o., female, moderately obese, nourished, conscious, alert. Does not appear to be distress or pain at this time. SKIN:  Warm, dry, no cyanosis or jaundice. HEAD:  Normocephalic, atraumatic, no swelling or tenderness. EYES:  Pupils equal, reactive to light. EARS:  No discharge, no marked hearing loss. NOSE:  No rhinorrhea, epistaxis or septal deformity. THROAT:  Not congested. No ulceration bleeding or discharge. NECK:  No stiffness, trachea central.                  CHEST:  Symmetrical and equal on expansion. HEART:  RRR S1 > S2. No audible murmurs or gallops.                  LUNGS:  Equal on expansion, normal breath sounds. No adventitious sounds. ABDOMEN:  Obese. Soft on palpation. No localized tenderness. No guarding or rigidity. LYMPHATICS:  No palpable cervical lymphadenopathy. LOCOMOTOR, BACK AND SPINE:  No tenderness or deformities. EXTREMITIES:  Symmetrical, no pretibial edema. No calf tenderness. No discoloration or ulcerations. Tenderness on palpation of the left Knee joint . NEUROLOGIC:  The patient is conscious, alert, oriented,Cranial nerve II-XII intact, taste and smell were not examined. No apparent focal sensory or motor deficits.                                                                                      PROVISIONAL DIAGNOSES / SURGERY:      KNEE ACL REPAIR ARTHROSCOPIC RECONSTRUCTION    LEFT KNEE ACL TEAR      Patient Active Problem List    Diagnosis Date Noted    Asthma  2012    Child passed of SIDS 2022     22 M Apg  Wt 5#15  2022    FGR 2022    Fetal club feet 2022    Declining 1hr GTT 2022    COVID vaccinated 2021    FHx of aortic stenosis (G3)   2021    Hx gHTN (G3) 2020    Dysmenorrhea 2020    Hx Aortic Stenosis 2019    Axillary hidradenitis suppurativa 2019    Hx sPTD x 2 2019       MYNOR FONG, ASIA - CNP on 2022 at 10:34 AM    Total time spent on encounter- PAT provider minutes: 31-40 minutes

## 2022-05-31 ENCOUNTER — HOSPITAL ENCOUNTER (OUTPATIENT)
Dept: PREADMISSION TESTING | Age: 27
Discharge: HOME | End: 2022-06-04
Payer: COMMERCIAL

## 2022-05-31 VITALS
DIASTOLIC BLOOD PRESSURE: 71 MMHG | WEIGHT: 231 LBS | OXYGEN SATURATION: 99 % | TEMPERATURE: 98 F | RESPIRATION RATE: 16 BRPM | SYSTOLIC BLOOD PRESSURE: 122 MMHG | HEART RATE: 57 BPM | BODY MASS INDEX: 39.44 KG/M2 | HEIGHT: 64 IN

## 2022-05-31 LAB
ABSOLUTE EOS #: 0.1 K/UL (ref 0–0.4)
ABSOLUTE LYMPH #: 1.6 K/UL (ref 1–4.8)
ABSOLUTE MONO #: 0.2 K/UL (ref 0.1–1.3)
ANION GAP SERPL CALCULATED.3IONS-SCNC: 10 MMOL/L (ref 9–17)
BASOPHILS # BLD: 1 % (ref 0–2)
BASOPHILS ABSOLUTE: 0 K/UL (ref 0–0.2)
BUN BLDV-MCNC: 7 MG/DL (ref 6–20)
CALCIUM SERPL-MCNC: 9.1 MG/DL (ref 8.6–10.4)
CHLORIDE BLD-SCNC: 104 MMOL/L (ref 98–107)
CO2: 26 MMOL/L (ref 20–31)
CREAT SERPL-MCNC: 0.58 MG/DL (ref 0.5–0.9)
EOSINOPHILS RELATIVE PERCENT: 3 % (ref 0–4)
GFR AFRICAN AMERICAN: >60 ML/MIN
GFR NON-AFRICAN AMERICAN: >60 ML/MIN
GFR SERPL CREATININE-BSD FRML MDRD: NORMAL ML/MIN/{1.73_M2}
GLUCOSE BLD-MCNC: 96 MG/DL (ref 70–99)
HCT VFR BLD CALC: 37.6 % (ref 36–46)
HEMOGLOBIN: 12.1 G/DL (ref 12–16)
LYMPHOCYTES # BLD: 33 % (ref 24–44)
MCH RBC QN AUTO: 28.4 PG (ref 26–34)
MCHC RBC AUTO-ENTMCNC: 32.2 G/DL (ref 31–37)
MCV RBC AUTO: 88.1 FL (ref 80–100)
MONOCYTES # BLD: 5 % (ref 1–7)
PDW BLD-RTO: 14.1 % (ref 11.5–14.9)
PLATELET # BLD: 174 K/UL (ref 150–450)
PMV BLD AUTO: 11 FL (ref 6–12)
POTASSIUM SERPL-SCNC: 4.3 MMOL/L (ref 3.7–5.3)
RBC # BLD: 4.27 M/UL (ref 4–5.2)
SEG NEUTROPHILS: 58 % (ref 36–66)
SEGMENTED NEUTROPHILS ABSOLUTE COUNT: 3 K/UL (ref 1.3–9.1)
SODIUM BLD-SCNC: 140 MMOL/L (ref 135–144)
WBC # BLD: 5.1 K/UL (ref 3.5–11)

## 2022-05-31 PROCEDURE — APPSS45 APP SPLIT SHARED TIME 31-45 MINUTES: Performed by: NURSE PRACTITIONER

## 2022-05-31 PROCEDURE — 85025 COMPLETE CBC W/AUTO DIFF WBC: CPT

## 2022-05-31 PROCEDURE — 80048 BASIC METABOLIC PNL TOTAL CA: CPT

## 2022-05-31 PROCEDURE — 36415 COLL VENOUS BLD VENIPUNCTURE: CPT

## 2022-05-31 RX ORDER — ALBUTEROL SULFATE 90 UG/1
2 AEROSOL, METERED RESPIRATORY (INHALATION) EVERY 6 HOURS PRN
COMMUNITY
End: 2022-06-03 | Stop reason: SDUPTHER

## 2022-05-31 ASSESSMENT — PAIN DESCRIPTION - ORIENTATION: ORIENTATION: LEFT

## 2022-05-31 ASSESSMENT — PAIN DESCRIPTION - DESCRIPTORS: DESCRIPTORS: SHARP;STABBING

## 2022-05-31 ASSESSMENT — PAIN DESCRIPTION - PAIN TYPE: TYPE: CHRONIC PAIN

## 2022-05-31 ASSESSMENT — PAIN DESCRIPTION - LOCATION: LOCATION: KNEE

## 2022-05-31 ASSESSMENT — PAIN DESCRIPTION - FREQUENCY: FREQUENCY: OTHER (COMMENT)

## 2022-05-31 ASSESSMENT — PAIN DESCRIPTION - ONSET: ONSET: ON-GOING

## 2022-06-02 ENCOUNTER — OFFICE VISIT (OUTPATIENT)
Dept: ORTHOPEDIC SURGERY | Age: 27
End: 2022-06-02
Payer: COMMERCIAL

## 2022-06-02 VITALS — HEIGHT: 64 IN | BODY MASS INDEX: 39.44 KG/M2 | WEIGHT: 231 LBS

## 2022-06-02 DIAGNOSIS — S83.512A NEW ACL TEAR, LEFT, INITIAL ENCOUNTER: Primary | ICD-10-CM

## 2022-06-02 PROCEDURE — PREOPEXAM PRE-OP EXAM: Performed by: ORTHOPAEDIC SURGERY

## 2022-06-02 RX ORDER — HYDROCODONE BITARTRATE AND ACETAMINOPHEN 5; 325 MG/1; MG/1
1 TABLET ORAL EVERY 4 HOURS PRN
Qty: 42 TABLET | Refills: 0 | Status: SHIPPED | OUTPATIENT
Start: 2022-06-02 | End: 2022-06-09

## 2022-06-02 RX ORDER — ONDANSETRON 4 MG/1
4 TABLET, FILM COATED ORAL DAILY PRN
Qty: 20 TABLET | Refills: 0 | Status: SHIPPED | OUTPATIENT
Start: 2022-06-02

## 2022-06-03 ENCOUNTER — OFFICE VISIT (OUTPATIENT)
Dept: FAMILY MEDICINE CLINIC | Age: 27
End: 2022-06-03
Payer: COMMERCIAL

## 2022-06-03 VITALS
DIASTOLIC BLOOD PRESSURE: 87 MMHG | HEART RATE: 69 BPM | TEMPERATURE: 97.2 F | HEIGHT: 64 IN | WEIGHT: 228 LBS | BODY MASS INDEX: 38.93 KG/M2 | SYSTOLIC BLOOD PRESSURE: 117 MMHG

## 2022-06-03 DIAGNOSIS — J45.909 ASTHMA AFFECTING PREGNANCY, ANTEPARTUM: ICD-10-CM

## 2022-06-03 DIAGNOSIS — Z01.818 PREOPERATIVE CLEARANCE: ICD-10-CM

## 2022-06-03 DIAGNOSIS — Z13.1 SCREENING FOR DIABETES MELLITUS: Primary | ICD-10-CM

## 2022-06-03 DIAGNOSIS — O99.519 ASTHMA AFFECTING PREGNANCY, ANTEPARTUM: ICD-10-CM

## 2022-06-03 DIAGNOSIS — F32.9 REACTIVE DEPRESSION: ICD-10-CM

## 2022-06-03 LAB — HBA1C MFR BLD: 5.7 %

## 2022-06-03 PROCEDURE — G8417 CALC BMI ABV UP PARAM F/U: HCPCS

## 2022-06-03 PROCEDURE — 83036 HEMOGLOBIN GLYCOSYLATED A1C: CPT

## 2022-06-03 PROCEDURE — 99204 OFFICE O/P NEW MOD 45 MIN: CPT

## 2022-06-03 PROCEDURE — G8427 DOCREV CUR MEDS BY ELIG CLIN: HCPCS

## 2022-06-03 PROCEDURE — 1036F TOBACCO NON-USER: CPT

## 2022-06-03 RX ORDER — ALBUTEROL SULFATE 90 UG/1
2 AEROSOL, METERED RESPIRATORY (INHALATION) EVERY 6 HOURS PRN
Qty: 18 G | Refills: 0 | Status: SHIPPED | OUTPATIENT
Start: 2022-06-03

## 2022-06-03 SDOH — HEALTH STABILITY: PHYSICAL HEALTH: ON AVERAGE, HOW MANY DAYS PER WEEK DO YOU ENGAGE IN MODERATE TO STRENUOUS EXERCISE (LIKE A BRISK WALK)?: 7 DAYS

## 2022-06-03 SDOH — HEALTH STABILITY: PHYSICAL HEALTH: ON AVERAGE, HOW MANY MINUTES DO YOU ENGAGE IN EXERCISE AT THIS LEVEL?: 60 MIN

## 2022-06-03 ASSESSMENT — ENCOUNTER SYMPTOMS
ABDOMINAL PAIN: 0
SORE THROAT: 0
SINUS PAIN: 0
WHEEZING: 0
VOMITING: 0
EYE REDNESS: 0
SHORTNESS OF BREATH: 0
EYE ITCHING: 0
NAUSEA: 0
TROUBLE SWALLOWING: 0
EYE DISCHARGE: 0
DIARRHEA: 0
CHEST TIGHTNESS: 0
SINUS PRESSURE: 0
COUGH: 0

## 2022-06-03 ASSESSMENT — PATIENT HEALTH QUESTIONNAIRE - PHQ9
9. THOUGHTS THAT YOU WOULD BE BETTER OFF DEAD, OR OF HURTING YOURSELF: 0
5. POOR APPETITE OR OVEREATING: 3
2. FEELING DOWN, DEPRESSED OR HOPELESS: 3
7. TROUBLE CONCENTRATING ON THINGS, SUCH AS READING THE NEWSPAPER OR WATCHING TELEVISION: 3
6. FEELING BAD ABOUT YOURSELF - OR THAT YOU ARE A FAILURE OR HAVE LET YOURSELF OR YOUR FAMILY DOWN: 3
SUM OF ALL RESPONSES TO PHQ QUESTIONS 1-9: 23
8. MOVING OR SPEAKING SO SLOWLY THAT OTHER PEOPLE COULD HAVE NOTICED. OR THE OPPOSITE, BEING SO FIGETY OR RESTLESS THAT YOU HAVE BEEN MOVING AROUND A LOT MORE THAN USUAL: 3
SUM OF ALL RESPONSES TO PHQ QUESTIONS 1-9: 23
SUM OF ALL RESPONSES TO PHQ9 QUESTIONS 1 & 2: 5
4. FEELING TIRED OR HAVING LITTLE ENERGY: 3
SUM OF ALL RESPONSES TO PHQ QUESTIONS 1-9: 23
3. TROUBLE FALLING OR STAYING ASLEEP: 3
SUM OF ALL RESPONSES TO PHQ QUESTIONS 1-9: 23
1. LITTLE INTEREST OR PLEASURE IN DOING THINGS: 2
10. IF YOU CHECKED OFF ANY PROBLEMS, HOW DIFFICULT HAVE THESE PROBLEMS MADE IT FOR YOU TO DO YOUR WORK, TAKE CARE OF THINGS AT HOME, OR GET ALONG WITH OTHER PEOPLE: 3

## 2022-06-03 ASSESSMENT — COLUMBIA-SUICIDE SEVERITY RATING SCALE - C-SSRS
1. WITHIN THE PAST MONTH, HAVE YOU WISHED YOU WERE DEAD OR WISHED YOU COULD GO TO SLEEP AND NOT WAKE UP?: NO
2. HAVE YOU ACTUALLY HAD ANY THOUGHTS OF KILLING YOURSELF?: NO
6. HAVE YOU EVER DONE ANYTHING, STARTED TO DO ANYTHING, OR PREPARED TO DO ANYTHING TO END YOUR LIFE?: NO

## 2022-06-03 NOTE — PROGRESS NOTES
HPI: Ms. Nasreen Conrad presents today for her preoperative visit regarding her left knee. She has a complete ACL rupture with persistent pain and instability. She is electing to proceed with surgical intervention by way of a left knee arthroscopic assisted ACL reconstruction. We once again discussed the details of the procedure, postoperative recovery course and expected outcome. She was provided her prescriptions for postoperative analgesia. She does have a brace that she was instructed to bring with her to surgery so that this can be applied. Evaluation of her knee demonstrates intact skin without warmth erythema. She is going to be seeing her PCP for clearance. We will plan on proceeding with surgery as currently scheduled pending appropriate clearance. All questions were answered.

## 2022-06-03 NOTE — PROGRESS NOTES
[address] [facility],6Th Floor  [address]  [address] [place]  [address]  6/3/2022    Julee Recions is a 31 y.o. female who presents today for her medical conditions and/or complaints as noted below. Julee Recinos is scheduled today for New Patient (clearance for knee surgery 22 )  . HPI:     This is a pleasant 79-year-old female presenting to the office to establish care and discuss preoperative clearance. In 2021 the patient was playing sand volleyball and injured her left knee. At that time she was pregnant and diagnosed with an ACL tear. She recently lost her infant son. She has been handling the grief and loss fairly well. She states she has a strong social support network at her [employer] and family. She is interested in beginning therapy. Notes pertaining to her knee injury were reviewed. Results of imaging reviewed. Lab work to include CBC and CMP that were performed recently were reviewed. A recent twelve-lead EKG was reviewed as well as a recent cardiology note. I would consider the patient cleared for surgery at this time.       Vitals:    22 1111   BP: 117/87   Site: Left Upper Arm   Position: Sitting   Cuff Size: Large Adult   Pulse: 69   Temp: 97.2 °F (36.2 °C)   TempSrc: Temporal   Weight: 228 lb (103.4 kg)   Height: 5' 4\" (1.626 m)      Past Medical History:   Diagnosis Date    ACL injury tear     Left     Asthma     Child passed of SIDS     Patient's G4  passed at 3 weeks (mother states that cause of death is not known at this time 22)   [address] present     Gestational HTN--G3 2020    Hidradenitis axillaris 2015    History of  labor 2010    Left ankle sprain 2015    Maternal congenital heart disease, antepartum 2014    Murmur, cardiac     Obesity     Wears glasses       Past Surgical History:   Procedure Laterality Date    OTHER SURGICAL HISTORY Bilateral 2016    excision axillary hydranitis    TONSILLECTOMY AND ADENOIDECTOMY       Family History   Problem Relation Age of Onset    Other Brother          suicide     Diabetes Maternal Grandmother     Cancer Maternal Grandmother     Heart Disease Paternal Uncle     Early Death Paternal Uncle 62        MI    Learning Disabilities Mother     Diabetes Mother         Type 2 managed with insulin     No Known Problems Paternal Grandfather     No Known Problems Paternal Grandmother     Lung Cancer Maternal Grandfather     No Known Problems Father     No Known Problems Sister     No Known Problems Brother     Breast Cancer Neg Hx     Colon Cancer Neg Hx     Eclampsia Neg Hx     Hypertension Neg Hx     Ovarian Cancer Neg Hx      Labor Neg Hx     Spont Abortions Neg Hx     Stroke Neg Hx     Uterine Cancer Neg Hx      Social History     Tobacco Use    Smoking status: Never Smoker    Smokeless tobacco: Never Used   Substance Use Topics    Alcohol use: No     Alcohol/week: 0.0 standard drinks      Current Outpatient Medications   Medication Sig Dispense Refill    albuterol sulfate HFA (VENTOLIN HFA) 108 (90 Base) MCG/ACT inhaler Inhale 2 puffs into the lungs every 6 hours as needed for Wheezing or Shortness of Breath 18 g 0    ondansetron (ZOFRAN) 4 MG tablet Take 1 tablet by mouth daily as needed for Nausea or Vomiting 20 tablet 0    HYDROcodone-acetaminophen (NORCO) 5-325 MG per tablet Take 1 tablet by mouth every 4 hours as needed for Pain for up to 7 days. Intended supply: 7 days.  Take lowest dose possible to manage pain 42 tablet 0    medroxyPROGESTERone (DEPO-PROVERA) 150 MG/ML injection Inject 1 mL into the muscle every 3 months 1 mL 3    ibuprofen (ADVIL;MOTRIN) 600 MG tablet Take 1 tablet by mouth every 6 hours as needed for Pain 30 tablet 1    docusate sodium (COLACE) 100 MG capsule Take 1 capsule by mouth 2 times daily 60 capsule 0    Prenatal Multivit-Min-Fe-FA (PRENATAL FORTE) TABS Take 1 tablet by mouth Daily May substitute with any prenatal vit pt insurance will cover 30 tablet 12    acetaminophen (TYLENOL) 325 MG tablet Take 1 tablet by mouth every 4 hours as needed for Pain 60 tablet 0    selenium sulfide (SELSUN) 2.5 % lotion Apply topically daily as needed for Itching Apply topically daily as needed. No current facility-administered medications for this visit. Allergies   Allergen Reactions    Lidocaine Hives     Pt states that dentist told her that she was allergic to lidocaine but thought it could have been novacaine, skin wheal test per CRNA demonstrated no rash       Health Maintenance   Topic Date Due    A1C test (Diabetic or Prediabetic)  Never done    Pneumococcal 0-64 years Vaccine (2 - PCV) 12/31/2016    COVID-19 Vaccine (3 - Booster for Pfizer series) 08/06/2021    Depression Screen  11/09/2022    Pap smear  09/27/2024    DTaP/Tdap/Td vaccine (10 - Td or Tdap) 01/24/2032    Hepatitis B vaccine  Completed    Hib vaccine  Completed    Varicella vaccine  Completed    Meningococcal (ACWY) vaccine  Completed    Flu vaccine  Completed    Hepatitis C screen  Completed    HIV screen  Completed    Hepatitis A vaccine  Aged Out       Subjective:      Review of Systems   Constitutional: Negative for chills, fatigue and fever. HENT: Negative for ear discharge, ear pain, sinus pressure, sinus pain, sore throat and trouble swallowing. Eyes: Negative for discharge, redness and itching. Respiratory: Negative for cough, chest tightness, shortness of breath and wheezing. Cardiovascular: Negative for chest pain. Gastrointestinal: Negative for abdominal pain, diarrhea, nausea and vomiting. Genitourinary: Negative for difficulty urinating. Musculoskeletal: Positive for arthralgias. Negative for neck pain. Left knee pain   Skin: Negative for rash. Neurological: Negative for dizziness, weakness, light-headedness and headaches. All other systems reviewed and are negative. Objective:     Physical Exam  Vitals reviewed. Constitutional:       General: She is not in acute distress. Appearance: Normal appearance. She is normal weight. She is not ill-appearing. HENT:      Head: Normocephalic and atraumatic. Nose: Nose normal.   Eyes:      Extraocular Movements: Extraocular movements intact. Conjunctiva/sclera: Conjunctivae normal.      Pupils: Pupils are equal, round, and reactive to light. Cardiovascular:      Rate and Rhythm: Normal rate and regular rhythm. Pulses: Normal pulses. Heart sounds: Normal heart sounds. No murmur heard. Pulmonary:      Effort: Pulmonary effort is normal. No respiratory distress. Breath sounds: Normal breath sounds. No wheezing, rhonchi or rales. Abdominal:      General: Abdomen is flat. Palpations: Abdomen is soft. Musculoskeletal:         General: Normal range of motion. Cervical back: Neck supple. Skin:     General: Skin is warm and dry. Capillary Refill: Capillary refill takes less than 2 seconds. Neurological:      General: No focal deficit present. Mental Status: She is alert and oriented to person, place, and time. Psychiatric:         Mood and Affect: Mood normal.          Assessment/Plan:      1. Preoperative clearance  2. Reactive depression  -     [address] ([address] [place] [address])  3. Asthma   -     albuterol sulfate HFA (VENTOLIN HFA) 108 (90 Base) MCG/ACT inhaler; Inhale 2 puffs into the lungs every 6 hours as needed for Wheezing or Shortness of Breath, Disp-18 g, R-0Normal     Patient reports that her asthma is stable with the intermittent use of an albuterol inhaler. I feel she would also benefit greatly from mental health counseling for her depression related to losing her infant. Regarding preoperative clearance, I would consider the patient clear for surgery. No follow-ups on file.   Orders Placed This Encounter   Procedures   [address] [facility])     Referral Priority:   Routine     Referral Type:   Behavioral Health     Referral Reason:   Specialty Services Required     Requested Specialty:   Behavioral Health     Number of Visits Requested:   1     Orders Placed This Encounter   Medications    albuterol sulfate HFA (VENTOLIN HFA) 108 (90 Base) MCG/ACT inhaler     Sig: Inhale 2 puffs into the lungs every 6 hours as needed for Wheezing or Shortness of Breath     Dispense:  18 g     Refill:  0       Reviewed health maintenance, prior labs and imaging. Patient given educational materials - see patient instructions. Discussed use, benefit, and side effects of prescribed medications. Barriers to medication compliance addressed. All patient questions answered. Pt voiced understanding to plan of care. Instructed to continue medications as discussed, healthy diet and exercise. Patient agreed with treatment plan. Follow up as directed below. This note is created with the assistance of a speech-recognition program. While intending to generate a document that actually reflects the content of the visit, no guarantees can be provided that every mistake has been identified and corrected by editing.     Electronically signed by ASIA Lilly CNP, APRN-CNP on 6/3/2022 at 11:35 AM

## 2022-06-08 ENCOUNTER — ANESTHESIA EVENT (OUTPATIENT)
Dept: OPERATING ROOM | Age: 27
End: 2022-06-08
Payer: COMMERCIAL

## 2022-06-08 RX ORDER — SODIUM CHLORIDE 0.9 % (FLUSH) 0.9 %
5-40 SYRINGE (ML) INJECTION EVERY 12 HOURS SCHEDULED
Status: CANCELLED | OUTPATIENT
Start: 2022-06-08

## 2022-06-08 RX ORDER — SODIUM CHLORIDE 9 MG/ML
INJECTION, SOLUTION INTRAVENOUS PRN
Status: CANCELLED | OUTPATIENT
Start: 2022-06-08

## 2022-06-08 RX ORDER — SODIUM CHLORIDE 0.9 % (FLUSH) 0.9 %
5-40 SYRINGE (ML) INJECTION PRN
Status: CANCELLED | OUTPATIENT
Start: 2022-06-08

## 2022-06-08 RX ORDER — ACETAMINOPHEN 325 MG/1
1000 TABLET ORAL ONCE
Status: CANCELLED | OUTPATIENT
Start: 2022-06-08 | End: 2022-06-08

## 2022-06-09 ENCOUNTER — HOSPITAL ENCOUNTER (OUTPATIENT)
Age: 27
Setting detail: OUTPATIENT SURGERY
Discharge: HOME | End: 2022-06-09
Attending: ORTHOPAEDIC SURGERY | Admitting: ORTHOPAEDIC SURGERY
Payer: COMMERCIAL

## 2022-06-09 ENCOUNTER — ANESTHESIA (OUTPATIENT)
Dept: OPERATING ROOM | Age: 27
End: 2022-06-09
Payer: COMMERCIAL

## 2022-06-09 VITALS
RESPIRATION RATE: 12 BRPM | HEART RATE: 105 BPM | TEMPERATURE: 97.1 F | HEIGHT: 64 IN | OXYGEN SATURATION: 100 % | DIASTOLIC BLOOD PRESSURE: 80 MMHG | SYSTOLIC BLOOD PRESSURE: 136 MMHG | BODY MASS INDEX: 39.61 KG/M2 | WEIGHT: 232 LBS

## 2022-06-09 LAB — HCG, PREGNANCY URINE (POC): NEGATIVE

## 2022-06-09 PROCEDURE — 2500000003 HC RX 250 WO HCPCS

## 2022-06-09 PROCEDURE — 2720000010 HC SURG SUPPLY STERILE: Performed by: ORTHOPAEDIC SURGERY

## 2022-06-09 PROCEDURE — 6360000002 HC RX W HCPCS: Performed by: ORTHOPAEDIC SURGERY

## 2022-06-09 PROCEDURE — 2709999900 HC NON-CHARGEABLE SUPPLY: Performed by: ORTHOPAEDIC SURGERY

## 2022-06-09 PROCEDURE — 7100000011 HC PHASE II RECOVERY - ADDTL 15 MIN: Performed by: ORTHOPAEDIC SURGERY

## 2022-06-09 PROCEDURE — 3600000014 HC SURGERY LEVEL 4 ADDTL 15MIN: Performed by: ORTHOPAEDIC SURGERY

## 2022-06-09 PROCEDURE — 29888 ARTHRS AID ACL RPR/AGMNTJ: CPT | Performed by: ORTHOPAEDIC SURGERY

## 2022-06-09 PROCEDURE — 7100000000 HC PACU RECOVERY - FIRST 15 MIN: Performed by: ORTHOPAEDIC SURGERY

## 2022-06-09 PROCEDURE — 3600000004 HC SURGERY LEVEL 4 BASE: Performed by: ORTHOPAEDIC SURGERY

## 2022-06-09 PROCEDURE — 64447 NJX AA&/STRD FEMORAL NRV IMG: CPT | Performed by: ANESTHESIOLOGY

## 2022-06-09 PROCEDURE — 6370000000 HC RX 637 (ALT 250 FOR IP): Performed by: ORTHOPAEDIC SURGERY

## 2022-06-09 PROCEDURE — 2580000003 HC RX 258: Performed by: ANESTHESIOLOGY

## 2022-06-09 PROCEDURE — 7100000031 HC ASPR PHASE II RECOVERY - ADDTL 15 MIN: Performed by: ORTHOPAEDIC SURGERY

## 2022-06-09 PROCEDURE — 7100000010 HC PHASE II RECOVERY - FIRST 15 MIN: Performed by: ORTHOPAEDIC SURGERY

## 2022-06-09 PROCEDURE — 7100000001 HC PACU RECOVERY - ADDTL 15 MIN: Performed by: ORTHOPAEDIC SURGERY

## 2022-06-09 PROCEDURE — 3700000001 HC ADD 15 MINUTES (ANESTHESIA): Performed by: ORTHOPAEDIC SURGERY

## 2022-06-09 PROCEDURE — C1713 ANCHOR/SCREW BN/BN,TIS/BN: HCPCS | Performed by: ORTHOPAEDIC SURGERY

## 2022-06-09 PROCEDURE — 7100000030 HC ASPR PHASE II RECOVERY - FIRST 15 MIN: Performed by: ORTHOPAEDIC SURGERY

## 2022-06-09 PROCEDURE — 2500000003 HC RX 250 WO HCPCS: Performed by: ORTHOPAEDIC SURGERY

## 2022-06-09 PROCEDURE — 3700000000 HC ANESTHESIA ATTENDED CARE: Performed by: ORTHOPAEDIC SURGERY

## 2022-06-09 PROCEDURE — 2500000003 HC RX 250 WO HCPCS: Performed by: ANESTHESIOLOGY

## 2022-06-09 PROCEDURE — 6360000002 HC RX W HCPCS

## 2022-06-09 PROCEDURE — 81025 URINE PREGNANCY TEST: CPT

## 2022-06-09 PROCEDURE — C1776 JOINT DEVICE (IMPLANTABLE): HCPCS | Performed by: ORTHOPAEDIC SURGERY

## 2022-06-09 DEVICE — SYSTEM TIGHTROPE II RT W/ DEPLOYING SUTURE: Type: IMPLANTABLE DEVICE | Site: KNEE | Status: FUNCTIONAL

## 2022-06-09 DEVICE — IMPLANTABLE DEVICE: Type: IMPLANTABLE DEVICE | Site: KNEE | Status: FUNCTIONAL

## 2022-06-09 DEVICE — IMPLANT TIB L30MM DIA10MM CANN DRVR FIX DEV APERFIX II: Type: IMPLANTABLE DEVICE | Site: KNEE | Status: FUNCTIONAL

## 2022-06-09 RX ORDER — ONDANSETRON 2 MG/ML
4 INJECTION INTRAMUSCULAR; INTRAVENOUS
Status: DISCONTINUED | OUTPATIENT
Start: 2022-06-09 | End: 2022-06-09 | Stop reason: HOSPADM

## 2022-06-09 RX ORDER — SODIUM CHLORIDE 9 MG/ML
INJECTION, SOLUTION INTRAVENOUS PRN
Status: DISCONTINUED | OUTPATIENT
Start: 2022-06-09 | End: 2022-06-09 | Stop reason: HOSPADM

## 2022-06-09 RX ORDER — ESMOLOL HYDROCHLORIDE 10 MG/ML
INJECTION INTRAVENOUS PRN
Status: DISCONTINUED | OUTPATIENT
Start: 2022-06-09 | End: 2022-06-09 | Stop reason: SDUPTHER

## 2022-06-09 RX ORDER — MIDAZOLAM HYDROCHLORIDE 1 MG/ML
INJECTION INTRAMUSCULAR; INTRAVENOUS PRN
Status: DISCONTINUED | OUTPATIENT
Start: 2022-06-09 | End: 2022-06-09 | Stop reason: SDUPTHER

## 2022-06-09 RX ORDER — SODIUM CHLORIDE 0.9 % (FLUSH) 0.9 %
5-40 SYRINGE (ML) INJECTION PRN
Status: DISCONTINUED | OUTPATIENT
Start: 2022-06-09 | End: 2022-06-09 | Stop reason: HOSPADM

## 2022-06-09 RX ORDER — ACETAMINOPHEN 500 MG
1000 TABLET ORAL ONCE
Status: COMPLETED | OUTPATIENT
Start: 2022-06-09 | End: 2022-06-09

## 2022-06-09 RX ORDER — KETOROLAC TROMETHAMINE 30 MG/ML
INJECTION, SOLUTION INTRAMUSCULAR; INTRAVENOUS PRN
Status: DISCONTINUED | OUTPATIENT
Start: 2022-06-09 | End: 2022-06-09 | Stop reason: SDUPTHER

## 2022-06-09 RX ORDER — DIPHENHYDRAMINE HYDROCHLORIDE 50 MG/ML
INJECTION INTRAMUSCULAR; INTRAVENOUS PRN
Status: DISCONTINUED | OUTPATIENT
Start: 2022-06-09 | End: 2022-06-09 | Stop reason: SDUPTHER

## 2022-06-09 RX ORDER — SODIUM CHLORIDE, SODIUM LACTATE, POTASSIUM CHLORIDE, CALCIUM CHLORIDE 600; 310; 30; 20 MG/100ML; MG/100ML; MG/100ML; MG/100ML
INJECTION, SOLUTION INTRAVENOUS CONTINUOUS
Status: DISCONTINUED | OUTPATIENT
Start: 2022-06-09 | End: 2022-06-09 | Stop reason: HOSPADM

## 2022-06-09 RX ORDER — DEXAMETHASONE SODIUM PHOSPHATE 10 MG/ML
10 INJECTION, SOLUTION INTRAMUSCULAR; INTRAVENOUS ONCE
Status: COMPLETED | OUTPATIENT
Start: 2022-06-09 | End: 2022-06-09

## 2022-06-09 RX ORDER — FENTANYL CITRATE 50 UG/ML
INJECTION, SOLUTION INTRAMUSCULAR; INTRAVENOUS PRN
Status: DISCONTINUED | OUTPATIENT
Start: 2022-06-09 | End: 2022-06-09 | Stop reason: SDUPTHER

## 2022-06-09 RX ORDER — METOCLOPRAMIDE HYDROCHLORIDE 5 MG/ML
10 INJECTION INTRAMUSCULAR; INTRAVENOUS
Status: DISCONTINUED | OUTPATIENT
Start: 2022-06-09 | End: 2022-06-09 | Stop reason: HOSPADM

## 2022-06-09 RX ORDER — PROPOFOL 10 MG/ML
INJECTION, EMULSION INTRAVENOUS PRN
Status: DISCONTINUED | OUTPATIENT
Start: 2022-06-09 | End: 2022-06-09 | Stop reason: SDUPTHER

## 2022-06-09 RX ORDER — BUPIVACAINE HYDROCHLORIDE AND EPINEPHRINE 5; 5 MG/ML; UG/ML
INJECTION, SOLUTION EPIDURAL; INTRACAUDAL; PERINEURAL PRN
Status: DISCONTINUED | OUTPATIENT
Start: 2022-06-09 | End: 2022-06-09 | Stop reason: ALTCHOICE

## 2022-06-09 RX ORDER — FENTANYL CITRATE 50 UG/ML
25 INJECTION, SOLUTION INTRAMUSCULAR; INTRAVENOUS EVERY 5 MIN PRN
Status: DISCONTINUED | OUTPATIENT
Start: 2022-06-09 | End: 2022-06-09 | Stop reason: HOSPADM

## 2022-06-09 RX ORDER — DIPHENHYDRAMINE HYDROCHLORIDE 50 MG/ML
12.5 INJECTION INTRAMUSCULAR; INTRAVENOUS
Status: DISCONTINUED | OUTPATIENT
Start: 2022-06-09 | End: 2022-06-09 | Stop reason: HOSPADM

## 2022-06-09 RX ORDER — SODIUM CHLORIDE 0.9 % (FLUSH) 0.9 %
5-40 SYRINGE (ML) INJECTION EVERY 12 HOURS SCHEDULED
Status: DISCONTINUED | OUTPATIENT
Start: 2022-06-09 | End: 2022-06-09 | Stop reason: HOSPADM

## 2022-06-09 RX ORDER — HYDROMORPHONE HCL 110MG/55ML
PATIENT CONTROLLED ANALGESIA SYRINGE INTRAVENOUS PRN
Status: DISCONTINUED | OUTPATIENT
Start: 2022-06-09 | End: 2022-06-09 | Stop reason: SDUPTHER

## 2022-06-09 RX ORDER — ONDANSETRON 2 MG/ML
INJECTION INTRAMUSCULAR; INTRAVENOUS PRN
Status: DISCONTINUED | OUTPATIENT
Start: 2022-06-09 | End: 2022-06-09 | Stop reason: SDUPTHER

## 2022-06-09 RX ORDER — BUPIVACAINE HYDROCHLORIDE 5 MG/ML
INJECTION, SOLUTION EPIDURAL; INTRACAUDAL
Status: DISCONTINUED | OUTPATIENT
Start: 2022-06-09 | End: 2022-06-09 | Stop reason: SDUPTHER

## 2022-06-09 RX ORDER — OXYCODONE HYDROCHLORIDE AND ACETAMINOPHEN 5; 325 MG/1; MG/1
2 TABLET ORAL EVERY 4 HOURS PRN
Status: DISCONTINUED | OUTPATIENT
Start: 2022-06-09 | End: 2022-06-09 | Stop reason: HOSPADM

## 2022-06-09 RX ADMIN — HYDROMORPHONE HYDROCHLORIDE 0.4 MG: 2 INJECTION, SOLUTION INTRAMUSCULAR; INTRAVENOUS; SUBCUTANEOUS at 13:55

## 2022-06-09 RX ADMIN — PROPOFOL 200 MG: 10 INJECTION, EMULSION INTRAVENOUS at 12:46

## 2022-06-09 RX ADMIN — ESMOLOL HYDROCHLORIDE 20 MG: 100 INJECTION, SOLUTION INTRAVENOUS at 14:18

## 2022-06-09 RX ADMIN — KETOROLAC TROMETHAMINE 30 MG: 30 INJECTION, SOLUTION INTRAMUSCULAR at 14:43

## 2022-06-09 RX ADMIN — SODIUM CHLORIDE, POTASSIUM CHLORIDE, SODIUM LACTATE AND CALCIUM CHLORIDE: 600; 310; 30; 20 INJECTION, SOLUTION INTRAVENOUS at 10:47

## 2022-06-09 RX ADMIN — DEXAMETHASONE SODIUM PHOSPHATE 10 MG: 10 INJECTION INTRAMUSCULAR; INTRAVENOUS at 12:53

## 2022-06-09 RX ADMIN — FENTANYL CITRATE 50 MCG: 50 INJECTION, SOLUTION INTRAMUSCULAR; INTRAVENOUS at 12:46

## 2022-06-09 RX ADMIN — HYDROMORPHONE HYDROCHLORIDE 0.4 MG: 2 INJECTION, SOLUTION INTRAMUSCULAR; INTRAVENOUS; SUBCUTANEOUS at 13:35

## 2022-06-09 RX ADMIN — FENTANYL CITRATE 50 MCG: 50 INJECTION, SOLUTION INTRAMUSCULAR; INTRAVENOUS at 13:26

## 2022-06-09 RX ADMIN — DIPHENHYDRAMINE HYDROCHLORIDE 25 MG: 50 INJECTION INTRAMUSCULAR; INTRAVENOUS at 13:08

## 2022-06-09 RX ADMIN — HYDROMORPHONE HYDROCHLORIDE 0.4 MG: 2 INJECTION, SOLUTION INTRAMUSCULAR; INTRAVENOUS; SUBCUTANEOUS at 14:15

## 2022-06-09 RX ADMIN — LIDOCAINE HYDROCHLORIDE 40 MG: 20 INJECTION, SOLUTION EPIDURAL; INFILTRATION; INTRACAUDAL; PERINEURAL at 12:46

## 2022-06-09 RX ADMIN — ACETAMINOPHEN 1000 MG: 500 TABLET ORAL at 10:47

## 2022-06-09 RX ADMIN — HYDROMORPHONE HYDROCHLORIDE 0.4 MG: 2 INJECTION, SOLUTION INTRAMUSCULAR; INTRAVENOUS; SUBCUTANEOUS at 14:03

## 2022-06-09 RX ADMIN — MIDAZOLAM 2 MG: 1 INJECTION INTRAMUSCULAR; INTRAVENOUS at 12:41

## 2022-06-09 RX ADMIN — ONDANSETRON 4 MG: 2 INJECTION INTRAMUSCULAR; INTRAVENOUS at 14:33

## 2022-06-09 RX ADMIN — CEFAZOLIN 2000 MG: 10 INJECTION, POWDER, FOR SOLUTION INTRAVENOUS at 12:50

## 2022-06-09 RX ADMIN — BUPIVACAINE HYDROCHLORIDE 15 ML: 5 INJECTION, SOLUTION EPIDURAL; INTRACAUDAL at 15:35

## 2022-06-09 ASSESSMENT — PAIN DESCRIPTION - ORIENTATION
ORIENTATION: LEFT

## 2022-06-09 ASSESSMENT — PAIN DESCRIPTION - DESCRIPTORS
DESCRIPTORS: ACHING
DESCRIPTORS: SHARP
DESCRIPTORS: SHARP

## 2022-06-09 ASSESSMENT — PAIN DESCRIPTION - FREQUENCY
FREQUENCY: CONTINUOUS
FREQUENCY: CONTINUOUS

## 2022-06-09 ASSESSMENT — PAIN DESCRIPTION - ONSET
ONSET: ON-GOING
ONSET: ON-GOING

## 2022-06-09 ASSESSMENT — PAIN SCALES - GENERAL
PAINLEVEL_OUTOF10: 9
PAINLEVEL_OUTOF10: 5
PAINLEVEL_OUTOF10: 5
PAINLEVEL_OUTOF10: 7
PAINLEVEL_OUTOF10: 10

## 2022-06-09 ASSESSMENT — LIFESTYLE VARIABLES: SMOKING_STATUS: 0

## 2022-06-09 ASSESSMENT — PAIN - FUNCTIONAL ASSESSMENT: PAIN_FUNCTIONAL_ASSESSMENT: 0-10

## 2022-06-09 ASSESSMENT — PAIN DESCRIPTION - PAIN TYPE
TYPE: SURGICAL PAIN
TYPE: SURGICAL PAIN

## 2022-06-09 ASSESSMENT — ENCOUNTER SYMPTOMS
SHORTNESS OF BREATH: 0
STRIDOR: 0

## 2022-06-09 ASSESSMENT — PAIN DESCRIPTION - LOCATION
LOCATION: KNEE

## 2022-06-09 NOTE — ANESTHESIA PRE PROCEDURE
Department of Anesthesiology  Preprocedure Note       Name:  Jayleen Arredondo   Age:  31 y.o.  :  1995                                          MRN:  [id number]         Date:  2022      Surgeon: Cece Payne):  Lucina Naranjo MD    Procedure: Procedure(s):  KNEE ACL REPAIR ARTHROSCOPIC RECONSTRUCTION    Medications prior to admission:   Prior to Admission medications    Medication Sig Start Date End Date Taking? Authorizing Provider   albuterol sulfate HFA (VENTOLIN HFA) 108 (90 Base) MCG/ACT inhaler Inhale 2 puffs into the lungs every 6 hours as needed for Wheezing or Shortness of Breath 6/3/22   Lili Velásquez APRN - CNP   ondansetron (ZOFRAN) 4 MG tablet Take 1 tablet by mouth daily as needed for Nausea or Vomiting 22   Lucina Naranjo MD   HYDROcodone-acetaminophen (NORCO) 5-325 MG per tablet Take 1 tablet by mouth every 4 hours as needed for Pain for up to 7 days. Intended supply: 7 days. Take lowest dose possible to manage pain 22  Lucina Naranjo MD   medroxyPROGESTERone (DEPO-PROVERA) 150 MG/ML injection Inject 1 mL into the muscle every 3 months 22   Lena Burger,    ibuprofen (ADVIL;MOTRIN) 600 MG tablet Take 1 tablet by mouth every 6 hours as needed for Pain 22   Doris Santos DO   docusate sodium (COLACE) 100 MG capsule Take 1 capsule by mouth 2 times daily 22   Dorisanish Santos DO   Prenatal Multivit-Min-Fe-FA (PRENATAL FORTE) TABS Take 1 tablet by mouth Daily May substitute with any prenatal vit pt insurance will cover 21  Kathrine Tolbert MD   acetaminophen (TYLENOL) 325 MG tablet Take 1 tablet by mouth every 4 hours as needed for Pain 20   Dina Olvera MD   selenium sulfide (SELSUN) 2.5 % lotion Apply topically daily as needed for Itching Apply topically daily as needed.     Historical Provider, MD       Current medications:    Current Facility-Administered Medications   Medication Dose Route Frequency Provider Last Rate Last Admin    lactated ringers infusion   IntraVENous Continuous [place] MD Goyo 125 mL/hr at 22 1100 NoRateChange at 22 1100    sodium chloride flush 0.9 % injection 5-40 mL  5-40 mL IntraVENous 2 times per day Lilliam Rudd MD        sodium chloride flush 0.9 % injection 5-40 mL  5-40 mL IntraVENous PRN Yola Nance MD        0.9 % sodium chloride infusion   IntraVENous PRN Yola Nance MD        0.9 % sodium chloride infusion   IntraVENous PRN Ed Guerra MD        ceFAZolin (ANCEF) 2000 mg in dextrose 5 % 50 mL IVPB  2,000 mg IntraVENous On Call to [address], MD        dexamethasone (PF) (DECADRON) injection 10 mg  10 mg IntraVENous Once Ed Guerra MD        sodium chloride flush 0.9 % injection 5-40 mL  5-40 mL IntraVENous 2 times per day Ed Guerra MD        sodium chloride flush 0.9 % injection 5-40 mL  5-40 mL IntraVENous PRN Ed Guerra MD           Allergies: Allergies   Allergen Reactions    Lidocaine Hives     Pt states that dentist told her that she was allergic to lidocaine but thought it could have been novacaine, skin wheal test per CRNA demonstrated no rash       Problem List:    Patient Active Problem List   Diagnosis Code    Asthma  O99.519, J45.909    Hx Aortic Stenosis Z87.74    Axillary hidradenitis suppurativa L73.2    Hx sPTD x 2 O09.891    Dysmenorrhea N94.6    Hx gHTN (G3) Z87.59    FHx of aortic stenosis (G3)   Z82.49    COVID vaccinated Z28.09    FGR O36.5990    Fetal club feet O35. 8XX0    Declining 1hr GTT R73.09     22 M Apg 88 Wt 5#15  O80    Child passed of SIDS Z84.82       Past Medical History:        Diagnosis Date    ACL injury tear     Left     Asthma     Child passed of SIDS     Patient's G4  passed at 3 weeks (mother states that cause of death is not known at this time 22)   [address] [facility] present     Gestational HTN--G3 2020    Hidradenitis axillaris 2015    History of  labor     Left ankle sprain 4/6/2015    Maternal congenital heart disease, antepartum 11/24/2014    Murmur, cardiac     Obesity     Wears glasses        Past Surgical History:        Procedure Laterality Date    OTHER SURGICAL HISTORY Bilateral 02/12/2016    excision axillary hydranitis    TONSILLECTOMY AND ADENOIDECTOMY         Social History:    Social History     Tobacco Use    Smoking status: Never Smoker    Smokeless tobacco: Never Used   Substance Use Topics    Alcohol use: No     Alcohol/week: 0.0 standard drinks                                Counseling given: Not Answered      Vital Signs (Current):   Vitals:    06/09/22 1019   BP: 131/82   Pulse: 61   Resp: 16   Temp: 97.1 °F (36.2 °C)   TempSrc: Infrared   SpO2: 98%   Weight: 232 lb (105.2 kg)   Height: 5' 4\" (1.626 m)                                              BP Readings from Last 3 Encounters:   06/09/22 131/82   05/31/22 122/71   06/03/22 117/87       NPO Status: Time of last liquid consumption: 2359                        Time of last solid consumption: 2359                        Date of last liquid consumption: 06/08/22                        Date of last solid food consumption: 06/08/22    BMI:   Wt Readings from Last 3 Encounters:   06/09/22 232 lb (105.2 kg)   05/31/22 231 lb (104.8 kg)   06/03/22 228 lb (103.4 kg)     Body mass index is 39.82 kg/m².     CBC:   Lab Results   Component Value Date    WBC 5.1 05/31/2022    RBC 4.27 05/31/2022    HGB 12.1 05/31/2022    HCT 37.6 05/31/2022    MCV 88.1 05/31/2022    RDW 14.1 05/31/2022     05/31/2022       CMP:   Lab Results   Component Value Date     05/31/2022    K 4.3 05/31/2022     05/31/2022    CO2 26 05/31/2022    BUN 7 05/31/2022    CREATININE 0.58 05/31/2022    GFRAA >60 05/31/2022    LABGLOM >60 05/31/2022    GLUCOSE 96 05/31/2022    PROT 7.3 12/24/2019    CALCIUM 9.1 05/31/2022    BILITOT 0.19 12/24/2019    ALKPHOS 73 12/24/2019    AST 23 12/24/2019    ALT 42 12/24/2019 POC Tests: No results for input(s): POCGLU, POCNA, POCK, POCCL, POCBUN, POCHEMO, POCHCT in the last 72 hours. Coags: No results found for: PROTIME, INR, APTT    HCG (If Applicable):   Lab Results   Component Value Date    PREGTESTUR positive 08/09/2021    HCG NEGATIVE 06/09/2022    HCGQUANT 1,299 (H) 08/11/2021        ABGs: No results found for: PHART, PO2ART, DSN2ZXD, NNM9UOX, BEART, X2BMINHN     Type & Screen (If Applicable):  No results found for: LABABO, LABRH    Drug/Infectious Status (If Applicable):  Lab Results   Component Value Date    HEPCAB NONREACTIVE 09/27/2021       COVID-19 Screening (If Applicable):   Lab Results   Component Value Date    COVID19 Not Detected 12/27/2021           Anesthesia Evaluation  Patient summary reviewed and Nursing notes reviewed no history of anesthetic complications:   Airway: Mallampati: II  TM distance: >3 FB   Neck ROM: full  Mouth opening: > = 3 FB   Dental: normal exam         Pulmonary:normal exam  breath sounds clear to auscultation  (+) asthma: allergic asthma,     (-) pneumonia, COPD, shortness of breath, recent URI, sleep apnea, rhonchi, wheezes, rales, stridor, not a current smoker and no decreased breath sounds          Patient did not smoke on day of surgery.                  Cardiovascular:  Exercise tolerance: good (>4 METS),   (+) hypertension: no interval change,     (-) pacemaker, valvular problems/murmurs, past MI, CAD, CABG/stent, dysrhythmias,  angina,  CHF, orthopnea, PND,  BUSBY, murmur, weak pulses,  friction rub, systolic click, carotid bruit,  JVD, peripheral edema, no pulmonary hypertension and no hyperlipidemia    ECG reviewed  Rhythm: regular  Rate: normal           Beta Blocker:  Not on Beta Blocker         Neuro/Psych:   Negative Neuro/Psych ROS     (-) seizures, neuromuscular disease, TIA, CVA, headaches, psychiatric history and depression/anxiety            GI/Hepatic/Renal: Neg GI/Hepatic/Renal ROS       (-) hiatal hernia, GERD, PUD, hepatitis, liver disease, no renal disease, bowel prep and no morbid obesity       Endo/Other: Negative Endo/Other ROS   (+) no malignancy/cancer. (-) diabetes mellitus, hypothyroidism, hyperthyroidism, blood dyscrasia, arthritis, no electrolyte abnormalities, no malignancy/cancer               Abdominal:             Vascular: negative vascular ROS. - PVD, DVT and PE. Other Findings:           Anesthesia Plan      general     ASA 2       Induction: intravenous. MIPS: Postoperative opioids intended and Prophylactic antiemetics administered. Anesthetic plan and risks discussed with patient. Plan discussed with CRNA.                     Kathy Bae MD   6/9/2022

## 2022-06-09 NOTE — ANESTHESIA POSTPROCEDURE EVALUATION
POST- ANESTHESIA EVALUATION       Pt Name: Blanca Snyder  MRN: [id number]  YOB: 1995  Date of evaluation: 6/9/2022  Time:  4:15 PM      /80   Pulse (!) 105   Temp 97.1 °F (36.2 °C)   Resp 12   Ht 5' 4\" (1.626 m)   Wt 232 lb (105.2 kg)   SpO2 100%   BMI 39.82 kg/m²      Consciousness Level  Awake  Cardiopulmonary Status  Stable  Pain Adequately Treated YES  Nausea / Vomiting  NO  Adequate Hydration  YES  Anesthesia Related Complications NONE      Electronically signed by Portia Fontana MD on 6/9/2022 at 4:15 PM       Department of Anesthesiology  Postprocedure Note    Patient: Blanca Snyder  MRN: [id number]  YOB: 1995  Date of evaluation: 6/9/2022  Time:  4:15 PM     Procedure Summary     Date: 06/09/22 Room / Location: [address] [place] [address] / [address] [place] [address]    Anesthesia Start: 9340 Anesthesia Stop: 7641    Procedure: KNEE ACL REPAIR ARTHROSCOPIC RECONSTRUCTION (Left Knee) Diagnosis:       Rupture of anterior cruciate ligament of left knee, initial encounter      (LEFT KNEE ACL TEAR)    Surgeons: Vince Pineda MD Responsible Provider: Elijah Watson MD    Anesthesia Type: general ASA Status: 2          Anesthesia Type: No value filed. Brian Phase I: Brian Score: 10    Brian Phase II:      Last vitals: Reviewed and per EMR flowsheets.        Anesthesia Post Evaluation

## 2022-06-09 NOTE — BRIEF OP NOTE
Brief Postoperative Note      Patient: Brigida Arrington  YOB: 1995  MRN: [id number]    Date of Procedure: 6/9/2022    Pre-Op Diagnosis: LEFT KNEE ACL TEAR    Post-Op Diagnosis: Same       Procedure(s):  KNEE ACL REPAIR ARTHROSCOPIC RECONSTRUCTION    Surgeon(s):  Vivek Willett MD    Assistant:  Resident: Aziza Mayo DO    Anesthesia: General    Estimated Blood Loss (mL): Minimal    Complications: None    Specimens:   * No specimens in log *    Implants:  Implant Name Type Inv.  Item Serial No.  Lot No. LRB No. Used Action   GRAFT BNE ANTR TIBIALIS 10.5X350 MM ASEP - [id number]  GRAFT BNE ANTR TIBIALIS 10.5X350 MM ASEP [id number] [employer]  Left 1 Implanted   SYSTEM TIGHTROPE II RT W/ DEPLOYING SUTURE - [id number]  SYSTEM TIGHTROPE II RT W/ DEPLOYING SUTURE  [employer] [id number] Left 1 Implanted   IMPLANT TIB L30MM [id number] AVIS DRVR FIX DEV APERFIX II - [id number]  IMPLANT TIB L30MM [id number] AVIS DRVR FIX DEV APERFIX II  [employer] [id number] Left 1 Implanted         Drains: * No LDAs found *    Findings: See operative report    Electronically signed by Vivek Willett MD on 6/9/2022 at 3:04 PM

## 2022-06-09 NOTE — OP NOTE
OPERATIVE REPORT    Date of Surgery: 6/9/2022    Pre-operative Diagnosis: Left knee ACL tear (O40.877V)    Post-operative Diagnosis: Left knee ACL tear (W77.721Z)    Procedure: Left knee arthroscopic assisted ACL reconstruction with tibialis anterior allograft (19193)    Surgeon(s): Gretchen Benson MD    Assistant(s): Pennie Webster DO (PGY 1)    Anesthesia: General    Fluids: See anesthesia record    Urine output: See anesthesia record    Estimated blood loss: Minimal    Findings: Complete ACL rupture    Specimen: None    Tourniquet time: 66 minutes    Implants: Arthrex tight rope and Jose F Biomet 10 mm peek tibial Intrafix sheath and screw    Surgical Indications:  Clemon Riedel is a 31 y.o. old female who presented with a chronic left knee ACL rupture. She sustained this injury in the fall 2021. She was diagnosed at the time but she was also pregnant at that time and so held off on any surgical treatment until after her pregnancy. Following a discussion with the patient regarding both non-operative and operative treatment options, she consented to proceed with a left knee arthroscopic assisted ACL reconstruction using allograft. she came to this decision after demonstrating an understanding of our discussion regarding details of the procedure, risks and benefits, expected outcome, and postoperative course. Operative technique: Following appropriate identification of the patient and her operative extremity, consent was reviewed with the patient and her operative extremity was signed. she was wheeled to the operating room where she finished a course of pre-operative antibiotic prophylaxis by way of 2 g of IV Ancef. The anesthesia service administered a general anesthetic and secured her airway using an LMA. All bony prominences were appropriately padded and the patient was secured to the operative table in a supine position.   I proceeded to perform an exam under anesthesia of her left knee and my findings were as follows:    1. Grade 2 Lachman  2. Grade 1 pivot shift  3. Negative posterior, posteromedial, and posterolateral drawers  4. Negative recurvatum  5. No instability with varus and valgus stress applied across his knee at 0 and 30° of knee flexion    A well-padded pneumatic tourniquet was applied to the proximal aspect of the patient's left thigh. The patient's operative extremity was then prepped and draped in a standard sterile fashion and a time out was performed during which the correct patient, operative extremity, and procedure were verified. I exsanguinated the patient's left lower extremity using an Esmarch and the pneumatic tourniquet was inflated up to 300 mmHg. I then established standard anterolateral and anteromedial arthroscopic knee portals and performed an arthroscopic evaluation of the patient's knee. My findings were as follows:    1. Patellofemoral compartment: Intact chondral surfaces  2. Medial and lateral gutters: Normal  3. Medial compartment: Intact chondral surfaces and medial meniscus  4. The notch: Intact PCL, complete ACL rupture  5. Lateral compartment: Intact chondral surfaces with an intact lateral meniscus    Following arthroscopic evaluation of the patient's knee as outlined above I proceeded to debride the remnant ACL tissue from the notch. The center of the patient's ACL femoral and tibial footprints were identified on their respective surfaces and marked using a curved awl on the femoral side and radiofrequency ablator on the tibial side. An ACL femoral guide was inserted through the lateral portal and centered on the  hole created by the awl marking the center of the ACL footprint on the lateral wall of the notch. An incision was made over the lateral aspect of the distal femur and the external aiming arm of the guide was seated against the lateral cortex of the distal femur.   A guidepin was inserted through the guide entering the knee at the center of the ACL femoral footprint. The guidepin was removed and in its place an ACL femoral flip cutter drill was inserted. The flip cutter was dialed to ream a 10 mm tunnel based on the size of graft that was already prepped and measured. A 20 mm tunnel was then reamed. The flip cutter reamer was removed and exchanged for a passing suture. At this time an ACL tibial aiming guide was then used to insert a guidepin in the center of the patient's ACL tibial footprint. A 10 mm reamer was used to ream a tibial tunnel over this guidepin. The intra-articular entry of the tunnel was appropriately debrided. With the femoral and tibial tunnels created a tibialis anterior graft was doubled over with a tight rope button on one end and grasping [place] #2 sutures on the opposite limbs. The graft was passed through the tibial and femoral tunnels. With the tight rope button just past the lateral femoral cortex it was flipped against the cortex and utilized to pull the graft and fully seated within the femoral tunnel. With the graft fully seated in the femoral tunnel and also fully seated and tibial tunnel tension was maintained on the tibial end of the graft and the patient's knee was brought through a full range of motion and the graft assessed arthroscopically for impingement in the notch and none was appreciated. The patient's knee was then cycled through flexion and extension repetitively to remove creep from the graft. Then with the patient's knee near full extension and tension maintained on the tibial end of the graft a Jose F Biomet tibial a PerFix 10 mm peek sheath and screw were inserted between the limbs of the graft in the tibial tunnel. Once the screw was seated to appropriate depth the patient's knee was checked intra-articularly to confirm appropriate tension on the graft as well as to ensure there was no prominent hardware in the joint.   Satisfied the patient's knee was evacuated of all fluid and loose debris. All arthroscopic instruments were withdrawn. The tourniquet was deflated after total time of 66 minutes. Closure of all incisions was performed using 0 Vicryl, 3-0 Vicryl, and Prolene sutures. Sterile dressings were then applied using Xeroform, 4 x 4 gauze, and Tegaderm. Her leg was wrapped with Ace bandage. The patient's knee was placed in a hinged knee brace locked in extension. she was awoken, transferred to his bed, and wheeled to the recovery in stable condition.     Complications: None    Post-operative Condition: Stable

## 2022-06-09 NOTE — ANESTHESIA PROCEDURE NOTES
Peripheral Block    Patient location during procedure: post-op  Start time: 6/9/2022 3:35 PM  End time: 6/9/2022 3:45 PM  Staffing  Performed: anesthesiologist   Anesthesiologist: Alessandro Watson MD  Preanesthetic Checklist  Completed: patient identified, IV checked, site marked, risks and benefits discussed, surgical/procedural consents, equipment checked, pre-op evaluation, timeout performed, anesthesia consent given, oxygen available, monitors applied/VS acknowledged, fire risk safety assessment completed and verbalized and blood product R/B/A discussed and consented  Peripheral Block  Patient position: supine  Prep: ChloraPrep  Patient monitoring: cardiac monitor, continuous pulse ox, continuous capnometry, frequent blood pressure checks, IV access, responsive to questions and oxygen  Block type: Femoral  Laterality: left  Injection technique: single-shot  Guidance: ultrasound guided  Adductor canal  Provider prep: mask and sterile gloves  Needle  Needle type: short-bevel   Needle gauge: 20 G  Needle length: 10 cm  Needle localization: anatomical landmarks and ultrasound guidance  Needle insertion depth: 4 cm  Test dose: negative  Assessment  Injection assessment: negative aspiration for heme, no paresthesia on injection, local visualized surrounding nerve on ultrasound and no intravascular symptoms  Paresthesia pain: none  Slow fractionated injection: yes  Hemodynamics: stableOutcomes: patient tolerated procedure well  Additional Notes  Marcaine 0.5% 15 ml, NS 10 ml and decadron 8 mg used for block  Medications Administered  Bupivacaine (MARCAINE) PF injection 0.5%, 15 mL  Reason for block: post-op pain management and at surgeon's request

## 2022-06-09 NOTE — INTERVAL H&P NOTE
Update History & Physical    The patient's History and Physical of May 31, 2022was reviewed with the patient and I examined the patient. There was no change. The surgical site was confirmed by the patient and me. Pt undergoing for  KNEE ACL REPAIR ARTHROSCOPIC RECONSTRUCTION, LEFT per Dr S[facility]NCHEZ [facility]. Pt states yesterday she fell on her left knee, she was plaining sand volleyball, she twisted her left knee and she falls on it . She states, immediately notice her left knee swelling, and very painful to touch . The pain level about 9/10. She did not seek any medical attention. She was able to walk and put weight on it. She denies hitting her head, no loss of consciousness. Pt denies fever/chills, chest pain or SOB  Pt NPO since the past midnight, no am medication     EXTREMITIES: Tenderness on palpation of the medial and below the left Knee joint  with swelling, and skin tear EVGENY, no calf tenderness. .    Denies hx of MRSA infection   Denies hx of blood clots  Patient denies any personal or family problems with anesthesia. Medical clearance obtain and in the pt chart   See nursing flow sheet for vital signs     Lab Results   Component Value Date    WBC 5.1 05/31/2022    HGB 12.1 05/31/2022    HCT 37.6 05/31/2022    MCV 88.1 05/31/2022     05/31/2022     Lab Results   Component Value Date     05/31/2022    K 4.3 05/31/2022     05/31/2022    CO2 26 05/31/2022    BUN 7 05/31/2022    CREATININE 0.58 05/31/2022    GLUCOSE 96 05/31/2022    CALCIUM 9.1 05/31/2022        Plan: The risks, benefits, expected outcome, and alternative to the recommended procedure have been discussed with the patient. Patient understands and wants to proceed with the procedure.      Electronically signed by ASIA Walker CNP on 6/9/2022 at 9:50 AM

## 2022-06-10 ENCOUNTER — HOSPITAL ENCOUNTER (OUTPATIENT)
Dept: PHYSICAL THERAPY | Facility: CLINIC | Age: 27
Setting detail: THERAPIES SERIES
Discharge: HOME | End: 2022-06-10
Payer: COMMERCIAL

## 2022-06-10 PROCEDURE — 97110 THERAPEUTIC EXERCISES: CPT

## 2022-06-10 PROCEDURE — 97161 PT EVAL LOW COMPLEX 20 MIN: CPT

## 2022-06-10 NOTE — CONSULTS
[] [facility] [address] [facility]  [address]  [phone]  F: [phone] [x] [address] [facility]   [address]  P: [phone]  F: [phone] [] [address] [facility]  Therapy  [address]  P: [phone]  F: [phone] [] [address]  P: [phone]  F: [phone] [] [address]  [facility]   [address]   Mehrdad Betters: [phone]  F: [phone]    Physical Therapy Lower Extremity Evaluation    Date:  6/10/2022  Patient: Stephanie Hilton  : 1995  MRN: [id number]  Physician: Scott Nguyen MD    Insurance: [facility] ()  Medical Diagnosis: S80.200A - New ACL tear, left, initial encounter  Rehab Codes: M25.562, M62.81, R26.89, M25.462  Onset date: surgical date 22   Next Dr's appt.: 22    Subjective:   CC/HPI:  31 y.o. female presents to physical therapy s/p L knee ACL reconstruction with allograft on 22. Pt recalls in 2021 she was playing sand volleyball and twisted and fell injuring her knee. Pt was pregnant at the time and had to refrain from surgery until yesterday. Initially after injury she was walking with axillary crutches then was able to progress to ambulating independently with a functional knee brace. Pt did not have physical therapy prior to surgery. Pt had surgery 22 and was discharged home yesterday afternoon. Pt notes that following surgery shecontinues to feel numbness at anterior knee and  shin from nerve block and primary pain at lateral aspect of her knee but feels her brace may be rubbing on the incision. Pt arrives wearing her knee brace and ambulating with bilateral axillary crutches. Pt is able to weight bear as tolerated but is walking more on toes due to pain. Pt has been elevating and icing regularly following discharge home. Pt has been taking pain medication as prescribed. Pt has been getting help with all daily actvities by her family including mom, sister, and children. Pt notes increased difficulty with stair ambulation following surgery especially with descending stairs. Pt notes that her primary goal is to return to work as she has been off of work since April 2022 after having her child. PMHx:    [x] Other: ACL tear 2021, maternal congenital heart disease               [x] Refer to full medical chart  In EPIC     Tests: [] X-Ray:    [x] MRI:   1. Complete ACL tear and kissing bone contusion pattern with associated   nondisplaced microtrabecular fracturing as detailed above. 2. Mild lateral and patellofemoral chondromalacia with areas of partial   thickness fissuring. 3. Mild edema in the subcutaneous fat about the knee. 4. Small joint effusion. 5. Grade 1 MCL sprain. 6. Mild multifocal patellar tendinosis.    7. Degeneration in the posterior horn medial meniscus.  No meniscal tear.         [] Other:     Comorbidities:   [x] Obesity [] Dialysis  [] N/A   [x] Asthma/COPD [] Dementia [] Other:   [] Stroke [] Sleep apnea [] Other:   [] Vascular disease [] Rheumatic disease [] Other:       Medications:  [x] Refer to full medical record [] None [] Other:  Allergies:       [x] Refer to full medical record [] None [] Other:    ADL/IADL Previous level of function Current level of function Who currently assists the patient with task Comments    Bathing  [x] Independent  [] Assist [x]Modified Independent  [x] Assist     Dress/grooming [x] Independent  [] Assist [] Independent  [x] Assist Mom, sister, kids     Transfer/mobility [x] Independent  [] Assist [x] Modified Independent  [] Assist     Feeding [x] Independent  [] Assist [x] Independent  [] Assist     Toileting [x] Independent  [] Assist [x] Independent  [] Assist     Driving [x] Independent  [] Assist [] Ext     ER     IR     ABD     ADD     Knee Flex 119 A 30* AA  35 * P   Ext 2 hyperextension  lacking 1    Ankle DF 12 8   PF     INV     EVER          * indicates pain with movement     TESTS (+/-) Left Right Not Tested   Ant. Drawer   [x]   Post. Drawer   [x]   Lachmans   [x]   Valgus Stress   [x]   Varus Stress   [x]   Óscars   [x]   Apleys Comp. [x]   Apleys Dist.   [x]   Hip Scouring   [x]   ERIKAs   [x]   Piriformis   [x]   Omars   [x]   Talor Tilt   [x]   Pat-Fem Grind   [x]       OBSERVATION No Deficit Deficit Not Tested Comments   Posture       PSIS [] [] [x]    ASIS [] [] [x]    Genu Valgus [] [x] [] bilateral   Genu Varus [x] [] []    Genu Recurvatum [] [x] [] RLE only   Pronation [x] [] []    Supination [x] [] []    Leg Length Discrp [x] [] []    Palpation [] [x] [] Increased tenderness diffusely through anterior and lateral PFJ, distal 1/3 quadriceps  Denies calf tenderness    Sensation [] [] []    Edema [] [x] [] L knee   Suprapatellar: 53.3 cm  Midpatellar:  52 cm  Infrapatellar:  45.5 cm        Neurological [x] [] []    Patellar Mobility [] [x] [] L patella hypomobile all planes    Patellar Orientation [] [x] []    Gait [] [x] [] Analysis: Ambulating with TROM brace and bilateral axillary crutches, TTWB, decreased gait speed,          FUNCTION Normal Difficult Unable   Sitting [] [x] []   Standing [] [x] []   Ambulation [] [x] []   Groom/Dress [] [x] []   Lift/Carry [] [x] []   Stairs [] [x] []   Bending [] [] [x]   Squat [] [] [x]   Kneel [] [] [x]         BALANCE/PROPRIOCEPTION              [x] Not tested   Single leg stance       R                     L                                PAIN   Eyes open                             Sec. Sec                  . []    Eyes closed                          Sec. Sec                  . []          FUNCTIONAL TESTS PAIN NO PAIN COMMENTS   Step Test 4 [] []    6 [] []    8 [] []    STS [x] [] Weight shifted to RLE gait mechanics. 3. ? Strength: Pt to demonstrate ability to perform at least 10 SLR without quad lag present in order to progress safely to full weight bearing. 4. ? Function: Pt to demonstrate ability to perform at least 5 STS with equal bilateral weight shift and without pain. 5. Pt to demonstrate improved knee effusion with patellofemoral circumferential measurements at joint line and suprapatellar < 49 cm to demonstrate improved joint force absorption with therapeutic exercise progressions. 6. Pt to be independent and compliant with Home Exercise Programs    LTG: (to be met in 16 treatments)  1. Improve score on assessment tool LEFI to 50% functionally impaired or less to demonstrate improved functional mobility. 2. Reduce L knee pain levels to 2/10 or less to improve independence with all daily activities. 3. Pt to demonstrate L knee AROM to 2-0-120 to ease difficulty with stair climbing and normalize gait mechanics. 4. Pt to demonstrate LLE strength at 4+/5 globally to ease difficulty with heavier household chores and caregiving for her children. 5. Pt to demonstrate ability to ambulate at least 300' independently without increased knee pain and improved heel-toe progression, gait speed, and equal bilateral step lengths. .   6. Pt to demonstrate ability to ascend/descend a full flight of stairs with reciprocal stepping mechanics and without pain to ease access to upstairs bedroom. Patient goals: return to work    Rehab Potential:  [x] Good  [] Elke Ibrahim  [] Poor   Suggested Professional Referral:  [x] No  [] Yes:  Barriers to Goal Achievement[de-identified]  [x] No  [] Yes:  Domestic Concerns:  [x] No  [] Yes:    Pt. Education:  [x] Plans/Goals, Risks/Benefits discussed  [x] Home exercise program    Method of Education: [x] Verbal  [x] Demo  [x] Written  Access Code: [email or web address]  URL: [email or web address]  Date: 06/10/2022  Prepared by: Noah Mcgee    Exercises  Supine Quad Set - 1 x daily - 7 x weekly - 3 sets - 10 reps  Supine Ankle Pumps - 1 x daily - 7 x weekly - 3 sets - 10 reps  Supine Heel Slide - 1 x daily - 7 x weekly - 3 sets - 10 reps  Supine Knee Extension Stretch on Towel Roll - 1 x daily - 7 x weekly - 3 sets - 10 reps  Long Sitting 4 Way Patellar [place] - 1 x daily - 7 x weekly - 3 sets - 10 reps   4 Way Patellar [place] - 1 x daily - 7 x weekly - 3 sets - 10 reps    Comprehension of Education:  [x] Verbalizes understanding. [x] Demonstrates understanding. [x] Needs Review. [] Demonstrates/verbalizes understanding of HEP/Ed previously given. Treatment Plan:  [x] Therapeutic Exercise   42061  [] Iontophoresis: 4 mg/mL Dexamethasone Sodium Phosphate  mAmin  46647   [] Therapeutic Activity  48405 [x] Vasopneumatic cold with compression  30810    [x] Gait Training   48812 [] Ultrasound   78359   [x] Neuromuscular Re-education  69190 [x] Electrical Stimulation Unattended  46842   [x] Manual Therapy  62497 [] Electrical Stimulation Attended  65110   [x] Instruction in HEP  [] Lumbar/Cervical Traction  77368   [] Aquatic Therapy   02732 [x] Cold/hotpack    [] Massage   62222      [] Dry Needling, 1 or 2 muscles  78400   [] Biofeedback, first 15 minutes   71130  [] Biofeedback, additional 15 minutes   71393 [] Dry Needling, 3 or more muscles  27077            [x]  Medication allergies reviewed for use of    Dexamethasone Sodium Phosphate 4mg/ml     with iontophoresis treatments. Pt is not allergic.     Frequency:  2 x/week for 16 visits    Todays Treatment:    Precautions: full ACL protocol in soft chart, WBAT   Exercises:  Exercise  S/p L knee ACL reconstruction 6/9/22 with allograft    Reps/ Time Weight/ Level Comments         Knee extension stretch 2'     Quad sets  10x5\"  Minimal quad contraction 6/10   Ankle pumps  20x     Heel Slides  10x AA    Patellar Mobs x  demo'd                                                         Other:    Specific Instructions for next treatment: restore full knee range of motion (0-90 degrees first 2 weeks), NMES for quad strenthening, gastroc stretching and ankle strengthening (heel/toe raise), gait mechanics, vaso     Evaluation Complexity:  History (Personal factors, comorbidities) [] 0 [x] 1-2 [] 3+   Exam (limitations, restrictions) [] 1-2 [] 3 [x] 4+   Clinical presentation (progression) [x] Stable [] Evolving  [] Unstable   Decision Making [x] Low [] Moderate [] High    [x] Low Complexity [] Moderate Complexity [] High Complexity       Treatment Charges: Mins Units   [x] Evaluation       [x]  Low       []  Moderate       []  High 35 1   []  Modalities     [x]  Ther Exercise 10 1   []  Manual Therapy     []  Ther Activities     []  Aquatics     []  Vasocompression     []  Other       TOTAL TREATMENT TIME: 45 minutes     Time in: 1:50 pm   Time Out: 2:35 pm    Electronically signed by: Paige Negro PT        Physician Signature:________________________________Date:__________________  By signing above or cosigning this note, I have reviewed this plan of care and certify a need for medically necessary rehabilitation services.      *PLEASE SIGN ABOVE AND FAX BACK ALL PAGES*

## 2022-06-13 ENCOUNTER — HOSPITAL ENCOUNTER (OUTPATIENT)
Dept: PHYSICAL THERAPY | Facility: CLINIC | Age: 27
Setting detail: THERAPIES SERIES
Discharge: HOME | End: 2022-06-13
Payer: COMMERCIAL

## 2022-06-13 PROCEDURE — 97032 APPL MODALITY 1+ESTIM EA 15: CPT

## 2022-06-13 PROCEDURE — 97016 VASOPNEUMATIC DEVICE THERAPY: CPT

## 2022-06-13 PROCEDURE — 97110 THERAPEUTIC EXERCISES: CPT

## 2022-06-13 NOTE — FLOWSHEET NOTE
[] [facility]) [facility] &  Therapy  [address]  [phone]  F: [phone] [x] [address]  [place]   [address]  P: [phone]  F: [phone] [] [address]  [address]  P: [phone]  F: [phone] [] [address]  P: [phone]  F: [phone] [] [address]  [facility]   [address]   [place]: [phone]  F: [phone]      Physical Therapy Daily Treatment Note    Date:  2022  Patient Name:  Garth Madrigal    :  1995  MRN: [id number]  Physician: Keila Talbot MD                              Insurance: [facility] ([id number])  Medical Diagnosis: S83.512A - New ACL tear, left, initial encounter                       Rehab Codes: M25.562, M62.81, R26.89, M25.462  Onset date: surgical date 22                             Next Dr's appt.: 22  Visit# / total visits:      Cancels/No Shows: 0    Subjective:    Pain:  [x] Yes  [] No  Location: L knee  Pain Rating: (0-10 scale) 6/10  Pain altered Tx:  [] No  [] Yes  Action:    Comments: pt continues to take pain mediation regularly around the clock. Reports the majority of her pain to be at incisional sites. Has not yet attempted to ambulate with her brace locked as she feels more pain when the brace is unlocked. Objective:  Modalities:    VMS- 10sec on/20 sec off, with quad sets x 10 min    Vasocompression- 34 deg, mod compression, 15 min following exercises.   Precautions: full ACL protocol in soft chart, WBAT   Exercises: [employer] Access Code: [email or web address]  Exercise  S/p L knee ACL reconstruction 22 with allograft     Reps/ Time Weight/ Level Comments             Knee extension stretch 2'   Gentle manual overpressure    Quad sets  10 min with VMS   10 seconds on  20 seconds rest   Ankle pumps  20x       Heel Slides  10x2 AA  using slide board, strap and manual overpressure   Patellar Mobs x      Long sitting calf stretch 3x 30\"     SLR 10x2 AA With brace on                                                                          Other: 6/13 knee ROM 0-79 deg    Reapplied pt's ace wrap to avoid circulatory restrictions        Treatment Charges: Mins Units   []  Modalities     [x]  Ther Exercise 40 2   []  Manual Therapy     []  Ther Activities     []  Aquatics     [x]  Vasocompression 10 1   [x]  Other- attended stim (VMS) 10 1   Total Treatment time 60 4       Assessment: [x] Progressing toward goals. Started session with gentle patellar mobs. Added VMS with quad sets for quad muscle facilitation. Pt tolerated well with improved quad contraction using stim. Added SLR with brace on as pt does not have quad strength to support knee extension. Pt required manual assist for SLR secondary to hip strength deficits. Pt with 0 deg resting knee extension. Passive knee flexion to 79 degrees, limited by pain. Ended with vaso. [] No change. [] Other:  [x] Patient would continue to benefit from skilled physical therapy services in order to: reduce pain and effusion, restore mobility and strength, and improve gait to return patient to prior level of function. At Eval:  Functional Test: LEFI Score: 14/80 or 82.5% functionally impaired      Problems:    [x]? ? Pain: up to 10/10 pain L knee   [x]? ? ROM limited L knee ROM globally, limited LLE flexibility  [x]? ? Strength reduced LLE strength globally  [x]? ? Function impaired function indicated by LEFI score of 82.5%   [x]? Other impaired gait      STG: (to be met in 8 treatments)  1. ? Pain: Decrease L knee pain levels to 6/10 max with activity to promote improved tolerance to therapeutic exercise progressions.   2. ? ROM: Increase L knee AROM limitations to at least 0-90 degrees to reduce difficulty with ADLs and normalize gait mechanics. 3. ? Strength: Pt to demonstrate ability to perform at least 10 SLR without quad lag present in order to progress safely to full weight bearing. 4. ? Function: Pt to demonstrate ability to perform at least 5 STS with equal bilateral weight shift and without pain. 5. Pt to demonstrate improved knee effusion with patellofemoral circumferential measurements at joint line and suprapatellar < 49 cm to demonstrate improved joint force absorption with therapeutic exercise progressions. 6. Pt to be independent and compliant with Home Exercise Programs     LTG: (to be met in 16 treatments)  1. Improve score on assessment tool LEFI to 50% functionally impaired or less to demonstrate improved functional mobility. 2. Reduce L knee pain levels to 2/10 or less to improve independence with all daily activities. 3. Pt to demonstrate L knee AROM to 2-0-120 to ease difficulty with stair climbing and normalize gait mechanics. 4. Pt to demonstrate LLE strength at 4+/5 globally to ease difficulty with heavier household chores and caregiving for her children. 5. Pt to demonstrate ability to ambulate at least 300' independently without increased knee pain and improved heel-toe progression, gait speed, and equal bilateral step lengths. .   6. Pt to demonstrate ability to ascend/descend a full flight of stairs with reciprocal stepping mechanics and without pain to ease access to upstairs bedroom.                     Patient goals: return to work      Pt. Education:  [x] Yes  [] No  [x] Reviewed Prior HEP/Ed  Method of Education: [] Verbal  [] Demo  [] Written  Access Code: [email or web address]  URL: [email or web address]  Date: 06/10/2022  Prepared by: Holly Raya     Exercises  Supine Quad Set - 1 x daily - 7 x weekly - 3 sets - 10 reps  Supine Ankle Pumps - 1 x daily - 7 x weekly - 3 sets - 10 reps  Supine Heel Slide - 1 x daily - 7 x weekly - 3 sets - 10 reps  Supine Knee Extension Stretch on Towel Roll - 1 x daily - 7 x weekly - 3 sets - 10 reps  Long Sitting 4 Way Patellar [place] - 1 x daily - 7 x weekly - 3 sets - 10 reps   4 Way Patellar [place] - 1 x daily - 7 x weekly - 3 sets - 10 reps  Comprehension of Education:  [] Verbalizes understanding. [] Demonstrates understanding. [] Needs review. [x] Demonstrates/verbalizes HEP/Ed previously given. Plan: [x] Continue current frequency toward long and short term goals.     [x] Specific Instructions for subsequent treatments: restore full knee range of motion (0-90 degrees first 2 weeks), NMES for quad strenthening, gastroc stretching and ankle strengthening (heel/toe raise), gait mechanics, vaso       Time In: 12:00pm             Time Out: 1:06pm    Electronically signed by:  Neda Jolley PTA

## 2022-06-15 ENCOUNTER — HOSPITAL ENCOUNTER (OUTPATIENT)
Dept: PHYSICAL THERAPY | Facility: CLINIC | Age: 27
Setting detail: THERAPIES SERIES
Discharge: HOME | End: 2022-06-15
Payer: COMMERCIAL

## 2022-06-15 PROCEDURE — 97016 VASOPNEUMATIC DEVICE THERAPY: CPT

## 2022-06-15 PROCEDURE — 97032 APPL MODALITY 1+ESTIM EA 15: CPT

## 2022-06-15 PROCEDURE — 97110 THERAPEUTIC EXERCISES: CPT

## 2022-06-15 NOTE — FLOWSHEET NOTE
[] [address]  [address]  [phone]  F: [phone] [x] [address]  [place] [address]   [address]  P: [phone]  F: [phone] [] [address]  [address]  P: [phone]  F: [phone] [] [address]  P: [phone]  F: [phone] [] [address]  [facility]   [address]   [place]: [phone]  F: [phone]      Physical Therapy Daily Treatment Note    Date:  6/15/2022  Patient Name:  Felicitas Singer    :  1995  MRN: [id number]  Physician: Linda Raya MD                              Insurance: [facility] (91/[id number])  Medical Diagnosis: S83.512A - New ACL tear, left, initial encounter                       Rehab Codes: M25.562, M62.81, R26.89, M25.462  Onset date: surgical date 22                             Next Dr's appt.: 22  Visit# / total visits: 3/16     Cancels/No Shows: 0    Subjective:    Pain:  [x] Yes  [] No  Location: L knee  Pain Rating: (0-10 scale) 5/10  Pain altered Tx:  [] No  [] Yes  Action:    Comments: Pt arrives noting a slight decrease in pain, states ability to bend knee more. Pt arrived 13' late, able to accommodate      Objective:  Modalities:    VMS- 10sec on/20 sec off, with quad sets x 10 min    Vasocompression- 34 deg, low compression (per pt preference), 15 min following exercises.   Precautions: full ACL protocol in soft chart, WBAT   Exercises: [facility] Access Code: [email or web address]  Exercise  S/p L knee ACL reconstruction 22 with allograft     Reps/ Time Weight/ Level Comments             Knee extension stretch 2'   Gentle manual overpressure    Quad sets  10 min with VMS   10 seconds on  20 seconds rest   Ankle pumps  20x       Heel Slides  10x2 AA  using slide board, strap and manual overpressure   Patellar Mobs x      Long sitting calf stretch 3x 30\"     SLR 10x2 AA With brace on                                                                          Other: 6/13 knee ROM 0-79 deg            Treatment Charges: Mins Units   []  Modalities     [x]  Ther Exercise 20 1   []  Manual Therapy     []  Ther Activities     []  Aquatics     [x]  Vasocompression 15 1   [x]  Other- attended stim (VMS) 10 1   Total Treatment time 45 3       Assessment: [x] Progressing toward goals. Initiated session with estim to increase quad activiation, with quad contraction noted; estim performed in conjunction with quad sets. Pt then performed exercises as listed above, focusing on heel slides to increase knee flex AROM. SLR performed with brace and AAROM; encouraged pt to only perform at home with brace donned. Ended with vasocompression to decrease post exercise edema and soreness. [] No change. [] Other:  [x] Patient would continue to benefit from skilled physical therapy services in order to: reduce pain and effusion, restore mobility and strength, and improve gait to return patient to prior level of function. At Eval:  Functional Test: LEFI Score: 14/80 or 82.5% functionally impaired      Problems:    [x]? ? Pain: up to 10/10 pain L knee   [x]? ? ROM limited L knee ROM globally, limited LLE flexibility  [x]? ? Strength reduced LLE strength globally  [x]? ? Function impaired function indicated by LEFI score of 82.5%   [x]? Other impaired gait      STG: (to be met in 8 treatments)  1. ? Pain: Decrease L knee pain levels to 6/10 max with activity to promote improved tolerance to therapeutic exercise progressions. 2. ? ROM: Increase L knee AROM limitations to at least 0-90 degrees to reduce difficulty with ADLs and normalize gait mechanics.    3. ? Strength: Pt to demonstrate ability to perform at least 10 SLR without quad lag present in order to progress safely to full weight bearing. 4. ? Function: Pt to demonstrate ability to perform at least 5 STS with equal bilateral weight shift and without pain. 5. Pt to demonstrate improved knee effusion with patellofemoral circumferential measurements at joint line and suprapatellar < 49 cm to demonstrate improved joint force absorption with therapeutic exercise progressions. 6. Pt to be independent and compliant with Home Exercise Programs     LTG: (to be met in 16 treatments)  1. Improve score on assessment tool LEFI to 50% functionally impaired or less to demonstrate improved functional mobility. 2. Reduce L knee pain levels to 2/10 or less to improve independence with all daily activities. 3. Pt to demonstrate L knee AROM to 2-0-120 to ease difficulty with stair climbing and normalize gait mechanics. 4. Pt to demonstrate LLE strength at 4+/5 globally to ease difficulty with heavier household chores and caregiving for her children. 5. Pt to demonstrate ability to ambulate at least 300' independently without increased knee pain and improved heel-toe progression, gait speed, and equal bilateral step lengths. .   6. Pt to demonstrate ability to ascend/descend a full flight of stairs with reciprocal stepping mechanics and without pain to ease access to upstairs bedroom.                     Patient goals: return to work      Pt. Education:  [x] Yes  [] No  [x] Reviewed Prior HEP/Ed  Method of Education: [] Verbal  [] Demo  [] Written  Access Code: [email or web address]  URL: [email or web address]  Date: 06/10/2022  Prepared by: Pedro Ceballos     Exercises  Supine Quad Set - 1 x daily - 7 x weekly - 3 sets - 10 reps  Supine Ankle Pumps - 1 x daily - 7 x weekly - 3 sets - 10 reps  Supine Heel Slide - 1 x daily - 7 x weekly - 3 sets - 10 reps  Supine Knee Extension Stretch on Towel Roll - 1 x daily - 7 x weekly - 3 sets - 10 reps  Long Sitting 4 Way Patellar [place] - 1 x daily - 7 x weekly - 3 sets - 10 reps   4 Way Patellar Glide - 1 x daily - 7 x weekly - 3 sets - 10 reps  Comprehension of Education:  [] Verbalizes understanding. [] Demonstrates understanding. [] Needs review. [x] Demonstrates/verbalizes HEP/Ed previously given. Plan: [x] Continue current frequency toward long and short term goals.     [x] Specific Instructions for subsequent treatments: restore full knee range of motion (0-90 degrees first 2 weeks), NMES for quad strenthening, gastroc stretching and ankle strengthening (heel/toe raise), gait mechanics, vaso       Time In: 0915             Time Out: 1000    Electronically signed by:  Leena Sofia PT

## 2022-06-17 ENCOUNTER — TELEPHONE (OUTPATIENT)
Dept: ORTHOPEDIC SURGERY | Age: 27
End: 2022-06-17

## 2022-06-17 NOTE — TELEPHONE ENCOUNTER
Called pt and advised to continue to wear her ace bandage since she is still having swelling. She was also advised to ice and elevate to help with swelling.

## 2022-06-17 NOTE — TELEPHONE ENCOUNTER
Patient had a Lt Knee ACL Recon on 6/9/22, patient states she has an ace bandage on her knee. Does she need to keep this on all day and all night. Knee is still swollen.     Pt can be reached at [phone]

## 2022-06-21 ENCOUNTER — HOSPITAL ENCOUNTER (OUTPATIENT)
Dept: PHYSICAL THERAPY | Facility: CLINIC | Age: 27
Setting detail: THERAPIES SERIES
Discharge: HOME | End: 2022-06-21
Payer: COMMERCIAL

## 2022-06-21 PROCEDURE — 97110 THERAPEUTIC EXERCISES: CPT

## 2022-06-21 PROCEDURE — 97016 VASOPNEUMATIC DEVICE THERAPY: CPT

## 2022-06-21 PROCEDURE — 97112 NEUROMUSCULAR REEDUCATION: CPT

## 2022-06-22 ENCOUNTER — TELEPHONE (OUTPATIENT)
Dept: BEHAVIORAL/MENTAL HEALTH CLINIC | Age: 27
End: 2022-06-22

## 2022-06-22 ENCOUNTER — OFFICE VISIT (OUTPATIENT)
Dept: ORTHOPEDIC SURGERY | Age: 27
End: 2022-06-22

## 2022-06-22 VITALS — BODY MASS INDEX: 39.61 KG/M2 | WEIGHT: 232 LBS | HEIGHT: 64 IN

## 2022-06-22 DIAGNOSIS — Z98.890 STATUS POST RECONSTRUCTION OF ANTERIOR CRUCIATE LIGAMENT: Primary | ICD-10-CM

## 2022-06-22 PROCEDURE — 99024 POSTOP FOLLOW-UP VISIT: CPT | Performed by: ORTHOPAEDIC SURGERY

## 2022-06-22 NOTE — TELEPHONE ENCOUNTER
.Contacted patient today in regards to missed appointment. Could not contact. Was call completed? If not, reason: Other No Voicemail    Does patient want to continue services? If no, and the patient is a new patient, please close the referral. Also, if no, please document reason and route message to provider. Yes    Anything the provider should be aware of? If yes, please route call.  No    Reason for Missed Appointment: Unknown

## 2022-06-24 ENCOUNTER — HOSPITAL ENCOUNTER (OUTPATIENT)
Dept: PHYSICAL THERAPY | Facility: CLINIC | Age: 27
Setting detail: THERAPIES SERIES
Discharge: HOME | End: 2022-06-24
Payer: COMMERCIAL

## 2022-06-24 PROCEDURE — 97112 NEUROMUSCULAR REEDUCATION: CPT

## 2022-06-24 PROCEDURE — 97110 THERAPEUTIC EXERCISES: CPT

## 2022-06-24 PROCEDURE — 97016 VASOPNEUMATIC DEVICE THERAPY: CPT

## 2022-06-24 NOTE — FLOWSHEET NOTE
[] [address]  [address]  [phone]  F: [phone] [x] [address]  [place] [address]   [address]  P: [phone]  F: [phone] [] [address]  [address]  P: [phone]  F: [phone] [] [address]  P: [phone]  F: [phone] [] [address]  [place]   [address]   [place]: [phone]  F: [phone]      Physical Therapy Daily Treatment Note    Date:  2022  Patient Name:  Mando Hunter    :  1995  MRN: [id number]  Physician: Sidney Lees MD                              Insurance: [facility] ()  Medical Diagnosis: S83.512A - New ACL tear, left, initial encounter                       Rehab Codes: M25.562, M62.81, R26.89, M25.462  Onset date: surgical date 22                             Next Dr's appt.: 22  Visit# / total visits:      Cancels/No Shows: 0    Subjective:    Pain:  [x] Yes  [] No  Location: L knee  Pain Rating: (0-10 scale) 3-4/10  Pain altered Tx:  [x] No  [] Yes  Action:    Comments:  Pt notes knee is feeling good today, minimal pain. Muscular soreness reported after previous session however denies increased knee pain. Pt notes her surgeon was happy with current progress at recent appointment and encouraged her to begin weaning off of crutches/brace as able in the next few weeks. Objective:  Modalities:    VMS- 10sec on/20 sec off, with quad sets x 10 min    Vasocompression- 34 deg, low compression (per pt preference), 15 min following exercises.   Precautions: full ACL protocol in soft chart, WBAT   Exercises: [employer] Access Code: [id number]  Exercise  S/p L knee ACL reconstruction 22 with allograft     Reps/ Time Weight/ Level Comments    Nustep  5' L1 New 6/24         Knee extension stretch 2x1'  5# Gentle manual overpressure 1st set  Added 5# second set    Quad sets   TKE 10 min with VMS   10 seconds on  20 seconds rest  Added TKE 6/24   Ankle pumps  20x       Heel Slides  10x2 AA  using slide board, strap and manual overpressure   Patellar Mobs x      Long sitting calf stretch 3x 30\"     SLR 10x2 AA With brace on  (held 6/24- significant quad lag, max A)   SL hip abd  10x AA Brace on- added 6/21         Prone Quad sets     A  Attempted 6/21- RESUME NEXT   Prone hip ext                             Heel Raises   20x    New 6/24- cues for quad set    Weight Shifting 10x  New 6/24- m/l to encourage increased weight bearing through LLE     Gait 1L   6/24- 150 feet performed with unilateral crutch to RUE however pt presents with difficulty performing terminal knee extension and lacks quad engagement; ambulated 150 with bilateral axillary crutches as pt demonstrates improved quad engagement but requires cueing for increased L stance and heel-toe progression    Other: 6/13 knee ROM 0-79 deg  6/21:   Flexion: 92 AA  Ext: lacking 2 A , 0 P    6/24  Flexion: 95 AA, 102 P  Ext 0A, 0P            Treatment Charges: Mins Units   []  Modalities     [x]  Ther Exercise 33 2   []  Manual Therapy     []  Ther Activities     []  Aquatics     [x]  Vasocompression 15 1   [x]  Other- neuro re ed 10 1   Total Treatment time 58 4       Assessment: [x] Progressing toward goals. Initiated session with nustep warm up followed by range of motion exercises. Pt demonstrates full knee extension this date and improved knee flexion range of motion up to 102 degrees passively with minimal pain at end range. Progressed VMES with TKE this date with good tolerance. Attempted SLR however despite max A and cues for quad engagement pt continues to demo quad lag with increased pain so held after 3 attempts.  Added standing weight shifts to encourage increased WBing through LLE as pt tends to shift more to RLE followed by heel raises. Attempted ambulation with unilateral axillary crutch however pt with difficulty maintaining quad engagement and achieving terminal knee extension with intermittent buckling so performed remaining 150' with bilateral axillary crutches. Encouraged pt to continue with ambulating with bilateral axillary crutches at home and focus on performing quad sets throughout the day to improve strength. Ended again with vaso to L knee to reduce swelling and soreness post exercise. [] No change. [] Other:  [x] Patient would continue to benefit from skilled physical therapy services in order to: reduce pain and effusion, restore mobility and strength, and improve gait to return patient to prior level of function. At Eval:  Functional Test: LEFI Score: 14/80 or 82.5% functionally impaired      Problems:    [x]? ? Pain: up to 10/10 pain L knee   [x]? ? ROM limited L knee ROM globally, limited LLE flexibility  [x]? ? Strength reduced LLE strength globally  [x]? ? Function impaired function indicated by LEFI score of 82.5%   [x]? Other impaired gait      STG: (to be met in 8 treatments)  1. ? Pain: Decrease L knee pain levels to 6/10 max with activity to promote improved tolerance to therapeutic exercise progressions. 2. ? ROM: Increase L knee AROM limitations to at least 0-90 degrees to reduce difficulty with ADLs and normalize gait mechanics. 3. ? Strength: Pt to demonstrate ability to perform at least 10 SLR without quad lag present in order to progress safely to full weight bearing. 4. ? Function: Pt to demonstrate ability to perform at least 5 STS with equal bilateral weight shift and without pain. 5. Pt to demonstrate improved knee effusion with patellofemoral circumferential measurements at joint line and suprapatellar < 49 cm to demonstrate improved joint force absorption with therapeutic exercise progressions.    6. Pt to be independent and compliant with Home Exercise Programs     LTG: (to be met in 16 treatments)  1. Improve score on assessment tool LEFI to 50% functionally impaired or less to demonstrate improved functional mobility. 2. Reduce L knee pain levels to 2/10 or less to improve independence with all daily activities. 3. Pt to demonstrate L knee AROM to 2-0-120 to ease difficulty with stair climbing and normalize gait mechanics. 4. Pt to demonstrate LLE strength at 4+/5 globally to ease difficulty with heavier household chores and caregiving for her children. 5. Pt to demonstrate ability to ambulate at least 300' independently without increased knee pain and improved heel-toe progression, gait speed, and equal bilateral step lengths. .   6. Pt to demonstrate ability to ascend/descend a full flight of stairs with reciprocal stepping mechanics and without pain to ease access to upstairs bedroom.                     Patient goals: return to work      Pt. Education:  [x] Yes  [] No  [] Reviewed Prior HEP/Ed  Method of Education: [x] Verbal- quad sets multiple reps throughout the day   [] Demo  [] Written  Access Code: [email or web address]  URL: [email or web address]  Date: 06/10/2022  Prepared by: Holly Raya     Exercises  Supine Quad Set - 1 x daily - 7 x weekly - 3 sets - 10 reps  Supine Ankle Pumps - 1 x daily - 7 x weekly - 3 sets - 10 reps  Supine Heel Slide - 1 x daily - 7 x weekly - 3 sets - 10 reps  Supine Knee Extension Stretch on Towel Roll - 1 x daily - 7 x weekly - 3 sets - 10 reps  Long Sitting 4 Way Patellar [place] - 1 x daily - 7 x weekly - 3 sets - 10 reps   4 Way Patellar [place] - 1 x daily - 7 x weekly - 3 sets - 10 reps  Comprehension of Education:  [x] Verbalizes understanding. [x] Demonstrates understanding. [] Needs review. [] Demonstrates/verbalizes HEP/Ed previously given. Plan: [x] Continue current frequency toward long and short term goals.     [x] Specific Instructions for subsequent treatments: ADD isometric long arc quads 60 degrees , no SAQ       Time In: 10:30 am           Time Out:  11:33 am     Electronically signed by:  Radha Thapa PT

## 2022-06-28 ENCOUNTER — HOSPITAL ENCOUNTER (OUTPATIENT)
Dept: PHYSICAL THERAPY | Facility: CLINIC | Age: 27
Setting detail: THERAPIES SERIES
Discharge: HOME | End: 2022-06-28
Payer: COMMERCIAL

## 2022-06-28 PROCEDURE — 97016 VASOPNEUMATIC DEVICE THERAPY: CPT

## 2022-06-28 PROCEDURE — 97110 THERAPEUTIC EXERCISES: CPT

## 2022-06-28 PROCEDURE — 97112 NEUROMUSCULAR REEDUCATION: CPT

## 2022-06-28 NOTE — FLOWSHEET NOTE
second set    [employer]   TKE 10 min with VMS   10 seconds on  20 seconds rest  Added TKE 6/24   Ankle pumps  20x       Heel Slides  10x2 AA  using slide board, strap and manual overpressure   Patellar Mobs x      Long sitting calf stretch 3x 30\"     SLR 10x2 AA With brace on  (held 6/24- significant quad lag, max A- same on 6/29 held)   SL hip abd  10x AA Brace on- added 6/21         Seated       Isometric LAQ at 60 deg 10x5\"  New 6/28         Prone Quad sets  10x  A  Attempted 6/21- resumed 6/28; towel roll under ankle    Prone hip ext                    Standing         Heel Raises  20x   New 6/24- cues for quad set    Toe raises  10x  New 6/28   Weight Shifting 10x ea  New 6/24- m/l to encourage increased weight bearing through LLE; added A/P 6/28     Gait 1L   6/24- 150 feet performed with unilateral crutch to RUE however pt presents with difficulty performing terminal knee extension and lacks quad engagement; ambulated 150 with bilateral axillary crutches as pt demonstrates improved quad engagement but requires cueing for increased L stance and heel-toe progression (not today)   Other: 6/13 knee ROM 0-79 deg  6/21:   Flexion: 92 AA  Ext: lacking 2 A , 0 P    6/24  Flexion: 95 AA, 102 P  Ext 0A, 0P    6/28  99 AA  110 P  Ext 0 A            Treatment Charges: Mins Units   []  Modalities     [x]  Ther Exercise 40 2   []  Manual Therapy     []  Ther Activities     []  Aquatics     [x]  Vasocompression 10 1   [x]  Other- neuro re ed 10 1   Total Treatment time 60 4       Assessment: [x] Progressing toward goals. Initiated session on nu step to promote increased knee flexion followed by ROM exercises on mat table. Pt able to achieve 99 degrees with AA and 110 degrees passively this date. Continued with NMES for increased quad activation. Held [facility] again this date as pt requires max assist d/t weak quad musculature. Addition of isometric LAQ at 60 degrees with cues for submaximal pressure.  Added toe raises in standing this date and emphasized TKE during heel raises. Incorporated A/P weight shifts for increased wbing through LLE. Pt tolerated tx well. Ended with vaso on mod pressure x10' for management of pain and edema. Encouraged to complete quad sets multiple times a day and focus on heel/toe mechanics along with TKE during ambulation. [] No change. [] Other:  [x] Patient would continue to benefit from skilled physical therapy services in order to: reduce pain and effusion, restore mobility and strength, and improve gait to return patient to prior level of function. At Eval:  Functional Test: LEFI Score: 14/80 or 82.5% functionally impaired      Problems:    [x]? ? Pain: up to 10/10 pain L knee   [x]? ? ROM limited L knee ROM globally, limited LLE flexibility  [x]? ? Strength reduced LLE strength globally  [x]? ? Function impaired function indicated by LEFI score of 82.5%   [x]? Other impaired gait      STG: (to be met in 8 treatments)  1. ? Pain: Decrease L knee pain levels to 6/10 max with activity to promote improved tolerance to therapeutic exercise progressions. 2. ? ROM: Increase L knee AROM limitations to at least 0-90 degrees to reduce difficulty with ADLs and normalize gait mechanics. 3. ? Strength: Pt to demonstrate ability to perform at least 10 SLR without quad lag present in order to progress safely to full weight bearing. 4. ? Function: Pt to demonstrate ability to perform at least 5 STS with equal bilateral weight shift and without pain. 5. Pt to demonstrate improved knee effusion with patellofemoral circumferential measurements at joint line and suprapatellar < 49 cm to demonstrate improved joint force absorption with therapeutic exercise progressions. 6. Pt to be independent and compliant with Home Exercise Programs     LTG: (to be met in 16 treatments)  1.  Improve score on assessment tool LEFI to 50% functionally impaired or less to demonstrate improved functional mobility. 2. Reduce L knee pain levels to 2/10 or less to improve independence with all daily activities. 3. Pt to demonstrate L knee AROM to 2-0-120 to ease difficulty with stair climbing and normalize gait mechanics. 4. Pt to demonstrate LLE strength at 4+/5 globally to ease difficulty with heavier household chores and caregiving for her children. 5. Pt to demonstrate ability to ambulate at least 300' independently without increased knee pain and improved heel-toe progression, gait speed, and equal bilateral step lengths. .   6. Pt to demonstrate ability to ascend/descend a full flight of stairs with reciprocal stepping mechanics and without pain to ease access to upstairs bedroom.                     Patient goals: return to work      Pt. Education:  [x] Yes  [] No  [] Reviewed Prior HEP/Ed  Method of Education: [x] Verbal- quad sets multiple reps throughout the day, gait mechanics, ice   [] Demo  [] Written  Access Code: [email or web address]  URL: [email or web address]  Date: 06/10/2022  Prepared by: Gaurav Escobedo     Exercises  Supine Quad Set - 1 x daily - 7 x weekly - 3 sets - 10 reps  Supine Ankle Pumps - 1 x daily - 7 x weekly - 3 sets - 10 reps  Supine Heel Slide - 1 x daily - 7 x weekly - 3 sets - 10 reps  Supine Knee Extension Stretch on Towel Roll - 1 x daily - 7 x weekly - 3 sets - 10 reps  Long Sitting 4 Way Patellar [place] - 1 x daily - 7 x weekly - 3 sets - 10 reps   4 Way Patellar [place] - 1 x daily - 7 x weekly - 3 sets - 10 reps  Comprehension of Education:  [x] Verbalizes understanding. [x] Demonstrates understanding. [] Needs review. [] Demonstrates/verbalizes HEP/Ed previously given. Plan: [x] Continue current frequency toward long and short term goals.     [x] Specific Instructions for subsequent treatments:  no SAQ       Time In: 11:10 am          Time Out: 12:15  pm     Electronically signed by:  Kaushik Estrella PTA

## 2022-06-29 ENCOUNTER — TELEPHONE (OUTPATIENT)
Dept: BEHAVIORAL/MENTAL HEALTH CLINIC | Age: 27
End: 2022-06-29

## 2022-06-29 ENCOUNTER — CLINICAL DOCUMENTATION (OUTPATIENT)
Dept: BEHAVIORAL/MENTAL HEALTH CLINIC | Age: 27
End: 2022-06-29

## 2022-06-29 NOTE — PROGRESS NOTES
Warm handoff requested by ASIA Dean CNP and was completed. Patient declined visit with [facility] Consultant. Called patient to reschedule. Patient said they currently had no transportation to get to appointment and did not have stable wifi for virtual appointment. Patient agreed they would call the office to reschedule if they still wanted services in the future.

## 2022-06-30 ENCOUNTER — HOSPITAL ENCOUNTER (OUTPATIENT)
Dept: PHYSICAL THERAPY | Facility: CLINIC | Age: 27
Setting detail: THERAPIES SERIES
Discharge: HOME | End: 2022-06-30
Payer: COMMERCIAL

## 2022-06-30 PROCEDURE — 97112 NEUROMUSCULAR REEDUCATION: CPT

## 2022-06-30 PROCEDURE — 97016 VASOPNEUMATIC DEVICE THERAPY: CPT

## 2022-06-30 PROCEDURE — 97110 THERAPEUTIC EXERCISES: CPT

## 2022-06-30 NOTE — FLOWSHEET NOTE
[] [facility] [facility]  [address]  [phone]  F: [phone] [x] [address]  [place] [address]   [address]  P: [phone]  F: [phone] [] [address]  [address]  P: [phone]  F: [phone] [] [address]  P: [phone]  F: [phone] [] [address]  [facility]   [address]   [place]: [phone]  F: [phone]      Physical Therapy Daily Treatment Note    Date:  2022  Patient Name:  Melonie Sotomayor    :  1995  MRN: [id number]  Physician: Magdaleno Landry MD                              Insurance: [facility] ()  Medical Diagnosis: S83.512A - New ACL tear, left, initial encounter                       Rehab Codes: M25.562, M62.81, R26.89, M25.462  Onset date: surgical date 22                             Next Dr's appt.: 22  Visit# / total visits:      Cancels/No Shows: 0    Subjective:    Pain:  [] Yes  [x] No  Location: L knee  Pain Rating: (0-10 scale) 0/10  Pain altered Tx:  [x] No  [] Yes  Action:    Comments: Pt arrives continuing to ambulate with bilateral axillary crutches. Denies any new changes this date.      Objective:  Modalities:    VMS- 10sec on/20 sec off, with quad sets x 10 min -billed with neuro    Vasocompression- 34 deg, low compression (per pt preference), 15 min following exercises. -able to tolerate mod pressure x10' 22  Precautions: full ACL protocol in soft chart, WBAT   Exercises: [address] Access Code: [email or web address]  Exercise  S/p L knee ACL reconstruction 22 with allograft     Reps/ Time Weight/ Level Comments    Nustep  5' L1 New          Supine       Knee extension stretch 2x1' 5# Gentle manual overpressure 1st set  Added 5# second Pt able to perform sidelying hip abduction with minimal to no assist for 2 sets of 10 this date. Continued to hold SLR due to quad lag despite max assist. Ended session again with vaso to L knee to reduce effusion. [] No change. [] Other:  [x] Patient would continue to benefit from skilled physical therapy services in order to: reduce pain and effusion, restore mobility and strength, and improve gait to return patient to prior level of function. At Eval:  Functional Test: LEFI Score: 14/80 or 82.5% functionally impaired      Problems:    [x]? ? Pain: up to 10/10 pain L knee   [x]? ? ROM limited L knee ROM globally, limited LLE flexibility  [x]? ? Strength reduced LLE strength globally  [x]? ? Function impaired function indicated by LEFI score of 82.5%   [x]? Other impaired gait      STG: (to be met in 8 treatments)  1. ? Pain: Decrease L knee pain levels to 6/10 max with activity to promote improved tolerance to therapeutic exercise progressions. 2. ? ROM: Increase L knee AROM limitations to at least 0-90 degrees to reduce difficulty with ADLs and normalize gait mechanics. 3. ? Strength: Pt to demonstrate ability to perform at least 10 SLR without quad lag present in order to progress safely to full weight bearing. 4. ? Function: Pt to demonstrate ability to perform at least 5 STS with equal bilateral weight shift and without pain. 5. Pt to demonstrate improved knee effusion with patellofemoral circumferential measurements at joint line and suprapatellar < 49 cm to demonstrate improved joint force absorption with therapeutic exercise progressions. 6. Pt to be independent and compliant with Home Exercise Programs     LTG: (to be met in 16 treatments)  1. Improve score on assessment tool LEFI to 50% functionally impaired or less to demonstrate improved functional mobility. 2. Reduce L knee pain levels to 2/10 or less to improve independence with all daily activities.    3. Pt to demonstrate L knee AROM to 2-0-120 to ease difficulty with stair climbing and normalize gait mechanics. 4. Pt to demonstrate LLE strength at 4+/5 globally to ease difficulty with heavier household chores and caregiving for her children. 5. Pt to demonstrate ability to ambulate at least 300' independently without increased knee pain and improved heel-toe progression, gait speed, and equal bilateral step lengths. .   6. Pt to demonstrate ability to ascend/descend a full flight of stairs with reciprocal stepping mechanics and without pain to ease access to upstairs bedroom.                     Patient goals: return to work      Pt. Education:  [x] Yes  [] No  [] Reviewed Prior HEP/Ed  Method of Education: [x] Verbal- quad sets multiple reps throughout the day, gait mechanics, ice   [] Demo  [] Written  Access Code: [email or web address]  URL: [email or web address]  Date: 06/10/2022  Prepared by: Deleta Gens     Exercises  Supine Quad Set - 1 x daily - 7 x weekly - 3 sets - 10 reps  Supine Ankle Pumps - 1 x daily - 7 x weekly - 3 sets - 10 reps  Supine Heel Slide - 1 x daily - 7 x weekly - 3 sets - 10 reps  Supine Knee Extension Stretch on Towel Roll - 1 x daily - 7 x weekly - 3 sets - 10 reps  Long Sitting 4 Way Patellar [place] - 1 x daily - 7 x weekly - 3 sets - 10 reps   4 Way Patellar [place] - 1 x daily - 7 x weekly - 3 sets - 10 reps  Comprehension of Education:  [x] Verbalizes understanding. [x] Demonstrates understanding. [] Needs review. [] Demonstrates/verbalizes HEP/Ed previously given. Plan: [x] Continue current frequency toward long and short term goals.     [x] Specific Instructions for subsequent treatments:  Reassess goals next       Time In: 11:07 am          Time Out: 12:09 am   Electronically signed by:  Jemima Peñaloza PT

## 2022-07-05 ENCOUNTER — HOSPITAL ENCOUNTER (OUTPATIENT)
Dept: PHYSICAL THERAPY | Facility: CLINIC | Age: 27
Setting detail: THERAPIES SERIES
Discharge: HOME | End: 2022-07-05
Payer: COMMERCIAL

## 2022-07-05 PROCEDURE — 97110 THERAPEUTIC EXERCISES: CPT

## 2022-07-05 PROCEDURE — 97112 NEUROMUSCULAR REEDUCATION: CPT

## 2022-07-05 PROCEDURE — 97016 VASOPNEUMATIC DEVICE THERAPY: CPT

## 2022-07-05 NOTE — FLOWSHEET NOTE
[] [facility] [facility]  [address]  [phone]  F: [phone] [x] [address]  [place] [address]   [address]  P: [phone]  F: [phone] [] [address]  [address]  P: [phone]  F: [phone] [] [address]  P: [phone]  F: [phone] [] [address]  [facility]   [address]   [place]: [phone]  F: [phone]      Physical Therapy Daily Treatment Note    Date:  2022  Patient Name:  Heather Gomez    :  1995  MRN: [id number]  Physician: Shaan Magallanes MD                              Insurance: [facility] ([id number])  Medical Diagnosis: S83.512A - New ACL tear, left, initial encounter                       Rehab Codes: M25.562, M62.81, R26.89, M25.462  Onset date: surgical date 22                             Next Dr's appt.: 22  Visit# / total visits:      Cancels/No Shows: 0    Subjective:    Pain:  [x] Yes  [] No  Location: L knee  Pain Rating: (0-10 scale) 4/10  Pain altered Tx:  [x] No  [] Yes  Action:    Comments: Pt arrives continuing to ambulate with bilateral axillary crutches. Notes ongoing lateral knee pain that has been present consistently following surgery rated at 4/10 this date. No new changes reported.       Objective:  Modalities:    VMS- 10sec on/20 sec off, with quad sets x 10 min -billed with neuro    Vasocompression- 34 deg, low compression (per pt preference), 15 min following exercises. -able to tolerate mod pressure x10' 22  Precautions: full ACL protocol in soft chart, WBAT   Exercises: Sidney Nguyen Access Code: [email or web address]  Exercise  S/p L knee ACL reconstruction 22 with allograft     Reps/ Time Weight/ Level Comments    Nustep  5' L1 New  Supine       Knee extension stretch 2x1' 5# Gentle manual overpressure 1st set  Added 5# second set (not today)   Quad sets   Isometric LAQ  10 min with VMS   10 seconds on  20 seconds rest  Added LAQ 6/30   Ankle pumps  20x       Heel Slides  10x2 AA  A  using slide board, strap and manual overpressure  Active performed on 7/5 second set    Patellar Mobs x      Long sitting calf stretch 3x 30\"     SLR 5x AA With brace on  (held 6/24- significant quad lag, max A- same on 6/29 held)  5 performed    SL hip abd  10x2 AA Brace on- added 6/21  Min A- inc reps 6/30         Seated       Isometric LAQ at 60 deg 10x5\"  New 6/28         Prone Quad sets  10x  A  Attempted 6/21- resumed 6/28; towel roll under ankle    Prone hip ext                    Standing         Heel Raises  20x   New 6/24- cues for quad set    Toe raises  20x  New 6/28- inc reps 6/30   TKE  10x5\" [place]  New 6/30   Weight Shifting 10x ea  New 6/24- m/l to encourage increased weight bearing through LLE; added A/P 6/28   Held 7/5   Mini Squats  10x  New 7/5   STS  2x5  New 7/5  Second set without UE assist     Gait 1L   6/24- 1L unilateral axillary crutch and through clinic with unilateral axillary crutch, cues provided for terminal knee extension with good carryover and heel toe gait progressions (not today)   Other: 6/13 knee ROM 0-79 deg  6/21:   Flexion: 92 AA  Ext: lacking 2 A , 0 P    6/24  Flexion: 95 AA, 102 P  Ext 0A, 0P    6/28  99 AA  110 P  Ext 0 A    7/5  Flexion: 110 AA, 106 A  Ext: 0         Treatment Charges: Mins Units   []  Modalities     [x]  Ther Exercise 45 3   []  Manual Therapy     []  Ther Activities     []  Aquatics     [x]  Vasocompression 10 1   [x]  Other- neuro re ed 10 1   Total Treatment time 65 5     *reassessment billed under therapeutic exercise     Assessment: [x] Progressing toward goals. Initiated session on nu step warm up followed by reassessment of progress towards goals.  Pt is currently demonstrating improvements in reduction of L knee pain and effusion, improved knee range of motion, improved strength and function. Pt has been compliant with HEP and has been attempting to ambulate more with unilateral axillary crutch at home as able. Pt continues to be most limited by quad weakness indicated by SLR. Progressed functional strengthening with STS and mini squats this date. Cues required throughout for equal bilateral weight shifting with fair carryover and for posterior hip hinge and maintaining heel contact during mini squats. Ended session again with vaso to L knee to reduce soreness and effusion post exercise. [] No change. [] Other:  [x] Patient would continue to benefit from skilled physical therapy services in order to: reduce pain and effusion, restore mobility and strength, and improve gait to return patient to prior level of function. At Eval:  Functional Test: LEFI Score: 14/80 or 82.5% functionally impaired      Problems:    [x]? ? Pain: up to 10/10 pain L knee   [x]? ? ROM limited L knee ROM globally, limited LLE flexibility  [x]? ? Strength reduced LLE strength globally  [x]? ? Function impaired function indicated by LEFI score of 82.5%   [x]? Other impaired gait      STG: (to be met in 8 treatments) Reassessed by primary PT 7/5/22  1. ? Pain: Decrease L knee pain levels to 6/10 max with activity to promote improved tolerance to therapeutic exercise progressions. MET- 5/10 max   2. ? ROM: Increase L knee AROM limitations to at least 0-90 degrees to reduce difficulty with ADLs and normalize gait mechanics. MET- 0-106 A  3. ? Strength: Pt to demonstrate ability to perform at least 10 SLR without quad lag present in order to progress safely to full weight bearing. Ongoing- max A needed, only able to complete 5   4. ? Function: Pt to demonstrate ability to perform at least 5 STS with equal bilateral weight shift and without pain.  Ongoing- cues for equal bilateral weight shifting, slight increase in lateral knee pain   5. Pt to demonstrate improved knee effusion with patellofemoral circumferential measurements at joint line and suprapatellar < 49 cm to demonstrate improved joint force absorption with therapeutic exercise progressions. Partially met- 50.3 cm suprapatellar this date, 43.5 cm at joint line   6. Pt to be independent and compliant with Home Exercise Programs MET      LTG: (to be met in 16 treatments)  1. Improve score on assessment tool LEFI to 50% functionally impaired or less to demonstrate improved functional mobility. 2. Reduce L knee pain levels to 2/10 or less to improve independence with all daily activities. 3. Pt to demonstrate L knee AROM to 2-0-120 to ease difficulty with stair climbing and normalize gait mechanics. 4. Pt to demonstrate LLE strength at 4+/5 globally to ease difficulty with heavier household chores and caregiving for her children. 5. Pt to demonstrate ability to ambulate at least 300' independently without increased knee pain and improved heel-toe progression, gait speed, and equal bilateral step lengths. .   6. Pt to demonstrate ability to ascend/descend a full flight of stairs with reciprocal stepping mechanics and without pain to ease access to upstairs bedroom.                     Patient goals: return to work      Pt. Education:  [x] Yes  [] No  [] Reviewed Prior HEP/Ed  Method of Education: [x] Verbal- quad sets multiple reps throughout the day, gait mechanics, ice   [] Demo  [] Written  Access Code: [email or web address]  URL: [email or web address]  Date: 06/10/2022  Prepared by: Celina Webster     Exercises  Supine Quad Set - 1 x daily - 7 x weekly - 3 sets - 10 reps  Supine Ankle Pumps - 1 x daily - 7 x weekly - 3 sets - 10 reps  Supine Heel Slide - 1 x daily - 7 x weekly - 3 sets - 10 reps  Supine Knee Extension Stretch on Towel Roll - 1 x daily - 7 x weekly - 3 sets - 10 reps  Long Sitting 4 Way Patellar [place] - 1 x daily - 7 x weekly - 3 sets - 10 reps   4 Way Patellar [place] - 1 x daily - 7 x weekly - 3 sets - 10 reps  Comprehension of Education:  [x] Verbalizes understanding. [x] Demonstrates understanding. [] Needs review. [] Demonstrates/verbalizes HEP/Ed previously given. Plan: [x] Continue current frequency toward long and short term goals.     [x] Specific Instructions for subsequent treatments:  Continue to progress quad and hamstring strengthening, no SAQ      Time In: 11:00 am           Time Out:  12:10 pm     Electronically signed by:  Fidel Hurst PT

## 2022-07-06 ENCOUNTER — NURSE ONLY (OUTPATIENT)
Dept: OBGYN | Age: 27
End: 2022-07-06
Payer: COMMERCIAL

## 2022-07-06 DIAGNOSIS — N94.6 DYSMENORRHEA: Primary | ICD-10-CM

## 2022-07-06 PROCEDURE — 96372 THER/PROPH/DIAG INJ SC/IM: CPT

## 2022-07-06 RX ORDER — MEDROXYPROGESTERONE ACETATE 150 MG/ML
150 INJECTION, SUSPENSION INTRAMUSCULAR ONCE
Status: COMPLETED | OUTPATIENT
Start: 2022-07-06 | End: 2022-07-06

## 2022-07-06 RX ADMIN — MEDROXYPROGESTERONE ACETATE 150 MG: 150 INJECTION, SUSPENSION INTRAMUSCULAR at 11:28

## 2022-07-08 ENCOUNTER — HOSPITAL ENCOUNTER (OUTPATIENT)
Dept: PHYSICAL THERAPY | Facility: CLINIC | Age: 27
Setting detail: THERAPIES SERIES
Discharge: HOME | End: 2022-07-08
Payer: COMMERCIAL

## 2022-07-08 ENCOUNTER — TELEPHONE (OUTPATIENT)
Dept: ORTHOPEDIC SURGERY | Age: 27
End: 2022-07-08

## 2022-07-08 PROCEDURE — 97016 VASOPNEUMATIC DEVICE THERAPY: CPT

## 2022-07-08 PROCEDURE — 97110 THERAPEUTIC EXERCISES: CPT

## 2022-07-08 PROCEDURE — 97112 NEUROMUSCULAR REEDUCATION: CPT

## 2022-07-08 NOTE — FLOWSHEET NOTE
[] [facility]  [address]  [phone]  F: [phone] [x] [address]  [place] [address]   [address]  P: [phone]  F: [phone] [] [address]  [address]  P: [phone]  F: [phone] [] [address]  P: [phone]  F: [phone] [] [address]  [facility]   [address]   [place]: [phone]  F: [phone]      Physical Therapy Daily Treatment Note    Date:  2022  Patient Name:  Elizabeth La    :  1995  MRN: [id number]  Physician: Feng Reed MD                              Insurance: [facility] ([id number])  Medical Diagnosis: S83.512A - New ACL tear, left, initial encounter                       Rehab Codes: M25.562, M62.81, R26.89, M25.462  Onset date: surgical date 22                             Next Dr's appt.: 22  Visit# / total visits:      Cancels/No Shows: 0    Subjective:    Pain:  [] Yes  [x] No  Location: L knee  Pain Rating: (0-10 scale) 0/10  Pain altered Tx:  [x] No  [] Yes  Action:  Comments: Pt cont to arrive to physical therapy without c/o pain. Pt reported has been completing HEP without any issue. Pt cont to ambulate with knee brace and B axillary crutches however was holding both on 1 side.            Objective:  Modalities:    VMS- 10sec on/20 sec off, with quad sets x 10 min -billed with neuro    Vasocompression- 34 deg, low compression (per pt preference), 15 min following exercises. -able to tolerate mod pressure x10' with pillowcase wrapped around L knee 22  Precautions: full ACL protocol in soft chart, WBAT   Exercises: [employer] Access Code: [email or web address]   Exercise  S/p L knee ACL reconstruction 22 with allograft     Reps/ Time Weight/ Level Comments    Nustep  5' L2 New 6/24; inc resistance 7/8         Supine       Knee extension stretch 2x1' 5# Gentle manual overpressure 1st set  Added 5# second set (not today)   Quad sets   Isometric LAQ  10 min with VMS   10 seconds on  20 seconds rest  Added LAQ 6/30   Ankle pumps  20x       Heel Slides  10x2 AA  A  using slide board, strap and manual overpressure  Active performed on 7/5 second set    Patellar Mobs x      Long sitting calf stretch 3x 30\"     SLR 5x AA With brace on  (held 6/24- significant quad lag, max A- same on 6/29 held)  5 performed    SL hip abd  10x2 AA Brace on- added 6/21  Min A- inc reps 6/30               Seated       Isometric LAQ at 60 deg 10x5\"  New 6/28         Prone Quad sets  10x  A  Attempted 6/21- resumed 6/28; towel roll under ankle    Prone hip ext                    Standing         Heel Raises  20x   New 6/24- cues for quad set    Toe raises  20x  New 6/28- inc reps 6/30   TKE  10x5\" Lime  New 6/30   Weight Shifting 10x ea  New 6/24- m/l to encourage increased weight bearing through LLE; added A/P 6/28   Held 7/5   Mini Squats  10x  New 7/5   STS  2x5  New 7/5  Second set without UE assist    Gait 1L   6/24- 1L unilateral axillary crutch and through clinic with unilateral axillary crutch, cues provided for terminal knee extension with good carryover and heel toe gait progressions - resumed 7/8   Other: 6/13 knee ROM 0-79 deg  6/21:   Flexion: 92 AA  Ext: lacking 2 A , 0 P    6/24  Flexion: 95 AA, 102 P  Ext 0A, 0P    6/28  99 AA  110 P  Ext 0 A    7/5  Flexion: 110 AA, 106 A  Ext: 0         Treatment Charges: Mins Units   []  Modalities     [x]  Ther Exercise 45 3   []  Manual Therapy     []  Ther Activities     []  Aquatics     [x]  Vasocompression 10 1   [x]  Other- neuro re ed 10 1   Total Treatment time 65 5         Assessment: [x] Progressing toward goals. Initiated therapy session on NuStep with increased resistance to promote blood flow to BLE. to full weight bearing. Ongoing- max A needed, only able to complete 5   4. ? Function: Pt to demonstrate ability to perform at least 5 STS with equal bilateral weight shift and without pain. Ongoing- cues for equal bilateral weight shifting, slight increase in lateral knee pain   5. Pt to demonstrate improved knee effusion with patellofemoral circumferential measurements at joint line and suprapatellar < 49 cm to demonstrate improved joint force absorption with therapeutic exercise progressions. Partially met- 50.3 cm suprapatellar this date, 43.5 cm at joint line   6. Pt to be independent and compliant with Home Exercise Programs MET      LTG: (to be met in 16 treatments)  1. Improve score on assessment tool LEFI to 50% functionally impaired or less to demonstrate improved functional mobility. 2. Reduce L knee pain levels to 2/10 or less to improve independence with all daily activities. 3. Pt to demonstrate L knee AROM to 2-0-120 to ease difficulty with stair climbing and normalize gait mechanics. 4. Pt to demonstrate LLE strength at 4+/5 globally to ease difficulty with heavier household chores and caregiving for her children. 5. Pt to demonstrate ability to ambulate at least 300' independently without increased knee pain and improved heel-toe progression, gait speed, and equal bilateral step lengths. .   6. Pt to demonstrate ability to ascend/descend a full flight of stairs with reciprocal stepping mechanics and without pain to ease access to upstairs bedroom.                     Patient goals: return to work      Pt. Education:  [x] Yes  [] No  [] Reviewed Prior HEP/Ed  Method of Education: [x] Verbal- quad sets multiple reps throughout the day, gait mechanics, ice   [] Demo  [] Written  Access Code: [email or web address]  URL: [email or web address]  Date: 06/10/2022  Prepared by: Malvin Pradhan     Exercises  Supine Quad Set - 1 x daily - 7 x weekly - 3 sets - 10 reps  Supine Ankle Pumps - 1 x daily - 7 x

## 2022-07-08 NOTE — TELEPHONE ENCOUNTER
Spoke with patient, she stated when she went to therapy they stated there is to much space between the brace and her knee   please advise

## 2022-07-08 NOTE — TELEPHONE ENCOUNTER
Patient is asking for a return call as soon as possible regarding a concern for her knee brace. Left knee ACL Recon 6/9/22. Thank you.

## 2022-07-11 NOTE — TELEPHONE ENCOUNTER
Spoke with patient and suggested that she come into the office so that I can examine the brace to determine if the brace just needs to be adjusted or if she is in need of a new brace. Patient states that the brace has not been damaged. An email to the PT that worked with the patient on 7/8 was sent as well to try to get some clarification as well. Patient states that she will try to get into the office sometime this week. Requested that she call the office prior to coming in to make sure that I am available to examine the brace.

## 2022-07-12 ENCOUNTER — HOSPITAL ENCOUNTER (OUTPATIENT)
Dept: PHYSICAL THERAPY | Facility: CLINIC | Age: 27
Setting detail: THERAPIES SERIES
Discharge: HOME | End: 2022-07-12
Payer: COMMERCIAL

## 2022-07-12 PROCEDURE — 97112 NEUROMUSCULAR REEDUCATION: CPT

## 2022-07-12 PROCEDURE — 97110 THERAPEUTIC EXERCISES: CPT

## 2022-07-12 PROCEDURE — 97016 VASOPNEUMATIC DEVICE THERAPY: CPT

## 2022-07-12 NOTE — FLOWSHEET NOTE
[] [facility]  [address]  [phone]  F: [phone] [x] [address]  [facility]   [address]  P: [phone]  F: [phone] [] [place] [facility]  [address] [facility]  P: [phone]  F: [phone] [] [address]  P: [phone]  F: [phone] [] [address]  [facility]   [address]   [place]: [phone]  F: [phone]      Physical Therapy Daily Treatment Note    Date:  2022  Patient Name:  Jackie Michelle    :  1995  MRN: [id number]  Physician: Charisma Acevedo MD                              Insurance: [facility] ([id number])  Medical Diagnosis: S83.512A - New ACL tear, left, initial encounter                       Rehab Codes: M25.562, M62.81, R26.89, M25.462  Onset date: surgical date 22                             Next Dr's appt.: 22  Visit# / total visits:      Cancels/No Shows: 0    Subjective:    Pain:  [] Yes  [x] No  Location: L knee  Pain Rating: (0-10 scale) 0/10  Pain altered Tx:  [x] No  [] Yes  Action:  Comments: Pt arrives without report of new changes. Has been trying to tighten knee brace at the edge of her chair and with suggestions made at previous session and feels this has helped slightly. Continues to ambulate with one crutch.          Objective:  Modalities:        Vasocompression- 34 deg, low compression (per pt preference), 15 min following exercises. -able to tolerate mod pressure x10' with pillowcase wrapped around L knee 22  Precautions: full ACL protocol in soft chart, WBAT   Exercises: [address] Access Code: [email or web address]   Exercise  S/p L knee ACL reconstruction 22 with allograft     Reps/ Time Weight/ Level Comments    Nustep  5' L2 New ; inc resistance 7/8         Supine       Knee extension stretch 2x1' 5# Gentle manual overpressure 1st set  Added 5# second set (not today)   Quad Sets  10x5\" A    SLR   Isometric LAQ  10 min with VMS   10 seconds on  20 seconds rest  Added LAQ 6/30  Added SLR 7/12 with Max A    Ankle pumps  20x       Heel Slides  10x2 AA  A  using slide board, strap and manual overpressure  Active performed on 7/5 second set    Patellar Mobs x      Long sitting calf stretch 3x 30\"     SLR 5x AA With brace on  (held 6/24- significant quad lag, max A- same on 6/29 held)  5 performed    SL hip abd  10x2 AA Brace on- added 6/21  Min A- inc reps 6/30               Seated       Isometric LAQ at 60 deg 10x5\"  New 6/28         Prone Quad sets  20x3\"  A  Attempted 6/21- resumed 6/28; towel roll under ankle   Inc reps 7/12   Prone hip ext   10x A  New 7/12             Standing         Heel Raises  20x   New 6/24- cues for quad set    Toe raises  20x  New 6/28- inc reps 6/30   TKE  10x5\" Lime  New 6/30   Weight Shifting 10x ea  New 6/24- m/l to encourage increased weight bearing through LLE; added A/P 6/28   Held 7/5   Mini Squats  10x  New 7/5   STS  2x5  New 7/5  Second set without UE assist    Gait 1L   6/24- 1L unilateral axillary crutch and through clinic with unilateral axillary crutch, cues provided for terminal knee extension with good carryover and heel toe gait progressions - resumed 7/8     Other: 6/13 knee ROM 0-79 deg  6/21:   Flexion: 92 AA  Ext: lacking 2 A , 0 P    6/24  Flexion: 95 AA, 102 P  Ext 0A, 0P    6/28  99 AA  110 P  Ext 0 A    7/5  Flexion: 110 AA, 106 A  Ext: 0     7/8  Flexion 113 AA  Ext 0         Treatment Charges: Mins Units   []  Modalities     [x]  Ther Exercise 40 2   []  Manual Therapy     []  Ther Activities     []  Aquatics     [x]  Vasocompression 10 1   [x]  Other- neuro re ed 10 1   Total Treatment time 60 4         Assessment: [x] Progressing toward goals.  Initiated session with nustep warm up followed by bilateral weight shifting, slight increase in lateral knee pain   5. Pt to demonstrate improved knee effusion with patellofemoral circumferential measurements at joint line and suprapatellar < 49 cm to demonstrate improved joint force absorption with therapeutic exercise progressions. Partially met- 50.3 cm suprapatellar this date, 43.5 cm at joint line   6. Pt to be independent and compliant with Home Exercise Programs MET      LTG: (to be met in 16 treatments)  1. Improve score on assessment tool LEFI to 50% functionally impaired or less to demonstrate improved functional mobility. 2. Reduce L knee pain levels to 2/10 or less to improve independence with all daily activities. 3. Pt to demonstrate L knee AROM to 2-0-120 to ease difficulty with stair climbing and normalize gait mechanics. 4. Pt to demonstrate LLE strength at 4+/5 globally to ease difficulty with heavier household chores and caregiving for her children. 5. Pt to demonstrate ability to ambulate at least 300' independently without increased knee pain and improved heel-toe progression, gait speed, and equal bilateral step lengths. .   6. Pt to demonstrate ability to ascend/descend a full flight of stairs with reciprocal stepping mechanics and without pain to ease access to upstairs bedroom.                     Patient goals: return to work      Pt. Education:  [x] Yes  [] No  [] Reviewed Prior HEP/Ed  Method of Education: [x] Verbal   [x] Demo  [x] Written  Access Code: [email or web address]  URL: [email or web address]  Date: 06/10/2022  Prepared by: Alena Phoenix     Exercises  Supine Quad Set - 1 x daily - 7 x weekly - 3 sets - 10 reps  Supine Ankle Pumps - 1 x daily - 7 x weekly - 3 sets - 10 reps  Supine Heel Slide - 1 x daily - 7 x weekly - 3 sets - 10 reps  Supine Knee Extension Stretch on Towel Roll - 1 x daily - 7 x weekly - 3 sets - 10 reps  Long Sitting 4 Way Patellar [place] - 1 x daily - 7 x weekly - 3 sets - 10 reps   4 Way Patellar Glide - 1 x daily - 7 x weekly - 3 sets - 10 reps    Date: 07/12/2022  Prepared by: Justin Medeiros    Exercises    Prone Quadriceps Set with Towel Roll - 1 x daily - 7 x weekly - 2 sets - 10 reps - 3\" hold  Prone Hip Extension - 1 x daily - 7 x weekly - 2 sets - 10 reps  Sidelying Hip Abduction - 1 x daily - 7 x weekly - 2 sets - 10 reps  Standing Heel Raise - 1 x daily - 7 x weekly - 2 sets - 10 reps  Heel Toe Raises with Counter Support - 1 x daily - 7 x weekly - 2 sets - 10 reps  Standing Terminal Knee Extension with Resistance - 1 x daily - 7 x weekly - 2 sets - 10 reps    Comprehension of Education:  [x] Verbalizes understanding. [x] Demonstrates understanding. [x] Needs review. [] Demonstrates/verbalizes HEP/Ed previously given. Plan: [x] Continue current frequency toward long and short term goals.     [x] Specific Instructions for subsequent treatments:  Continue to progress quad and hamstring strengthening, no SAQ      Time In: 12:00 pm           Time Out:     Electronically signed by:  Lynette Stout PT

## 2022-07-14 ENCOUNTER — HOSPITAL ENCOUNTER (OUTPATIENT)
Dept: PHYSICAL THERAPY | Facility: CLINIC | Age: 27
Setting detail: THERAPIES SERIES
Discharge: HOME | End: 2022-07-14
Payer: COMMERCIAL

## 2022-07-14 PROCEDURE — 97110 THERAPEUTIC EXERCISES: CPT

## 2022-07-14 PROCEDURE — 97016 VASOPNEUMATIC DEVICE THERAPY: CPT

## 2022-07-14 PROCEDURE — 97112 NEUROMUSCULAR REEDUCATION: CPT

## 2022-07-14 NOTE — FLOWSHEET NOTE
[] [facility]  [address]  [phone]  F: [phone] [x] [address]  [facility]   [address]  P: [phone]  F: [phone] [] [address]  [address] [facility]  P: [phone]  F: [phone] [] [address]  P: [phone]  F: [phone] [] [address]  [facility]   [address]   [place]: [phone]  F: [phone]      Physical Therapy Daily Treatment Note    Date:  2022  Patient Name:  Pete Arredondo    :  1995  MRN: [id number]  Physician: Nicanor Martinez MD                              Insurance: [facility] ()  Medical Diagnosis: S83.512A - New ACL tear, left, initial encounter                       Rehab Codes: M25.562, M62.81, R26.89, M25.462  Onset date: surgical date 22                             Next Dr's appt.: 22  Visit# / total visits: 10/16     Cancels/No Shows: 0    Subjective:    Pain:  [] Yes  [x] No  Location: L knee  Pain Rating: (0-10 scale) 0/10  Pain altered Tx:  [x] No  [] Yes  Action:  Comments: Pt arrives ambulating with 1 crutch and knee brace donned. Without pain today. Mentions when she does have pain it is at lateral aspect of knee.         Objective:  Modalities:        Vasocompression- 34 deg, low compression (per pt preference), 15 min following exercises. -able to tolerate mod pressure x10' with pillowcase wrapped around L knee 22  Precautions: full ACL protocol in soft chart, WBAT   Exercises: [employer] Kil[employer] Access Code: [email or web address]   Exercise  S/p L knee ACL reconstruction 22 with allograft     Reps/ Time Weight/ Level Comments    Nustep  7' L2; seat 6 New ; inc resistance          Supine       Knee extension stretch 2x1' 5# Added 5# detailed above for increased ms activation. Mod-max assist for SLR with NMES this date. Continued difficulty with SLR's needing max assist from clinician so limited to 5 reps again. Able to achieve 1 more degree of knee flexion AAROM this date. Encouraged TKE with heel strike during gait training with fair carryover. Encouraged TKE and quad sets throughout the day to improve ms activation, pt ensures understanding. Gave pt compression stocking to help manage edema and instructed to continue to ice + elevate. Ended with vaso to L knee to reduce swelling and soreness post exercise. [] No change. [] Other:   [x] Patient would continue to benefit from skilled physical therapy services in order to: reduce pain and effusion, restore mobility and strength, and improve gait to return patient to prior level of function. At Eval:  Functional Test: LEFI Score: 14/80 or 82.5% functionally impaired      Problems:    [x]? ? Pain: up to 10/10 pain L knee   [x]? ? ROM limited L knee ROM globally, limited LLE flexibility  [x]? ? Strength reduced LLE strength globally  [x]? ? Function impaired function indicated by LEFI score of 82.5%   [x]? Other impaired gait      STG: (to be met in 8 treatments) Reassessed by primary PT 7/5/22  1. ? Pain: Decrease L knee pain levels to 6/10 max with activity to promote improved tolerance to therapeutic exercise progressions. MET- 5/10 max   2. ? ROM: Increase L knee AROM limitations to at least 0-90 degrees to reduce difficulty with ADLs and normalize gait mechanics. MET- 0-106 A  3. ? Strength: Pt to demonstrate ability to perform at least 10 SLR without quad lag present in order to progress safely to full weight bearing. Ongoing- max A needed, only able to complete 5   4. ? Function: Pt to demonstrate ability to perform at least 5 STS with equal bilateral weight shift and without pain. Ongoing- cues for equal bilateral weight shifting, slight increase in lateral knee pain   5.  Pt to demonstrate improved knee effusion with patellofemoral circumferential measurements at joint line and suprapatellar < 49 cm to demonstrate improved joint force absorption with therapeutic exercise progressions. Partially met- 50.3 cm suprapatellar this date, 43.5 cm at joint line   6. Pt to be independent and compliant with Home Exercise Programs MET      LTG: (to be met in 16 treatments)  1. Improve score on assessment tool LEFI to 50% functionally impaired or less to demonstrate improved functional mobility. 2. Reduce L knee pain levels to 2/10 or less to improve independence with all daily activities. 3. Pt to demonstrate L knee AROM to 2-0-120 to ease difficulty with stair climbing and normalize gait mechanics. 4. Pt to demonstrate LLE strength at 4+/5 globally to ease difficulty with heavier household chores and caregiving for her children. 5. Pt to demonstrate ability to ambulate at least 300' independently without increased knee pain and improved heel-toe progression, gait speed, and equal bilateral step lengths. .   6. Pt to demonstrate ability to ascend/descend a full flight of stairs with reciprocal stepping mechanics and without pain to ease access to upstairs bedroom.                     Patient goals: return to work      Pt. Education:  [x] Yes  [] No  [] Reviewed Prior HEP/Ed  Method of Education: [x] Verbal   [x] Demo  [] Written  Access Code: [email or web address]  URL: [email or web address]  Date: 06/10/2022  Prepared by: Ortega Florez     Exercises  Supine Quad Set - 1 x daily - 7 x weekly - 3 sets - 10 reps  Supine Ankle Pumps - 1 x daily - 7 x weekly - 3 sets - 10 reps  Supine Heel Slide - 1 x daily - 7 x weekly - 3 sets - 10 reps  Supine Knee Extension Stretch on Towel Roll - 1 x daily - 7 x weekly - 3 sets - 10 reps  Long Sitting 4 Way Patellar [place] - 1 x daily - 7 x weekly - 3 sets - 10 reps   4 Way Patellar [place] - 1 x daily - 7 x weekly - 3 sets - 10 reps    Date: 07/12/2022  Prepared by: Cassie Nicole    Exercises    Prone Quadriceps Set with Towel Roll - 1 x daily - 7 x weekly - 2 sets - 10 reps - 3\" hold  Prone Hip Extension - 1 x daily - 7 x weekly - 2 sets - 10 reps  Sidelying Hip Abduction - 1 x daily - 7 x weekly - 2 sets - 10 reps  Standing Heel Raise - 1 x daily - 7 x weekly - 2 sets - 10 reps  Heel Toe Raises with Counter Support - 1 x daily - 7 x weekly - 2 sets - 10 reps  Standing Terminal Knee Extension with Resistance - 1 x daily - 7 x weekly - 2 sets - 10 reps    Comprehension of Education:  [x] Verbalizes understanding. [x] Demonstrates understanding. [x] Needs review. [] Demonstrates/verbalizes HEP/Ed previously given. Plan: [x] Continue current frequency toward long and short term goals.     [x] Specific Instructions for subsequent treatments:  Continue to progress quad and hamstring strengthening, no SAQ      Time In: 10:03 am          Time Out: 11:10 am    Electronically signed by:  Lorena Ag PTA

## 2022-07-19 ENCOUNTER — HOSPITAL ENCOUNTER (OUTPATIENT)
Dept: PHYSICAL THERAPY | Facility: CLINIC | Age: 27
Setting detail: THERAPIES SERIES
Discharge: HOME | End: 2022-07-19
Payer: COMMERCIAL

## 2022-07-19 PROCEDURE — 97016 VASOPNEUMATIC DEVICE THERAPY: CPT

## 2022-07-19 PROCEDURE — 97112 NEUROMUSCULAR REEDUCATION: CPT

## 2022-07-19 PROCEDURE — 97110 THERAPEUTIC EXERCISES: CPT

## 2022-07-19 NOTE — FLOWSHEET NOTE
[] [facility]  [address]  [phone]  F: [phone] [x] [address]  [place] [address]   [address]  P: [phone]  F: [phone] [] [address]  [address]  P: [phone]  F: [phone] [] [address]  P: [phone]  F: [phone] [] [address]  [facility]   [address]   [place]: [phone]  F: [phone]      Physical Therapy Daily Treatment Note    Date:  2022  Patient Name:  Power Menjivar    :  1995  MRN: [id number]  Physician: Cristela Wolfe MD                              Insurance: [facility] (51)  Medical Diagnosis: S83.512A - New ACL tear, left, initial encounter                       Rehab Codes: M25.562, M62.81, R26.89, M25.462  Onset date: surgical date 22                             Next Dr's appt.: 22  Visit# / total visits:      Cancels/No Shows: 0    Subjective:    Pain:  [] Yes  [x] No  Location: L knee  Pain Rating: (0-10 scale) 0/10  Pain altered Tx:  [x] No  [] Yes  Action:  Comments: Pt arrives ambulating without axillary crutches this date. Pt notes some lateral knee pain intermittently but reports that it is getting better.  Pt again c/o knee brace not fitting and is visualized sliding down her leg upon arrival.       Objective:  Modalities:        Vasocompression- 34 deg, low compression (per pt preference), 15 min following exercises. -able to tolerate mod pressure x10' with pillowcase wrapped around L knee 22  Precautions: full ACL protocol in soft chart, WBAT   Exercises: [employer] Access Code: [email or web address]   Exercise  S/p L knee ACL reconstruction 22 with allograft     Reps/ Time Weight/ Level Comments    Nustep  7' L2; seat 6 New 6/24; inc resistance 7/8   Recumbent Bike  5' L1 New 7/19         Supine       Knee extension stretch 2x1' 5# Added 5#    Quad Sets  20x5\" A Inc reps 7/19   SLR    Isometric LAQ  10 min with VMS   10 seconds on  20 seconds rest  Added LAQ 6/30  Added SLR 7/12 with mod-Max A    Ankle pumps  20x       Heel Slides  10x2 AA  A  using slide board, strap and manual overpressure  Active performed on 7/5 second set    Patellar Mobs x      Long sitting calf stretch 3x 30\"     SLR 5x AA With brace on  (held 6/24- significant quad lag, max A- same on 6/29 held)  5 performed    SL hip abd  10x2 AA Brace on- added 6/21  Min A- inc reps 6/30               Seated       Isometric LAQ at 60 deg 10x5\"  New 6/28         Prone Quad sets  20x3\"  A Attempted 6/21- resumed 6/28; towel roll under ankle   Inc reps 7/12   Prone hip ext  10x A  New 7/12             Standing      Brace donned 7/14   Heel Raises  20x   New 6/24- cues for quad set    Toe raises  20x  New 6/28- inc reps 6/30   TKE  10x5\" Blue New 6/30- inc  reps 7/19   OKC 3 way hip 10xea A New 7/19   HS curls - OKC  10x A New 7/19   Weight Shifting 10x ea  New 6/24- m/l to encourage increased weight bearing through LLE; added A/P 6/28   Held 7/14   Mini Squats  10x  New 7/5- cues for equal bilateral weight shift    STS  2x5  New 7/5  without UE assist 7/14   Gait 1L   7/19- independent w/out device, cues for heel strike and terminal knee extension, equal bilateral step length      Other: 6/13 knee ROM 0-79 deg  6/21:   Flexion: 92 AA  Ext: lacking 2 A , 0 P    6/24  Flexion: 95 AA, 102 P  Ext 0A, 0P    6/28  99 AA  110 P  Ext 0 A    7/5  Flexion: 110 AA, 106 A  Ext: 0     7/8  Flexion 113 AA  Ext 0     7/14  Flexion  114 AA  Ext 0    7/19  Flexion  114 AA  Ext 0        Treatment Charges: Mins Units   []  Modalities     [x]  Ther Exercise 40 2   []  Manual Therapy     []  Ther Activities     []  Aquatics     [x]  Vasocompression 10 1   [x]  Other- neuro re ed 10 1   Total Treatment time 60 4         Assessment: [x] Progressing toward goals. Initiated session with recumbent bike warm up this date followed by ROM and stretching. Pt continues to demonstrate 0-114 degrees this date and denies pain at end range. Continued with NMES for improved quad activation and mod-max assist provided for SLR to avoid quad lag. Added OKC 3 way hip and HS curls this date and progressed resistance for TKE with good tolerance. Pt requires intermittent cueing for equal bilateral weight shift during mini squats with fair carryover. Pt denies any increase in knee pain throughout. Pt able to ambulate 1 lap and throughout clinic without AD this date with intermittent cueing for heel strike, terminal knee extension, and equal bilateral step lengths with fair carryover. Ended again with vaso to L knee to reduce soreness/effusion post exercise. [] No change. [] Other:   [x] Patient would continue to benefit from skilled physical therapy services in order to: reduce pain and effusion, restore mobility and strength, and improve gait to return patient to prior level of function. At Eval:  Functional Test: LEFI Score: 14/80 or 82.5% functionally impaired      Problems:    [x] ? Pain: up to 10/10 pain L knee   [x] ? ROM limited L knee ROM globally, limited LLE flexibility  [x] ? Strength reduced LLE strength globally  [x] ? Function impaired function indicated by LEFI score of 82.5%   [x] Other impaired gait      STG: (to be met in 8 treatments) Reassessed by primary PT 7/5/22  ? Pain: Decrease L knee pain levels to 6/10 max with activity to promote improved tolerance to therapeutic exercise progressions. MET- 5/10 max   ? ROM: Increase L knee AROM limitations to at least 0-90 degrees to reduce difficulty with ADLs and normalize gait mechanics. MET- 0-106 A  ? Strength: Pt to demonstrate ability to perform at least 10 SLR without quad lag present in order to progress safely to full weight bearing. Ongoing- max A needed, only able to complete 5   ? Function: Pt to demonstrate ability to perform at least 5 STS with equal bilateral weight shift and without pain. Ongoing- cues for equal bilateral weight shifting, slight increase in lateral knee pain   Pt to demonstrate improved knee effusion with patellofemoral circumferential measurements at joint line and suprapatellar < 49 cm to demonstrate improved joint force absorption with therapeutic exercise progressions. Partially met- 50.3 cm suprapatellar this date, 43.5 cm at joint line   Pt to be independent and compliant with Home Exercise Programs MET      LTG: (to be met in 16 treatments)  Improve score on assessment tool LEFI to 50% functionally impaired or less to demonstrate improved functional mobility. Reduce L knee pain levels to 2/10 or less to improve independence with all daily activities. Pt to demonstrate L knee AROM to 2-0-120 to ease difficulty with stair climbing and normalize gait mechanics. Pt to demonstrate LLE strength at 4+/5 globally to ease difficulty with heavier household chores and caregiving for her children. Pt to demonstrate ability to ambulate at least 300' independently without increased knee pain and improved heel-toe progression, gait speed, and equal bilateral step lengths. .   Pt to demonstrate ability to ascend/descend a full flight of stairs with reciprocal stepping mechanics and without pain to ease access to upstairs bedroom. Patient goals: return to work      Pt. Education:  [x] Yes  [] No  [x] Reviewed Prior HEP/Ed  Method of Education: [x] Verbal   [x] Demo  [] Written  Access Code: [email or web address]  URL: [email or web address] [email or web address]  Date: 06/10/2022  Prepared by: Otis Freitas     Exercises  Supine Quad Set - 1 x daily - 7 x weekly - 3 sets - 10 reps  Supine Ankle Pumps - 1 x daily - 7 x weekly - 3 sets - 10 reps  Supine Heel Slide - 1 x daily - 7 x weekly - 3 sets - 10 reps  Supine Knee Extension Stretch on Towel Roll - 1 x daily - 7 x weekly - 3 sets - 10 reps  Long Sitting 4 Way Patellar [place] - 1 x daily - 7 x weekly - 3 sets - 10 reps   4 Way Patellar [place] - 1 x daily - 7 x weekly - 3 sets - 10 reps    Date: 07/12/2022  Prepared by: Shanon     Exercises    Prone Quadriceps Set with Towel Roll - 1 x daily - 7 x weekly - 2 sets - 10 reps - 3\" hold  Prone Hip Extension - 1 x daily - 7 x weekly - 2 sets - 10 reps  Sidelying Hip Abduction - 1 x daily - 7 x weekly - 2 sets - 10 reps  Standing Heel Raise - 1 x daily - 7 x weekly - 2 sets - 10 reps  Heel Toe Raises with Counter Support - 1 x daily - 7 x weekly - 2 sets - 10 reps  Standing Terminal Knee Extension with Resistance - 1 x daily - 7 x weekly - 2 sets - 10 reps    Comprehension of Education:  [x] Verbalizes understanding. [x] Demonstrates understanding. [] Needs review. [] Demonstrates/verbalizes HEP/Ed previously given. Plan: [x] Continue current frequency toward long and short term goals.     [x] Specific Instructions for subsequent treatments:  Continue to progress quad and hamstring strengthening, no SAQ      Time In: 11:00 am        Time Out: 12:05 pm     Electronically signed by:  Lukasz Do PT

## 2022-07-20 ENCOUNTER — OFFICE VISIT (OUTPATIENT)
Dept: ORTHOPEDIC SURGERY | Age: 27
End: 2022-07-20

## 2022-07-20 VITALS — WEIGHT: 232 LBS | HEIGHT: 64 IN | BODY MASS INDEX: 39.61 KG/M2

## 2022-07-20 DIAGNOSIS — Z98.890 STATUS POST RECONSTRUCTION OF ANTERIOR CRUCIATE LIGAMENT: Primary | ICD-10-CM

## 2022-07-20 DIAGNOSIS — M75.21 RIGHT BICIPITAL TENOSYNOVITIS: ICD-10-CM

## 2022-07-20 PROCEDURE — 99024 POSTOP FOLLOW-UP VISIT: CPT | Performed by: ORTHOPAEDIC SURGERY

## 2022-07-21 ENCOUNTER — HOSPITAL ENCOUNTER (OUTPATIENT)
Dept: PHYSICAL THERAPY | Facility: CLINIC | Age: 27
Setting detail: THERAPIES SERIES
Discharge: HOME | End: 2022-07-21
Payer: COMMERCIAL

## 2022-07-21 PROCEDURE — 97112 NEUROMUSCULAR REEDUCATION: CPT

## 2022-07-21 PROCEDURE — 97110 THERAPEUTIC EXERCISES: CPT

## 2022-07-21 PROCEDURE — 97016 VASOPNEUMATIC DEVICE THERAPY: CPT

## 2022-07-21 NOTE — FLOWSHEET NOTE
[] [facility]  [address]  [phone]  F: [phone] [x] [address]  [place] [address]   [address]  P: [phone]  F: [phone] [] [address]  [address]  P: [phone]  F: [phone] [] [address]  P: [phone]  F: [phone] [] [address]  [facility]   [address]   [place]: [phone]  F: [phone]      Physical Therapy Daily Treatment Note    Date:  2022  Patient Name:  Pete Arredondo    :  1995  MRN: [id number]  Physician: Nicanor Martinez MD                              Insurance: [facility] (46Y/)  Medical Diagnosis: S83.512A - New ACL tear, left, initial encounter                       Rehab Codes: M25.562, M62.81, R26.89, M25.462  Onset date: surgical date 22                             Next Dr's appt.: 22  Visit# / total visits:      Cancels/No Shows: 0    Subjective:    Pain:  [] Yes  [x] No  Location: L knee  Pain Rating: (0-10 scale) 0/10  Pain altered Tx:  [x] No  [] Yes  Action:  Comments: Pt arrives without AD and wearing slides. Reports she saw ortho the ortho day noting everything looks good and was instructed to stop wearing brace. Pt feels she is not strong enough to go fully without brace so plans to wean from brace. Reports she received a new brace for increased comfort, however, notes it feels better but is tight around her thigh. Ambulates into clinic with improved gait mechanics this date.        Objective:  Modalities:        Vasocompression- 34 deg, low compression (per pt preference), 15 min following exercises. -able to tolerate vaso at MOD pressure x10' with pillowcase wrapped around L knee 22  Precautions: full ACL protocol in soft chart, WBAT   Exercises: [employer] Access Code: [email or web address]   Exercise  S/p L knee ACL reconstruction 6/9/22 with allograft     Reps/ Time Weight/ Level Comments all ex's w/o brace 7/21    Nustep  7' L2; seat 6 New 6/24; inc resistance 7/8   Recumbent Bike  5' L1 New 7/19         Supine       Knee extension stretch 2x1' 5# Added 5#    Quad Sets  20x5\" A Inc reps 7/19   SLR    Isometric LAQ  10 min with VMS   10 seconds on  20 seconds rest  Added LAQ 6/30  Added SLR 7/12 with mod-Max A    Ankle pumps  20x       Heel Slides  10x2 AA  A  using slide board, strap and manual overpressure  Active performed on 7/5 second set    Patellar Mobs x      Long sitting calf stretch 3x 30\"     SLR 5x2 AA With brace off  (held 6/24- significant quad lag, max A- same on 6/29 held)  5 performed -min A and 2nd set on 7/21   SL hip abd  10x2 AA Brace off- added 6/21  Min A- inc reps 6/30               Seated       Isometric LAQ at 60 deg 10x5\"  New 6/28         Prone Quad sets  20x3\"  A Attempted 6/21- resumed 6/28; towel roll under ankle   Inc reps 7/12   Prone hip ext  10x A  New 7/12             Standing      Brace donned 7/14   Heel Raises  20x   New 6/24- cues for quad set    Toe raises  20x  New 6/28- inc reps 6/30   TKE  10x5\" Blue New 6/30- inc  reps 7/19   OKC 3 way hip 10xea A New 7/19   HS curls - OKC  10x A New 7/19   High marches-alt 10x  New 7/21         Weight Shifting 10x ea  New 6/24- m/l to encourage increased weight bearing through LLE; added A/P 6/28   Held 7/14   Mini Squats  10x  New 7/5- cues for equal bilateral weight shift    STS  2x5  New 7/5  without UE assist 7/14   Gait 1L   7/19- independent w/out device, cues for heel strike and terminal knee extension, equal bilateral step length      Other: 6/13 knee ROM 0-79 deg  6/21:   Flexion: 92 AA  Ext: lacking 2 A , 0 P    6/24  Flexion: 95 AA, 102 P  Ext 0A, 0P    6/28  99 AA  110 P  Ext 0 A    7/5  Flexion: 110 AA, 106 A  Ext: 0     7/8  Flexion 113 AA  Ext 0 7/14  Flexion  114 AA  Ext 0    7/19  Flexion  114 AA  Ext 0    7/21/22  L knee flexion   116 AA  115 A          Treatment Charges: Mins Units   []  Modalities     [x]  Ther Exercise 35 2   []  Manual Therapy     []  Ther Activities     []  Aquatics     [x]  Vasocompression 10 1   [x]  Other- neuro re ed 10 1   Total Treatment time 55 4         Assessment: [x] Progressing toward goals. Initiated session on bike with increased resistance for increased blood flow and in preparation for exercise program. Continued with VMS to quad needing only min assist for SLR's with and without VMS this date. Added alternating high marches which pt tolerated well. Pt with good recollection of proper gait mechanics. Min cueing as needed for correct form throughout session. All exercises performed without brace donned this date. Tends to shift towards R LE with mini squats so cued for equal wbing with improved form following cues. Ended with vaso for management of ms soreness and edema. Plan to progress strength and stability with stairs in upcoming sessions. [] No change. [] Other:   [x] Patient would continue to benefit from skilled physical therapy services in order to: reduce pain and effusion, restore mobility and strength, and improve gait to return patient to prior level of function. At Eval:  Functional Test: LEFI Score: 14/80 or 82.5% functionally impaired      Problems:    [x] ? Pain: up to 10/10 pain L knee   [x] ? ROM limited L knee ROM globally, limited LLE flexibility  [x] ? Strength reduced LLE strength globally  [x] ? Function impaired function indicated by LEFI score of 82.5%   [x] Other impaired gait      STG: (to be met in 8 treatments) Reassessed by primary PT 7/5/22  ? Pain: Decrease L knee pain levels to 6/10 max with activity to promote improved tolerance to therapeutic exercise progressions. MET- 5/10 max   ?  ROM: Increase L knee AROM limitations to at least 0-90 degrees to reduce difficulty with ADLs and normalize gait mechanics. MET- 0-106 A  ? Strength: Pt to demonstrate ability to perform at least 10 SLR without quad lag present in order to progress safely to full weight bearing. Ongoing- max A needed, only able to complete 5   ? Function: Pt to demonstrate ability to perform at least 5 STS with equal bilateral weight shift and without pain. Ongoing- cues for equal bilateral weight shifting, slight increase in lateral knee pain   Pt to demonstrate improved knee effusion with patellofemoral circumferential measurements at joint line and suprapatellar < 49 cm to demonstrate improved joint force absorption with therapeutic exercise progressions. Partially met- 50.3 cm suprapatellar this date, 43.5 cm at joint line   Pt to be independent and compliant with Home Exercise Programs MET      LTG: (to be met in 16 treatments)  Improve score on assessment tool LEFI to 50% functionally impaired or less to demonstrate improved functional mobility. Reduce L knee pain levels to 2/10 or less to improve independence with all daily activities. Pt to demonstrate L knee AROM to 2-0-120 to ease difficulty with stair climbing and normalize gait mechanics. Pt to demonstrate LLE strength at 4+/5 globally to ease difficulty with heavier household chores and caregiving for her children. Pt to demonstrate ability to ambulate at least 300' independently without increased knee pain and improved heel-toe progression, gait speed, and equal bilateral step lengths. .   Pt to demonstrate ability to ascend/descend a full flight of stairs with reciprocal stepping mechanics and without pain to ease access to upstairs bedroom. Patient goals: return to work      Pt. Education:  [x] Yes  [] No  [x] Reviewed Prior HEP/Ed  Method of Education: [x] Verbal   [x] Demo  [] Written  Access Code: [email or web address]  URL: [email or web address]  Date: 06/10/2022  Prepared by: Malvin Pradhan     Exercises  Supine Quad Set - 1 x daily - 7 x weekly - 3 sets - 10 reps  Supine Ankle Pumps - 1 x daily - 7 x weekly - 3 sets - 10 reps  Supine Heel Slide - 1 x daily - 7 x weekly - 3 sets - 10 reps  Supine Knee Extension Stretch on Towel Roll - 1 x daily - 7 x weekly - 3 sets - 10 reps  Long Sitting 4 Way Patellar [place] - 1 x daily - 7 x weekly - 3 sets - 10 reps   4 Way Patellar [place] - 1 x daily - 7 x weekly - 3 sets - 10 reps    Date: 07/12/2022  Prepared by: Felicitas Range    Exercises    Prone Quadriceps Set with Towel Roll - 1 x daily - 7 x weekly - 2 sets - 10 reps - 3\" hold  Prone Hip Extension - 1 x daily - 7 x weekly - 2 sets - 10 reps  Sidelying Hip Abduction - 1 x daily - 7 x weekly - 2 sets - 10 reps  Standing Heel Raise - 1 x daily - 7 x weekly - 2 sets - 10 reps  Heel Toe Raises with Counter Support - 1 x daily - 7 x weekly - 2 sets - 10 reps  Standing Terminal Knee Extension with Resistance - 1 x daily - 7 x weekly - 2 sets - 10 reps    Comprehension of Education:  [x] Verbalizes understanding. [x] Demonstrates understanding. [] Needs review. [] Demonstrates/verbalizes HEP/Ed previously given. Plan: [x] Continue current frequency toward long and short term goals.     [x] Specific Instructions for subsequent treatments:  Continue to progress quad and hamstring strengthening, no SAQ      Time In: 11:06 am       Time Out: 12:06 pm     Electronically signed by:  Presley Monroe PTA

## 2022-07-21 NOTE — PROGRESS NOTES
Procedure: Left knee ACL reconstruction  Date of procedure: 6/9/2022    HPI: Ms. Bin Nazario is 67-year-old approximately 6 weeks status post the aforementioned procedure. She indicates that she is doing well. She has been much no pain in her knee at this time. Physical therapy has been going well for her. She continues to wear her brace indicating that she feels more secure in it. Physical examination:  Evaluation patient's left knee and lower extremity demonstrates intact incisions that are fully healed. Minimal swelling present at this time. She has full knee extension and good flexion. No gross instability with varus and valgus stress applied across the knee joint and she has a negative Lachman. Sensation is grossly intact light touch in all dermatomes and she has good pedal pulses distally. Impression and plan: Ms. Bin Nazario is a 67-year-old 6 weeks status post left knee ACL reconstruction. She is doing very well at this time. She was encouraged to keep up with physical therapy working on gradually strengthening this knee. She may continue weightbearing as tolerated. While I did encourage her to start weaning out of her brace I did switch her to a better fitting brace today because she feels comfortable wearing one and did complain that the brace that she is currently wearing tends to slide down her leg. I will see her back for reevaluation in 6 weeks but she was encouraged to return or call earlier with any questions or concerns.

## 2022-07-26 ENCOUNTER — HOSPITAL ENCOUNTER (OUTPATIENT)
Dept: PHYSICAL THERAPY | Facility: CLINIC | Age: 27
Setting detail: THERAPIES SERIES
Discharge: HOME | End: 2022-07-26
Payer: COMMERCIAL

## 2022-07-26 PROCEDURE — 97110 THERAPEUTIC EXERCISES: CPT

## 2022-07-26 PROCEDURE — 97032 APPL MODALITY 1+ESTIM EA 15: CPT

## 2022-07-26 NOTE — FLOWSHEET NOTE
[] [facility]  [address]  [phone]  F: [phone] [x] [address]  [place] [address]   [address]  P: [phone]  F: [phone] [] [address]  [address]  P: [phone]  F: [phone] [] [address]  P: [phone]  F: [phone] [] [address]  [facility]   [address]   [place]: [phone]  F: [phone]      Physical Therapy Daily Treatment Note    Date:  2022  Patient Name:  Celeste Fink    :  1995  MRN: [id number]  Physician: Rebeca Medina MD                              Insurance: [facility] (18/)  Medical Diagnosis: S83.512A - New ACL tear, left, initial encounter                       Rehab Codes: M25.562, M62.81, R26.89, M25.462  Onset date: surgical date 22                             Next Dr's appt.: 22  Visit# / total visits:      Cancels/No Shows: 0    Subjective:    Pain:  [] Yes  [x] No  Location: L knee  Pain Rating: (0-10 scale) 0/10  Pain altered Tx:  [x] No  [] Yes  Action:    Comments: pt denies pain at rest. States she goes without her brace when at home, but puts it on when going places d/t feeling apprehensive about instability.       Objective:  Modalities:        vaso at MOD pressure x10' with pillowcase wrapped around L knee- did not perform   Precautions: full ACL protocol in soft chart, WBAT   Exercises: [employer] Access Code: [email or web address]   Exercise  S/p L knee ACL reconstruction 22 with allograft     Reps/ Time Weight/ Level Comments all ex's w/o brace     Nustep  7' L2; seat 6 New ; inc resistance    Recumbent Bike  5' L1 New          Supine       Knee extension stretch 2x1' 5# Added 5#    Quad Sets  20x5\" 7[facility] reps 7/19   SLR    Isometric LAQ  10 min with VMS   10 seconds on  20 seconds rest  Added LAQ 6/30  Added SLR 7/12 with mod-Max A    Ankle pumps  20x       Heel Slides  10x2 AA  A  using slide board, strap and manual overpressure  Active performed on 7/5 second set    Patellar Mobs x      Long sitting calf stretch 3x 30\"     SLR 5x2 AA With brace off  (held 6/24- significant quad lag, max A- same on 6/29 held)  5 performed -min A and 2nd set on 7/21   SL hip abd  10x2 A Brace off- added 6/21                 Seated       Isometric LAQ at 60 deg 10x5\"  New 6/28         Prone Quad sets  20x3\"  A Attempted 6/21- resumed 6/28; towel roll under ankle   Inc reps 7/12   Prone hip ext  10x2 A Increased reps 7/26             Standing      Brace donned 7/14   Heel Raises  20x   New 6/24- cues for quad set    Toe raises  20x  New 6/28- inc reps 6/30   TKE  10x5\" Blue New 6/30- inc  reps 7/19   OKC 3 way hip 10xea A New 7/19   HS curls - OKC  10x A New 7/19   High marches-alt 10x  New 7/21         Weight Shifting 10x ea  New 6/24- m/l to encourage increased weight bearing through LLE; added A/P 6/28   Held 7/14   Mini Squats  10x  New 7/5- cues for equal bilateral weight shift    STS  2x5  New 7/5  without UE assist 7/14   Gait 1L   7/19- independent w/out device, cues for heel strike and terminal knee extension, equal bilateral step length      Other: 6/13 knee ROM 0-79 deg  6/21:   Flexion: 92 AA  Ext: lacking 2 A , 0 P    6/24  Flexion: 95 AA, 102 P  Ext 0A, 0P    6/28  99 AA  110 P  Ext 0 A    7/5  Flexion: 110 AA, 106 A  Ext: 0     7/8  Flexion 113 AA  Ext 0     7/14  Flexion  114 AA  Ext 0    7/19  Flexion  114 AA  Ext 0    7/21/22  L knee flexion   116 AA  115 A          Treatment Charges: Mins Units   []  Modalities     [x]  Ther Exercise 36 2   []  Manual Therapy     []  Ther Activities     []  Aquatics     []  Vasocompression     [x]  Other- attended stim 10 1   Total Treatment time 46 3         Assessment: [x] Progressing toward goals. Started session with warm up on bike followed by SLR and LAQ with VMS. Pt continues to require assist it start a SLR but is able to maintain hold independently. Bold print exercises completed d/t limited time available for treatment. Pt tolerates all well reporting some muscular fatigue, but denies pain. Pt declined vaso this date to allow more time for exercises. [] No change. [x] Other: pt arrives at wrong scheduled appt time. Was able to accomodate in a 45 minute time slot. Pt agreeable, however not all exercises completed d/t decreased time. Bold print specifies completed. [x] Patient would continue to benefit from skilled physical therapy services in order to: reduce pain and effusion, restore mobility and strength, and improve gait to return patient to prior level of function. At Eval:  Functional Test: LEFI Score: 14/80 or 82.5% functionally impaired      Problems:    [x] ? Pain: up to 10/10 pain L knee   [x] ? ROM limited L knee ROM globally, limited LLE flexibility  [x] ? Strength reduced LLE strength globally  [x] ? Function impaired function indicated by LEFI score of 82.5%   [x] Other impaired gait      STG: (to be met in 8 treatments) Reassessed by primary PT 7/5/22  ? Pain: Decrease L knee pain levels to 6/10 max with activity to promote improved tolerance to therapeutic exercise progressions. MET- 5/10 max   ? ROM: Increase L knee AROM limitations to at least 0-90 degrees to reduce difficulty with ADLs and normalize gait mechanics. MET- 0-106 A  ? Strength: Pt to demonstrate ability to perform at least 10 SLR without quad lag present in order to progress safely to full weight bearing. Ongoing- max A needed, only able to complete 5   ? Function: Pt to demonstrate ability to perform at least 5 STS with equal bilateral weight shift and without pain.  Ongoing- cues for equal bilateral weight shifting, slight increase in lateral knee pain   Pt to demonstrate improved knee effusion with patellofemoral circumferential measurements at joint line and suprapatellar < 49 cm to demonstrate improved joint force absorption with therapeutic exercise progressions. Partially met- 50.3 cm suprapatellar this date, 43.5 cm at joint line   Pt to be independent and compliant with Home Exercise Programs MET      LTG: (to be met in 16 treatments)  Improve score on assessment tool LEFI to 50% functionally impaired or less to demonstrate improved functional mobility. Reduce L knee pain levels to 2/10 or less to improve independence with all daily activities. Pt to demonstrate L knee AROM to 2-0-120 to ease difficulty with stair climbing and normalize gait mechanics. Pt to demonstrate LLE strength at 4+/5 globally to ease difficulty with heavier household chores and caregiving for her children. Pt to demonstrate ability to ambulate at least 300' independently without increased knee pain and improved heel-toe progression, gait speed, and equal bilateral step lengths. .   Pt to demonstrate ability to ascend/descend a full flight of stairs with reciprocal stepping mechanics and without pain to ease access to upstairs bedroom. Patient goals: return to work      Pt. Education:  [x] Yes  [] No  [x] Reviewed Prior HEP/Ed  Method of Education: [] Verbal   [] Demo  [] Written  Access Code: [email or web address]  URL: [email or web address]  Date: 06/10/2022  Prepared by: Waleska Sotelo     Exercises  Supine Quad Set - 1 x daily - 7 x weekly - 3 sets - 10 reps  Supine Ankle Pumps - 1 x daily - 7 x weekly - 3 sets - 10 reps  Supine Heel Slide - 1 x daily - 7 x weekly - 3 sets - 10 reps  Supine Knee Extension Stretch on Towel Roll - 1 x daily - 7 x weekly - 3 sets - 10 reps  Long Sitting 4 Way Patellar [place] - 1 x daily - 7 x weekly - 3 sets - 10 reps   4 Way Patellar [place] - 1 x daily - 7 x weekly - 3 sets - 10 reps    Date: 07/12/2022  Prepared by: Luna Wall

## 2022-07-29 ENCOUNTER — HOSPITAL ENCOUNTER (OUTPATIENT)
Dept: PHYSICAL THERAPY | Facility: CLINIC | Age: 27
Setting detail: THERAPIES SERIES
Discharge: HOME | End: 2022-07-29
Payer: COMMERCIAL

## 2022-07-29 PROCEDURE — 97032 APPL MODALITY 1+ESTIM EA 15: CPT

## 2022-07-29 PROCEDURE — 97016 VASOPNEUMATIC DEVICE THERAPY: CPT

## 2022-07-29 PROCEDURE — 97110 THERAPEUTIC EXERCISES: CPT

## 2022-07-29 NOTE — FLOWSHEET NOTE
[] [facility]  [address]  [phone]  F: [phone] [x] [address]  [place] [address]   [address]  P: [phone]  F: [phone] [] [address]  [address]  P: [phone]  F: [phone] [] [address]  P: [phone]  F: [phone] [] [address]  [facility]   [address]   [place]: [phone]  F: [phone]      Physical Therapy Daily Treatment Note    Date:  2022  Patient Name:  Maxine Jefferson    :  1995  MRN: [id number]  Physician: Sandy Ku MD                              Insurance: [facility] (61K/)  Medical Diagnosis: S83.512A - New ACL tear, left, initial encounter                       Rehab Codes: M25.562, M62.81, R26.89, M25.462  Onset date: surgical date 22                             Next Dr's appt.: 22  Visit# / total visits: 14     Cancels/No Shows: 0    Subjective:    Pain:  [] Yes  [x] No  Location: L knee  Pain Rating: (0-10 scale) 0/10  Pain altered Tx:  [x] No  [] Yes  Action:    Comments: Pt continues to deny pain. Does have some lateral knee swelling this date, stating she woke up with it. Pt continues to wear brace when going out of her house d/t fear of instability.        Objective:  Modalities:    vaso at MOD pressure x10' with pillowcase wrapped around L knee  Precautions: full ACL protocol in soft chart, WBAT   Exercises: [employer] Access Code: [email or web address]   Exercise  S/p L knee ACL reconstruction 22 with allograft     Reps/ Time Weight/ Level Comments all ex's w/o brace     Nustep  7' L2; seat 6 New ; inc resistance    Recumbent Bike  5' L1 New          Supine       Knee extension stretch 2x1' 5# Added 5#    Quad Sets 20x5\" A Inc reps 7/19   SLR    Isometric LAQ  10 min with VMS   10 seconds on  20 seconds rest  Added LAQ 6/30  Added SLR 7/12 with mod-Max A    Ankle pumps  20x       Heel Slides  10x2 AA  A  using slide board, strap and manual overpressure  Active performed on 7/5 second set    Patellar Mobs x      Long sitting calf stretch 3x 30\"     SLR 5x2 AA With brace off  (held 6/24- significant quad lag, max A- same on 6/29 held)  5 performed -min A and 2nd set on 7/21   SL hip abd  10x2 A Brace off- added 6/21                 Seated       Isometric LAQ at 60 deg 10x5\"  New 6/28         Prone Quad sets  20x3\"  A Attempted 6/21- resumed 6/28; towel roll under ankle   Inc reps 7/12   Prone hip ext  10x2 A Increased reps 7/26             Standing      Brace donned 7/14   Heel Raises  20x   New 6/24- cues for quad set    Toe raises  20x  New 6/28- inc reps 6/30   TKE  20x5\" Blue Increased reps 7/29   OKC 3 way hip 20xea A Increased reps 7/29   HS curls - OKC  10x A New 7/19   High marches-alt 10x  New 7/21- no UE assist         Weight Shifting 10x ea  New 6/24- m/l to encourage increased weight bearing through LLE; added A/P 6/28   Held 7/14   Mini Squats  10x  New 7/5- cues for equal bilateral weight shift    STS  2x5  New 7/5  without UE assist 7/14   Gait 1L   7/19- independent w/out device, cues for heel strike and terminal knee extension, equal bilateral step length      Stairway ambulation  1/2 flight x2  No handrails, reciprocal stepping- added 7/29   Other: 6/13 knee ROM 0-79 deg  6/21:   Flexion: 92 AA  Ext: lacking 2 A , 0 P    6/24  Flexion: 95 AA, 102 P  Ext 0A, 0P    6/28  99 AA  110 P  Ext 0 A    7/5  Flexion: 110 AA, 106 A  Ext: 0     7/8  Flexion 113 AA  Ext 0     7/14  Flexion  114 AA  Ext 0    7/19  Flexion  114 AA  Ext 0    7/21/22  L knee flexion   116 AA  115 A    7/29/21  L Knee flexion  118 A          Treatment Charges: Mins Units   []  Modalities     [x]  Ther Exercise 39 2   []  Manual Therapy     [] Ther Activities     []  Aquatics     [x]  Vasocompression 10 1   [x]  Other- attended stim 10 1   Total Treatment time 59 4         Assessment: [x] Progressing toward goals. Continued with warm up on bike before exercise. Started session with VMS for quad stimulation during both SLR and LAQ. Pt requires assist during SLR to activate lift, but is able to complete the lift, hold and eccentrically lower on her own. Pt tolerates all exercises well. Denying pain throughout session. Added reciprocal stairway ambulation today. Pt was able to perform up and down reciprocally, without handrails for assist. No issues ascending steps, pt reports feeling decreased control when descending d/t quad weakness. Ended with vasocompression this date d/t swelling. [] No change. [] Other:    [x] Patient would continue to benefit from skilled physical therapy services in order to: reduce pain and effusion, restore mobility and strength, and improve gait to return patient to prior level of function. At Eval:  Functional Test: LEFI Score: 14/80 or 82.5% functionally impaired      Problems:    [x] ? Pain: up to 10/10 pain L knee   [x] ? ROM limited L knee ROM globally, limited LLE flexibility  [x] ? Strength reduced LLE strength globally  [x] ? Function impaired function indicated by LEFI score of 82.5%   [x] Other impaired gait      STG: (to be met in 8 treatments) Reassessed by primary PT 7/5/22  ? Pain: Decrease L knee pain levels to 6/10 max with activity to promote improved tolerance to therapeutic exercise progressions. MET- 5/10 max   ? ROM: Increase L knee AROM limitations to at least 0-90 degrees to reduce difficulty with ADLs and normalize gait mechanics. MET- 0-106 A  ? Strength: Pt to demonstrate ability to perform at least 10 SLR without quad lag present in order to progress safely to full weight bearing. Ongoing- max A needed, only able to complete 5   ?  Function: Pt to demonstrate ability to perform at least 5 STS with equal bilateral weight shift and without pain. Ongoing- cues for equal bilateral weight shifting, slight increase in lateral knee pain   Pt to demonstrate improved knee effusion with patellofemoral circumferential measurements at joint line and suprapatellar < 49 cm to demonstrate improved joint force absorption with therapeutic exercise progressions. Partially met- 50.3 cm suprapatellar this date, 43.5 cm at joint line   Pt to be independent and compliant with Home Exercise Programs MET      LTG: (to be met in 16 treatments)  Improve score on assessment tool LEFI to 50% functionally impaired or less to demonstrate improved functional mobility. Reduce L knee pain levels to 2/10 or less to improve independence with all daily activities. Pt to demonstrate L knee AROM to 2-0-120 to ease difficulty with stair climbing and normalize gait mechanics. Pt to demonstrate LLE strength at 4+/5 globally to ease difficulty with heavier household chores and caregiving for her children. Pt to demonstrate ability to ambulate at least 300' independently without increased knee pain and improved heel-toe progression, gait speed, and equal bilateral step lengths. .   Pt to demonstrate ability to ascend/descend a full flight of stairs with reciprocal stepping mechanics and without pain to ease access to upstairs bedroom. - met 7/29/22                    Patient goals: return to work      Pt. Education:  [x] Yes  [] No  [] Reviewed Prior HEP/Ed  Method of Education: [x] Verbal- use of ice as needed for swelling  [] Demo  [] Written  Access Code: [email or web address]  URL: [employer] com/  Date: 06/10/2022  Prepared by: Antonia Casillas     Exercises  Supine Quad Set - 1 x daily - 7 x weekly - 3 sets - 10 reps  Supine Ankle Pumps - 1 x daily - 7 x weekly - 3 sets - 10 reps  Supine Heel Slide - 1 x daily - 7 x weekly - 3 sets - 10 reps  Supine Knee Extension Stretch on Towel Roll - 1 x daily - 7 x weekly - 3 sets - 10 reps  Long Sitting 4 Way Patellar [place] - 1 x daily - 7 x weekly - 3 sets - 10 reps   4 Way Patellar [place] - 1 x daily - 7 x weekly - 3 sets - 10 reps    Date: 07/12/2022  Prepared by: Akbar Everett    Exercises    Prone Quadriceps Set with Towel Roll - 1 x daily - 7 x weekly - 2 sets - 10 reps - 3\" hold  Prone Hip Extension - 1 x daily - 7 x weekly - 2 sets - 10 reps  Sidelying Hip Abduction - 1 x daily - 7 x weekly - 2 sets - 10 reps  Standing Heel Raise - 1 x daily - 7 x weekly - 2 sets - 10 reps  Heel Toe Raises with Counter Support - 1 x daily - 7 x weekly - 2 sets - 10 reps  Standing Terminal Knee Extension with Resistance - 1 x daily - 7 x weekly - 2 sets - 10 reps    Comprehension of Education:  [x] Verbalizes understanding. [] Demonstrates understanding. [] Needs review. [] Demonstrates/verbalizes HEP/Ed previously given. Plan: [x] Continue current frequency toward long and short term goals.     [x] Specific Instructions for subsequent treatments:  Continue to progress quad and hamstring strengthening, no SAQ      Time In: 12:31pm      Time Out: 1:35pm     Electronically signed by:  Seda Dailey PTA

## 2022-08-02 ENCOUNTER — HOSPITAL ENCOUNTER (OUTPATIENT)
Dept: PHYSICAL THERAPY | Facility: CLINIC | Age: 27
Setting detail: THERAPIES SERIES
Discharge: HOME | End: 2022-08-02
Payer: COMMERCIAL

## 2022-08-02 NOTE — FLOWSHEET NOTE
[] [facility]) [facility]  [address]    [phone]  F: [phone]   [x] [address]  [facility]   [address]  P: [phone]  F: [phone]  [] [address]  [address]  P: [phone]  F: [phone] [] [address]  P: [phone]  F: [phone]  [] [address]  [address] [facility] [address]   [address]   [place]: [phone]  F: [phone]   [] [place] [address] [facility] [address]  [place]: [phone]   F: [phone]     Physical Therapy Cancel/No Show note    Date: 2022  Patient: Lisa Zhong  : 1995  MRN: [id number]    Cancels/No Shows to date: 00    For today's appointment patient:    [x]  Cancelled    [] Rescheduled appointment    [] No-show     Reason given by patient:    []  Patient ill    []  Conflicting appointment    [x] No transportation      [] Conflict with work    [] No reason given    [] Weather related    [] [id number]    [] Other:      Comments:        [x] Next appointment was confirmed    Electronically signed by: Cameron Gordillo PT

## 2022-08-04 ENCOUNTER — HOSPITAL ENCOUNTER (OUTPATIENT)
Dept: PHYSICAL THERAPY | Facility: CLINIC | Age: 27
Setting detail: THERAPIES SERIES
Discharge: HOME | End: 2022-08-04
Payer: COMMERCIAL

## 2022-08-04 PROCEDURE — 97032 APPL MODALITY 1+ESTIM EA 15: CPT

## 2022-08-04 PROCEDURE — 97110 THERAPEUTIC EXERCISES: CPT

## 2022-08-04 NOTE — FLOWSHEET NOTE
[] [facility] [address] [facility]  [address]  [phone]  F: [phone] [x] [address]  [place] [address]   [address]  P: [phone]  F: [phone] [] [address]  [address]  P: [phone]  F: [phone] [] [address]  P: [phone]  F: [phone] [] [address]  [facility]   [address]   [place]: [phone]  F: [phone]      Physical Therapy Daily Treatment Note    Date:  2022  Patient Name:  Liliane Copeland    :  1995  MRN: [id number]  Physician: Uli Blevins MD                              Insurance: [facility] ([id number])  Medical Diagnosis: S83.512A - New ACL tear, left, initial encounter                       Rehab Codes: M25.562, M62.81, R26.89, M25.462  Onset date: surgical date 22                             Next Dr's appt.: 22  Visit# / total visits: 15/16     Cancels/No Shows: 1/0    Subjective:    Pain:  [] Yes  [x] No  Location: L knee  Pain Rating: (0-10 scale) 0/10  Pain altered Tx:  [] No  [x] Yes  Action: limited ex's and progressions d/t late arrival    Comments: Pt arrives 15 min late as she had a flat tire this morning. Presents without brace donned noting she has noticed improved stability. Continues to have swelling and tenderness at lateral aspect of knee.        Objective:  Modalities:    vaso at MOD pressure x10' with pillowcase wrapped around L knee- not today   Precautions: full ACL protocol in soft chart, WBAT   Exercises: Traffline Access Code: [email or web address]   Exercise  S/p L knee ACL reconstruction 22 with allograft     Reps/ Time Weight/ Level Comments all ex's w/o brace     Nustep  7' L2; seat 6 New ; inc resistance    Recumbent Bike  5' L1 New 7/19         Supine       Knee extension stretch 2x1' 5# Added 5#    Quad Sets  20x5\" A Inc reps 7/19   SLR    Isometric LAQ  10 min with VMS   10 seconds on  20 seconds rest  Added LAQ 6/30  Added SLR 7/12 with mod-Max A    Ankle pumps  20x       Heel Slides  10x2 AA  A  using slide board, strap and manual overpressure  Active performed on 7/5 second set    Patellar Mobs x      Long sitting calf stretch 3x 30\"     SLR 5x3 AA With brace off  (held 6/24- significant quad lag, max A- same on 6/29 held)  5 performed -min A and 2nd set on 7/21; 3rd set 8/4   SL hip abd  10x2 A Brace off- added 6/21                 Seated       Isometric LAQ at 60 deg 10x5\"  New 6/28         Prone Quad sets  20x3\"  A Attempted 6/21- resumed 6/28; towel roll under ankle   Inc reps 7/12   Prone hip ext  10x2 A Increased reps 7/26             Standing      Brace donned 7/14   Heel Raises  20x   New 6/24- cues for quad set    Toe raises  20x  New 6/28- inc reps 6/30   TKE  20x5\" Blue Increased reps 7/29   OKC 3 way hip 20xea A Increased reps 7/29   HS curls - OKC  10x A New 7/19   High marches-alt 10x  New 7/21- no UE assist         Weight Shifting 10x ea  New 6/24- m/l to encourage increased weight bearing through LLE; added A/P 6/28   Held 7/14   Mini Squats  10x  New 7/5- cues for equal bilateral weight shift    STS  2x5  New 7/5  without UE assist 7/14   Gait 1L   7/19- independent w/out device, cues for heel strike and terminal knee extension, equal bilateral step length      Stairway ambulation  1/2 flight x2  No handrails, reciprocal stepping- added 7/29   Other: 6/13 knee ROM 0-79 deg  6/21:   Flexion: 92 AA  Ext: lacking 2 A , 0 P    6/24  Flexion: 95 AA, 102 P  Ext 0A, 0P    6/28  99 AA  110 P  Ext 0 A    7/5  Flexion: 110 AA, 106 A  Ext: 0     7/8  Flexion 113 AA  Ext 0     7/14  Flexion  114 AA  Ext 0    7/19  Flexion  114 AA  Ext 0    7/21/22  L knee flexion   116 AA  115 A    7/29/21  L Knee flexion  118 A          Treatment Charges: Mins Units   []  Modalities     [x]  Ther Exercise 38 2   []  Manual Therapy     []  Ther Activities     []  Aquatics     [x]  Vasocompression     [x]  Other- attended stim 10 1   Total Treatment time 48 3         Assessment: [x] Progressing toward goals. Held bike and initiated session with mat stretches and exercises d/t late arrival. Continued with NMES for increased quad activation. Added 3rd set of SLR's needing min assist for initial lift. Cues to avoid excessive hip motion with TKE's with good carryover following cues. Ended with stair training with some difficulty on descend. Encouraged to practice this at home focusing on controlling motion with use of hand rails to improve strength and tolerance to stair negotiation. Instructed to ice at home this date as time was limited. Plan to assess goals next session. [] No change. [] Other:    [x] Patient would continue to benefit from skilled physical therapy services in order to: reduce pain and effusion, restore mobility and strength, and improve gait to return patient to prior level of function. At Eval:  Functional Test: LEFI Score: 14/80 or 82.5% functionally impaired      Problems:    [x] ? Pain: up to 10/10 pain L knee   [x] ? ROM limited L knee ROM globally, limited LLE flexibility  [x] ? Strength reduced LLE strength globally  [x] ? Function impaired function indicated by LEFI score of 82.5%   [x] Other impaired gait      STG: (to be met in 8 treatments) Reassessed by primary PT 7/5/22  ? Pain: Decrease L knee pain levels to 6/10 max with activity to promote improved tolerance to therapeutic exercise progressions. MET- 5/10 max   ? ROM: Increase L knee AROM limitations to at least 0-90 degrees to reduce difficulty with ADLs and normalize gait mechanics. MET- 0-106 A  ? Strength: Pt to demonstrate ability to perform at least 10 SLR without quad lag present in order to progress safely to full weight bearing.   Ongoing- max A needed, only able to complete 5   ? Function: Pt to demonstrate ability to perform at least 5 STS with equal bilateral weight shift and without pain. Ongoing- cues for equal bilateral weight shifting, slight increase in lateral knee pain   Pt to demonstrate improved knee effusion with patellofemoral circumferential measurements at joint line and suprapatellar < 49 cm to demonstrate improved joint force absorption with therapeutic exercise progressions. Partially met- 50.3 cm suprapatellar this date, 43.5 cm at joint line   Pt to be independent and compliant with Home Exercise Programs MET      LTG: (to be met in 16 treatments)  Improve score on assessment tool LEFI to 50% functionally impaired or less to demonstrate improved functional mobility. Reduce L knee pain levels to 2/10 or less to improve independence with all daily activities. Pt to demonstrate L knee AROM to 2-0-120 to ease difficulty with stair climbing and normalize gait mechanics. Pt to demonstrate LLE strength at 4+/5 globally to ease difficulty with heavier household chores and caregiving for her children. Pt to demonstrate ability to ambulate at least 300' independently without increased knee pain and improved heel-toe progression, gait speed, and equal bilateral step lengths. .   Pt to demonstrate ability to ascend/descend a full flight of stairs with reciprocal stepping mechanics and without pain to ease access to upstairs bedroom. - met 7/29/22                    Patient goals: return to work      Pt. Education:  [x] Yes  [] No  [] Reviewed Prior HEP/Ed  Method of Education: [x] Verbal- use of ice as needed for swelling  [] Demo  [] Written  Access Code: [email or web address]  URL: [email or web address]  Date: 06/10/2022  Prepared by: Padmaja Quezada     Exercises  Supine Quad Set - 1 x daily - 7 x weekly - 3 sets - 10 reps  Supine Ankle Pumps - 1 x daily - 7 x weekly - 3 sets - 10 reps  Supine Heel Slide - 1 x daily - 7 x weekly - 3 sets - 10 reps  Supine Knee Extension Stretch on Towel Roll - 1 x daily - 7 x weekly - 3 sets - 10 reps  Long Sitting 4 Way Patellar Clemons - 1 x daily - 7 x weekly - 3 sets - 10 reps   4 Way Patellar Clemons - 1 x daily - 7 x weekly - 3 sets - 10 reps    Date: 07/12/2022  Prepared by: Elizabeth Varghese    Exercises    Prone Quadriceps Set with Towel Roll - 1 x daily - 7 x weekly - 2 sets - 10 reps - 3\" hold  Prone Hip Extension - 1 x daily - 7 x weekly - 2 sets - 10 reps  Sidelying Hip Abduction - 1 x daily - 7 x weekly - 2 sets - 10 reps  Standing Heel Raise - 1 x daily - 7 x weekly - 2 sets - 10 reps  Heel Toe Raises with Counter Support - 1 x daily - 7 x weekly - 2 sets - 10 reps  Standing Terminal Knee Extension with Resistance - 1 x daily - 7 x weekly - 2 sets - 10 reps    Comprehension of Education:  [x] Verbalizes understanding. [] Demonstrates understanding. [] Needs review. [] Demonstrates/verbalizes HEP/Ed previously given. Plan: [x] Continue current frequency toward long and short term goals.     [x] Specific Instructions for subsequent treatments:  Continue to progress quad and hamstring strengthening, no SAQ      Time In: 11:15   am   Time Out: 12:03 pm    Electronically signed by:  Angela Zhu PTA

## 2022-08-09 ENCOUNTER — HOSPITAL ENCOUNTER (OUTPATIENT)
Dept: PHYSICAL THERAPY | Facility: CLINIC | Age: 27
Setting detail: THERAPIES SERIES
Discharge: HOME | End: 2022-08-09
Payer: COMMERCIAL

## 2022-08-09 PROCEDURE — 97016 VASOPNEUMATIC DEVICE THERAPY: CPT

## 2022-08-09 PROCEDURE — 97110 THERAPEUTIC EXERCISES: CPT

## 2022-08-09 NOTE — PROGRESS NOTES
[] [facility]) - [facility]  Therapy  [address]  [phone]  F: [phone] [x] [address]  [place] [address]   [address]  P: [phone]  F: [phone] [] [address] [facility]  Therapy  [address]  P: [phone]  F: [phone] [] [address]  P: [phone]  F: [phone] [] [address]  [facility]   [address]   [place]: [phone]  F: [phone]      Physical Therapy Progress Note    Date: 2022      Patient: Elizabeth La  : 1995  MRN: [id number]    Physician: Feng Reed MD                              Insurance: [facility] ()  Medical Diagnosis: S83.512A - New ACL tear, left, initial encounter                       Rehab Codes: M25.562, M62.81, R26.89, M25.462  Onset date: surgical date 22                             Next Dr's appt.: 22  Visit# / total visits:                                 Cancels/No Shows: 1/0  Date range of services: 6/10/22 to 22    Subjective:    Pain:  [] Yes  [x] No               Location: L knee                     Pain Rating: (0-10 scale) 7-8/10  Pain altered Tx:  [] No  [x] Yes  Action: limited ex's and progressions d/t late arrival     Comments: Pt arrived to physical therapy without knee brace or AD. Pt noted increased L ant knee pain around med/lat joint line rated 7-8/10 and increased swelling in lat aspect of L knee, noted \"I think because I walk more these days\". Pt noted has to lean over cart in grocery store due to increased knee pain lately. Pt noted Doctor gave pt soft knee brace and instructed pt to wear ace wrap to reduce swelling. Pt noted compliant with HEP.   Pt noted is not back to work yet and depends on Doctor when pt is able to return back to work. Objective:  Test Measurements:  8/9/22  L knee ROM: 0-130 A        Objective:         STRENGTH     Left Right   Hip Flex 4-/5 5/5   Ext 4+/5     ABD 4+/5     ADD       Knee Flex 5/5 5/5   Ext 4/5 5/5   Ankle DF   5/5   PF   5/5   INV       EVER         Function:   Functional Test: LEFI Score: 14/80 or 82.5% functionally impaired  8/9/22: 70/80 or 12.5% functionally impaired       Assessment:  [x] Progressing toward goals. Spent time educating pt on appropriate elevation of LLE and apply ice at home to reduce pain and swelling. Minimal progression made this date due to c/o increased L ant knee pain and increased swelling noted. Pt able perform L SLR without assistance from therapist however cont to have co-contraction of L glut and minimal contraction of L quad with SLR, better L quad activation with prone quad set noted. Visual and tactile cues provided for proper technique with mini squat to reduce R knee hyperextension and encouraged equal wt distribution as pt tend to shift wt to RLE. Re-assessed LTGs and overall function this date. Pt is progressing well with overall strength and mobility of LLE as well as ambulation with proper gait pattern. Mild increased of L knee buckling and pain noted with ambulation. Pt cont to demo reduced tolerance with physical activities as pt reported increased pain and swelling after walking for about 1-1.5 hours. Pt was instructed to increased frequency of seated rest break to manage pain and inflammation. Pt verbalized understanding. Resumed vaso to L knee with bolster this date to manage swelling and pain. Pt reported pain reduced to 3/10 post-exs. Pt was given compression stockings and instructed to wear more frequently to reduce swelling and cont to elevate LLE. Pt will benefit from additional therapy in order to restore functional strength, mobility, and stability to assist pt in improving overall tolerance when returning to work. [] No change. [] Other:    [x] Patient would continue to benefit from skilled physical therapy services in order to: reduce pain and effusion, restore mobility and strength, and improve gait to return patient to prior level of function. Problems:    [x] ? Pain: up to 10/10 pain L knee   [x] ? ROM limited L knee ROM globally, limited LLE flexibility  [x] ? Strength reduced LLE strength globally  [x] ? Function impaired function indicated by LEFI score of 82.5%   [x] Other impaired gait         STG: (to be met in 8 treatments) Reassessed by primary PT 7/5/22  ? Pain: Decrease L knee pain levels to 6/10 max with activity to promote improved tolerance to therapeutic exercise progressions. MET- 5/10 max  ? ROM: Increase L knee AROM limitations to at least 0-90 degrees to reduce difficulty with ADLs and normalize gait mechanics. MET- 0-106 A  ? Strength: Pt to demonstrate ability to perform at least 10 SLR without quad lag present in order to progress safely to full weight bearing. Ongoing- max A needed, only able to complete 5  ? Function: Pt to demonstrate ability to perform at least 5 STS with equal bilateral weight shift and without pain. Ongoing- cues for equal bilateral weight shifting, slight increase in lateral knee pain  Pt to demonstrate improved knee effusion with patellofemoral circumferential measurements at joint line and suprapatellar < 49 cm to demonstrate improved joint force absorption with therapeutic exercise progressions.  Partially met- 50.3 cm suprapatellar this date, 43.5 cm at joint line  Pt to be independent and compliant with Home Exercise Programs MET        LTG: (to be met in 16 treatments) - Re-assessed by An Ricki Stark PT on 8/9/22   Improve score on assessment tool LEFI to 50% functionally impaired or less to demonstrate improved functional mobility - MET (12.5% impaired)  Reduce L knee pain levels to 2/10 or less to improve independence with all daily

## 2022-08-09 NOTE — FLOWSHEET NOTE
[] [facility]  [address]  [phone]  F: [phone] [x] [address]  [place]   [address]  P: [phone]  F: [phone] [] [address]  [address]  P: [phone]  F: [phone] [] [address]  P: [phone]  F: [phone] [] [address]  [facility]   [address]   [place]: [phone]  F: [phone]      Physical Therapy Daily Treatment Note    Date:  2022  Patient Name:  Evaristo Villalobos    :  1995  MRN: [id number]  Physician: Paulo Spear MD                              Insurance: [facility] ([id number])  Medical Diagnosis: S80.200A - New ACL tear, left, initial encounter                       Rehab Codes: M25.562, M62.81, R26.89, M25.462  Onset date: surgical date 22                             Next Dr's appt.: 22  Visit# / total visits:      Cancels/No Shows: 1/0    Subjective:    Pain:  [] Yes  [x] No  Location: L knee  Pain Rating: (0-10 scale) 7-8/10  Pain altered Tx:  [] No  [x] Yes  Action: limited ex's and progressions d/t late arrival     Comments: Pt arrived to physical therapy without knee brace or AD. Pt noted increased L ant knee pain around med/lat joint line rated 7-8/10 and increased swelling in lat aspect of L knee, noted \"I think because I walk more these days\". Pt noted has to lean over cart in grocery store due to increased knee pain lately. Pt noted Doctor gave pt soft knee brace and instructed pt to wear ace wrap to reduce swelling. Pt noted compliant with HEP. Pt noted is not back to work yet and depends on Doctor when pt is able to return back to work.        Objective:  Modalities: vaso at MOD pressure x10' with pillowcase wrapped around L knee  Precautions: full ACL protocol in soft chart, WBAT   Exercises: [employer] Access Code: [email or web address]   Exercise  S/p L knee ACL reconstruction 6/9/22 with allograft     Reps/ Time Weight/ Level Comments all ex's w/o brace 7/21   Nustep  7' L2; seat 6 New 6/24; inc resistance 7/8   Recumbent Bike  5' L1 New 7/19         Supine       Knee extension stretch 2x1' 5# Added 5#    Quad Sets  20x5\" A Inc reps 7/19   SLR    Isometric LAQ  10 min with VMS   10 seconds on  20 seconds rest  Added LAQ 6/30  Added SLR 7/12 with mod-Max A    Ankle pumps  20x       Heel Slides  10x2 strap  using slide board, strap and manual overpressure  Active performed on 7/5 second set    Patellar Mobs x      Long sitting calf stretch 3x 30\"     SLR 5x3 A With brace off  (held 6/24- significant quad lag, max A- same on 6/29 held)  5 performed -min A and 2nd set on 7/21; 3rd set 8/4   SL hip abd  10x2 A Brace off- added 6/21                 Seated       Isometric LAQ at 60 deg 10x5\"  New 6/28         Prone Quad sets  20x3\"  A Attempted 6/21- resumed 6/28; towel roll under ankle   Inc reps 7/12   Prone hip ext  10x2 A Increased reps 7/26             Standing      Brace donned 7/14   Heel Raises  20x   New 6/24- cues for quad set    Toe raises  20x  New 6/28- inc reps 6/30   TKE  20x5\" Blue Increased reps 7/29   OKC 3 way hip 20xea A Increased reps 7/29   HS curls - OKC  10x A New 7/19   High marches-alt 2x10  New 7/21- no UE assist         Weight Shifting 10x ea  New 6/24- m/l to encourage increased weight bearing through LLE; added A/P 6/28   Held 7/14   Mini Squats  2x10  New 7/5- cues for equal bilateral weight shift    STS  2x5  New 7/5  without UE assist 7/14   Gait 1L   7/19- independent w/out device, cues for heel strike and terminal knee extension, equal bilateral step length      Stairway ambulation  1/2 flight x2  No handrails, reciprocal stepping- added 7/29   Other: 6/13 knee ROM 0-79 deg  6/21:   Flexion: 92 AA  Ext: lacking 2 A , 0 P    6/24  Flexion: 95 AA, 102 P  Ext 0A, 0P    6/28  99 AA  110 P  Ext 0 A    7/5  Flexion: 110 AA, 106 A  Ext: 0     7/8  Flexion 113 AA  Ext 0     7/14  Flexion  114 AA  Ext 0    7/19  Flexion  114 AA  Ext 0    7/21/22  L knee flexion   116 AA  115 A    7/29/22  L Knee flexion  118 A    8/9/22  L knee ROM: 0-130 A      Objective:    STRENGTH     Left Right   Hip Flex 4-/5 5/5   Ext 4+/5     ABD 4+/5     ADD      Knee Flex 5/5 5/5   Ext 4/5 5/5   Ankle DF  5/5   PF  5/5   INV      EVER                   Functional Test: LEFI Score: 14/80 or 82.5% functionally impaired  8/9/22: 70/80 or 12.5% functionally impaired             Treatment Charges: Mins Units   []  Modalities     [x]  Ther Exercise 45 3   []  Manual Therapy     []  Ther Activities     []  Aquatics     [x]  Vasocompression 10 1   []  Other- attended stim     Total Treatment time 55 4   Re-assessment time is billed under therex this date 8/9/22         Assessment: [x] Progressing toward goals. Spent time educating pt on appropriate elevation of LLE and apply ice at home to reduce pain and swelling. Minimal progression made this date due to c/o increased L ant knee pain and increased swelling noted. Pt able perform L SLR without assistance from therapist however cont to have co-contraction of L glut and minimal contraction of L quad with SLR, better L quad activation with prone quad set noted. Visual and tactile cues provided for proper technique with mini squat to reduce R knee hyperextension and encouraged equal wt distribution as pt tend to shift wt to RLE. Re-assessed LTGs and overall function this date. Pt is progressing well with overall strength and mobility of LLE as well as ambulation with proper gait pattern. Mild increased of L knee buckling and pain noted with ambulation. Pt cont to demo reduced tolerance with physical activities as pt reported increased pain and swelling after walking for about 1-1.5 hours.   Pt was instructed to increased frequency of seated rest break to manage pain and inflammation. Pt verbalized understanding. Resumed vaso to L knee with bolster this date to manage swelling and pain. Pt reported pain reduced to 3/10 post-exs. Pt was given compression stockiness and instructed to wear more frequently to reduce swelling and cont to elevate LLE. Pt will benefit from additional therapy in order to restore functional strength, mobility, and stability to assist pt in improving overall tolerance when returning to work. [] No change. [] Other:    [x] Patient would continue to benefit from skilled physical therapy services in order to: reduce pain and effusion, restore mobility and strength, and improve gait to return patient to prior level of function. Problems:    [x] ? Pain: up to 10/10 pain L knee   [x] ? ROM limited L knee ROM globally, limited LLE flexibility  [x] ? Strength reduced LLE strength globally  [x] ? Function impaired function indicated by LEFI score of 82.5%   [x] Other impaired gait        STG: (to be met in 8 treatments) Reassessed by primary PT 7/5/22  ? Pain: Decrease L knee pain levels to 6/10 max with activity to promote improved tolerance to therapeutic exercise progressions. MET- 5/10 max   ? ROM: Increase L knee AROM limitations to at least 0-90 degrees to reduce difficulty with ADLs and normalize gait mechanics. MET- 0-106 A  ? Strength: Pt to demonstrate ability to perform at least 10 SLR without quad lag present in order to progress safely to full weight bearing. Ongoing- max A needed, only able to complete 5   ? Function: Pt to demonstrate ability to perform at least 5 STS with equal bilateral weight shift and without pain.  Ongoing- cues for equal bilateral weight shifting, slight increase in lateral knee pain   Pt to demonstrate improved knee effusion with patellofemoral circumferential measurements at joint line and suprapatellar < 49 cm to demonstrate improved joint force absorption with therapeutic exercise progressions. Partially met- 50.3 cm suprapatellar this date, 43.5 cm at joint line   Pt to be independent and compliant with Home Exercise Programs MET        LTG: (to be met in 16 treatments) - Re-assessed by An Keyla Cardenas, PT on 8/9/22   Improve score on assessment tool LEFI to 50% functionally impaired or less to demonstrate improved functional mobility - MET (12.5% impaired)   Reduce L knee pain levels to 2/10 or less to improve independence with all daily activities. - NOT MET (8-9/10)  Pt to demonstrate L knee AROM to 2-0-120 to ease difficulty with stair climbing and normalize gait mechanics. MET (0-130)  Pt to demonstrate LLE strength at 4+/5 globally to ease difficulty with heavier household chores and caregiving for her children. - Progressing (L hip flexors 4-/5, L quad 4/5)  Pt to demonstrate ability to ambulate at least 300' independently without increased knee pain and improved heel-toe progression, gait speed, and equal bilateral step lengths. - Progressing (mild inc of pain & instability/buckling L knee noted)  Pt to demonstrate ability to ascend/descend a full flight of stairs with reciprocal stepping mechanics and without pain to ease access to upstairs bedroom.- MET 7/29/22                    Patient goals: return to work       Pt. Education:  [x] Yes  [] No  [] Reviewed Prior HEP/Ed  Method of Education: [x] Verbal- use of ice as needed for swelling  [] Demo  [] Written  Access Code: [email or web address]  URL: [email or web address]  Date: 06/10/2022  Prepared by: Adrian Mantle     Exercises  Supine Quad Set - 1 x daily - 7 x weekly - 3 sets - 10 reps  Supine Ankle Pumps - 1 x daily - 7 x weekly - 3 sets - 10 reps  Supine Heel Slide - 1 x daily - 7 x weekly - 3 sets - 10 reps  Supine Knee Extension Stretch on Towel Roll - 1 x daily - 7 x weekly - 3 sets - 10 reps  Long Sitting 4 Way Patellar [place] - 1 x daily - 7 x weekly - 3 sets - 10 reps   4 Way Patellar Glide - 1 x daily - 7 x weekly - 3 sets - 10 reps    Date: 07/12/2022  Prepared by: Dena Modest    Exercises    Prone Quadriceps Set with Towel Roll - 1 x daily - 7 x weekly - 2 sets - 10 reps - 3\" hold  Prone Hip Extension - 1 x daily - 7 x weekly - 2 sets - 10 reps  Sidelying Hip Abduction - 1 x daily - 7 x weekly - 2 sets - 10 reps  Standing Heel Raise - 1 x daily - 7 x weekly - 2 sets - 10 reps  Heel Toe Raises with Counter Support - 1 x daily - 7 x weekly - 2 sets - 10 reps  Standing Terminal Knee Extension with Resistance - 1 x daily - 7 x weekly - 2 sets - 10 reps    Comprehension of Education:  [x] Verbalizes understanding. [] Demonstrates understanding. [] Needs review. [] Demonstrates/verbalizes HEP/Ed previously given. Plan: [x] Continue current frequency toward long and short term goals. [x] Specific Instructions for subsequent treatments:  Continue to progress quad and hamstring strengthening, no SAQ      Time In: 11:03 am     Time Out: 12:00 pm    Electronically signed by:   Goldie Austin, PT

## 2022-08-12 ENCOUNTER — HOSPITAL ENCOUNTER (OUTPATIENT)
Dept: PHYSICAL THERAPY | Facility: CLINIC | Age: 27
Setting detail: THERAPIES SERIES
Discharge: HOME | End: 2022-08-12
Payer: COMMERCIAL

## 2022-08-12 PROCEDURE — 97110 THERAPEUTIC EXERCISES: CPT

## 2022-08-16 ENCOUNTER — HOSPITAL ENCOUNTER (OUTPATIENT)
Dept: PHYSICAL THERAPY | Facility: CLINIC | Age: 27
Setting detail: THERAPIES SERIES
Discharge: HOME | End: 2022-08-16
Payer: COMMERCIAL

## 2022-08-16 PROCEDURE — 97016 VASOPNEUMATIC DEVICE THERAPY: CPT

## 2022-08-16 PROCEDURE — 97110 THERAPEUTIC EXERCISES: CPT

## 2022-08-16 NOTE — FLOWSHEET NOTE
[] [facility]  [address]  [phone]  F: [phone] [x] [address]  [place] [address]   [address]  P: [phone]  F: [phone] [] [address]  [address]  P: [phone]  F: [phone] [] [address]  P: [phone]  F: [phone] [] [address]  [facility]   [address]   [place]: [phone]  F: [phone]      Physical Therapy Daily Treatment Note    Date:  2022  Patient Name:  Fran Baron    :  1995  MRN: [id number]  Physician: Ned Marmolejo MD                              Insurance: [facility] ([id number])  Medical Diagnosis: S83.512A - New ACL tear, left, initial encounter                       Rehab Codes: M25.562, M62.81, R26.89, M25.462  Onset date: surgical date 22                             Next Dr's appt.: 22  Visit# / total visits:      Cancels/No Shows: 1/0    Subjective:    Pain:  [] Yes  [x] No  Location: L knee  Pain Rating: (0-10 scale) 0/10  Pain altered Tx:  [] No  [x] Yes  Action: limited ex's and progressions d/t late arrival     Comments: Pt arrived in [place] without compression stock. Pt noted cont to have knee swelling d/t increased standing/walking activities recently. Pt noted compression stock was really helpful in reducing swelling and able to wear it when wearing short. Pt noted last session went well without any issue.   Pt cont to deny pain upon arrival.       Objective:  Modalities: vaso at MOD pressure x10' with pillowcase wrapped around L knee  Precautions: full ACL protocol in soft chart, WBAT   Exercises: [employer] Access Code: [email or web address]   Exercise  S/p L knee ACL reconstruction 22 with allograft     Reps/ Time Weight/ Level Comments all ex's w/o brace 7/21   Nustep  7' L2; seat 6 New 6/24; inc resistance 7/8   Recumbent Bike  5' L1 New 7/19   Elliptical  5' Lvl 3 New 8/16         Supine       Knee extension stretch 2x1' 5# Added 5#    Quad Sets  20x5\" A Inc reps 7/19   SLR    Isometric LAQ  10 min with VMS   10 seconds on  20 seconds rest  Added LAQ 6/30  Added SLR 7/12 with mod-Max A    Ankle pumps  20x       Heel Slides  10x2 strap  using slide board, strap and manual overpressure  Active performed on 7/5 second set    Patellar Mobs x      Long sitting calf stretch 3x 30\"     SLR 8x  12x  15x A With brace off  (held 6/24- significant quad lag, max A- same on 6/29 held)  5 performed -min A and 2nd set on 7/21; 3rd set 8/4  Performed 8x 1st set, 12x 2nd set, 15x 3rd set 8/16   SL hip abd  2x10 A Brace off- added 6/21                 Seated:       Isometric LAQ at 60 deg 20x5\"  New 6/28         Prone Quad sets  20x3\"  A Attempted 6/21- resumed 6/28; towel roll under ankle   Inc reps 7/12   Prone hip ext  10x2 A Increased reps 7/26                  Standing:      Brace donned 7/14   Heel Raises  20x   New 6/24- cues for quad set    Toe raises  20x  New 6/28- inc reps 6/30   TKE  20x5\" [place]  Increased reps 7/29, inc resistance 8/12    OKC 3 way hip 20xea [place]  Increased reps 7/29; added resistance 8/12    HS curls - OKC  2x10 A New 7/19- inc reps 8/12   High marches-alt 2x10  New 7/21- no UE assist   Step Ups 2x10 6\" New 8/12   Step Downs  2x10 6\" New 8/12   Weight Shifting 10x ea  New 6/24- m/l to encourage increased weight bearing through LLE; added A/P 6/28   Held 7/14   Mini Squats  2x10  New 7/5- cues for equal bilateral weight shift    STS  2x5  New 7/5  without UE assist 7/14   Gait 1L   Independent without device or brace, cues for heel-toe gait and terminal knee extension with good carryover.       Stairway ambulation  1/2 flight x2  No handrails, reciprocal stepping- added 7/29   Modified LLE RDL 2x10 12in New 8/16-B finger taps on steps   Walking lunges 1 lap  8# DB New 8/16 - dumbbells both hands   Heel tap  20x ea  4in New 8/16 - forward & lateral, LLE stance, 1 UE support   Band walk  30ft x2 [place]  New 8/16 - lateral, monster    Squat + chair tap 10x  New 8/16 - added foam pad to inc height          DEVEN LEG PRESS   New 8/16   DL 2x10 Lvl 9, pl 5    2 up 1 down 2x10 Lvl 9, pl 3 LLE down only    Heel raises 20x Lvl 9, pl 5    Other: 6/13 knee ROM 0-79 deg  6/21:   Flexion: 92 AA  Ext: lacking 2 A , 0 P    6/24  Flexion: 95 AA, 102 P  Ext 0A, 0P    6/28  99 AA  110 P  Ext 0 A    7/5  Flexion: 110 AA, 106 A  Ext: 0     7/8  Flexion 113 AA  Ext 0     7/14  Flexion  114 AA  Ext 0    7/19  Flexion  114 AA  Ext 0    7/21/22  L knee flexion   116 AA  115 A    7/29/22  L Knee flexion  118 A    8/9/22  L knee ROM: 0-130 A      Objective:    STRENGTH     Left Right   Hip Flex 4-/5 5/5   Ext 4+/5     ABD 4+/5     ADD      Knee Flex 5/5 5/5   Ext 4/5 5/5   Ankle DF  5/5   PF  5/5   INV      EVER                   Functional Test: LEFI Score: 14/80 or 82.5% functionally impaired  8/9/22: 70/80 or 12.5% functionally impaired             Treatment Charges: Mins Units   []  Modalities     [x]  Ther Exercise 46 3   []  Manual Therapy     []  Ther Activities     []  Aquatics     [x]  Vasocompression 10 1   []  Other- attended stim     Total Treatment time 56 4           Assessment: [x] Progressing toward goals. Initiated therapy session on Elliptical to improve endurance and blood flow to BLE. Pt able to progress each set of SLR with mild-mod extension lag noted d/t cont to have impaired quad activation. Added variations of deven leg press, walking lunges, band walk lateral/monster, and heel tap to progress LLE strength and eccentric quad control. Intermittent cues provided throughout for proper form and technique with newly added exs. End session with vasocompression to L knee to manage pain and edema.   Progress to higher level of functional strengthening and stability as able. [] No change. [] Other:    [x] Patient would continue to benefit from skilled physical therapy services in order to: reduce pain and effusion, restore mobility and strength, and improve gait to return patient to prior level of function. Problems:    [x] ? Pain: up to 10/10 pain L knee   [x] ? ROM limited L knee ROM globally, limited LLE flexibility  [x] ? Strength reduced LLE strength globally  [x] ? Function impaired function indicated by LEFI score of 82.5%   [x] Other impaired gait        STG: (to be met in 8 treatments) Reassessed by primary PT 7/5/22  ? Pain: Decrease L knee pain levels to 6/10 max with activity to promote improved tolerance to therapeutic exercise progressions. MET- 5/10 max   ? ROM: Increase L knee AROM limitations to at least 0-90 degrees to reduce difficulty with ADLs and normalize gait mechanics. MET- 0-106 A  ? Strength: Pt to demonstrate ability to perform at least 10 SLR without quad lag present in order to progress safely to full weight bearing. Ongoing- max A needed, only able to complete 5   ? Function: Pt to demonstrate ability to perform at least 5 STS with equal bilateral weight shift and without pain. Ongoing- cues for equal bilateral weight shifting, slight increase in lateral knee pain   Pt to demonstrate improved knee effusion with patellofemoral circumferential measurements at joint line and suprapatellar < 49 cm to demonstrate improved joint force absorption with therapeutic exercise progressions.  Partially met- 50.3 cm suprapatellar this date, 43.5 cm at joint line   Pt to be independent and compliant with Home Exercise Programs MET        LTG: (to be met in 16 treatments) - Re-assessed by An Tessa Desouza PT on 8/9/22   Improve score on assessment tool LEFI to 50% functionally impaired or less to demonstrate improved functional mobility - MET (12.5% impaired)   Reduce L knee pain levels to 2/10 or less to improve independence with all daily activities. - NOT MET (8-9/10)  Pt to demonstrate L knee AROM to 2-0-120 to ease difficulty with stair climbing and normalize gait mechanics. MET (0-130)  Pt to demonstrate LLE strength at 4+/5 globally to ease difficulty with heavier household chores and caregiving for her children. - Progressing (L hip flexors 4-/5, L quad 4/5)  Pt to demonstrate ability to ambulate at least 300' independently without increased knee pain and improved heel-toe progression, gait speed, and equal bilateral step lengths. - Progressing (mild inc of pain & instability/buckling L knee noted)  Pt to demonstrate ability to ascend/descend a full flight of stairs with reciprocal stepping mechanics and without pain to ease access to upstairs bedroom.- MET 7/29/22                    Patient goals: return to work       Pt. Education:  [x] Yes  [] No  [] Reviewed Prior HEP/Ed  Method of Education: [x] Verbal- use of ice as needed for swelling  [] Demo  [] Written  Access Code: [email or web address]  URL: [email or web address]  Date: 06/10/2022  Prepared by: Padmaja Quezada     Exercises  Supine Quad Set - 1 x daily - 7 x weekly - 3 sets - 10 reps  Supine Ankle Pumps - 1 x daily - 7 x weekly - 3 sets - 10 reps  Supine Heel Slide - 1 x daily - 7 x weekly - 3 sets - 10 reps  Supine Knee Extension Stretch on Towel Roll - 1 x daily - 7 x weekly - 3 sets - 10 reps  Long Sitting 4 Way Patellar [place] - 1 x daily - 7 x weekly - 3 sets - 10 reps   4 Way Patellar [place] - 1 x daily - 7 x weekly - 3 sets - 10 reps    Date: 07/12/2022  Prepared by: Padmaja Quezada    Exercises    Prone Quadriceps Set with Towel Roll - 1 x daily - 7 x weekly - 2 sets - 10 reps - 3\" hold  Prone Hip Extension - 1 x daily - 7 x weekly - 2 sets - 10 reps  Sidelying Hip Abduction - 1 x daily - 7 x weekly - 2 sets - 10 reps  Standing Heel Raise - 1 x daily - 7 x weekly - 2 sets - 10 reps  Heel Toe Raises with Counter Support - 1 x daily - 7 x weekly - 2 sets - 10 reps  Standing Terminal Knee Extension with Resistance - 1 x daily - 7 x weekly - 2 sets - 10 reps    Comprehension of Education:  [x] Verbalizes understanding. [] Demonstrates understanding. [] Needs review. [] Demonstrates/verbalizes HEP/Ed previously given. Plan: [x] Continue current frequency toward long and short term goals. [x] Specific Instructions for subsequent treatments:  Continue to progress quad and hamstring strengthening, no SAQ      Time In: 12:05 pm    Time Out: 1:10pm      Electronically signed by:   Goldie Lai, PT

## 2022-08-19 ENCOUNTER — HOSPITAL ENCOUNTER (OUTPATIENT)
Dept: PHYSICAL THERAPY | Facility: CLINIC | Age: 27
Setting detail: THERAPIES SERIES
Discharge: HOME | End: 2022-08-19
Payer: COMMERCIAL

## 2022-08-19 PROCEDURE — 97110 THERAPEUTIC EXERCISES: CPT

## 2022-08-19 NOTE — FLOWSHEET NOTE
[] [facility]  [address]  [phone]  F: [phone] [x] [address]  [facility] 36   [address]  P: [phone]  F: [phone] [] [address]  [address] [facility]  P: [phone]  F: [phone] [] [address]  P: [phone]  F: [phone] [] [address]  [facility]   [address]   [place]: [phone]  F: [phone]      Physical Therapy Daily Treatment Note    Date:  2022  Patient Name:  Shaila Portillo    :  1995  MRN: [id number]  Physician: Mookie Brewer MD                              Insurance: [facility] ()  Medical Diagnosis: S83.512A - New ACL tear, left, initial encounter                       Rehab Codes: M25.562, M62.81, R26.89, M25.462  Onset date: surgical date 22                             Next Dr's appt.: 22  Visit# / total visits:      Cancels/No Shows: 1/0    Subjective:    Pain:  [] Yes  [x] No  Location: L knee  Pain Rating: (0-10 scale) 0/10  Pain altered Tx:  [x] No  [] Yes  Action:  Comments: Pt arrived 13 mins late to therapy appointment but able to accommodate. Cont to deny pain upon arrival only c/o increased swelling with increased physical activities. Pt note cont to be compliant with HEP without any issue. Pt noted will f/u with Doctor this upcoming Wednesday to determine if pt is ready to return back to work. Pt noted compression stock keeps sliding down knee because it was too short so therapist gave pt a longer compression stockinette.          Objective:  Modalities: vaso at MOD pressure x10' with pillowcase wrapped around L knee  Precautions: full ACL protocol in soft chart, WBAT   Exercises: Cheryle Falcon Access Code: [email or web address] Exercise  S/p L knee ACL reconstruction 6/9/22 with allograft     Reps/ Time Weight/ Level Comments all ex's w/o brace 7/21   Nustep  7' L2; seat 6 New 6/24; inc resistance 7/8   Recumbent Bike  5' L1 New 7/19   Elliptical  5' Lvl 3 New 8/16         Supine       Knee extension stretch 2x1' 5# Added 5#    Quad Sets  20x5\" A Inc reps 7/19   SLR    Isometric LAQ  10 min with VMS   10 seconds on  20 seconds rest  Added LAQ 6/30  Added SLR 7/12 with mod-Max A    Ankle pumps  20x       Heel Slides  10x2 strap  using slide board, strap and manual overpressure  Active performed on 7/5 second set    Patellar Mobs x      Long sitting calf stretch 3x 30\"     SLR 20x  A Able to complete 20x without rest break, no extension lag noted 8/19  *may add weight next visit   SL hip abd  2x10 2# Brace off- added 6/21  Added wt 8/19               Seated:       Isometric LAQ at 60 deg 20x5\"  New 6/28         Prone Quad sets  20x3\"  A Attempted 6/21- resumed 6/28; towel roll under ankle   Inc reps 7/12   Prone hip ext  10x2 2# Added wt 8/19                  Standing:      Brace donned 7/14   Heel Raises  20x   New 6/24- cues for quad set    Toe raises  20x  New 6/28- inc reps 6/30   TKE  20x5\" [place]  Increased reps 7/29, inc resistance 8/12    OKC 3 way hip 20xea Lime  Inc resistance 8/19   HS curls - OKC  2x10 A New 7/19- inc reps 8/12   High marches-alt 2x10  New 7/21- no UE assist   Step Ups + knee drive  3A83 6\" Added R knee drive 9/65   Step Downs  2x10 6\" New 8/12   Weight Shifting 10x ea  New 6/24- m/l to encourage increased weight bearing through LLE; added A/P 6/28   Held 7/14   Mini Squats  2x10  New 7/5- cues for equal bilateral weight shift    STS  2x5  New 7/5  without UE assist 7/14   Gait 1L   Independent without device or brace, cues for heel-toe gait and terminal knee extension with good carryover.       Stairway ambulation  1/2 flight x2  No handrails, reciprocal stepping- added 7/29   Modified LLE RDL 2x10 12in New deven leg press to avoid B knee valgus collapse compensation and to recruit L quad 1st with L SLR/hip abduction. Pt denied CP/vaso post-exs and was instructed to apply ice at home to reduce swelling and manage pain. [] No change. [] Other:    [x] Patient would continue to benefit from skilled physical therapy services in order to: reduce pain and effusion, restore mobility and strength, and improve gait to return patient to prior level of function. Problems:    [x] ? Pain: up to 10/10 pain L knee   [x] ? ROM limited L knee ROM globally, limited LLE flexibility  [x] ? Strength reduced LLE strength globally  [x] ? Function impaired function indicated by LEFI score of 82.5%   [x] Other impaired gait        STG: (to be met in 8 treatments) Reassessed by primary PT 7/5/22  ? Pain: Decrease L knee pain levels to 6/10 max with activity to promote improved tolerance to therapeutic exercise progressions. MET- 5/10 max   ? ROM: Increase L knee AROM limitations to at least 0-90 degrees to reduce difficulty with ADLs and normalize gait mechanics. MET- 0-106 A  ? Strength: Pt to demonstrate ability to perform at least 10 SLR without quad lag present in order to progress safely to full weight bearing. Ongoing- max A needed, only able to complete 5   ? Function: Pt to demonstrate ability to perform at least 5 STS with equal bilateral weight shift and without pain. Ongoing- cues for equal bilateral weight shifting, slight increase in lateral knee pain   Pt to demonstrate improved knee effusion with patellofemoral circumferential measurements at joint line and suprapatellar < 49 cm to demonstrate improved joint force absorption with therapeutic exercise progressions.  Partially met- 50.3 cm suprapatellar this date, 43.5 cm at joint line   Pt to be independent and compliant with Home Exercise Programs MET        LTG: (to be met in 16 treatments) - Re-assessed by An Trisha Mobley PT on 8/9/22   Improve score on assessment tool LEFI to 50% functionally impaired or less to demonstrate improved functional mobility - MET (12.5% impaired)   Reduce L knee pain levels to 2/10 or less to improve independence with all daily activities. - NOT MET (8-9/10)  Pt to demonstrate L knee AROM to 2-0-120 to ease difficulty with stair climbing and normalize gait mechanics. MET (0-130)  Pt to demonstrate LLE strength at 4+/5 globally to ease difficulty with heavier household chores and caregiving for her children. - Progressing (L hip flexors 4-/5, L quad 4/5)  Pt to demonstrate ability to ambulate at least 300' independently without increased knee pain and improved heel-toe progression, gait speed, and equal bilateral step lengths. - Progressing (mild inc of pain & instability/buckling L knee noted)  Pt to demonstrate ability to ascend/descend a full flight of stairs with reciprocal stepping mechanics and without pain to ease access to upstairs bedroom.- MET 7/29/22                    Patient goals: return to work       Pt. Education:  [x] Yes  [] No  [] Reviewed Prior HEP/Ed  Method of Education: [x] Verbal- use of ice as needed for swelling  [] Demo  [] Written  Access Code: [email or web address]  URL: [email or web address]  Date: 06/10/2022  Prepared by: Ortega Florez     Exercises  Supine Quad Set - 1 x daily - 7 x weekly - 3 sets - 10 reps  Supine Ankle Pumps - 1 x daily - 7 x weekly - 3 sets - 10 reps  Supine Heel Slide - 1 x daily - 7 x weekly - 3 sets - 10 reps  Supine Knee Extension Stretch on Towel Roll - 1 x daily - 7 x weekly - 3 sets - 10 reps  Long Sitting 4 Way Patellar [place] - 1 x daily - 7 x weekly - 3 sets - 10 reps   4 Way Patellar [place] - 1 x daily - 7 x weekly - 3 sets - 10 reps    Date: 07/12/2022  Prepared by: Ortega Florez    Exercises    Prone Quadriceps Set with Towel Roll - 1 x daily - 7 x weekly - 2 sets - 10 reps - 3\" hold  Prone Hip Extension - 1 x daily - 7 x weekly - 2 sets - 10 reps  Sidelying Hip Abduction - 1 x daily - 7 x weekly - 2 sets - 10 reps  Standing Heel Raise - 1 x daily - 7 x weekly - 2 sets - 10 reps  Heel Toe Raises with Counter Support - 1 x daily - 7 x weekly - 2 sets - 10 reps  Standing Terminal Knee Extension with Resistance - 1 x daily - 7 x weekly - 2 sets - 10 reps    Comprehension of Education:  [x] Verbalizes understanding. [] Demonstrates understanding. [] Needs review. [] Demonstrates/verbalizes HEP/Ed previously given. Plan: [x] Continue current frequency toward long and short term goals. [x] Specific Instructions for subsequent treatments:  Continue to progress quad and hamstring strengthening, no SAQ      Time In: 12:43 pm    Time Out: 1:30 pm      Electronically signed by:   Goldie Mittal, PT

## 2022-08-23 ENCOUNTER — HOSPITAL ENCOUNTER (OUTPATIENT)
Dept: PHYSICAL THERAPY | Facility: CLINIC | Age: 27
Setting detail: THERAPIES SERIES
Discharge: HOME | End: 2022-08-23
Payer: COMMERCIAL

## 2022-08-23 NOTE — FLOWSHEET NOTE
[] [facility] [address] [address]  [address]    [phone]  F: [phone]   [x] [address]  [facility] 36   [address]  P: [phone]  F: [phone]  [] [address]  [address]  P: [phone]  F: [phone] [] [address]  P: [phone]  F: [phone]  [] [address]  [address] [facility] [address]   [address]   [place]: [phone]  F: [phone]   [] [place] [address] [facility] [address]  [place]: [phone]   F: [phone]     Physical Therapy Cancel/No Show note    Date: 2022  Patient: Power Menjivar  : 1995  MRN: [id number]    Cancels/No Shows to date:     For today's appointment patient:    [x]  Cancelled    [] Rescheduled appointment    [] No-show     Reason given by patient:    [x]  Patient ill    []  Conflicting appointment    [] No transportation      [] Conflict with work    [] No reason given    [] Weather related    [] [id number]    [] Other:      Comments:        [x] Next appointment was confirmed    Electronically signed by: Tamia Rene PT

## 2022-08-25 ENCOUNTER — HOSPITAL ENCOUNTER (OUTPATIENT)
Dept: PHYSICAL THERAPY | Facility: CLINIC | Age: 27
Setting detail: THERAPIES SERIES
Discharge: HOME | End: 2022-08-25
Payer: COMMERCIAL

## 2022-08-25 PROCEDURE — 97016 VASOPNEUMATIC DEVICE THERAPY: CPT

## 2022-08-25 PROCEDURE — 97110 THERAPEUTIC EXERCISES: CPT

## 2022-08-25 NOTE — FLOWSHEET NOTE
[] [facility]  [address]  [phone]  F: [phone] [x] [address]  [place] [address]   [address]  P: [phone]  F: [phone] [] [address]  [address] [facility] [address]  P: [phone]  F: [phone] [] [address]  P: [phone]  F: [phone] [] [address]  [facility]   [address]   [place]: [phone]  F: [phone]      Physical Therapy Daily Treatment Note    Date:  2022  Patient Name:  Rosa Manzo    :  1995  MRN: [id number]  Physician: Alisia Wright MD                              Insurance: [facility] (46)  Medical Diagnosis: S83.512A - New ACL tear, left, initial encounter                       Rehab Codes: M25.562, M62.81, R26.89, M25.462  Onset date: surgical date 22                             Next Dr's appt.: 22  Visit# / total visits:      Cancels/No Shows: 2/0    Subjective:    Pain:  [] Yes  [x] No  Location: L knee  Pain Rating: (0-10 scale) 0/10  Pain altered Tx:  [x] No  [] Yes  Action:  Comments: Pt arrives without complaint of pain this date. Does note ongoing swelling in L knee otherwise no new changes. Slight soreness through L thigh/knee following previous session.        Objective:  Modalities: vaso at MOD pressure x10' with pillowcase wrapped around L knee  Precautions: full ACL protocol in soft chart, WBAT   Exercises: [employer] Access Code: [email or web address]   Exercise  S/p L knee ACL reconstruction 22 with allograft     Reps/ Time Weight/ Level Comments all ex's w/o brace    Nustep  7' L2; seat 6 New ; inc resistance    Recumbent Bike  5' L1 New    Elliptical  5' Lvl 3 New          Supine       Knee extension stretch 2x1' 5# Added 5#    Quad Sets  20x5\" A Inc reps 7/19   SLR    Isometric LAQ  10 min with VMS   10 seconds on  20 seconds rest  Added LAQ 6/30  Added SLR 7/12 with mod-Max A    Ankle pumps  20x       Heel Slides  10x2 strap  using slide board, strap and manual overpressure  Active performed on 7/5 second set    Patellar Mobs x      Long sitting calf stretch 3x 30\"     SLR 20x  A Able to complete 20x without rest break, no extension lag noted 8/19  *may add weight next visit   SL hip abd  2x10 2# Brace off- added 6/21  Added wt 8/19               Seated:       Isometric LAQ at 60 deg 20x5\"  New 6/28         Prone Quad sets  20x3\"  A Attempted 6/21- resumed 6/28; towel roll under ankle   Inc reps 7/12   Prone hip ext  10x2 2# Added wt 8/19                  Standing:      Brace donned 7/14   Heel Raises  20x   New 6/24- cues for quad set    Toe raises  20x  New 6/28- inc reps 6/30   TKE  20x5\" [place]  Increased reps 7/29, inc resistance 8/12    OKC 3 way hip 20xea [place]  Inc resistance 8/19   HS curls - OKC  2x10 A New 7/19- inc reps 8/12   High marches-alt 2x10  New 7/21- no UE assist   Step Ups + knee drive  3X36 6\" Added R knee drive 6/14   Lateral step up 2x10 6\" New 8/25   Step Downs  2x10 6\" New 8/12   Weight Shifting 10x ea  New 6/24- m/l to encourage increased weight bearing through LLE; added A/P 6/28   Held 7/14   Mini Squats  2x10  New 7/5- cues for equal bilateral weight shift    STS  2x5  New 7/5  without UE assist 7/14   Gait 1L   Independent without device or brace, cues for heel-toe gait and terminal knee extension with good carryover.       Stairway ambulation  1/2 flight x2  No handrails, reciprocal stepping- added 7/29   Modified LLE RDL 2x10 12in New 8/16-B finger taps on steps   Walking lunges 1 lap  8# DB New 8/16 - dumbbells both hands   Heel tap  20x ea  4in New 8/16 - forward & lateral, LLE stance, 1 UE support   Band walk  30ft x2 [place]  New 8/16 - lateral, monster    Squat + chair tap 10x  New 8/16 - added foam pad to inc height    TRX squat + bench tap 20x  New 8/19   B calf stretch 3x30s  slantboard New 8/19   Star slides  10x  New 8/25   RDL 10x A New 8/25         SATHISH LEG PRESS   New 8/16   DL 2x10 Lvl 7, pl 5 Inc depth & weight    2 up 1 down 2x10 Lvl 7, pl 4 LLE down only  Inc depth & weight    LLE only 2x10  Lvl 7, pl 3 New 8/19   Heel raises 20x Lvl 9, pl 5    Other: 6/13 knee ROM 0-79 deg  6/21:   Flexion: 92 AA  Ext: lacking 2 A , 0 P    6/24  Flexion: 95 AA, 102 P  Ext 0A, 0P    6/28  99 AA  110 P  Ext 0 A    7/5  Flexion: 110 AA, 106 A  Ext: 0     7/8  Flexion 113 AA  Ext 0     7/14  Flexion  114 AA  Ext 0    7/19  Flexion  114 AA  Ext 0    7/21/22  L knee flexion   116 AA  115 A    7/29/22  L Knee flexion  118 A    8/9/22  L knee ROM: 0-130 A      Objective:    STRENGTH     Left Right   Hip Flex 4-/5 5/5   Ext 4+/5     ABD 4+/5     ADD      Knee Flex 5/5 5/5   Ext 4/5 5/5   Ankle DF  5/5   PF  5/5   INV      EVER                   Functional Test: LEFI Score: 14/80 or 82.5% functionally impaired  8/9/22: 70/80 or 12.5% functionally impaired             Treatment Charges: Mins Units   []  Modalities     [x]  Ther Exercise 45 3   []  Manual Therapy     []  Ther Activities     []  Aquatics     [x]  Vasocompression 10 1   []  Other- attended stim     Total Treatment time 55 4           Assessment: [x] Progressing toward goals. Initiated session again with elliptical warm up this date followed by progressive therapeutic exercise program. Able to add star slides, SL deadlift, and lateral step ups to promote improved quad strength and single leg stability. Minimal UE support utilized through all standing interventions to challenge LE stability. Initial cueing for lateral step ups and star slides to maintain L heel contact to minimize excessive anterior tibial translation with good carryover. Cues to minimize excessive genu valgus required throughout session with fair carryover.  Pt with overall good tolerance throughout session and is able to complete all exercises without noting increase in pain. Ended session with vaso to L knee to reduce effusion and soreness post exercise. [] No change. [] Other:    [x] Patient would continue to benefit from skilled physical therapy services in order to: reduce pain and effusion, restore mobility and strength, and improve gait to return patient to prior level of function. Problems:    [x] ? Pain: up to 10/10 pain L knee   [x] ? ROM limited L knee ROM globally, limited LLE flexibility  [x] ? Strength reduced LLE strength globally  [x] ? Function impaired function indicated by LEFI score of 82.5%   [x] Other impaired gait        STG: (to be met in 8 treatments) Reassessed by primary PT 7/5/22  ? Pain: Decrease L knee pain levels to 6/10 max with activity to promote improved tolerance to therapeutic exercise progressions. MET- 5/10 max   ? ROM: Increase L knee AROM limitations to at least 0-90 degrees to reduce difficulty with ADLs and normalize gait mechanics. MET- 0-106 A  ? Strength: Pt to demonstrate ability to perform at least 10 SLR without quad lag present in order to progress safely to full weight bearing. Ongoing- max A needed, only able to complete 5   ? Function: Pt to demonstrate ability to perform at least 5 STS with equal bilateral weight shift and without pain. Ongoing- cues for equal bilateral weight shifting, slight increase in lateral knee pain   Pt to demonstrate improved knee effusion with patellofemoral circumferential measurements at joint line and suprapatellar < 49 cm to demonstrate improved joint force absorption with therapeutic exercise progressions.  Partially met- 50.3 cm suprapatellar this date, 43.5 cm at joint line   Pt to be independent and compliant with Home Exercise Programs MET        LTG: (to be met in 16 treatments) - Re-assessed by An Trisha Mobley PT on 8/9/22   Improve score on assessment tool LEFI to 50% functionally impaired or less to demonstrate improved functional mobility - MET (12.5% impaired)   Reduce L knee pain levels to 2/10 or less to improve independence with all daily activities. - NOT MET (8-9/10)  Pt to demonstrate L knee AROM to 2-0-120 to ease difficulty with stair climbing and normalize gait mechanics. MET (0-130)  Pt to demonstrate LLE strength at 4+/5 globally to ease difficulty with heavier household chores and caregiving for her children. - Progressing (L hip flexors 4-/5, L quad 4/5)  Pt to demonstrate ability to ambulate at least 300' independently without increased knee pain and improved heel-toe progression, gait speed, and equal bilateral step lengths. - Progressing (mild inc of pain & instability/buckling L knee noted)  Pt to demonstrate ability to ascend/descend a full flight of stairs with reciprocal stepping mechanics and without pain to ease access to upstairs bedroom.- MET 7/29/22                    Patient goals: return to work       Pt. Education:  [x] Yes  [] No  [] Reviewed Prior HEP/Ed  Method of Education: [x] Verbal- use of ice as needed for swelling  [] Demo  [] Written  Access Code: [email or web address]  URL: [email or web address]  Date: 06/10/2022  Prepared by: Padmaja Quezada     Exercises  Supine Quad Set - 1 x daily - 7 x weekly - 3 sets - 10 reps  Supine Ankle Pumps - 1 x daily - 7 x weekly - 3 sets - 10 reps  Supine Heel Slide - 1 x daily - 7 x weekly - 3 sets - 10 reps  Supine Knee Extension Stretch on Towel Roll - 1 x daily - 7 x weekly - 3 sets - 10 reps  Long Sitting 4 Way Patellar [place] - 1 x daily - 7 x weekly - 3 sets - 10 reps   4 Way Patellar [place] - 1 x daily - 7 x weekly - 3 sets - 10 reps    Date: 07/12/2022  Prepared by: Padmaja Quezada    Exercises    Prone Quadriceps Set with Towel Roll - 1 x daily - 7 x weekly - 2 sets - 10 reps - 3\" hold  Prone Hip Extension - 1 x daily - 7 x weekly - 2 sets - 10 reps  Sidelying Hip Abduction - 1 x daily - 7 x weekly - 2 sets - 10 reps  Standing Heel Raise - 1 x daily - 7 x weekly - 2 sets - 10 reps  Heel Toe Raises with Counter Support - 1 x daily - 7 x weekly - 2 sets - 10 reps  Standing Terminal Knee Extension with Resistance - 1 x daily - 7 x weekly - 2 sets - 10 reps    Comprehension of Education:  [x] Verbalizes understanding. [] Demonstrates understanding. [] Needs review. [] Demonstrates/verbalizes HEP/Ed previously given. Plan: [x] Continue current frequency toward long and short term goals.     [x] Specific Instructions for subsequent treatments:  Continue to progress standing/functional strengthening       Time In: 11:00 am     Time Out: 12:00 pm       Electronically signed by:  Arminda Kim PT

## 2022-08-29 ENCOUNTER — HOSPITAL ENCOUNTER (OUTPATIENT)
Dept: PHYSICAL THERAPY | Facility: CLINIC | Age: 27
Setting detail: THERAPIES SERIES
Discharge: HOME | End: 2022-08-29
Payer: COMMERCIAL

## 2022-08-29 PROCEDURE — 97016 VASOPNEUMATIC DEVICE THERAPY: CPT

## 2022-08-29 PROCEDURE — 97110 THERAPEUTIC EXERCISES: CPT

## 2022-08-29 NOTE — FLOWSHEET NOTE
[] [facility] [address]  [address]  [phone]  F: [phone] [x] [address]  [place] [address]   [address]  P: [phone]  F: [phone] [] [address]  [address] [facility]  P: [phone]  F: [phone] [] [address]  P: [phone]  F: [phone] [] [address]  [facility]   [address]   [place]: [phone]  F: [phone]      Physical Therapy Daily Treatment Note    Date:  2022  Patient Name:  Raul Castrejon    :  1995  MRN: [id number]  Physician: Mick Sears MD                              Insurance: [facility] ()  Medical Diagnosis: S80.200A - New ACL tear, left, initial encounter                       Rehab Codes: M25.562, M62.81, R26.89, M25.462  Onset date: surgical date 22                             Next Dr's appt.: 22  Visit# / total visits:      Cancels/No Shows: 2/0    Subjective:    Pain:  [] Yes  [x] No  Location: L knee  Pain Rating: (0-10 scale) 0/10  Pain altered Tx:  [x] No  [] Yes  Action:  Comments: Pt arrives without complaint of pain this date. Does note that she was able to work a half day work today and wore compression stockings without any pain/difficulty. Pt does report that she is set to return to work full time beginning tomorrow without restriction.        Objective:  Modalities: vaso at MOD pressure x10' with pillowcase wrapped around L knee  Precautions: full ACL protocol in soft chart, WBAT   Exercises: [employer] Access Code: [email or web address]   Exercise  S/p L knee ACL reconstruction 22 with allograft     Reps/ Time Weight/ Level Comments all ex's w/o brace    Nustep  7' L2; seat 6 New ; inc resistance    Recumbent Bike  5' L1 New  Elliptical  5' Lvl 3 New 8/16         Supine       Knee extension stretch 2x1' 5# Added 5#    Quad Sets  20x5\" A Inc reps 7/19   SLR    Isometric LAQ  10 min with VMS   10 seconds on  20 seconds rest  Added LAQ 6/30  Added SLR 7/12 with mod-Max A    Ankle pumps  20x       Heel Slides  10x2 strap  using slide board, strap and manual overpressure  Active performed on 7/5 second set    Patellar Mobs x      Long sitting calf stretch 3x 30\"     SLR 10x 2 A Able to complete 20x without rest break, no extension lag noted 8/19  *may add weight next visit   SL hip abd  2x10 2# Brace off- added 6/21  Added wt 8/19               Seated:       Isometric LAQ at 60 deg 20x5\"  New 6/28   LAQ-  20x5\"  Full range 8/29   Prone Quad sets  20x3\"  A Attempted 6/21- resumed 6/28; towel roll under ankle   Inc reps 7/12   Prone hip ext  10x2 2# Added wt 8/19                  Standing:      Brace donned 7/14   Heel Raises  20x   New 6/24- cues for quad set    Toe raises  20x  New 6/28- inc reps 6/30   TKE  20x5\" [place]  Increased reps 7/29, inc resistance 8/12     3 way hip 20xea [place] resistance 8/19; bilateral 8/29   HS curls - OKC  2x10 A New 7/19- inc reps 8/12   High marches-alt 2x10  New 7/21- no UE assist   Step Ups + knee drive  9F25 6\" Added R knee drive 6/94   Lateral step up 2x10 6\" New 8/25   Step Downs  2x10 6\" New 8/12   Weight Shifting 10x ea  New 6/24- m/l to encourage increased weight bearing through LLE; added A/P 6/28   Held 7/14   Mini Squats  2x10  New 7/5- cues for equal bilateral weight shift    STS  2x5  New 7/5  without UE assist 7/14   Gait 1L   Independent without device or brace, cues for heel-toe gait and terminal knee extension with good carryover.       Stairway ambulation  1/2 flight x2  No handrails, reciprocal stepping- added 7/29   Modified LLE RDL 2x10 12in New 8/16-B finger taps on steps   Walking lunges 1 lap  8# DB New 8/16 - dumbbells both hands   Heel tap  20x ea  4in New 8/16 - forward & lateral, LLE stance, without UE support 8/29   Band walk  30ft x2 [place]  New 8/16 - lateral, monster    Squat + chair tap 10x  New 8/16 - added foam pad to inc height    TRX squat + bench tap 20x  New 8/19   B calf stretch 3x30s  slantboard New 8/19   Star slides  10x  New 8/25   RDL 10x A New 8/25   Reverse lunge+knee drive 42J A New 0/61         SATHISH LEG PRESS   New 8/16   DL 2x10 Lvl 7, pl 5 Inc depth & weight    2 up 1 down 2x10 Lvl 7, pl 4 LLE down only  Inc depth & weight    LLE only 2x10  Lvl 7, pl 3 New 8/19   Heel raises 20x Lvl 9, pl 5    Other: 6/13 knee ROM 0-79 deg  6/21:   Flexion: 92 AA  Ext: lacking 2 A , 0 P    6/24  Flexion: 95 AA, 102 P  Ext 0A, 0P    6/28  99 AA  110 P  Ext 0 A    7/5  Flexion: 110 AA, 106 A  Ext: 0     7/8  Flexion 113 AA  Ext 0     7/14  Flexion  114 AA  Ext 0    7/19  Flexion  114 AA  Ext 0    7/21/22  L knee flexion   116 AA  115 A    7/29/22  L Knee flexion  118 A    8/9/22  L knee ROM: 0-130 A      Objective: Reassessed by Andrea Vaz, PT 8/29/22    STRENGTH     Left Right   Hip Flex 5/5 5/5   Ext 4+/5     ABD 4+/5     ADD      Knee Flex 5/5 5/5   Ext 4/5 5/5   Ankle DF  5/5   PF  5/5   INV      EVER                 Knee AROM: 0-130 degrees    Functional Test: LEFI Score: 14/80 or 82.5% functionally impaired  8/9/22: 70/80 or 12.5% functionally impaired             Treatment Charges: Mins Units   []  Modalities     [x]  Ther Exercise 45 3   []  Manual Therapy     []  Ther Activities     []  Aquatics     [x]  Vasocompression 10 1   []  Other- attended stim     Total Treatment time 55 4     *reassessment billed under therapeutic exercise       Assessment: [x] Progressing toward goals. Reassessed progress towards goals this date. Pt is currently demonstrating significant improvements overall in reduction of L knee pain, L knee effusion, overall strength and mobility through LLE, and improved gait for short distances.  Pt continues to be most limited at this time by lack of quad strength, knee stability, and quad endurance with gait impairments described below. Progressed quad strengthening with reverse lunge to march and heel taps without UE support. Pt with difficulty maintaining knee stability with tendency towards LE touchdown to maintain balance as well as lateral trunk shifting. Pt denies pain but does note fatigue after several repetitions. Increased fatigue is likely also attributed to having started work this date. Ended session with vaso to reduce knee soreness and pain at end of session. [] No change. [] Other:    [x] Patient would continue to benefit from skilled physical therapy services in order to: reduce pain and effusion, restore mobility and strength, and improve gait to return patient to prior level of function. Problems:    [x] ? Pain: up to 10/10 pain L knee   [x] ? ROM limited L knee ROM globally, limited LLE flexibility  [x] ? Strength reduced LLE strength globally  [x] ? Function impaired function indicated by LEFI score of 82.5%   [x] Other impaired gait        STG: (to be met in 8 treatments) Reassessed by primary PT 8/29/22  ? Pain: Decrease L knee pain levels to 6/10 max with activity to promote improved tolerance to therapeutic exercise progressions. MET- 4/10 max   ? ROM: Increase L knee AROM limitations to at least 0-90 degrees to reduce difficulty with ADLs and normalize gait mechanics. MET- 0-106 A  ? Strength: Pt to demonstrate ability to perform at least 10 SLR without quad lag present in order to progress safely to full weight bearing. MET   ? Function: Pt to demonstrate ability to perform at least 5 STS with equal bilateral weight shift and without pain. MET   Pt to demonstrate improved knee effusion with patellofemoral circumferential measurements at joint line and suprapatellar < 49 cm to demonstrate improved joint force absorption with therapeutic exercise progressions.  MET- 48.5 cm suprapatellar  Pt to be independent and compliant with Home Exercise Programs MET        LTG: (to be met in 16 treatments) - Re-assessed by primary PT on 8/29/22  Improve score on assessment tool LEFI to 50% functionally impaired or less to demonstrate improved functional mobility - MET (12.5% impaired)   Reduce L knee pain levels to 2/10 or less to improve independence with all daily activities. MET   Pt to demonstrate L knee AROM to 2-0-120 to ease difficulty with stair climbing and normalize gait mechanics. MET (0-130)  Pt to demonstrate LLE strength at 4+/5 globally to ease difficulty with heavier household chores and caregiving for her children. - Progressing ( L quad 4/5)  Pt to demonstrate ability to ambulate at least 300' independently without increased knee pain and improved heel-toe progression, gait speed, and equal bilateral step lengths. - Progressing (reduced L stance time and heel toe progression after 200')  Pt to demonstrate ability to ascend/descend a full flight of stairs with reciprocal stepping mechanics and without pain to ease access to upstairs bedroom.- MET 7/29/22                    Patient goals: return to work       Pt. Education:  [] Yes  [x] No  [] Reviewed Prior HEP/Ed  Method of Education: [] Verbal  [] Demo  [] Written  Access Code: [email or web address]  URL: [email or web address]  Date: 06/10/2022  Prepared by: Pita Rivers     Exercises  Supine Quad Set - 1 x daily - 7 x weekly - 3 sets - 10 reps  Supine Ankle Pumps - 1 x daily - 7 x weekly - 3 sets - 10 reps  Supine Heel Slide - 1 x daily - 7 x weekly - 3 sets - 10 reps  Supine Knee Extension Stretch on Towel Roll - 1 x daily - 7 x weekly - 3 sets - 10 reps  Long Sitting 4 Way Patellar [place] - 1 x daily - 7 x weekly - 3 sets - 10 reps   4 Way Patellar [place] - 1 x daily - 7 x weekly - 3 sets - 10 reps    Date: 07/12/2022  Prepared by: Pita Rivers    Exercises    Prone Quadriceps Set with Towel Roll - 1 x daily - 7 x weekly - 2 sets - 10 reps - 3\" hold  Prone Hip Extension - 1 x daily - 7 x weekly - 2 sets - 10 reps  Sidelying Hip Abduction - 1 x daily - 7 x weekly - 2 sets - 10 reps  Standing Heel Raise - 1 x daily - 7 x weekly - 2 sets - 10 reps  Heel Toe Raises with Counter Support - 1 x daily - 7 x weekly - 2 sets - 10 reps  Standing Terminal Knee Extension with Resistance - 1 x daily - 7 x weekly - 2 sets - 10 reps    Comprehension of Education:  [] Verbalizes understanding. [] Demonstrates understanding. [] Needs review. [] Demonstrates/verbalizes HEP/Ed previously given. Plan: [] Continue current frequency toward long and short term goals. Continue at 1x/week due to scheduling availability   [x] Specific Instructions for subsequent treatments: progress quad strength and endurance; SLS; consider BFR?       Time In: 4:00 pm     Time Out: 5:00 pm       Electronically signed by:  Jeanie Moise PT

## 2022-08-31 ENCOUNTER — APPOINTMENT (OUTPATIENT)
Dept: PHYSICAL THERAPY | Facility: CLINIC | Age: 27
End: 2022-08-31
Payer: COMMERCIAL

## 2022-08-31 ENCOUNTER — OFFICE VISIT (OUTPATIENT)
Dept: ORTHOPEDIC SURGERY | Age: 27
End: 2022-08-31

## 2022-08-31 VITALS — WEIGHT: 232 LBS | HEIGHT: 64 IN | BODY MASS INDEX: 39.61 KG/M2 | RESPIRATION RATE: 14 BRPM

## 2022-08-31 DIAGNOSIS — Z98.890 STATUS POST RECONSTRUCTION OF ANTERIOR CRUCIATE LIGAMENT: Primary | ICD-10-CM

## 2022-08-31 PROCEDURE — 99024 POSTOP FOLLOW-UP VISIT: CPT | Performed by: ORTHOPAEDIC SURGERY

## 2022-09-02 NOTE — PROGRESS NOTES
Procedure: Left knee ACL reconstruction  Date of procedure: 6/9/2022    HPI: Ms. Lacey Rodriguez is 25-year-old approximately 3 months status post the aforementioned procedure. She states that she is doing well and having no pain or instability in her knee. Physical therapy is going well for her. Physical examination:  Evaluation patient's left knee and lower extremity demonstrates intact incisions that are fully healed. No swelling present at this time. She lacks approximately 5 to 10 degrees of active extension with flexion to approximately 115 degrees. Passively she has full extension. No gross instability with varus and valgus stress applied across the knee joint and she has a negative Lachman. Sensation is grossly intact light touch in all dermatomes and she has good pedal pulses distally. Imaging studies:  2 x-ray views of the left knee completed on 8/31/2022 were reviewed independently demonstrating femoral and tibial tunnels consistent with ACL reconstruction. No significant tunnel widening noted. No obvious fracture, dislocation or subluxation. Impression and plan: Ms. Lacey Rodriguez is a 25-year-old 3 months status post left knee ACL reconstruction. She is doing relatively well but I did encourage her to keep up with her stretching to make sure that she has full knee extension at rest.  She can continue on with physical therapy working on motion as well as progressive strengthening. She may continue weightbearing as tolerated and I will see her back for reevaluation in 3 months.

## 2022-09-02 NOTE — FLOWSHEET NOTE
[] [facility]) - [facility] &  Therapy  [address]  [phone]  F: [phone] [x] [address] [facility]  [facility] [address]   [address]  P: [phone]  F: [phone] [] [address] &  Therapy  [address] [facility]  P: [phone]  F: [phone] [] [address]  [facility]   [address]   [place]: [phone]  F: [phone]     THERAPY RESPONSIBILITY OF CARE TRANSFER FORM       PATIENT NAME: Opal Daugherty  MRN: [id number]   : 1995      TRANSFERRING FACILITY:    [] [facility]   [] [facility]   [x]  [facility]   [] Priya Jacques OT   [] [facility] [] Krystina Hedge   [] [facility]  [] Priya Jacques   [] Other:       ACCEPTING FACILITY   [] [facility]   [] [facility]   [x]  [facility]   [] Priya Jacques OT   [] [facility] [] Krystina Hedge   [] [facility]  [] Priya Jacques   [] Other:          REASON FOR TRANSFER: primary PT on maternity leave       TRANSFER OF CARE:    I am transferring the care of the above patient to: Goldie Ferguson, PT  [place] [address] [facility]  2022      ACCEPTANCE OF CARE:     I am accepting the care of the above patient.  Goldie Ferguson, PT

## 2022-09-12 ENCOUNTER — HOSPITAL ENCOUNTER (OUTPATIENT)
Dept: PHYSICAL THERAPY | Facility: CLINIC | Age: 27
Setting detail: THERAPIES SERIES
Discharge: HOME | End: 2022-09-12
Payer: COMMERCIAL

## 2022-09-12 PROCEDURE — 97110 THERAPEUTIC EXERCISES: CPT

## 2022-09-12 PROCEDURE — 97016 VASOPNEUMATIC DEVICE THERAPY: CPT

## 2022-09-12 NOTE — FLOWSHEET NOTE
L knee ACL reconstruction 6/9/22 with allograft     Reps/ Time Weight/ Level Comments all ex's w/o brace 7/21   Nustep  7' L2; seat 6 New 6/24; inc resistance 7/8   Recumbent Bike  5' L1 New 7/19   Elliptical  5' Lvl 3 New 8/16         Supine       Knee extension stretch 2x1' 5# Added 5#    Quad Sets  20x5\" A Inc reps 7/19   SLR    Isometric LAQ  10 min with VMS   10 seconds on  20 seconds rest  Added LAQ 6/30  Added SLR 7/12 with mod-Max A    Ankle pumps  20x       Heel Slides  10x2 strap  using slide board, strap and manual overpressure  Active performed on 7/5 second set    Patellar Mobs x      Long sitting calf stretch 3x 30\"     SLR 10x 2 2# Added wt 9/12   SL hip abd  2x10 2# Brace off- added 6/21  Added wt 8/19               Seated:       Isometric LAQ at 60 deg 20x5\"  New 6/28   LAQ-  20x5\" 2# Full range 8/29   Prone Quad sets  20x3\"  A Attempted 6/21- resumed 6/28; towel roll under ankle   Inc reps 7/12   Prone hip ext  10x2 2# Added wt 8/19                  Standing:      Brace donned 7/14   Heel Raises  20x   New 6/24- cues for quad set    Toe raises  20x  New 6/28- inc reps 6/30   TKE  20x5\" [place]  Increased reps 7/29, inc resistance 8/12     3 way hip 20xea [place] resistance 8/19; bilateral 8/29   HS curls - OKC  2x10 A New 7/19- inc reps 8/12   High marches-alt 2x10  New 7/21- no UE assist   Step Ups + knee drive  9U49 6\" Added R knee drive 0/09   Lateral step up 2x10 6\" New 8/25   Step Downs  2x10 6\" New 8/12   Weight Shifting 10x ea  New 6/24- m/l to encourage increased weight bearing through LLE; added A/P 6/28   Held 7/14   Mini Squats  2x10  New 7/5- cues for equal bilateral weight shift    STS  2x5  New 7/5  without UE assist 7/14   Gait 1L   Independent without device or brace, cues for heel-toe gait and terminal knee extension with good carryover.       Stairway ambulation  1/2 flight x2  No handrails, reciprocal stepping- added 7/29   Modified LLE RDL 2x10 12in New 8/16-B finger taps on steps Walking lunges 1 lap  10# DB New 8/16 - dumbbells both hands; progressed wt 9/12   Heel tap  20x ea  4in New 8/16 - forward & lateral, LLE stance, without UE support 8/29   Band walk  30ft x2 [place]   New 8/16 - lateral, monster    Squat + chair tap 10x  New 8/16 - added foam pad to inc height    TRX squat + bench tap 20x  New 8/19   B calf stretch 3x30s  slantboard New 8/19   Star slides  10x  New 8/25   RDL SL 2x10 A  6\"  LLE only, touch 6\" step 9/12   Reverse lunge+knee drive 94W A; 6in New 8/29; added step 9/12   [place]is KE 2x10 5pl New 9/12   Regis KE 2 up 1 down 15x 3pl New 9/12   Regis KE LLE  2x10 1pl New 9/12               REGIS LEG PRESS   New 8/16   DL 2x10 Lvl 7, pl 5 Inc depth & weight    2 up 1 down 2x10 Lvl 7, pl 4 LLE down only  Inc depth & weight    LLE only 2x10  Lvl 7, pl 3 New 8/19   Heel raises 20x Lvl 9, pl 5    Other: 6/13 knee ROM 0-79 deg  6/21:   Flexion: 92 AA  Ext: lacking 2 A , 0 P    6/24  Flexion: 95 AA, 102 P  Ext 0A, 0P    6/28  99 AA  110 P  Ext 0 A    7/5  Flexion: 110 AA, 106 A  Ext: 0     7/8  Flexion 113 AA  Ext 0     7/14  Flexion  114 AA  Ext 0    7/19  Flexion  114 AA  Ext 0    7/21/22  L knee flexion   116 AA  115 A    7/29/22  L Knee flexion  118 A    8/9/22  L knee ROM: 0-130 A      Objective: Reassessed by Amanda Villalobos, PT 8/29/22    STRENGTH     Left Right   Hip Flex 5/5 5/5   Ext 4+/5     ABD 4+/5     ADD      Knee Flex 5/5 5/5   Ext 4/5 5/5   Ankle DF  5/5   PF  5/5   INV      EVER                 Knee AROM: 0-130 degrees    Functional Test: LEFI Score: 14/80 or 82.5% functionally impaired  8/9/22: 70/80 or 12.5% functionally impaired             Treatment Charges: Mins Units   []  Modalities     [x]  Ther Exercise 45 3   []  Manual Therapy     []  Ther Activities     []  Aquatics     [x]  Vasocompression 10 1   []  Other- attended stim     Total Treatment time 55 4         Assessment: [x] Progressing toward goals.   Initiated therapy session on Elliptical for warm up followed by progressive strengthening program focus on improving LLE strength and stability as well as maximize activation of L quad musculatures. Pt cont to demo reduced quad activation with SLR however able to add weight with mild extension lag noted. Added deven leg extension with increased difficulty when performing dveen leg press 2 ups 1 down and LLE only thus reduced resistance to improve tolerance and proper quad activation. Cues provided to increase WB onto LLE with walking lunges and deven leg extension. Pt required intermittent cues to encourage full knee extension with reverse lunge to knee drive to improve single leg stance confidence and stability. Again end with vaso for 10 mins to manage pain and inflammation. [] No change. [] Other:    [x] Patient would continue to benefit from skilled physical therapy services in order to: reduce pain and effusion, restore mobility and strength, and improve gait to return patient to prior level of function. Problems:    [x] ? Pain: up to 10/10 pain L knee   [x] ? ROM limited L knee ROM globally, limited LLE flexibility  [x] ? Strength reduced LLE strength globally  [x] ? Function impaired function indicated by LEFI score of 82.5%   [x] Other impaired gait        STG: (to be met in 8 treatments) Reassessed by primary PT 8/29/22  ? Pain: Decrease L knee pain levels to 6/10 max with activity to promote improved tolerance to therapeutic exercise progressions. MET- 4/10 max   ? ROM: Increase L knee AROM limitations to at least 0-90 degrees to reduce difficulty with ADLs and normalize gait mechanics. MET- 0-106 A  ? Strength: Pt to demonstrate ability to perform at least 10 SLR without quad lag present in order to progress safely to full weight bearing. MET   ? Function: Pt to demonstrate ability to perform at least 5 STS with equal bilateral weight shift and without pain.  MET   Pt to demonstrate improved knee effusion with patellofemoral circumferential measurements at joint line and suprapatellar < 49 cm to demonstrate improved joint force absorption with therapeutic exercise progressions. MET- 48.5 cm suprapatellar  Pt to be independent and compliant with Home Exercise Programs MET        LTG: (to be met in 16 treatments) - Re-assessed by primary PT on 8/29/22  Improve score on assessment tool LEFI to 50% functionally impaired or less to demonstrate improved functional mobility - MET (12.5% impaired)   Reduce L knee pain levels to 2/10 or less to improve independence with all daily activities. MET   Pt to demonstrate L knee AROM to 2-0-120 to ease difficulty with stair climbing and normalize gait mechanics. MET (0-130)  Pt to demonstrate LLE strength at 4+/5 globally to ease difficulty with heavier household chores and caregiving for her children. - Progressing ( L quad 4/5)  Pt to demonstrate ability to ambulate at least 300' independently without increased knee pain and improved heel-toe progression, gait speed, and equal bilateral step lengths. - Progressing (reduced L stance time and heel toe progression after 200')  Pt to demonstrate ability to ascend/descend a full flight of stairs with reciprocal stepping mechanics and without pain to ease access to upstairs bedroom.- MET 7/29/22                    Patient goals: return to work       Pt. Education:  [] Yes  [x] No  [] Reviewed Prior HEP/Ed  Method of Education: [] Verbal  [] Demo  [] Written  Access Code: [email or web address]  URL: [email or web address]  Date: 06/10/2022  Prepared by: Radha Kilpatrick     Exercises  Supine Quad Set - 1 x daily - 7 x weekly - 3 sets - 10 reps  Supine Ankle Pumps - 1 x daily - 7 x weekly - 3 sets - 10 reps  Supine Heel Slide - 1 x daily - 7 x weekly - 3 sets - 10 reps  Supine Knee Extension Stretch on Towel Roll - 1 x daily - 7 x weekly - 3 sets - 10 reps  Long Sitting 4 Way Patellar [place] - 1 x daily - 7 x weekly - 3 sets - 10 reps   4 Way Patellar Glide - 1 x daily - 7 x weekly - 3 sets - 10 reps    Date: 07/12/2022  Prepared by: Dena Modest    Exercises    Prone Quadriceps Set with Towel Roll - 1 x daily - 7 x weekly - 2 sets - 10 reps - 3\" hold  Prone Hip Extension - 1 x daily - 7 x weekly - 2 sets - 10 reps  Sidelying Hip Abduction - 1 x daily - 7 x weekly - 2 sets - 10 reps  Standing Heel Raise - 1 x daily - 7 x weekly - 2 sets - 10 reps  Heel Toe Raises with Counter Support - 1 x daily - 7 x weekly - 2 sets - 10 reps  Standing Terminal Knee Extension with Resistance - 1 x daily - 7 x weekly - 2 sets - 10 reps    Comprehension of Education:  [] Verbalizes understanding. [] Demonstrates understanding. [] Needs review. [] Demonstrates/verbalizes HEP/Ed previously given. Plan: [] Continue current frequency toward long and short term goals. Continue at 1x/week due to scheduling availability   [x] Specific Instructions for subsequent treatments: progress quad strength and endurance; SLS; consider BFR? Time In: 5:00 pm     Time Out: 6:01 pm       Electronically signed by:   Goldie Austin, PT

## 2022-09-19 ENCOUNTER — HOSPITAL ENCOUNTER (OUTPATIENT)
Dept: PHYSICAL THERAPY | Facility: CLINIC | Age: 27
Setting detail: THERAPIES SERIES
Discharge: HOME | End: 2022-09-19
Payer: COMMERCIAL

## 2022-09-19 NOTE — FLOWSHEET NOTE
[] [facility]) [facility] &  Therapy  [address]    [phone]  F: [phone]   [x] [address]  [facility]   [address]  P: [phone]  F: [phone]  [] [address]  [address] [facility]  P: [phone]  F: [phone] [] [address]  P: [phone]  F: [phone]  [] [address]  [address] [facility] [address]   [address]   [place]: [phone]  F: [phone]   [] [place] [id number] [facility] [address]  [place]: [phone]   F: [phone]     Physical Therapy Cancel/No Show note    Date: 2022  Patient: Raul Castrejon  : 1995  MRN: [id number]    Cancels/No Shows to date: 3/0    For today's appointment patient:    [x]  Cancelled    [] Rescheduled appointment    [] No-show     Reason given by patient:    []  Patient ill    []  Conflicting appointment    [] No transportation      [] Conflict with work    [x] No reason given    [] Weather related    [] [id number]    [] Other:      Comments:        [x] Next appointment was confirmed    Electronically signed by: Cristina Malik PTA

## 2022-09-26 ENCOUNTER — HOSPITAL ENCOUNTER (OUTPATIENT)
Dept: PHYSICAL THERAPY | Facility: CLINIC | Age: 27
Setting detail: THERAPIES SERIES
Discharge: HOME | End: 2022-09-26
Payer: COMMERCIAL

## 2022-09-26 PROCEDURE — 97110 THERAPEUTIC EXERCISES: CPT

## 2022-09-26 PROCEDURE — 97016 VASOPNEUMATIC DEVICE THERAPY: CPT

## 2022-09-26 NOTE — FLOWSHEET NOTE
[] [facility]  [address]  [phone]  F: [phone] [x] [address]  [place] [address]   [address]  P: [phone]  F: [phone] [] [address]  [address]  P: [phone]  F: [phone] [] [address]  P: [phone]  F: [phone] [] [address]  [facility]   [address]   Keisha Cleaves: [phone]  F: [phone]      Physical Therapy Daily Treatment Note    Date:  2022  Patient Name:  Evaristo Villalobos    :  1995  MRN: [id number]  Physician: Paulo Spear MD                              Insurance: [facility] ()  Medical Diagnosis: S83.512A - New ACL tear, left, initial encounter                       Rehab Codes: M25.562, M62.81, R26.89, M25.462  Onset date: surgical date 22                             Next Dr's appt.: 22  Visit# / total visits:      Cancels/No Shows: 3/0    Subjective:    Pain:  [] Yes  [x] No  Location: L knee  Pain Rating: (0-10 scale) 0/10  Pain altered Tx:  [x] No  [] Yes  Action:  Comments: Pt arrived to therapy cont to deny pain in L knee. Pt ambulated with functional knee brace donned on LLE. Pt reported only able to complete HEP once a day d/t being busy with family and work. Pt noted \"sometimes I feel like it's there sometimes it's not\" when asked about quad activation. Pt reported is a  until October which pt reported will only able to come to therapy 1x/week until cheer season end in October. Pt noted have membership to the [employer] but only able to go to the gym on Saturday at this time due to busy schedule.        Objective:  Modalities: vaso at MOD pressure x10' with pillowcase wrapped around L knee  Precautions: full ACL protocol in soft chart, WBAT   Exercises: [employer] Access Code: [email or web address]   Exercise  S/p L knee ACL reconstruction 6/9/22 with allograft     Reps/ Time Weight/ Level Comments all ex's w/o brace 7/21   Nustep  7' L2; seat 6 New 6/24; inc resistance 7/8   Recumbent Bike  5' L1 New 7/19   Elliptical  5' Lvl 3 New 8/16         Supine       Knee extension stretch 2x1' 5# Added 5#    Quad Sets  20x5\" A Inc reps 7/19   SLR    Isometric LAQ  10 min with VMS   10 seconds on  20 seconds rest  Added LAQ 6/30  Added SLR 7/12 with mod-Max A    Ankle pumps  20x       Heel Slides  10x2 strap  using slide board, strap and manual overpressure  Active performed on 7/5 second set    Patellar Mobs x      Long sitting calf stretch 3x 30\"     SLR 10x 2 2# Added wt 9/12   SL hip abd  2x10 2# Brace off- added 6/21  Added wt 8/19               Seated:       Isometric LAQ at 60 deg 20x5\"  New 6/28   LAQ-  20x5\" 2# Full range 8/29   Prone Quad sets  20x3\"  A Attempted 6/21- resumed 6/28; towel roll under ankle   Inc reps 7/12   Prone hip ext  10x2 2# Added wt 8/19                  Standing:      Brace donned 7/14   Heel Raises  20x   New 6/24- cues for quad set    Toe raises  20x  New 6/28- inc reps 6/30   TKE  20x5\" [place]  Increased reps 7/29, inc resistance 8/12    3 way hip 20xea [place] resistance 8/19; bilateral 8/29   HS curls - OKC  2x10 A New 7/19- inc reps 8/12   High marches-alt 2x10  New 7/21- no UE assist   Step Ups + knee drive  5X23 6\"  56# DB  Added DB hold 9/26   Lateral step up 2x10 6\" New 8/25   Step Downs  2x10 6\" New 8/12   Weight Shifting 10x ea  New 6/24- m/l to encourage increased weight bearing through LLE; added A/P 6/28   Held 7/14   Mini Squats  2x10  New 7/5- cues for equal bilateral weight shift    STS  2x5  New 7/5  without UE assist 7/14   Gait 1L   Independent without device or brace, cues for heel-toe gait and terminal knee extension with good carryover.       Stairway ambulation  1/2 flight x2  No handrails, reciprocal stepping- added 7/29   Modified LLE RDL 2x10 12in New 8/16-B finger taps on steps   Walking lunges 1 lap  10# DB New 8/16 - dumbbells both hands; progressed wt 9/12   Heel tap  20x ea  4in New 8/16 - forward & lateral, LLE stance, without UE support 8/29   Band walk  30ft x2 Blue   New 8/16 - lateral, monster    Squat + chair tap 10x  New 8/16 - added foam pad to inc height    TRX squat + bench tap 20x  New 8/19   B calf stretch 3x30s  slantboard New 8/19   Star slides  10x  New 8/25   RDL SL 2x10 A  6\"  LLE only, touch 6\" step 9/12   Reverse lunge+knee drive 85H A; 6in New 8/29; added step 9/12   Regis KE 2x10 6pl New 9/12; progressed 9/26   Shaneka Rosibellls KE 2 up 1 down 20x  4pl New 9/12; progressed 9/26   Regis KE LLE  2x10 1pl New 9/12               REGIS LEG PRESS   New 8/16   DL 2x10 Lvl 7, pl 6 Progressed 9/26   2 up 1 down 2x10 Lvl 7, pl 4 LLE down only  Inc depth & weight    LLE only 2x10  Lvl 7, pl 4 Progressed 9/26   Heel raises 20x Lvl 9, pl 5    Other: 6/13 knee ROM 0-79 deg  6/21:   Flexion: 92 AA  Ext: lacking 2 A , 0 P    6/24  Flexion: 95 AA, 102 P  Ext 0A, 0P    6/28  99 AA  110 P  Ext 0 A    7/5  Flexion: 110 AA, 106 A  Ext: 0     7/8  Flexion 113 AA  Ext 0     7/14  Flexion  114 AA  Ext 0    7/19  Flexion  114 AA  Ext 0    7/21/22  L knee flexion   116 AA  115 A    7/29/22  L Knee flexion  118 A    8/9/22  L knee ROM: 0-130 A      Objective: Reassessed by Yamilet Walters, PT 8/29/22    STRENGTH     Left Right   Hip Flex 5/5 5/5   Ext 4+/5     ABD 4+/5     ADD      Knee Flex 5/5 5/5   Ext 4/5 5/5   Ankle DF  5/5   PF  5/5   INV      EVER                 Knee AROM: 0-130 degrees    Functional Test: LEFI Score: 14/80 or 82.5% functionally impaired  8/9/22: 70/80 or 12.5% functionally impaired             Treatment Charges: Mins Units   []  Modalities     [x]  Ther Exercise 50 3   []  Manual Therapy     []  Ther Activities     []  Aquatics     [x]  Vasocompression 10 1   []  Other- attended stim     Total Treatment time 60 4         Assessment: [x] Progressing toward goals. Pt was able to tolerate progressive therex program without c/o increased pain noted. Able to progress deven machine exs to improve BLE strength with cues provided to avoid B knee valgus collapse with deven leg press. Added dumbbells into step up with knee drive to promote single leg stability. Pt required short rest break to reduce muscle fatigue and soreness. Pt reported increased L knee tightness post-exs. End with vaso for 10 mins to manage pain and inflammation. Updated HEP and provided pt with lime and blue tband to further improving strength and stability of BLE.      [] No change. [] Other:    [x] Patient would continue to benefit from skilled physical therapy services in order to: reduce pain and effusion, restore mobility and strength, and improve gait to return patient to prior level of function. Problems:    [x] ? Pain: up to 10/10 pain L knee   [x] ? ROM limited L knee ROM globally, limited LLE flexibility  [x] ? Strength reduced LLE strength globally  [x] ? Function impaired function indicated by LEFI score of 82.5%   [x] Other impaired gait        STG: (to be met in 8 treatments) Reassessed by primary PT 8/29/22  ? Pain: Decrease L knee pain levels to 6/10 max with activity to promote improved tolerance to therapeutic exercise progressions. MET- 4/10 max   ? ROM: Increase L knee AROM limitations to at least 0-90 degrees to reduce difficulty with ADLs and normalize gait mechanics. MET- 0-106 A  ? Strength: Pt to demonstrate ability to perform at least 10 SLR without quad lag present in order to progress safely to full weight bearing. MET   ? Function: Pt to demonstrate ability to perform at least 5 STS with equal bilateral weight shift and without pain.  MET   Pt to demonstrate improved knee effusion with patellofemoral circumferential measurements at joint line and suprapatellar < 49 cm to demonstrate improved joint force absorption with therapeutic exercise progressions. MET- 48.5 cm suprapatellar  Pt to be independent and compliant with Home Exercise Programs MET        LTG: (to be met in 16 treatments) - Re-assessed by primary PT on 8/29/22  Improve score on assessment tool LEFI to 50% functionally impaired or less to demonstrate improved functional mobility - MET (12.5% impaired)   Reduce L knee pain levels to 2/10 or less to improve independence with all daily activities. MET   Pt to demonstrate L knee AROM to 2-0-120 to ease difficulty with stair climbing and normalize gait mechanics. MET (0-130)  Pt to demonstrate LLE strength at 4+/5 globally to ease difficulty with heavier household chores and caregiving for her children. - Progressing ( L quad 4/5)  Pt to demonstrate ability to ambulate at least 300' independently without increased knee pain and improved heel-toe progression, gait speed, and equal bilateral step lengths. - Progressing (reduced L stance time and heel toe progression after 200')  Pt to demonstrate ability to ascend/descend a full flight of stairs with reciprocal stepping mechanics and without pain to ease access to upstairs bedroom.- MET 7/29/22                    Patient goals: return to work       Pt. Education:  [x] Yes  [] No  [] Reviewed Prior HEP/Ed  Method of Education: [x] Verbal  [] Demo  [] Written  Access Code: [email or web address]  URL: [email or web address]  Date: 06/10/2022  Prepared by: Paty Booth     Exercises  Supine Quad Set - 1 x daily - 7 x weekly - 3 sets - 10 reps  Supine Ankle Pumps - 1 x daily - 7 x weekly - 3 sets - 10 reps  Supine Heel Slide - 1 x daily - 7 x weekly - 3 sets - 10 reps  Supine Knee Extension Stretch on Towel Roll - 1 x daily - 7 x weekly - 3 sets - 10 reps  Long Sitting 4 Way Patellar [place] - 1 x daily - 7 x weekly - 3 sets - 10 reps   4 Way Patellar [place] - 1 x daily - 7 x weekly - 3 sets - 10 reps    Date: 07/12/2022  Prepared by: Cassie Nicole    Exercises    Prone Quadriceps Set with Towel Roll - 1 x daily - 7 x weekly - 2 sets - 10 reps - 3\" hold  Prone Hip Extension - 1 x daily - 7 x weekly - 2 sets - 10 reps  Sidelying Hip Abduction - 1 x daily - 7 x weekly - 2 sets - 10 reps  Standing Heel Raise - 1 x daily - 7 x weekly - 2 sets - 10 reps  Heel Toe Raises with Counter Support - 1 x daily - 7 x weekly - 2 sets - 10 reps  Standing Terminal Knee Extension with Resistance - 1 x daily - 7 x weekly - 2 sets - 10 reps      Date: 09/26/2022  Prepared by: Goldie Stephenson  Exercise  Standing Hip Flexion with Anchored Resistance - 1 x daily - 7 x weekly - 2 sets - 10 reps  Standing Repeated Hip Abduction with Resistance - 1 x daily - 7 x weekly - 2 sets - 10 reps  Standing Hip Extension and Flexion with Resistance - 1 x daily - 7 x weekly - 2 sets - 10 reps  Walking Forward Lunge - 1 x daily - 7 x weekly - 2 sets - 20 reps  Reverse Walking Lunge - 1 x daily - 7 x weekly - 2 sets - 20 reps  Crossover Step Up with Knee Drive - 1 x daily - 7 x weekly - 2 sets - 10 reps  Forward Step Tap - 1 x daily - 7 x weekly - 3 sets - 10 reps  Lateral Step Tap - 1 x daily - 7 x weekly - 3 sets - 10 reps      Comprehension of Education:  [x] Verbalizes understanding. [x] Demonstrates understanding. [] Needs review. [] Demonstrates/verbalizes HEP/Ed previously given. Plan: [x] Continue current frequency toward long and short term goals - Continue at 1x/week due to scheduling availability   [x] Specific Instructions for subsequent treatments: check goals       Time In: 4:55 pm     Time Out: 6:00 pm       Electronically signed by:   Goldie Stephenson, PT

## 2022-09-28 ENCOUNTER — TELEPHONE (OUTPATIENT)
Dept: OBGYN | Age: 27
End: 2022-09-28

## 2022-10-03 ENCOUNTER — HOSPITAL ENCOUNTER (OUTPATIENT)
Dept: PHYSICAL THERAPY | Facility: CLINIC | Age: 27
Setting detail: THERAPIES SERIES
Discharge: HOME | End: 2022-10-03
Payer: COMMERCIAL

## 2022-10-03 NOTE — FLOWSHEET NOTE
[] [facility]) [facility]  [address]    [phone]  F: [phone]   [x] [address]  [facility]   [address]  P: [phone]  F: [phone]  [] [address]  [address]  P: [phone]  F: [phone] [] [address]  P: [phone]  F: [phone]  [] [address]  [address] [address] [facility]   [address]   [place]: [phone]  F: [phone]   [] [place] [address] [facility] [address]  [place]: [phone]   F: [phone]     Physical Therapy Cancel/No Show note    Date: 10/3/2022  Patient: Sissy Heath  : 1995  MRN: [id number]    Cancels/No Shows to date:     For today's appointment patient:    [x]  Cancelled    [] Rescheduled appointment    [] No-show     Reason given by patient:    []  Patient ill    []  Conflicting appointment    [] No transportation      [x] Conflict with work    [] No reason given    [] Weather related    [] COVID-19    [] Other:      Comments:        [x] Next appointment was confirmed    Electronically signed by:  Goldie Mckeon, PT

## 2022-10-10 ENCOUNTER — HOSPITAL ENCOUNTER (OUTPATIENT)
Dept: PHYSICAL THERAPY | Facility: CLINIC | Age: 27
Setting detail: THERAPIES SERIES
Discharge: HOME | End: 2022-10-10
Payer: COMMERCIAL

## 2022-10-10 NOTE — PATIENT INSTRUCTIONS
Patient ID: Mitul Mancuso is a 65 y o  female    Assessment/Plan:    Essential tremor  Tremor and speech are reported to have improved after last visit but in recent month speech has become more slurred  Tremors continue to be fairly well controlled with mild return of tremor  Over 30 minutes spent on deep brain stimulation programming, evaluation and documentation  Changes made to left lead with improved right hand tremor and mild improvement in speech  See body of note for clinical details  Will remain on these settings  Diagnoses and all orders for this visit:    Essential tremor    S/P deep brain stimulator placement        Subjective:      Yair Abdul is a left handed female with peripheral neuropathy, spinal stenosis, anxiety and essential tremor s/p bilateral VIM DBS on 5/1/14 with right Activa RC 6/20/17, left RC 12/4/19, who presents for follow up  To review, tremors began in her late 52's with a mild intentional tremor on the left  This has progressed with time and spread to the right side as well  Last seen in April 2022 with speech changes with improved with change in electrode configuration on left  She continues to have difficulty with balance  She has not fallen in over a year  Speech can be slurred  It is worse in the evening when tired  Tremors are still controlled she has notice a great the improvement after the last programming session and this has persisted  Speech was good the last time but slowly worsened  She wishes to know if this may be improved by changing stimulation  Objective:    /64 (BP Location: Right arm, Patient Position: Sitting, Cuff Size: Standard)   Pulse 73   Temp 98 2 °F (36 8 °C)   Wt 71 2 kg (157 lb)   BMI 27 81 kg/m²       Physical Exam  Vitals reviewed  Eyes:      Extraocular Movements: Extraocular movements intact  Pupils: Pupils are equal, round, and reactive to light     Neurological:      Cranial Nerves: Patient advised to follow up with referring physician. Dysarthria present  Deep Tendon Reflexes: Strength normal          Neurological Exam  Mental Status   Oriented only to person, place and situation  Recent and remote memory are intact  Mild dysarthria present  Speech: Fluctuated throughout the exam   Follows complex commands  Attention and concentration are normal     Cranial Nerves  CN II: Right funduscopic exam: not visualized  Left funduscopic exam: not visualized  CN III, IV, VI: Extraocular movements intact bilaterally  Pupils equal round and reactive to light bilaterally  CN VII: Full and symmetric facial movement  CN VIII: Hearing is normal   CN IX, X: Palate elevates symmetrically  CN XI: Shoulder shrug strength is normal   CN XII: Tongue midline without atrophy or fasciculations  Motor   Normal muscle tone  Strength is 5/5 throughout all four extremities  Sensory  Light touch is normal in upper and lower extremities  Coordination  Right: Right hand trigger finger impairs movement  Left: Rapid alternating movement normal   Mild tremor on FTN on the left  Mild on the right  No res tremor  No head tremor       Gait  Casual gait: Able to rise from chair without using arms  Mild slowness         Deep brain stimulation programming:    Left VIM  Activa RC   1-3+, PW80, 185Hz, 2 3V  New setting  1-3+,  PW  70 , 185Hz, 2 5V  Speech and tremor with improvement   Imp 1021, 2 5mA    Left old setting on Prior group      Right VIM   Activa RC   Group C  0+1-, 4 3V , PW90, 125Hz  0+2-, PW90, 125Hz, 3 8V        Current Outpatient Medications on File Prior to Visit   Medication Sig Dispense Refill   • acetaminophen (TYLENOL) 325 mg tablet Take 2 tablets (650 mg total) by mouth every 6 (six) hours as needed for mild pain  0   • albuterol (PROVENTIL HFA,VENTOLIN HFA) 90 mcg/act inhaler Inhale 2 puffs every 6 (six) hours as needed for wheezing 1 Inhaler 1   • amLODIPine (NORVASC) 2 5 mg tablet Take 2 5 mg by mouth daily     • Calcium Carb-Cholecalciferol (CALCIUM 600-D PO) Take 1 tablet by mouth 2 (two) times a day     • Coenzyme Q10 (CO Q-10 PO) Take 1 capsule by mouth daily     • diltiazem (CARDIZEM CD) 180 mg 24 hr capsule TAKE ONE CAPSULE BY MOUTH EVERY DAY 90 capsule 1   • diltiazem (CARDIZEM) 60 mg tablet Take 1 tablet (60 mg total) by mouth daily 90 tablet 1   • escitalopram (LEXAPRO) 20 mg tablet TAKE ONE TABLET BY MOUTH EVERY DAY 90 tablet 1   • fluticasone (FLONASE) 50 mcg/act nasal spray INSTILL ONE SPRAY INTO EACH NOSTRIL ONCE DAILY AS NEEDED FOR RHINITIS 48 g 1   • fluticasone-salmeterol (Advair Diskus) 250-50 mcg/dose inhaler Inhale 1 puff 2 (two) times a day Rinse mouth after use 180 blister 3   • gabapentin (NEURONTIN) 600 MG tablet TAKE ONE TABLET IN THE MORNING, ONE TABLET IN THE AFTERNOON, AND 2 TABLETS AT BEDTIME 360 tablet 2   • HYDROcodone-acetaminophen (NORCO) 5-325 mg per tablet Take 1 tablet by mouth every 6 (six) hours as needed for pain Max Daily Amount: 4 tablets 120 tablet 0   • lansoprazole (PREVACID) 15 mg capsule Take 1 capsule (15 mg total) by mouth 2 (two) times a day before meals Take 1/2 to 1 hour before breakfast and dinner  60 capsule 5   • levothyroxine 50 mcg tablet TAKE ONE TABLET BY MOUTH EVERY DAY 90 tablet 1   • lisinopril (ZESTRIL) 20 mg tablet TAKE ONE TABLET BY MOUTH TWICE A  tablet 1   • loperamide (IMODIUM) 2 mg capsule Take 2 mg by mouth 4 (four) times a day as needed       • LORazepam (ATIVAN) 1 mg tablet Take 1 tablet (1 mg total) by mouth every 6 (six) hours as needed for anxiety 120 tablet 0   • MAGNESIUM PO Take 1 tablet by mouth daily     • meclizine (ANTIVERT) 25 mg tablet Take 1 tablet (25 mg total) by mouth every 6 (six) hours as needed for dizziness 90 tablet 1   • Multiple Vitamin (MULTIVITAMIN ADULT PO) Take 1 tablet by mouth daily     • naloxone (NARCAN) 4 mg/0 1 mL nasal spray Administer 1 spray into a nostril   If no response after 2-3 minutes, give another dose in the other nostril using a new spray  1 each 1   • nystatin (MYCOSTATIN) 500,000 units/5 mL suspension      • potassium chloride (MICRO-K) 10 MEQ CR capsule Take 2 capsules daily 180 capsule 3   • simvastatin (ZOCOR) 5 MG tablet TAKE ONE TABLET BY MOUTH EVERY DAY 90 tablet 1   • sodium chloride 1 g tablet Take 1 tablet (1 g total) by mouth 3 (three) times a day 270 tablet 3   • torsemide (DEMADEX) 10 mg tablet Take 1 tablet (10 mg total) by mouth daily as needed (edema) 90 tablet 0   • warfarin (COUMADIN) 5 mg tablet Take 5 mg by mouth daily     • cetirizine (ZyrTEC) 10 mg tablet Take 10 mg by mouth daily (Patient not taking: No sig reported)     • Omega-3 Fatty Acids (FISH OIL PO) Take 1 capsule by mouth daily (Patient not taking: Reported on 10/10/2022)       No current facility-administered medications on file prior to visit           Rosa Madrigal MD  Movement disorder physician  [address] [place] [address]

## 2022-10-10 NOTE — FLOWSHEET NOTE
[] [address]  [address]    [phone]  F: [phone]   [x] [address]  [facility]   [address]  P: [phone]  F: [phone]  [] [address]  [address]  P: [phone]  F: [phone] [] [address]  P: [phone]  F: [phone]  [] [address]  [address] [address] [facility] [address]   [address]   [place]: [phone]  F: [phone]   [] [place] [address] [facility] [address]  [place]: [phone]   F: [phone]     Physical Therapy Cancel/No Show note    Date: 10/10/2022  Patient: Leandra Stewart  : 1995  MRN: [id number]    Cancels/No Shows to date:     For today's appointment patient:    [x]  Cancelled    [] Rescheduled appointment    [] No-show     Reason given by patient:    []  Patient ill    []  Conflicting appointment    [] No transportation      [] Conflict with work    [] No reason given    [] Weather related    [] COVID-19    [x] Other:      Comments: Primary PT spoke to pt on the phone - pt stated tried to call earlier around 4pm to cancel today's appointment d/t son is having surgery. Pt reported difficulty participating with therapy 2x/week due to work schedule and son is currently doing therapy 3x/week at [facility]. Discussed possibility of transferring pt to [facility] to be able to cont therapy at the same time as son's, pt agreed that it would be better and will be able to participate in therapy more than 1x/week. [x] Next appointment was confirmed (may transfer to [facility])     Electronically signed by:  Goldie Gaspar, PT

## 2022-10-17 ENCOUNTER — HOSPITAL ENCOUNTER (OUTPATIENT)
Dept: PHYSICAL THERAPY | Facility: CLINIC | Age: 27
Setting detail: THERAPIES SERIES
Discharge: HOME | End: 2022-10-17
Payer: COMMERCIAL

## 2022-10-17 PROCEDURE — 97110 THERAPEUTIC EXERCISES: CPT

## 2022-10-17 NOTE — DISCHARGE SUMMARY
[] [facility]) - [facility] &  Therapy  [address]  [phone]  F: [phone] [x] [address]  [facility]   [address]  P: [phone]  F: [phone] [] [address] &  Therapy  [address]  P: [phone]  F: [phone] [] [address]  P: [phone]  F: [phone] [] [address]  [facility]   [address]   [place]: [phone]  F: [phone]      Physical Therapy Discharge Note    Date: 10/17/2022      Patient: Dillon Hernandez  : 1995  MRN: [id number]    Physician: Renee Pena MD                              Insurance: [facility] ([id number])  Medical Diagnosis: S83.512A - New ACL tear, left, initial encounter                       Rehab Codes: M25.562, M62.81, R26.89, M25.462  Onset date: surgical date 22                             Next Dr's appt.: 22  Visit# / total visits:                                 Cancels/No Shows:     Date of initial visit: 6/10/22                Date of final visit: 10/17/22       Subjective:    Pain:  [] Yes  [x] No               Location: L knee                     Pain Rating: (0-10 scale) 0/10  Pain altered Tx:  [x] No  [] Yes  Action:  Comments: Pt arrived to physical therapy cont to deny pain. Pt reported has not been wearing knee brace at work despite work required pt to stand for long period of time, noted \"when I need to sit down I do\", noted have knee brace nearby and will wear if needed. Pt reported have been going to the  to complete strengthening exs with DB and leg press as well as exs in the pool.           Objective: Re-assessed on 10/17/22 by An You Scott, PT         STRENGTH     Left Right   Hip Flex 5/5 5/5   Ext 5/5     ABD 4+/5     ADD       Knee Flex 5/5 5/5   Ext 5/5 5/5   Ankle DF   5/5   PF   5/5   INV       EVER                  Knee AROM: 0-130 degrees     Functional Test: LEFI Score: 14/80 or 82.5% functionally impaired  8/9/22: 70/80 or 12.5% functionally impaired              Assessment:  [x] Progressing toward goals. Initiated therapy session on Elliptical to improve cardiovascular endurance followed by mat exs to promote BLE strength. Time spent re-assessing goals to prepare for discharged. Pt continue to demo improvement in L hip and quad strength without co-contraction of L glut with quad set during SLR and sidelying hip abduction. Cont with charted exs with increase progression to further improve BLE strengthening with intermittent cues provided for proper loading and equal weight distribution with good carryover noted. Reviewed and updated HEP to promote independence and consistency in order to prevent regression and risk of re-injury. Pt verbalized understanding. Pt is progressing well with overall function and has been able to participate in both household and work-related tasks without any issue. Pt is ready to be discharged from physical therapy services at this time and [facility] STG: (to be met in 8 treatments) Reassessed by primary PT 8/29/22  ? Pain: Decrease L knee pain levels to 6/10 max with activity to promote improved tolerance to therapeutic exercise progressions. MET- 4/10 max   ? ROM: Increase L knee AROM limitations to at least 0-90 degrees to reduce difficulty with ADLs and normalize gait mechanics. MET- 0-106 A  ? Strength: Pt to demonstrate ability to perform at least 10 SLR without quad lag present in order to progress safely to full weight bearing. MET   ? Function: Pt to demonstrate ability to perform at least 5 STS with equal bilateral weight shift and without pain.  MET   Pt to demonstrate improved knee effusion with patellofemoral circumferential measurements at joint line and suprapatellar < 49 cm to demonstrate improved joint force absorption with therapeutic exercise progressions. MET- 48.5 cm suprapatellar  Pt to be independent and compliant with Home Exercise Programs MET         LTG: (to be met in 16 treatments) - Re-assessed by primary PT on 8/29/22  Improve score on assessment tool LEFI to 50% functionally impaired or less to demonstrate improved functional mobility - MET (12.5% impaired) 8/29/22  Reduce L knee pain levels to 2/10 or less to improve independence with all daily activities. MET 8/29/22  Pt to demonstrate L knee AROM to 2-0-120 to ease difficulty with stair climbing and normalize gait mechanics. MET (0-130) 8/29/22  Pt to demonstrate LLE strength at 4+/5 globally to ease difficulty with heavier household chores and caregiving for her children. - MET   Pt to demonstrate ability to ambulate at least 300' independently without increased knee pain and improved heel-toe progression, gait speed, and equal bilateral step lengths. - MET  Pt to demonstrate ability to ascend/descend a full flight of stairs with reciprocal stepping mechanics and without pain to ease access to upstairs bedroom.- MET 7/29/22                    Patient goals: return to work - MET         Treatment to Date:  [x] Therapeutic Exercise    [] Modalities:  [] Therapeutic Activity    [] Ultrasound  [x] Electrical Stimulation  [x] Gait Training     [] Massage       [] Lumbar/Cervical Traction  [x] Neuromuscular Re-education [x] Cold/hotpack [] Iontophoresis: 4 mg/mL  [x] Instruction in Home Exercise Program                     Dexamethasone Sodium  [x] Manual Therapy             Phosphate 40-80 mAmin  [] Aquatic Therapy                   [x] Vasocompression/    [] Other:             Game Ready    Discharge Status:     [x] Pt recovered from conditions. Treatment goals were met. [x] Pt received maximum benefit. No further therapy indicated at this time.     [x] Pt to continue exercise/home instructions independently. Electronically signed by Goldie Hu PT on 10/17/2022 at 7:15 PM      If you have any questions or concerns, please don't hesitate to call.   Thank you for your referral.

## 2022-10-17 NOTE — FLOWSHEET NOTE
[] [facility] [address]  [address]  [phone]  F: [phone] [x] [address]  [place] [address]   [address]  P: [phone]  F: [phone] [] [address]  [address]  P: [phone]  F: [phone] [] [address]  P: [phone]  F: [phone] [] [address]  [facility]   [address]   [place]: [phone]  F: [phone]      Physical Therapy Daily Treatment Note    Date:  10/17/2022  Patient Name:  Timbo Veag    :  1995  MRN: [id number]  Physician: Sonido Luna MD                              Insurance: [facility] ()  Medical Diagnosis: S83.512A - New ACL tear, left, initial encounter                       Rehab Codes: M25.562, M62.81, R26.89, M25.462  Onset date: surgical date 22                             Next Dr's appt.: 22  Visit# / total visits:      Cancels/No Shows: 5/0    Subjective:    Pain:  [] Yes  [x] No  Location: L knee  Pain Rating: (0-10 scale) 0/10  Pain altered Tx:  [x] No  [] Yes  Action:  Comments: Pt arrived to physical therapy cont to deny pain. Pt reported has not been wearing knee brace at work despite work required pt to stand for long period of time, noted \"when I need to sit down I do\", noted have knee brace nearby and will wear if needed. [facility] and leg press as well as exs in the pool.         Objective:  Modalities: vaso at MOD pressure x10' with pillowcase wrapped around L knee - not today  Precautions: full ACL protocol in soft chart, WBAT   Exercises: [employer] Access Code: [email or web address]   Exercise  S/p L knee ACL reconstruction 22 with allograft     Reps/ Time Weight/ Level Comments all ex's w/o brace 7/21   Nustep  7' L2; seat 6 New 6/24; inc resistance 7/8   Recumbent Bike  5' L1 New 7/19   Elliptical  5' Lvl 3 New 8/16         Supine       Knee extension stretch 2x1' 5# Added 5#    Quad Sets  20x5\" A Inc reps 7/19   SLR    Isometric LAQ  10 min with VMS   10 seconds on  20 seconds rest  Added LAQ 6/30  Added SLR 7/12 with mod-Max A    Ankle pumps  20x       Heel Slides  10x2 strap  using slide board, strap and manual overpressure  Active performed on 7/5 second set    Patellar Mobs x      Long sitting calf stretch 3x 30\"     SLR 10x2 3# Added wt 9/12; inc wt & cues to hold quad set 10/17   SL hip abd  2x10 3# Brace off- added 6/21  Added wt 8/19; inc wt & cues to hold quad set 10/17   B HS stretch 3x30s   New 10/17         Seated:       Isometric LAQ at 60 deg 20x5\"  New 6/28   LAQ-  20x5\" 3# Full range 8/29   Prone Quad sets  20x3\"  A Attempted 6/21- resumed 6/28; towel roll under ankle   Inc reps 7/12   Prone hip ext  10x2 2# Added wt 8/19                  Standing:      Brace donned 7/14   Heel Raises  20x   New 6/24- cues for quad set    Toe raises  20x  New 6/28- inc reps 6/30   TKE  20x5\" Claude  Increased reps 7/29, inc resistance 8/12    3 way hip 20xea [facility] resistance 8/19; bilateral 8/29   HS curls - OKC  2x10 A New 7/19- inc reps 8/12   High marches-alt 2x10  New 7/21- no UE assist   Step Ups + knee drive  5H68 6\"  69# DB  Added DB hold 9/26; added LLE heel raise and DB press 10/17   Lateral step up 2x10 6\" New 8/25   Step Downs  2x10 6\" New 8/12   Weight Shifting 10x ea  New 6/24- m/l to encourage increased weight bearing through LLE; added A/P 6/28   Held 7/14   Mini Squats  2x10  New 7/5- cues for equal bilateral weight shift    STS  2x5  New 7/5  without UE assist 7/14   Gait 1L   Independent without device or brace, cues for heel-toe gait and terminal knee extension with good carryover.       Stairway ambulation  1/2 flight x2  No handrails, reciprocal stepping- added 7/29   Modified LLE RDL 2x10 12in New 8/16-B finger taps on steps   Walking lunges 1 lap  10# DB  12# DB New 8/16 - dumbbells both hands; progressed wt 9/12; 1/2 laps w/ 10# & 1/2 lap w/ 12# ea arm 10/17   Heel tap  20x ea  4in New 8/16 - forward & lateral, LLE stance, without UE support 8/29   Band walk  30ft x2 Blue   New 8/16 - lateral, monster    Squat + chair tap 10x  New 8/16 - added foam pad to inc height    TRX squat + bench tap 20x  New 8/19   B calf stretch 3x30s  slantboard New 8/19   Star slides  10x  New 8/25   RDL SL 2x10 A  6\"  LLE only, touch 6\" step 9/12   Reverse lunge+knee drive 22F 6\"  75# ea  New 8/29; added step 9/12; added weight 10/17         Regis KE 2x10 6pl New 9/12; progressed 9/26   Regis KE 2 up 1 down 20x  4pl New 9/12; progressed 9/26   Regis KE LLE  2x10 1pl New 9/12               REGIS LEG PRESS   New 8/16   DL 2x10 Lvl 6, pl 7 Progressed 9/26; progressed 10/17   2 up 1 down 2x10 Lvl 6, pl 5 LLE down only  Inc depth & weight 10/17   LLE only 2x10  Lvl 6, pl 4 Progressed 9/26   Heel raises 20x Lvl 9, pl 5    Other: 6/13 knee ROM 0-79 deg  6/21:   Flexion: 92 AA  Ext: lacking 2 A , 0 P    6/24  Flexion: 95 AA, 102 P  Ext 0A, 0P    6/28  99 AA  110 P  Ext 0 A    7/5  Flexion: 110 AA, 106 A  Ext: 0     7/8  Flexion 113 AA  Ext 0     7/14  Flexion  114 AA  Ext 0    7/19  Flexion  114 AA  Ext 0    7/21/22  L knee flexion   116 AA  115 A    7/29/22  L Knee flexion  118 A    8/9/22  L knee ROM: 0-130 A      Objective: Re-assessed on 10/17/22 by An Casey Win, PT    STRENGTH     Left Right   Hip Flex 5/5 5/5   Ext 5/5     ABD 4+/5     ADD      Knee Flex 5/5 5/5   Ext 5/5 5/5   Ankle DF  5/5   PF  5/5   INV      EVER                 Knee AROM: 0-130 degrees    Functional Test: LEFI Score: 14/80 or 82.5% functionally impaired  8/9/22: 70/80 or 12.5% functionally impaired             Treatment Charges: Mins Units   []  Modalities     [x]  Ther Exercise 55 4   []  Manual Therapy     []  Ther Activities     []  Aquatics     []  Vasocompression     []  Other- attended stim     Total Treatment time 55 4   Re-assessment time is billed under therex this date 10/17/22         Assessment: [x] Progressing toward goals. Initiated therapy session on Elliptical to improve cardiovascular endurance followed by mat exs to promote BLE strength. Time spent re-assessing goals to prepare for discharged. Pt continue to demo improvement in L hip and quad strength without co-contraction of L glut with quad set during SLR and sidelying hip abduction. Cont with charted exs with increase progression to further improve BLE strengthening with intermittent cues provided for proper loading and equal weight distribution with good carryover noted. Reviewed and updated HEP to promote independence and consistency in order to prevent regression and risk of re-injury. Pt verbalized understanding. Pt is progressing well with overall function and has been able to participate in both household and work-related tasks without any issue. Pt is ready to be discharged from physical therapy services at this time and [facility] [] No change. [] Other:    [] Patient would continue to benefit from skilled physical therapy services in order to: reduce pain and effusion, restore mobility and strength, and improve gait to return patient to prior level of function. Problems:    [x] ? Pain: up to 10/10 pain L knee   [x] ? ROM limited L knee ROM globally, limited LLE flexibility  [x] ? Strength reduced LLE strength globally  [x] ? Function impaired function indicated by LEFI score of 82.5%   [x] Other impaired gait        STG: (to be met in 8 treatments) Reassessed by primary PT 8/29/22  ? Pain: Decrease L knee pain levels to 6/10 max with activity to promote improved tolerance to therapeutic exercise progressions. MET- 4/10 max   ?  ROM: Increase L knee AROM limitations to at least 0-90 degrees to reduce difficulty with ADLs and normalize gait mechanics. MET- 0-106 A  ? Strength: Pt to demonstrate ability to perform at least 10 SLR without quad lag present in order to progress safely to full weight bearing. MET   ? Function: Pt to demonstrate ability to perform at least 5 STS with equal bilateral weight shift and without pain. MET   Pt to demonstrate improved knee effusion with patellofemoral circumferential measurements at joint line and suprapatellar < 49 cm to demonstrate improved joint force absorption with therapeutic exercise progressions. MET- 48.5 cm suprapatellar  Pt to be independent and compliant with Home Exercise Programs MET        LTG: (to be met in 16 treatments) - Re-assessed by primary PT on 8/29/22  Improve score on assessment tool LEFI to 50% functionally impaired or less to demonstrate improved functional mobility - MET (12.5% impaired) 8/29/22  Reduce L knee pain levels to 2/10 or less to improve independence with all daily activities. MET 8/29/22  Pt to demonstrate L knee AROM to 2-0-120 to ease difficulty with stair climbing and normalize gait mechanics. MET (0-130) 8/29/22  Pt to demonstrate LLE strength at 4+/5 globally to ease difficulty with heavier household chores and caregiving for her children. - MET   Pt to demonstrate ability to ambulate at least 300' independently without increased knee pain and improved heel-toe progression, gait speed, and equal bilateral step lengths. - MET   Pt to demonstrate ability to ascend/descend a full flight of stairs with reciprocal stepping mechanics and without pain to ease access to upstairs bedroom.- MET 7/29/22                    Patient goals: return to work - MET      Pt. Education:  [x] Yes  [] No  [] Reviewed Prior HEP/Ed  Method of Education: [x] Verbal  [x] Demo  [x] Written    Access Code: [email or web address]  URL: [email or web address]  Date: 10/17/2022  Prepared by:  An [employer] - 1 x daily - 7 x weekly - 3 sets - 10 reps  Supine Heel Slide - 1 x daily - 7 x weekly - 3 sets - 10 reps  Supine Knee Extension Stretch on Towel Roll - 1 x daily - 7 x weekly - 3 sets - 10 reps  Long Sitting 4 Way Patellar [place] - 1 x daily - 7 x weekly - 3 sets - 10 reps  Prone Quadriceps Set with Towel Roll - 1 x daily - 7 x weekly - 2 sets - 10 reps - 3\" hold  Prone Hip Extension - 1 x daily - 7 x weekly - 2 sets - 10 reps  Sidelying Hip Abduction - 1 x daily - 7 x weekly - 2 sets - 10 reps  Standing Heel Raise - 1 x daily - 7 x weekly - 2 sets - 10 reps  Heel Toe Raises with Counter Support - 1 x daily - 7 x weekly - 2 sets - 10 reps  Standing Terminal Knee Extension with Resistance - 1 x daily - 7 x weekly - 2 sets - 10 reps  Standing Hip Flexion with Anchored Resistance - 1 x daily - 7 x weekly - 2 sets - 10 reps  Standing Repeated Hip Abduction with Resistance - 1 x daily - 7 x weekly - 2 sets - 10 reps  Standing Hip Extension and Flexion with Resistance - 1 x daily - 7 x weekly - 2 sets - 10 reps  Walking Forward Lunge - 1 x daily - 7 x weekly - 2 sets - 20 reps  Reverse Walking Lunge - 1 x daily - 7 x weekly - 2 sets - 20 reps  Crossover Step Up with Knee Drive - 1 x daily - 7 x weekly - 2 sets - 10 reps  Forward Step Down Touch with Heel - 1 x daily - 7 x weekly - 3 sets - 10 reps  Lateral Step Down - 1 x daily - 7 x weekly - 3 sets - 10 reps  Full Leg Press - 1 x daily - 7 x weekly - 2 sets - 10 reps  Supine Hamstring Stretch with Strap - 1 x daily - 7 x weekly - 1 sets - 3 reps - 30s hold        Comprehension of Education:  [x] Verbalizes understanding. [x] Demonstrates understanding. [] Needs review. [] Demonstrates/verbalizes HEP/Ed previously given.      Plan: [] Continue current frequency toward long and short term goals - Continue at 1x/week due to scheduling availability   [] Specific Instructions for subsequent treatments: check goals       Time In: 6:00 pm     Time Out: 7:00 pm       Electronically signed by:   An Kathlee Merlin, PT

## 2022-10-24 ENCOUNTER — APPOINTMENT (OUTPATIENT)
Dept: PHYSICAL THERAPY | Facility: CLINIC | Age: 27
End: 2022-10-24
Payer: COMMERCIAL

## 2022-10-31 ENCOUNTER — APPOINTMENT (OUTPATIENT)
Dept: PHYSICAL THERAPY | Facility: CLINIC | Age: 27
End: 2022-10-31
Payer: COMMERCIAL

## 2022-12-12 ENCOUNTER — OFFICE VISIT (OUTPATIENT)
Dept: ORTHOPEDIC SURGERY | Age: 27
End: 2022-12-12
Payer: COMMERCIAL

## 2022-12-12 VITALS — RESPIRATION RATE: 16 BRPM | BODY MASS INDEX: 39.61 KG/M2 | WEIGHT: 232 LBS | HEIGHT: 64 IN

## 2022-12-12 DIAGNOSIS — Z98.890 STATUS POST RECONSTRUCTION OF ANTERIOR CRUCIATE LIGAMENT: Primary | ICD-10-CM

## 2022-12-12 PROCEDURE — 99212 OFFICE O/P EST SF 10 MIN: CPT | Performed by: ORTHOPAEDIC SURGERY

## 2022-12-12 PROCEDURE — G8427 DOCREV CUR MEDS BY ELIG CLIN: HCPCS | Performed by: ORTHOPAEDIC SURGERY

## 2022-12-12 PROCEDURE — 1036F TOBACCO NON-USER: CPT | Performed by: ORTHOPAEDIC SURGERY

## 2022-12-12 PROCEDURE — G8484 FLU IMMUNIZE NO ADMIN: HCPCS | Performed by: ORTHOPAEDIC SURGERY

## 2022-12-12 PROCEDURE — G8417 CALC BMI ABV UP PARAM F/U: HCPCS | Performed by: ORTHOPAEDIC SURGERY

## 2022-12-12 NOTE — PROGRESS NOTES
Procedure: Left knee ACL reconstruction  Date of procedure: 6/9/2022    HPI: Ms. Suad Monsalve is 57-year-old approximately 6 months status post the aforementioned procedure. She states that she is not having any pain in his knee but states that her knee does feel like it is getting give out on her from time to time after a period of sitting  and she gets up, as well as after prolonged standing. Physical examination:  Evaluation patient's left knee and lower extremity demonstrates no swelling present at this time. She lacks approximately 5 degrees of active extension with flexion to approximately 115 degrees. Passively she has full extension. No gross instability with varus and valgus stress applied across the knee joint and she has a negative Lachman. Sensation is grossly intact light touch in all dermatomes and she has good pedal pulses distally. Impression and plan: Ms. Suad Monsalve is a 57-year-old 6 months status post left knee ACL reconstruction. She is doing relatively well at this time but does report episodes of her knee wanting to give out on her. She has no gross instability on exam.  She was discharged from physical therapy when she cut down her visits to once a week. So she has been out of physical therapy for a couple of months now. She was encouraged to keep up with her home exercise program though. She may continue weightbearing as tolerated. I will see her back for reevaluation in 6 months but she may return or call earlier with any questions or concerns.

## 2023-01-28 ENCOUNTER — APPOINTMENT (OUTPATIENT)
Dept: ULTRASOUND IMAGING | Age: 28
End: 2023-01-28
Payer: COMMERCIAL

## 2023-01-28 ENCOUNTER — HOSPITAL ENCOUNTER (EMERGENCY)
Age: 28
Discharge: HOME | End: 2023-01-29
Attending: EMERGENCY MEDICINE
Payer: COMMERCIAL

## 2023-01-28 VITALS
TEMPERATURE: 97.3 F | DIASTOLIC BLOOD PRESSURE: 71 MMHG | HEART RATE: 77 BPM | SYSTOLIC BLOOD PRESSURE: 138 MMHG | OXYGEN SATURATION: 99 % | RESPIRATION RATE: 18 BRPM

## 2023-01-28 DIAGNOSIS — O46.90 VAGINAL BLEEDING IN PREGNANCY: ICD-10-CM

## 2023-01-28 DIAGNOSIS — N39.0 URINARY TRACT INFECTION WITH HEMATURIA, SITE UNSPECIFIED: ICD-10-CM

## 2023-01-28 DIAGNOSIS — O23.599 BACTERIAL VAGINOSIS IN PREGNANCY: ICD-10-CM

## 2023-01-28 DIAGNOSIS — R31.9 URINARY TRACT INFECTION WITH HEMATURIA, SITE UNSPECIFIED: ICD-10-CM

## 2023-01-28 DIAGNOSIS — O36.80X0 PREGNANCY OF UNKNOWN ANATOMIC LOCATION: Primary | ICD-10-CM

## 2023-01-28 DIAGNOSIS — B96.89 BACTERIAL VAGINOSIS IN PREGNANCY: ICD-10-CM

## 2023-01-28 LAB
BILIRUBIN URINE: NEGATIVE
CANDIDA SPECIES, DNA PROBE: NEGATIVE
CHP ED QC CHECK: YES
COLOR: YELLOW
EPITHELIAL CELLS UA: ABNORMAL /HPF (ref 0–5)
GARDNERELLA VAGINALIS, DNA PROBE: POSITIVE
GLUCOSE URINE: NEGATIVE
HCG QUANTITATIVE: ABNORMAL MIU/ML
KETONES, URINE: NEGATIVE
LEUKOCYTE ESTERASE, URINE: ABNORMAL
MUCUS: ABNORMAL
NITRITE, URINE: NEGATIVE
PH UA: 8.5 (ref 5–8)
PREGNANCY TEST URINE, POC: POSITIVE
PROTEIN UA: ABNORMAL
RBC UA: ABNORMAL /HPF (ref 0–2)
SOURCE: ABNORMAL
SPECIFIC GRAVITY UA: 1.02 (ref 1–1.03)
TRICHOMONAS VAGINALIS DNA: NEGATIVE
TURBIDITY: ABNORMAL
URINE HGB: ABNORMAL
UROBILINOGEN, URINE: NORMAL
WBC UA: ABNORMAL /HPF (ref 0–5)

## 2023-01-28 PROCEDURE — 81001 URINALYSIS AUTO W/SCOPE: CPT

## 2023-01-28 PROCEDURE — 85014 HEMATOCRIT: CPT

## 2023-01-28 PROCEDURE — 87491 CHLMYD TRACH DNA AMP PROBE: CPT

## 2023-01-28 PROCEDURE — 87480 CANDIDA DNA DIR PROBE: CPT

## 2023-01-28 PROCEDURE — 85018 HEMOGLOBIN: CPT

## 2023-01-28 PROCEDURE — 87591 N.GONORRHOEAE DNA AMP PROB: CPT

## 2023-01-28 PROCEDURE — 87660 TRICHOMONAS VAGIN DIR PROBE: CPT

## 2023-01-28 PROCEDURE — 76817 TRANSVAGINAL US OBSTETRIC: CPT

## 2023-01-28 PROCEDURE — 96372 THER/PROPH/DIAG INJ SC/IM: CPT

## 2023-01-28 PROCEDURE — 84702 CHORIONIC GONADOTROPIN TEST: CPT

## 2023-01-28 PROCEDURE — 87086 URINE CULTURE/COLONY COUNT: CPT

## 2023-01-28 PROCEDURE — 99284 EMERGENCY DEPT VISIT MOD MDM: CPT

## 2023-01-28 PROCEDURE — 87510 GARDNER VAG DNA DIR PROBE: CPT

## 2023-01-28 ASSESSMENT — ENCOUNTER SYMPTOMS
TROUBLE SWALLOWING: 0
VOICE CHANGE: 0

## 2023-01-29 PROBLEM — O09.90 HRP (HIGH RISK PREGNANCY): Status: ACTIVE | Noted: 2023-01-29

## 2023-01-29 LAB
CULTURE: NORMAL
HCT VFR BLD CALC: 35.5 % (ref 36.3–47.1)
HEMOGLOBIN: 11.5 G/DL (ref 11.9–15.1)
SPECIMEN DESCRIPTION: NORMAL

## 2023-01-29 PROCEDURE — 6360000002 HC RX W HCPCS: Performed by: STUDENT IN AN ORGANIZED HEALTH CARE EDUCATION/TRAINING PROGRAM

## 2023-01-29 RX ORDER — KETOROLAC TROMETHAMINE 30 MG/ML
30 INJECTION, SOLUTION INTRAMUSCULAR; INTRAVENOUS ONCE
Status: COMPLETED | OUTPATIENT
Start: 2023-01-29 | End: 2023-01-29

## 2023-01-29 RX ORDER — METRONIDAZOLE 500 MG/1
500 TABLET ORAL 2 TIMES DAILY
Qty: 20 TABLET | Refills: 0 | Status: SHIPPED | OUTPATIENT
Start: 2023-01-29 | End: 2023-02-08

## 2023-01-29 RX ORDER — CEPHALEXIN 500 MG/1
500 CAPSULE ORAL 4 TIMES DAILY
Qty: 28 CAPSULE | Refills: 0 | Status: SHIPPED | OUTPATIENT
Start: 2023-01-29 | End: 2023-02-05

## 2023-01-29 RX ORDER — ONDANSETRON 4 MG/1
4 TABLET, ORALLY DISINTEGRATING ORAL 3 TIMES DAILY PRN
Qty: 21 TABLET | Refills: 0 | Status: SHIPPED | OUTPATIENT
Start: 2023-01-29

## 2023-01-29 RX ORDER — METRONIDAZOLE 500 MG/1
500 TABLET ORAL 2 TIMES DAILY
Qty: 20 TABLET | Refills: 0 | Status: SHIPPED | OUTPATIENT
Start: 2023-01-29 | End: 2023-01-29 | Stop reason: SDUPTHER

## 2023-01-29 RX ORDER — CEPHALEXIN 500 MG/1
500 CAPSULE ORAL 4 TIMES DAILY
Qty: 28 CAPSULE | Refills: 0 | Status: SHIPPED | OUTPATIENT
Start: 2023-01-29 | End: 2023-01-29 | Stop reason: SDUPTHER

## 2023-01-29 RX ADMIN — KETOROLAC TROMETHAMINE 30 MG: 30 INJECTION, SOLUTION INTRAMUSCULAR at 00:46

## 2023-01-29 ASSESSMENT — PAIN SCALES - GENERAL: PAINLEVEL_OUTOF10: 10

## 2023-01-29 NOTE — PROGRESS NOTES
Ob/Gyn Note     Was notified by the ED resident of this patient in the ED. Patient originally presented for lower abdominal pain and vaginal bleeding. She states she was saturating pads prior to arrival but her cramping is what brought her in. She states she is only spotting at this time. She denies any lightheadedness, dizziness, chest pain, or shortness of breath. Hemoglobin was found to be 11.4 and patient is Rh+. Her vitals are stable. Patient was found to have a positive pregnancy test with a beta hCG of 42,000. Official ultrasound reveals intrauterine gestational sac measuring 20 mm without a yolk sac or embryo. This is suspicious for early pregnancy failure. Will order repeat Beta HCG and schedule patient for follow-up ultrasound in 1 to 2 weeks. Will schedule her for an appointment outpatient to discuss further management. Patient was given strict return precautions if in the event she begins bleeding or has severe abdominal pain and is aware that she should return to the ED. Attending updated and in agreement with plan. Vitals:    01/28/23 2101   BP: 138/71   Pulse: 77   Resp: 18   Temp: 97.3 °F (36.3 °C)   TempSrc: Oral   SpO2: 99%       Recent Results (from the past 6 hour(s))   POCT urine pregnancy    Collection Time: 01/28/23  9:14 PM   Result Value Ref Range    Preg Test, Ur positive     QC OK? yes    HCG, QUANTITATIVE, PREGNANCY    Collection Time: 01/28/23  9:18 PM   Result Value Ref Range    hCG Quant 42,393 (H) <5 mIU/mL   Vaginitis DNA Probe    Collection Time: 01/28/23  9:27 PM    Specimen: Vaginal   Result Value Ref Range    Source . VAGINAL SWAB     Trichomonas Vaginalis DNA NEGATIVE NEGATIVE    Gardnerella Vaginalis, DNA Probe POSITIVE (A) NEGATIVE    Candida Species, DNA Probe NEGATIVE NEGATIVE   Urinalysis with Microscopic    Collection Time: 01/28/23 10:46 PM   Result Value Ref Range    Color, UA Yellow Yellow    Turbidity UA Cloudy (A) Clear    Glucose, Ur NEGATIVE NEGATIVE    Bilirubin Urine NEGATIVE NEGATIVE    Ketones, Urine NEGATIVE NEGATIVE    Specific Gravity, UA 1.024 1.005 - 1.030    Urine Hgb LARGE (A) NEGATIVE    pH, UA 8.5 (H) 5.0 - 8.0    Protein, UA NEGATIVE  Verified by sulfosalicylic acid test. (A) NEGATIVE    Urobilinogen, Urine Normal Normal    Nitrite, Urine NEGATIVE NEGATIVE    Leukocyte Esterase, Urine MODERATE (A) NEGATIVE    WBC, UA 5 TO 10 0 - 5 /HPF    RBC, UA 10 TO 20 0 - 2 /HPF    Epithelial Cells UA 5 TO 10 0 - 5 /HPF    Mucus, UA 1+ (A) None       Jada Duron  OB/GYN Resident  [facility]  1/29/2023 12:27 AM

## 2023-01-29 NOTE — ED NOTES
Pt with c/o lower abdominal cramping and vaginal bleeding. Pt reports she passed a clot last night and saturated two pads. Pt reports spotting today. Pt unsure if she is pregnant.       Chito Eugene RN  01/28/23 2134

## 2023-01-29 NOTE — ED NOTES
Pt resting comfortably in cot. Awaiting U/S. Pt updated on POC. Call light within reach. Will cont to monitor.      Caridad Meredith RN  01/28/23 8026

## 2023-01-29 NOTE — ED PROVIDER NOTES
[facility]  ED  Emergency Department Encounter  Emergency Medicine Resident     Pt Braden Chirag Smith  MRN: [id number]  [place] 1995  Date of evaluation: 23  PCP:  ASIA Salazar CNP  Note Started: 10:29 PM EST      CHIEF COMPLAINT       Chief Complaint   Patient presents with    Vaginal Bleeding       HISTORY OF PRESENT ILLNESS  (Location/Symptom, Timing/Onset, Context/Setting, Quality, Duration, Modifying Factors, Severity.)      Leandra Stewart is a 28 y.o. female who presents with significant vaginal bleeding that happened earlier this morning. Patient states that she passed a large clot and soaked almost 3 pads in the course of 2 hours. Patient states that she is only spotting now but still feels like she still has some vaginal bleeding ongoing. Patient states that she is getting the Depo shot and has not had a regular period however states that the last time that she had similar symptoms when she was pregnant with her son. Patient denies any other symptoms at this time. PAST MEDICAL / SURGICAL / SOCIAL / FAMILY HISTORY      has a past medical history of ACL injury tear, Asthma, Child passed of SIDS, Dental crowns present, Gestational HTN--G3, Hidradenitis axillaris, History of  labor, Left ankle sprain, Maternal congenital heart disease, antepartum, Murmur, cardiac, Obesity, and Wears glasses. Reviewed with the patient     has a past surgical history that includes Tonsillectomy and adenoidectomy; other surgical history (Bilateral, 2016); and knee surgery (Left, 2022).   Reviewed with the patient    Social History     Socioeconomic History    Marital status: Single     Spouse name: Not on file    Number of children: Not on file    Years of education: Not on file    Highest education level: Not on file   Occupational History    Not on file   Tobacco Use    Smoking status: Never    Smokeless tobacco: Never   Vaping Use    Vaping Use: Never used Substance and Sexual Activity    Alcohol use: No     Alcohol/week: 0.0 standard drinks    Drug use: Never    Sexual activity: Not Currently     Partners: Male   Other Topics Concern    Not on file   Social History Narrative    Not on file     Social Determinants of Health     Financial Resource Strain: Low Risk     Difficulty of Paying Living Expenses: Not hard at all   Food Insecurity: No Food Insecurity    Worried About Running Out of Food in the Last Year: Never true    Ran Out of Food in the Last Year: Never true   Transportation Needs: Not on file   Physical Activity: Sufficiently Active    Days of Exercise per Week: 7 days    Minutes of Exercise per Session: 60 min   Stress: Not on file   Social Connections: Not on file   Intimate Partner Violence: Not At Risk    Fear of Current or Ex-Partner: No    Emotionally Abused: No    Physically Abused: No    Sexually Abused: No   Housing Stability: Not on file       Family History   Problem Relation Age of Onset    Other Brother          suicide     Diabetes Maternal Grandmother     Cancer Maternal Grandmother     Heart Disease Paternal Uncle     Early Death Paternal Uncle 62        MI    Learning Disabilities Mother     Diabetes Mother         Type 2 managed with insulin     No Known Problems Paternal Grandfather     No Known Problems Paternal Grandmother     Lung Cancer Maternal Grandfather     No Known Problems Father     No Known Problems Sister     No Known Problems Brother     Breast Cancer Neg Hx     Colon Cancer Neg Hx     Eclampsia Neg Hx     Hypertension Neg Hx     Ovarian Cancer Neg Hx      Labor Neg Hx     Spont Abortions Neg Hx     Stroke Neg Hx     Uterine Cancer Neg Hx        Allergies:  Lidocaine    Home Medications:  Prior to Admission medications    Medication Sig Start Date End Date Taking?  Authorizing Provider   cephALEXin (KEFLEX) 500 MG capsule Take 1 capsule by mouth 4 times daily for 7 days 23 Yes Arnaldo Birmingham MD metroNIDAZOLE (FLAGYL) 500 MG tablet Take 1 tablet by mouth in the morning and at bedtime for 10 days 1/29/23 2/8/23 Yes Thu Zhao MD   albuterol sulfate HFA (VENTOLIN HFA) 108 (90 Base) MCG/ACT inhaler Inhale 2 puffs into the lungs every 6 hours as needed for Wheezing or Shortness of Breath 6/3/22   ASIA Pepe CNP   ondansetron (ZOFRAN) 4 MG tablet Take 1 tablet by mouth daily as needed for Nausea or Vomiting 6/2/22   Zoya Murray MD   medroxyPROGESTERone (DEPO-PROVERA) 150 MG/ML injection Inject 1 mL into the muscle every 3 months 5/24/22   Gonzales Crandall DO   ibuprofen (ADVIL;MOTRIN) 600 MG tablet Take 1 tablet by mouth every 6 hours as needed for Pain 4/4/22   [facility], DO   docusate sodium (COLACE) 100 MG capsule Take 1 capsule by mouth 2 times daily 4/4/22   [facility], DO   Prenatal Multivit-Min-Fe-FA (PRENATAL FORTE) TABS Take 1 tablet by mouth Daily May substitute with any prenatal vit pt insurance will cover 9/14/21 9/14/22  Rita Zelaya MD   selenium sulfide (SELSUN) 2.5 % lotion Apply topically daily as needed for Itching Apply topically daily as needed. Historical Provider, MD     Reviewed via chart check    REVIEW OF SYSTEMS       Review of Systems   Constitutional:  Negative for appetite change and chills. HENT:  Negative for congestion, tinnitus, trouble swallowing and voice change. Genitourinary:  Positive for vaginal bleeding and vaginal discharge. Negative for decreased urine volume, pelvic pain and urgency. Musculoskeletal: Negative. PHYSICAL EXAM      INITIAL VITALS:   /71   Pulse 77   Temp 97.3 °F (36.3 °C) (Oral)   Resp 18   SpO2 99%     Physical Exam  Vitals reviewed. Exam conducted with a chaperone present. Constitutional:       Appearance: Normal appearance. HENT:      Head: Normocephalic and atraumatic. Nose: Nose normal.      Mouth/Throat:      Mouth: Mucous membranes are moist.      Pharynx: Oropharynx is clear.    Eyes: Extraocular Movements: Extraocular movements intact. Conjunctiva/sclera: Conjunctivae normal.      Pupils: Pupils are equal, round, and reactive to light. Abdominal:      General: There is no distension. Palpations: Abdomen is soft. Tenderness: There is no abdominal tenderness. There is no guarding. Hernia: There is no hernia in the left inguinal area or right inguinal area. Genitourinary:     General: Normal vulva. Exam position: Lithotomy position. Presley stage (genital): 5. Labia:         Right: No rash, tenderness, lesion or injury. Left: No rash, tenderness, lesion or injury. Urethra: No prolapse or urethral swelling. Vagina: Vaginal discharge present. No bleeding. Cervix: Discharge and cervical bleeding present. Uterus: Enlarged. Adnexa: Right adnexa normal and left adnexa normal.   Skin:     General: Skin is warm and dry. Capillary Refill: Capillary refill takes less than 2 seconds. Neurological:      General: No focal deficit present. Mental Status: She is alert. Mental status is at baseline. Psychiatric:         Mood and Affect: Mood normal.         Behavior: Behavior normal.         DDX/DIAGNOSTIC RESULTS / EMERGENCY DEPARTMENT COURSE / MDM     Medical Decision Making  Patient is a 19-year-old female who comes in with concerns with vaginal bleeding. Given pelvic examination, high concern for possible miscarriage versus subchorionic hematoma versus ectopic pregnancy. We will send off all all pelvic swabs, labs, urine. Point-of-care positive for pregnancy. We will get hCG quantitative. Bedside ultrasound did show a gestational sac however unable to visualize anything in the sac. We will get formal ultrasound for ectopic rule out. Amount and/or Complexity of Data Reviewed  Labs: ordered. Radiology: ordered. Risk  Prescription drug management.         EKG      All EKG's are interpreted by the Emergency Department Physician who either signs or Co-signs this chart in the absence of a cardiologist.    EMERGENCY DEPARTMENT COURSE:      ED Course as of 01/29/23 0051   Sun Jan 29, 2023   0050 Patient was seen at bedside by OB/GYN services. H&H was ordered. It within normal limits. Set up for repeat quant and ultrasound take care of by the [facility] team.  We will discharge the patient at this time. [ES]      ED Course User Index  [ES] Sergo Tolentino MD       PROCEDURES:      CONSULTS:  None    CRITICAL CARE:  There was significant risk of life threatening deterioration of patient's condition requiring my direct management. Critical care time 0 minutes, excluding any documented procedures. FINAL IMPRESSION      1. Pregnancy of unknown anatomic location    2. Vaginal bleeding in pregnancy    3. Urinary tract infection with hematuria, site unspecified    4.  Bacterial vaginosis in pregnancy          DISPOSITION / PLAN     DISPOSITION Decision To Discharge 01/29/2023 12:49:05 AM      PATIENT REFERRED TO:  ASIA Retana - [facility]  [address] [facility] [address] [phone]    Schedule an appointment as soon as possible for a visit       [facility]  [address] [place] [address]  [phone]  Go to   As needed    DISCHARGE MEDICATIONS:  New Prescriptions    CEPHALEXIN (KEFLEX) 500 MG CAPSULE    Take 1 capsule by mouth 4 times daily for 7 days    METRONIDAZOLE (FLAGYL) 500 MG TABLET    Take 1 tablet by mouth in the morning and at bedtime for 10 days       Sergo Tolentino MD  Emergency Medicine Resident    (Please note that portions of thisnote were completed with a voice recognition program.  Efforts were made to edit the dictations but occasionally words are mis-transcribed.)       Sergo Tolentino MD  Resident  01/29/23 1324

## 2023-01-29 NOTE — ED PROVIDER NOTES
[facility] ED     Emergency Department     Faculty Attestation        I performed a history and physical examination of the patient and discussed management with the resident. I reviewed the residents note and agree with the documented findings and plan of care. Any areas of disagreement are noted on the chart. I was personally present for the key portions of any procedures. I have documented in the chart those procedures where I was not present during the key portions. I have reviewed the emergency nurses triage note. I agree with the chief complaint, past medical history, past surgical history, allergies, medications, social and family history as documented unless otherwise noted below. For mid-level providers such as nurse practitioners as well as physicians assistants:    I have personally seen and evaluated the patient. I find the patient's history and physical exam are consistent with NP/PA documentation. I agree with the care provided, treatment rendered, disposition, & follow-up plan. Additional findings are as noted.     Vital Signs: /71   Pulse 77   Temp 97.3 °F (36.3 °C) (Oral)   Resp 18   SpO2 99%   PCP:  Pineda De La Torre APRN - CNP    Pertinent Comments:     Patient with recent positive pregnancy test she is Rh+ she has some lower pelvic pain vaginal bleeding obtain labs quantitative hCG, pelvic exam transvaginal ultrasound to rule out ectopic      Critical Care  None          Autumn Cam MD    Attending Emergency Medicine Physician            Anthony Santoro MD  01/28/23 2123

## 2023-01-29 NOTE — ED PROVIDER NOTES
[facility] ED  Emergency Department  Emergency Medicine Resident Turn-Over     Care of Clari Dan was assumed from Dr. Collins and is being seen for Vaginal Bleeding  .  The patient's initial evaluation and plan have been discussed with the prior provider who initially evaluated the patient.     EMERGENCY DEPARTMENT COURSE / MEDICAL DECISION MAKING:       MEDICATIONS GIVEN:  Orders Placed This Encounter   Medications    ketorolac (TORADOL) injection 30 mg    cephALEXin (KEFLEX) 500 MG capsule     Sig: Take 1 capsule by mouth 4 times daily for 7 days     Dispense:  28 capsule     Refill:  0    metroNIDAZOLE (FLAGYL) 500 MG tablet     Sig: Take 1 tablet by mouth in the morning and at bedtime for 10 days     Dispense:  20 tablet     Refill:  0       LABS / RADIOLOGY:     Labs Reviewed   VAGINITIS DNA PROBE - Abnormal; Notable for the following components:       Result Value    Gardnerella Vaginalis, DNA Probe POSITIVE (*)     All other components within normal limits   URINALYSIS WITH MICROSCOPIC - Abnormal; Notable for the following components:    Turbidity UA Cloudy (*)     Urine Hgb LARGE (*)     pH, UA 8.5 (*)     Protein, UA NEGATIVE  Verified by sulfosalicylic acid test. (*)     Leukocyte Esterase, Urine MODERATE (*)     Mucus, UA 1+ (*)     All other components within normal limits   HCG, QUANTITATIVE, PREGNANCY - Abnormal; Notable for the following components:    hCG Quant 42,393 (*)     All other components within normal limits   HEMOGLOBIN AND HEMATOCRIT - Abnormal; Notable for the following components:    Hemoglobin 11.5 (*)     Hematocrit 35.5 (*)     All other components within normal limits   POCT URINE PREGNANCY - Normal   C.TRACHOMATIS N.GONORRHOEAE DNA   CULTURE, URINE       US OB TRANSVAGINAL    Result Date: 1/28/2023  EXAMINATION: FIRST TRIMESTER OBSTETRIC ULTRASOUND 1/28/2023 TECHNIQUE: Transvaginal first trimester obstetric pelvic duplex ultrasound was performed with  real-time imaging, color flow Doppler imaging, and spectral analysis. COMPARISON: None HISTORY: ORDERING SYSTEM PROVIDED HISTORY: vaginal bleeding, new pregnancy. Significant amount of blood with history of prior ectopic TECHNOLOGIST PROVIDED HISTORY: vaginal bleeding, new pregnancy. Significant amount of blood with history of prior ectopic FINDINGS: Uterus: 11.6 x 5.6 x 8.1 cm Gestational Sac(s):  Single oblong gestational sac. No evidence of subchorionic hemorrhage. Yolk Sac:  None Fetal Pole:  None Crown Rump Length:  None Fetal Heart Rate:  None Right ovary: 3.3 x 2.6 x 2.7 cm Left ovary: 4.0 x 2.5 x 2.6 cm Flow is preserved to the ovaries. Free fluid: Trace, simple Measurements: Estimated gestational age by current ultrasound: 6 weeks 3 days Estimated gestational age by LMP/prior ultrasound: Unknown Estimated Due Date: 09/20/2023     Intrauterine gestational sac measuring 20 mm without yolk sac or embryo. Findings are suspicious for but not diagnostic of failed early pregnancy. Recommend follow-up pelvic ultrasound in 7-14 days. RECENT VITALS:     Temp: 97.3 °F (36.3 °C),  Heart Rate: 77, Resp: 18, BP: 138/71, SpO2: 99 %    This patient is a 28 y.o. Female with vaginal bleeding and concern for miscarriage. Patient was evaluated by OB team at bedside. BV positive. UA showed moderate leukocyte esterase. hcG [id number]. Transvaginal ultrasound showing intrauterine gestational sac without yolk sac or embryo, suspicious but nondiagnostic of failed early pregnancy. OB has ordered repeat transvaginal ultrasound as outpatient and patient scheduled for outpatient follow up. ED Course as of 01/29/23 0126   Dm Cason Jan 29, 2023   0050 Patient was seen at bedside by OB/GYN services. H&H was ordered. It within normal limits. Set up for repeat quant and ultrasound take care of by the [place] team.  We will discharge the patient at this time.  [ES]      ED Course User Index  [ES] Jayy yAala MD       OUTSTANDING TASKS / RECOMMENDATIONS:    Discharge     FINAL IMPRESSION:     1. Pregnancy of unknown anatomic location    2. Vaginal bleeding in pregnancy    3. Urinary tract infection with hematuria, site unspecified    4.  Bacterial vaginosis in pregnancy        DISPOSITION:         DISPOSITION:  [x]  Discharge   []  Transfer -    []  Admission -     []  Against Medical Advice   []  Eloped   FOLLOW-UP: Jose Nieto, APRN - CNP  [address] [place] [place] [facility]  [phone]    Schedule an appointment as soon as possible for a visit       [facility]  [address] [place] [address]  [phone]  Go to   As needed     DISCHARGE MEDICATIONS: New Prescriptions    CEPHALEXIN (KEFLEX) 500 MG CAPSULE    Take 1 capsule by mouth 4 times daily for 7 days    METRONIDAZOLE (FLAGYL) 500 MG TABLET    Take 1 tablet by mouth in the morning and at bedtime for 10 days           Tae Amin MD  Emergency Medicine Resident  [facility]       Tae Amin MD  Resident  01/29/23 9733

## 2023-01-30 ENCOUNTER — TELEPHONE (OUTPATIENT)
Dept: FAMILY MEDICINE CLINIC | Age: 28
End: 2023-01-30

## 2023-01-30 NOTE — TELEPHONE ENCOUNTER
[facility] ([facility]) ED Follow up Call    Reason for ED visit:  possible miscarriage      1/30/2023     Dayton Moss , this is Tiffanie from Dr. Raine Weems office, just calling to see how you are doing after your recent ED visit. Did you receive discharge instructions? Yes  Do you understand the discharge instructions? Yes  Did the ED give you any new prescriptions? Yes  Were you able to fill your prescriptions? Yes      Do you have one of our red, yellow and green  Zone sheets that help you to determine when you should go to the ED? Not Applicable      Do you need or want to make a follow up appt with your PCP? No  She has na appt with ob    Do you have any further needs in the home, e.g. equipment?   Not Applicable        FU appts/Provider:    Future Appointments   Date Time Provider Jamaica Hay   2/10/2023  9:00 AM SCHEDULE, [facility] [address] [facility] OB/Gyn [email or web address]   2/10/2023  9:15 AM Mark Anthony Chisholm MD [address] [place] [address] OB/Gyn [email or web address]   6/12/2023 11:15 AM Cherri Webster MD [facility]

## 2023-01-30 NOTE — ED PROVIDER NOTES
[facility]   Emergency Department  Emergency Medicine Attending Sign-out     Care of Pao Elizabeth was assumed from previous attending Dr. Paty Dias and is being seen for Vaginal Bleeding  . The patient's initial evaluation and plan have been discussed with the prior provider who initially evaluated the patient. Attestation  I was available and discussed any additional care issues that arose and coordinated the management plans with the resident(s) caring for the patient during my duty period. Any areas of disagreement with resident's documentation of care or procedures are noted on the chart. I was personally present for the key portions of any/all procedures, during my duty period. I have documented in the chart those procedures where I was not present during the key portions. BRIEF PATIENT SUMMARY/MDM COURSE PER INITIAL PROVIDER:   RECENT VITALS:     Temp: 97.3 °F (36.3 °C),  Heart Rate: 77, Resp: 18, BP: 138/71, SpO2: 99 %    This patient is a 28 y.o. Female with pelvic pain and vaginal bleeding. Found to be pregnant with elevated hcg. Awaiting transvaginal ultrasoudn.      DIAGNOSTICS/MEDICATIONS:     MEDICATIONS GIVEN:  ED Medication Orders (From admission, onward)      Start Ordered     Status Ordering Provider    01/29/23 0045 01/29/23 0040  ketorolac (TORADOL) injection 30 mg  ONCE         Last MAR action: Given - by MARICARMEN Pineda on 01/29/23 at [address] [place] [address]            LABS    Labs Reviewed   VAGINITIS DNA PROBE - Abnormal; Notable for the following components:       Result Value    Gardnerella Vaginalis, DNA Probe POSITIVE (*)     All other components within normal limits   URINALYSIS WITH MICROSCOPIC - Abnormal; Notable for the following components:    Turbidity UA Cloudy (*)     Urine Hgb LARGE (*)     pH, UA 8.5 (*)     Protein, UA NEGATIVE  Verified by sulfosalicylic acid test. (*)     Leukocyte Esterase, Urine MODERATE (*)     Mucus, UA 1+ (*)     All other components within normal limits   HCG, QUANTITATIVE, PREGNANCY - Abnormal; Notable for the following components:    hCG Quant 42,393 (*)     All other components within normal limits   HEMOGLOBIN AND HEMATOCRIT - Abnormal; Notable for the following components:    Hemoglobin 11.5 (*)     Hematocrit 35.5 (*)     All other components within normal limits   POCT URINE PREGNANCY - Normal   C.TRACHOMATIS N.GONORRHOEAE DNA   CULTURE, URINE       RADIOLOGY  US OB TRANSVAGINAL    Result Date: 1/28/2023  EXAMINATION: FIRST TRIMESTER OBSTETRIC ULTRASOUND 1/28/2023 TECHNIQUE: Transvaginal first trimester obstetric pelvic duplex ultrasound was performed with real-time imaging, color flow Doppler imaging, and spectral analysis. COMPARISON: None HISTORY: ORDERING SYSTEM PROVIDED HISTORY: vaginal bleeding, new pregnancy. Significant amount of blood with history of prior ectopic TECHNOLOGIST PROVIDED HISTORY: vaginal bleeding, new pregnancy. Significant amount of blood with history of prior ectopic FINDINGS: Uterus: 11.6 x 5.6 x 8.1 cm Gestational Sac(s):  Single oblong gestational sac.  No evidence of subchorionic hemorrhage. Yolk Sac:  None Fetal Pole:  None Crown Rump Length:  None Fetal Heart Rate:  None Right ovary: 3.3 x 2.6 x 2.7 cm Left ovary: 4.0 x 2.5 x 2.6 cm Flow is preserved to the ovaries. Free fluid: Trace, simple Measurements: Estimated gestational age by current ultrasound: 6 weeks 3 days Estimated gestational age by LMP/prior ultrasound: Unknown Estimated Due Date: 09/20/2023     Intrauterine gestational sac measuring 20 mm without yolk sac or embryo. Findings are suspicious for but not diagnostic of failed early pregnancy. Recommend follow-up pelvic ultrasound in 7-14 days.       OUTSTANDING TASKS / ADDITIONAL COMMENTS   No definitive live IUP.  OB consulted and provided recommendations.    Ok for d/c with outpatient f/u.     Sivan Sheridan MD  Emergency Medicine Attending  [facility]      Comfort Roper MD  01/30/23 5657

## 2023-02-02 ENCOUNTER — HOSPITAL ENCOUNTER (OUTPATIENT)
Age: 28
Discharge: HOME | End: 2023-02-02
Payer: COMMERCIAL

## 2023-02-02 DIAGNOSIS — O36.80X0 PREGNANCY OF UNKNOWN ANATOMIC LOCATION: ICD-10-CM

## 2023-02-02 LAB — HCG QUANTITATIVE: ABNORMAL MIU/ML

## 2023-02-02 PROCEDURE — 84702 CHORIONIC GONADOTROPIN TEST: CPT

## 2023-02-02 PROCEDURE — 36415 COLL VENOUS BLD VENIPUNCTURE: CPT

## 2023-02-06 ENCOUNTER — TELEPHONE (OUTPATIENT)
Dept: OBGYN | Age: 28
End: 2023-02-06

## 2023-02-06 NOTE — TELEPHONE ENCOUNTER
Discussed Beta Hcg results with the patient. Discussed with her that the value unfortunately cannot give us a definitive diagnosis due to the rise, but did discuss that this is most likely not a normal pregnancy. She denies any abdominal pain or bleeding. Patient would like to await US on 2/10 and discuss all of her options at her appointment that day. Counseled her that if she experiences worsening abdominal pain or begins to saturate 2 pads an hour she should go to the ED. She vocalized understanding.

## 2023-02-10 ENCOUNTER — OFFICE VISIT (OUTPATIENT)
Dept: OBGYN | Age: 28
End: 2023-02-10
Payer: COMMERCIAL

## 2023-02-10 ENCOUNTER — ANCILLARY PROCEDURE (OUTPATIENT)
Dept: OBGYN | Age: 28
End: 2023-02-10
Payer: COMMERCIAL

## 2023-02-10 ENCOUNTER — HOSPITAL ENCOUNTER (OUTPATIENT)
Age: 28
Setting detail: SPECIMEN
Discharge: HOME | End: 2023-02-10

## 2023-02-10 VITALS
WEIGHT: 248 LBS | BODY MASS INDEX: 42.57 KG/M2 | HEART RATE: 77 BPM | DIASTOLIC BLOOD PRESSURE: 83 MMHG | SYSTOLIC BLOOD PRESSURE: 133 MMHG

## 2023-02-10 DIAGNOSIS — Z09 FOLLOW-UP EXAM: Primary | ICD-10-CM

## 2023-02-10 DIAGNOSIS — O09.91 HIGH-RISK PREGNANCY IN FIRST TRIMESTER: ICD-10-CM

## 2023-02-10 DIAGNOSIS — O36.80X0 PREGNANCY OF UNKNOWN ANATOMIC LOCATION: ICD-10-CM

## 2023-02-10 PROBLEM — O09.90 HRP (HIGH RISK PREGNANCY): Status: RESOLVED | Noted: 2023-01-29 | Resolved: 2023-02-10

## 2023-02-10 PROBLEM — O36.5990 PREGNANCY AFFECTED BY FETAL GROWTH RESTRICTION: Status: RESOLVED | Noted: 2022-01-24 | Resolved: 2023-02-10

## 2023-02-10 PROBLEM — O35.HXX0 CLUB FOOT, FETAL, AFFECTING CARE OF MOTHER, ANTEPARTUM: Status: RESOLVED | Noted: 2022-01-24 | Resolved: 2023-02-10

## 2023-02-10 PROBLEM — N94.6 DYSMENORRHEA: Status: RESOLVED | Noted: 2020-05-26 | Resolved: 2023-02-10

## 2023-02-10 PROBLEM — R73.9 BLOOD SUGAR INCREASED: Status: RESOLVED | Noted: 2022-01-24 | Resolved: 2023-02-10

## 2023-02-10 PROBLEM — Z82.49 FAMILY HISTORY OF AORTIC STENOSIS: Status: RESOLVED | Noted: 2021-09-14 | Resolved: 2023-02-10

## 2023-02-10 PROBLEM — R73.09 BLOOD SUGAR INCREASED: Status: RESOLVED | Noted: 2022-01-24 | Resolved: 2023-02-10

## 2023-02-10 LAB
ABO/RH: NORMAL
ABSOLUTE EOS #: 0.08 K/UL (ref 0–0.44)
ABSOLUTE IMMATURE GRANULOCYTE: <0.03 K/UL (ref 0–0.3)
ABSOLUTE LYMPH #: 1.77 K/UL (ref 1.1–3.7)
ABSOLUTE MONO #: 0.41 K/UL (ref 0.1–1.2)
ALBUMIN SERPL-MCNC: 4.5 G/DL (ref 3.5–5.2)
ALBUMIN/GLOBULIN RATIO: 1.5 (ref 1–2.5)
ALP SERPL-CCNC: 85 U/L (ref 35–104)
ALT SERPL-CCNC: 13 U/L (ref 5–33)
AMPHETAMINE SCREEN URINE: NEGATIVE
ANION GAP SERPL CALCULATED.3IONS-SCNC: 19 MMOL/L (ref 9–17)
ANTIBODY SCREEN: NEGATIVE
AST SERPL-CCNC: 14 U/L
BARBITURATE SCREEN URINE: NEGATIVE
BASOPHILS # BLD: 0 % (ref 0–2)
BASOPHILS ABSOLUTE: <0.03 K/UL (ref 0–0.2)
BENZODIAZEPINE SCREEN, URINE: NEGATIVE
BILIRUB SERPL-MCNC: 0.2 MG/DL (ref 0.3–1.2)
BILIRUBIN URINE: NEGATIVE
BUN SERPL-MCNC: 6 MG/DL (ref 6–20)
CALCIUM SERPL-MCNC: 10.3 MG/DL (ref 8.6–10.4)
CANNABINOID SCREEN URINE: NEGATIVE
CASTS UA: NORMAL /LPF (ref 0–8)
CHLORIDE SERPL-SCNC: 108 MMOL/L (ref 98–107)
CO2 SERPL-SCNC: 17 MMOL/L (ref 20–31)
COCAINE METABOLITE, URINE: NEGATIVE
COLOR: YELLOW
CREAT SERPL-MCNC: 0.42 MG/DL (ref 0.5–0.9)
EOSINOPHILS RELATIVE PERCENT: 1 % (ref 1–4)
EPITHELIAL CELLS UA: NORMAL /HPF (ref 0–5)
FENTANYL URINE: NEGATIVE
GFR SERPL CREATININE-BSD FRML MDRD: >60 ML/MIN/1.73M2
GLUCOSE SERPL-MCNC: 85 MG/DL (ref 70–99)
GLUCOSE UR STRIP.AUTO-MCNC: NEGATIVE MG/DL
HBV SURFACE AG SER QL: NONREACTIVE
HCT VFR BLD AUTO: 36 % (ref 36.3–47.1)
HCV AB SER QL: NONREACTIVE
HGB BLD-MCNC: 11.8 G/DL (ref 11.9–15.1)
HIV 1+2 AB+HIV1 P24 AG SERPL QL IA: NONREACTIVE
IMMATURE GRANULOCYTES: 0 %
KETONES UR STRIP.AUTO-MCNC: NEGATIVE MG/DL
LEUKOCYTE ESTERASE UR QL STRIP.AUTO: NEGATIVE
LYMPHOCYTES # BLD: 28 % (ref 24–43)
MCH RBC QN AUTO: 29.9 PG (ref 25.2–33.5)
MCHC RBC AUTO-ENTMCNC: 32.8 G/DL (ref 28.4–34.8)
MCV RBC AUTO: 91.1 FL (ref 82.6–102.9)
METHADONE SCREEN, URINE: NEGATIVE
MONOCYTES # BLD: 6 % (ref 3–12)
NITRITE UR QL STRIP.AUTO: NEGATIVE
NRBC AUTOMATED: 0 PER 100 WBC
OPIATES, URINE: NEGATIVE
OXYCODONE SCREEN URINE: NEGATIVE
PDW BLD-RTO: 12.4 % (ref 11.8–14.4)
PHENCYCLIDINE, URINE: NEGATIVE
PLATELET # BLD AUTO: ABNORMAL K/UL (ref 138–453)
PLATELET, FLUORESCENCE: 193 K/UL (ref 138–453)
PLATELET, IMMATURE FRACTION: 15 % (ref 1.1–10.3)
POTASSIUM SERPL-SCNC: 4.7 MMOL/L (ref 3.7–5.3)
PROT SERPL-MCNC: 7.6 G/DL (ref 6.4–8.3)
PROT UR STRIP.AUTO-MCNC: 7 MG/DL (ref 5–8)
PROT UR STRIP.AUTO-MCNC: NEGATIVE MG/DL
RBC # BLD: 3.95 M/UL (ref 3.95–5.11)
RBC CLUMPS #/AREA URNS AUTO: NORMAL /HPF (ref 0–4)
RUBV IGG SER QL: 210.6 IU/ML
SEG NEUTROPHILS: 64 % (ref 36–65)
SEGMENTED NEUTROPHILS ABSOLUTE COUNT: 4.09 K/UL (ref 1.5–8.1)
SODIUM SERPL-SCNC: 144 MMOL/L (ref 135–144)
SPECIFIC GRAVITY UA: 1.02 (ref 1–1.03)
T PALLIDUM AB SER QL IA: NONREACTIVE
TEST INFORMATION: NORMAL
TURBIDITY: CLEAR
URINE HGB: NEGATIVE
UROBILINOGEN, URINE: NORMAL
WBC # BLD AUTO: 6.4 K/UL (ref 3.5–11.3)
WBC UA: NORMAL /HPF (ref 0–5)

## 2023-02-10 PROCEDURE — G8427 DOCREV CUR MEDS BY ELIG CLIN: HCPCS

## 2023-02-10 PROCEDURE — 99213 OFFICE O/P EST LOW 20 MIN: CPT

## 2023-02-10 PROCEDURE — 76817 TRANSVAGINAL US OBSTETRIC: CPT | Performed by: RADIOLOGY

## 2023-02-10 PROCEDURE — G8417 CALC BMI ABV UP PARAM F/U: HCPCS

## 2023-02-10 PROCEDURE — 99211 OFF/OP EST MAY X REQ PHY/QHP: CPT

## 2023-02-10 PROCEDURE — G8484 FLU IMMUNIZE NO ADMIN: HCPCS

## 2023-02-10 PROCEDURE — 1036F TOBACCO NON-USER: CPT

## 2023-02-10 RX ORDER — PNV NO.95/FERROUS FUM/FOLIC AC 28MG-0.8MG
1 TABLET ORAL DAILY
Qty: 30 TABLET | Refills: 12 | Status: SHIPPED | OUTPATIENT
Start: 2023-02-10 | End: 2023-03-12

## 2023-02-10 NOTE — PROGRESS NOTES
OB/GYN Problem Visit    Evaristo Villalobos  2/10/2023                       Primary Care Physician: Lamin Harrell, APRN - CNP    CC:   Chief Complaint   Patient presents with    Follow-up     US results         HPI: Evaristo Villalobos is a 28 y.o. female [id number]    The patient was seen and examined. She is here for follow up for ultrasound results. Patient had an ultrasound on 23 which showed an intrauterine gestational sac measuring 20 mm without yolk sac or embryo with a HCG quant of [id number]. Today, TVUS completed in the office and showed a viable IUP with a CRL consistent with a dating of 8w2d and an PABLO 23. Patient complains of nothing at this time. She is happy about the viable IUP.     REVIEW OF SYSTEMS:  Constitutional: negative fever, negative chills  HEENT: negative visual disturbances, negative headaches  Respiratory: negative dyspnea, negative cough  Cardiovascular: negative chest pain,  negative palpitations  Gastrointestinal: negative abdominal pain, negative RUQ pain, negative N/V, negative diarrhea, negative constipation  Genitourinary: negative dysuria, negative vaginal discharge, negative vaginal bleeding  Dermatological: negative rash  Hematologic: negative bruising  Immunologic/Lymphatic: negative recent illness, negative recent sick contact  Musculoskeletal: negative back pain, negative myalgias, negative arthralgias  Neurological:  negative dizziness, negative weakness  Behavior/Psych: negative depression, negative anxiety    OBSTETRICAL HISTORY:  OB History    Para Term  AB Living   5 4 2 2 0 3   SAB IAB Ectopic Molar Multiple Live Births   0 0 0   0 4      # Outcome Date GA Lbr Filipe/2nd Weight Sex Delivery Anes PTL Lv   5 Current            4 Term 22 38w4d 08:54 / 00:04 5 lb 15.4 oz (2.705 kg) M Vag-Spont EPI N DEC   3 Term 06/10/20 38w1d  6 lb 8.2 oz (2.955 kg) F Vag-Spont EPI N VANITA   2   32w0d  4 lb 3 oz (1.899 kg) M Vag-Spont   VANITA      Birth Comments: [facility]   1   35w0d  5 lb 1 oz (2.296 kg) F Vag-Spont EPI Y VANITA      Birth Comments: this delivery       Obstetric Comments   G1-    G2-  - Progesterone in this preg   G3- Progesterone in this preg. Injectable Progesterone with [facility] home care. G4-baby  at 4 weeks of age      Same partner for all preg. PAST MEDICAL HISTORY:      Diagnosis Date    ACL injury tear     Left     Asthma     Child passed of SIDS     Patient's G4  passed at 4 weeks (mother states that cause of death is not known at this time 22)    Dental crowns present     Gestational HTN--G3 2020    Hidradenitis axillaris 2015    History of  labor 2010    Left ankle sprain 2015    Maternal congenital heart disease, antepartum 2014    Murmur, cardiac     Obesity     Wears glasses      PAST SURGICAL HISTORY:                                                                    Procedure Laterality Date    KNEE SURGERY Left 2022    KNEE ACL REPAIR ARTHROSCOPIC RECONSTRUCTION performed by Troy Julian MD at [facility] Bilateral 2016    excision axillary hydranitis    TONSILLECTOMY AND ADENOIDECTOMY       MEDICATIONS:  Current Outpatient Medications   Medication Sig Dispense Refill    Prenatal Vit-Fe Fumarate-FA (PRENATAL VITAMINS) 28-0.8 MG TABS Take 1 tablet by mouth daily 30 tablet 12    albuterol sulfate HFA (VENTOLIN HFA) 108 (90 Base) MCG/ACT inhaler Inhale 2 puffs into the lungs every 6 hours as needed for Wheezing or Shortness of Breath 18 g 0    ondansetron (ZOFRAN) 4 MG tablet Take 1 tablet by mouth daily as needed for Nausea or Vomiting 20 tablet 0    docusate sodium (COLACE) 100 MG capsule Take 1 capsule by mouth 2 times daily 60 capsule 0    selenium sulfide (SELSUN) 2.5 % lotion Apply topically daily as needed for Itching Apply topically daily as needed.       ondansetron (ZOFRAN-ODT) 4 MG disintegrating tablet Take 1 tablet by mouth 3 times daily as needed for Nausea or Vomiting (Patient not taking: Reported on 2/10/2023) 21 tablet 0     No current facility-administered medications for this visit. ALLERGIES:  Allergies as of 02/10/2023 - Fully Reviewed 02/10/2023   Allergen Reaction Noted    Lidocaine Hives 09/19/2014                          VITALS:  Vitals:    02/10/23 0912   BP: 133/83   Pulse: 77   Weight: 248 lb (112.5 kg)                                                                                                                                                                     PHYSICAL EXAM:   Chaperone for Intimate Exam: NA    General appearance:  no apparent distress, alert, and cooperative  HEENT: head atraumatic, normocephalic, moist mucous membranes, trachea midline  Neurologic:  alert, oriented, normal speech, no focal findings or movement disorder noted  Lungs:  No increased work of breathing noted  Heart:  regular rate and rhythm   Abdomen:  soft, and non-tender  Extremities:  no calf tenderness, non edematous   Musculoskeletal: Gross strength equal and intact throughout, no gross abnormalities, range of motion normal in hips, knees, shoulders and spine  Psychiatric: Mood appropriate, normal affect   Rectal Exam: not indicated  Pelvic Exam: deferred today    DATA:  No results found for this visit on 02/10/23. ASSESSMENT & PLAN:    Maxine Jefferson is a 28 y.o. female [id number] here for follow up for ultrasound results   - VSS, afebrile   - Previous TVUS on 1/28/23 which showed an intrauterine gestational sac measuring 20 mm without yolk sac or embryo    - TVUS today completed in the office shows a viable IUP 8w2d.  bpm. Patient unsure of LMP. Will use US from today to date pregnancy   - Denies hx preeclampsia in previous pregnancy. Hx gHTN in G3 pregnancy. Baseline PreE labs ordered   - Initial prenatal labs ordered today as well. Family hx of DM in patient mother and BMI 43.  Early 1 hr gtt ordered per protocol   - Last PAP from  unsatisfactory of evaluation. Discussed this with patient. Offered PAP collection today but patient prefers to wait until initial prenatal appointment. - Rx for prenatal vitamins sent to pharmacy on file   - All questions answered and patient expressed understanding  Patient Active Problem List    Diagnosis Date Noted    Asthma  2012     Priority: High     Pt did not report using inhalers at intake-SK      Child passed of SIDS 2022     Priority: Medium     Patient's G4  passed at 3 weeks        22 M Apg 8/ Wt 5#15  2022    COVID vaccinated 2021    Hx gHTN (G3) 2020    Hx Aortic Stenosis 2019     Arotic Stenosis. 3/24/20- saw Dr Sebastián Beltran at [facility], echo done showing EF of 55%, normal LV diastolic compliance, no significant valvular abnormalities   Pt has f/u appt in 2020, Pt completed appt, to follow up as needed-SK      Axillary hidradenitis suppurativa 2019    Hx sPTD x 2 2019 and   ( progesterone in  )   17P Plans/Education and Counseled-  Labor Definition and Warning Signs, What is 17P and  Common side effects of 17P injections,  [facility] with [facility]. 21- GA 9w5d  Plan for referral to [facility] accordingly. Plan to obtain progesterone from [facility] accordingly closer to 16 weeks of pregnancy   MFM placed referral. Pt started injections 10/29/21 with [facility]       Return in about 26 days (around 3/8/2023) for OB intake . Counseling Completed:    Counseled about need for repeat pap as per American Society for Colposcopy and Cervical Pathology guidelines. Counseled about STD counseling and prevention. Tobacco & Secondary smoke risks discussed; with recommendation for cessation and avoidance. Routine health maintenance per patients PCP discussed. Patient was seen with total face to face time of 20 minutes.  More than 50% of this visit was on counseling and education regarding her diagnose(s) as listed below and options. She was also counseled on her preventative health maintenance recommendations and follow-up. Diagnosis Orders   1. Follow-up exam        2. High-risk pregnancy in first trimester  Prenatal Vit-Fe Fumarate-FA (PRENATAL VITAMINS) 28-0.8 MG TABS    Urinalysis with Microscopic    Culture, Urine    Hepatitis C Antibody    HIV Screen    Urine Drug Screen    Prenatal Profile I    Glucose Tolerance, 1 Hr    Comprehensive Metabolic Panel    Protein / Creatinine Ratio, Urine        Jere Gates MD  Ob/Gyn Resident  [facility], [place]  2/10/2023, 9:38 AM        Attending Physician Statement  I have discussed the care of Lisa Zhong, including pertinent history and exam findings,  with the resident. I have reviewed the key elements of all parts of the encounter with the resident. I agree with the assessment, plan and orders as documented by the resident.   (GE Modifier)    Lyric Cee,

## 2023-02-11 LAB
MICROORGANISM SPEC CULT: NORMAL
SPECIMEN DESCRIPTION: NORMAL

## 2023-02-15 ENCOUNTER — TELEPHONE (OUTPATIENT)
Dept: OBGYN | Age: 28
End: 2023-02-15

## 2023-02-15 ENCOUNTER — HOSPITAL ENCOUNTER (EMERGENCY)
Age: 28
Discharge: HOME | End: 2023-02-15
Attending: EMERGENCY MEDICINE
Payer: COMMERCIAL

## 2023-02-15 VITALS
OXYGEN SATURATION: 98 % | WEIGHT: 243 LBS | TEMPERATURE: 98.9 F | RESPIRATION RATE: 16 BRPM | HEART RATE: 70 BPM | BODY MASS INDEX: 41.71 KG/M2 | SYSTOLIC BLOOD PRESSURE: 128 MMHG | DIASTOLIC BLOOD PRESSURE: 80 MMHG

## 2023-02-15 DIAGNOSIS — N30.00 ACUTE CYSTITIS WITHOUT HEMATURIA: ICD-10-CM

## 2023-02-15 DIAGNOSIS — N93.9 VAGINAL BLEEDING: Primary | ICD-10-CM

## 2023-02-15 LAB
BILIRUBIN URINE: NEGATIVE
CANDIDA SPECIES, DNA PROBE: NEGATIVE
CASTS UA: NORMAL /LPF (ref 0–8)
COLOR: ABNORMAL
EPITHELIAL CELLS UA: NORMAL /HPF (ref 0–5)
GARDNERELLA VAGINALIS, DNA PROBE: NEGATIVE
GLUCOSE UR STRIP.AUTO-MCNC: NEGATIVE MG/DL
KETONES UR STRIP.AUTO-MCNC: NEGATIVE MG/DL
LEUKOCYTE ESTERASE UR QL STRIP.AUTO: ABNORMAL
NITRITE UR QL STRIP.AUTO: NEGATIVE
PROT UR STRIP.AUTO-MCNC: 7 MG/DL (ref 5–8)
PROT UR STRIP.AUTO-MCNC: ABNORMAL MG/DL
RBC CLUMPS #/AREA URNS AUTO: NORMAL /HPF (ref 0–4)
SOURCE: NORMAL
SPECIFIC GRAVITY UA: 1.02 (ref 1–1.03)
TRICHOMONAS VAGINALIS DNA: NEGATIVE
TURBIDITY: ABNORMAL
URINE HGB: ABNORMAL
UROBILINOGEN, URINE: NORMAL
WBC UA: NORMAL /HPF (ref 0–5)

## 2023-02-15 PROCEDURE — 87591 N.GONORRHOEAE DNA AMP PROB: CPT

## 2023-02-15 PROCEDURE — 87660 TRICHOMONAS VAGIN DIR PROBE: CPT

## 2023-02-15 PROCEDURE — 6370000000 HC RX 637 (ALT 250 FOR IP): Performed by: HEALTH CARE PROVIDER

## 2023-02-15 PROCEDURE — 99283 EMERGENCY DEPT VISIT LOW MDM: CPT

## 2023-02-15 PROCEDURE — 87480 CANDIDA DNA DIR PROBE: CPT

## 2023-02-15 PROCEDURE — 87086 URINE CULTURE/COLONY COUNT: CPT

## 2023-02-15 PROCEDURE — 87510 GARDNER VAG DNA DIR PROBE: CPT

## 2023-02-15 PROCEDURE — 87491 CHLMYD TRACH DNA AMP PROBE: CPT

## 2023-02-15 PROCEDURE — 81001 URINALYSIS AUTO W/SCOPE: CPT

## 2023-02-15 RX ORDER — CEPHALEXIN 500 MG/1
500 CAPSULE ORAL ONCE
Status: COMPLETED | OUTPATIENT
Start: 2023-02-15 | End: 2023-02-15

## 2023-02-15 RX ORDER — CEPHALEXIN 500 MG/1
500 CAPSULE ORAL 2 TIMES DAILY
Qty: 13 CAPSULE | Refills: 0 | Status: SHIPPED | OUTPATIENT
Start: 2023-02-15 | End: 2023-02-22

## 2023-02-15 RX ADMIN — CEPHALEXIN 500 MG: 500 CAPSULE ORAL at 20:21

## 2023-02-15 ASSESSMENT — ENCOUNTER SYMPTOMS
ABDOMINAL PAIN: 1
VOMITING: 0
SHORTNESS OF BREATH: 0
NAUSEA: 0
DIARRHEA: 0
SORE THROAT: 0
CONSTIPATION: 0

## 2023-02-15 NOTE — ED PROVIDER NOTES
[facility]     Emergency Department     Faculty Attestation    I performed a history and physical examination of the patient and discussed management with the resident. I reviewed the residents note and agree with the documented findings and plan of care. Any areas of disagreement are noted on the chart. I was personally present for the key portions of any procedures. I have documented in the chart those procedures where I was not present during the key portions. I have reviewed the emergency nurses triage note. I agree with the chief complaint, past medical history, past surgical history, allergies, medications, social and family history as documented unless otherwise noted below. Documentation of the HPI, Physical Exam and Medical Decision Making performed by medical students or scribes is based on my personal performance of the HPI, PE and MDM. For Physician Assistant/ Nurse Practitioner cases/documentation I have personally evaluated this patient and have completed at least one if not all key elements of the E/M (history, physical exam, and MDM). Additional findings are as noted. Vital signs:   Vitals:    02/15/23 1719   BP: 128/80   Pulse: 70   Resp: 16   Temp: 98.9 °F (37.2 °C)   SpO2: 98%      9 weeks pregnant with vaginal bleeding. Os closed. IUP with cardiac activity noted on ultrasound. Blood type O positive.             Roro Calderón M.D,  Attending Emergency  Physician            David Romero MD  02/15/23 [phone]

## 2023-02-15 NOTE — TELEPHONE ENCOUNTER
Pt called the office stating she felt a big gush of blood that soaked her clothes. Bleeding is bright red.   She said she is going to go to the ER for an evaluation

## 2023-02-15 NOTE — ED PROVIDER NOTES
[facility] ED  Emergency Department Encounter  Emergency Medicine Resident     Pt Name:Clari Stewart  MRN: [id number]  [place] 1995  Date of evaluation: 2/15/23  PCP:  ASIA Liz CNP  Note Started: 5:08 PM EST      CHIEF COMPLAINT       Chief Complaint   Patient presents with    Vaginal Bleeding     X1 day, 9 weeks pregnant    Abdominal Cramping     Intermittent x a couple weeks       HISTORY OF PRESENT ILLNESS  (Location/Symptom, Timing/Onset, Context/Setting, Quality, Duration, Modifying Factors, Severity.)      Mirian Solis is a 28 y.o. female who presents with concerns for vaginal bleeding and cramping. Patient had an ultrasound on 2023 which showed an intrauterine gestational sac measuring 20 mm without a yolk or embryo, hCG quant was 42,000 at that time. On 2/10/2023, patient had a transvaginal ultrasound completed in the OB office which showed a viable intrauterine pregnancy at 8 weeks 2 days. Patient's blood type is O+. Patient presents today as she has had some intermittent vaginal cramping, unchanged at this time. He is states she has had some light vaginal spotting over the last week, did have a large gush of blood earlier today for 1 episode. Denies passing any fetal tissue. PAST MEDICAL / SURGICAL / SOCIAL / FAMILY HISTORY      has a past medical history of ACL injury tear, Asthma, Child passed of SIDS, Dental crowns present, Gestational HTN--G3, Hidradenitis axillaris, History of  labor, Left ankle sprain, Maternal congenital heart disease, antepartum, Murmur, cardiac, Obesity, and Wears glasses. has a past surgical history that includes Tonsillectomy and adenoidectomy; other surgical history (Bilateral, 2016); and knee surgery (Left, 2022).     Social History     Socioeconomic History    Marital status: Single     Spouse name: Not on file    Number of children: Not on file    Years of education: Not on file    Highest education level: Not on file   Occupational History    Not on file   Tobacco Use    Smoking status: Never    Smokeless tobacco: Never   Vaping Use    Vaping Use: Never used   Substance and Sexual Activity    Alcohol use: No     Alcohol/week: 0.0 standard drinks    Drug use: Never    Sexual activity: Not Currently     Partners: Male   Other Topics Concern    Not on file   Social History Narrative    Not on file     Social Determinants of Health     Financial Resource Strain: Low Risk     Difficulty of Paying Living Expenses: Not hard at all   Food Insecurity: No Food Insecurity    Worried About Running Out of Food in the Last Year: Never true    [address] in the Last Year: Never true   Transportation Needs: Not on file   Physical Activity: Sufficiently Active    Days of Exercise per Week: 7 days    Minutes of Exercise per Session: 60 min   Stress: Not on file   Social Connections: Not on file   Intimate Partner Violence: Not At Risk    Fear of Current or Ex-Partner: No    Emotionally Abused: No    Physically Abused: No    Sexually Abused: No   Housing Stability: Not on file       Family History   Problem Relation Age of Onset    Other Brother          suicide     Diabetes Maternal Grandmother     Cancer Maternal Grandmother     Heart Disease Paternal Uncle     Early Death Paternal Uncle 62        MI    Learning Disabilities Mother     Diabetes Mother         Type 2 managed with insulin     No Known Problems Paternal Grandfather     No Known Problems Paternal Grandmother     Lung Cancer Maternal Grandfather     No Known Problems Father     No Known Problems Sister     No Known Problems Brother     Breast Cancer Neg Hx     Colon Cancer Neg Hx     Eclampsia Neg Hx     Hypertension Neg Hx     Ovarian Cancer Neg Hx      Labor Neg Hx     Spont Abortions Neg Hx     Stroke Neg Hx     Uterine Cancer Neg Hx        Allergies:  Lidocaine    Home Medications:  Prior to Admission medications    Medication Sig Start Date End Date Taking? Authorizing Provider   cephALEXin (KEFLEX) 500 MG capsule Take 1 capsule by mouth 2 times daily for 7 days 2/15/23 2/22/23 Yes Jossy Peck DO   Prenatal Vit-Fe Fumarate-FA (PRENATAL VITAMINS) 28-0.8 MG TABS Take 1 tablet by mouth daily 2/10/23 3/12/23  Aliya Chaney MD   ondansetron (ZOFRAN-ODT) 4 MG disintegrating tablet Take 1 tablet by mouth 3 times daily as needed for Nausea or Vomiting  Patient not taking: Reported on 2/10/2023 1/29/23   Jessica Rivera MD   albuterol sulfate HFA (VENTOLIN HFA) 108 (90 Base) MCG/ACT inhaler Inhale 2 puffs into the lungs every 6 hours as needed for Wheezing or Shortness of Breath 6/3/22   ASIA Fonseca - CNP   ondansetron (ZOFRAN) 4 MG tablet Take 1 tablet by mouth daily as needed for Nausea or Vomiting 6/2/22   Alisia Wright MD   docusate sodium (COLACE) 100 MG capsule Take 1 capsule by mouth 2 times daily 4/4/22   Anthony Sanchez DO   selenium sulfide (SELSUN) 2.5 % lotion Apply topically daily as needed for Itching Apply topically daily as needed. Historical Provider, MD     REVIEW OF SYSTEMS       Review of Systems   Constitutional:  Negative for chills and fever. HENT:  Negative for ear pain, hearing loss and sore throat. Eyes:  Negative for visual disturbance. Respiratory:  Negative for shortness of breath. Cardiovascular:  Negative for chest pain. Gastrointestinal:  Positive for abdominal pain. Negative for constipation, diarrhea, nausea and vomiting. Genitourinary:  Positive for vaginal bleeding. Negative for difficulty urinating and dysuria. Musculoskeletal:  Negative for arthralgias and myalgias. Neurological:  Negative for numbness. Psychiatric/Behavioral:  Negative for agitation and confusion.       PHYSICAL EXAM      INITIAL VITALS:   /80   Pulse 70   Temp 98.9 °F (37.2 °C) (Oral)   Resp 16   Wt 243 lb (110.2 kg)   LMP  (LMP Unknown)   SpO2 98%   BMI 41.71 kg/m²     Physical Exam  Vitals and nursing note reviewed. Exam conducted with a chaperone present. Constitutional:       General: She is not in acute distress. Appearance: Normal appearance. She is well-developed. She is not ill-appearing or diaphoretic. HENT:      Head: Normocephalic and atraumatic. Right Ear: External ear normal.      Left Ear: External ear normal.      Nose: Nose normal.      Mouth/Throat:      Mouth: Mucous membranes are moist.   Eyes:      Extraocular Movements: Extraocular movements intact. Conjunctiva/sclera: Conjunctivae normal.   Neck:      Trachea: No tracheal deviation. Cardiovascular:      Rate and Rhythm: Normal rate and regular rhythm. Pulmonary:      Effort: Pulmonary effort is normal. No respiratory distress. Abdominal:      General: Abdomen is flat. There is no distension. Genitourinary:     Comments: Os closed  Minimal vaginal bleeding noted in the vault  Musculoskeletal:         General: No swelling, deformity or signs of injury. Normal range of motion. Cervical back: Normal range of motion and neck supple. Skin:     General: Skin is warm and dry. Coloration: Skin is not jaundiced. Findings: No bruising or lesion. Neurological:      General: No focal deficit present. Mental Status: She is alert and oriented to person, place, and time. Mental status is at baseline. Motor: No abnormal muscle tone. DDX/DIAGNOSTIC RESULTS / EMERGENCY DEPARTMENT COURSE / MDM     Medical Decision Making  Patient is a 76-year-old female presents this time for concerns of abdominal cramping and vaginal bleeding. At time of initial examination patient was in no acute distress, vital signs stable this time. She is nontachycardic, normotensive, afebrile. Vaginal exam does show a closed os, minimal blood in the vaginal canal.  Bedside ultrasound was performed and did show a intrauterine pregnancy with a fetal heart rate of around 160. Vaginitis probes and urinalysis were sent. We will plan for discharge home with OB follow-up once results. Amount and/or Complexity of Data Reviewed  Labs: ordered. Decision-making details documented in ED Course. Risk  Prescription drug management. EKG    All EKG's are interpreted by the Emergency Department Physician who either signs or Co-signs this chart in the absence of a cardiologist.    EMERGENCY DEPARTMENT COURSE:    ED Course as of 02/15/23 1957   Wed Feb 15, 2023   1940 WBC, UA: 20 TO 50 [JS]   1940 Will plan to treat for UTI. [JS]   1947 Patient was updated on all results and plan of care. Negative vaginitis probe. Potential concern for UTI, 20-50 WBCs, large number RBCs. May be contaminant, however given patient's currently pregnant, will go ahead and treat. Given first dose of Keflex in the emergency department. Follow up plans and ER return precautions discussed. Patient verbalized understanding and had no further questions at time of discharge. [JS]      ED Course User Index  [JS] Rodo Vasquez DO       PROCEDURES:    CONSULTS:  None    CRITICAL CARE:    FINAL IMPRESSION      1. Vaginal bleeding    2. Acute cystitis without hematuria          DISPOSITION / PLAN     DISPOSITION Decision To Discharge 02/15/2023 07:52:55 PM      PATIENT REFERRED TO:  Urban Santacruz, APRN - CNP  [address]  [phone]    Call in 1 day  To discuss this emergency room visit and any further follow-up needed    [facility]  [address] [place] [address]  [phone]  Go to   As needed, If symptoms worsen    Your OB  Please call your OB tomorrow to discuss further follow-up.         DISCHARGE MEDICATIONS:  New Prescriptions    CEPHALEXIN (KEFLEX) 500 MG CAPSULE    Take 1 capsule by mouth 2 times daily for 7 days       Tyron Zhu DO  Emergency Medicine Resident    (Please note that portions of thisnote were completed with a voice recognition program.  Efforts were made to edit the dictations but occasionally words are mis-transcribed.)       Ally Cm, DO  Resident  02/15/23 1407 [facility], DO  Resident  02/15/23 [id number]

## 2023-02-16 ENCOUNTER — TELEPHONE (OUTPATIENT)
Dept: FAMILY MEDICINE CLINIC | Age: 28
End: 2023-02-16

## 2023-02-16 LAB
C TRACH DNA SPEC QL PROBE+SIG AMP: NEGATIVE
MICROORGANISM SPEC CULT: NORMAL
N GONORRHOEA DNA SPEC QL PROBE+SIG AMP: NEGATIVE
SPECIMEN DESCRIPTION: NORMAL
SPECIMEN DESCRIPTION: NORMAL

## 2023-02-16 NOTE — TELEPHONE ENCOUNTER
[facility] ([facility] ED Follow up Call    Reason for ED visit:  Vaginal bleeding    2/16/2023       VOICEMAIL DOCUMENTATION - ERASE IF NOT USED  Hi, this message is for [facility]. This is Koko Liang from [facility] office. Just calling to see how you are doing after your recent visit to the Emergency Room. [facility] wants to make sure you were able to fill any prescriptions and that you understand your discharge instructions. Please return our call if you need to make a follow up appointment with your provider or have any further needs. Our phone number is [phone]. Have a great day.

## 2023-02-16 NOTE — DISCHARGE INSTRUCTIONS
Call today or tomorrow to follow up with ASIA Mulligan CNP  or your OB / GYN in 1-2 days    Potential miscarriage has not been ruled out in your case of pregnancy. Use ibuprofen or Tylenol (unless prescribed medications that have Tylenol in it) for pain. You can take over the counter Ibuprofen (advil) tablets (4 tablets every 8 hours or 3 tablets every 6 hours or 2 tablets every 4 hours)    You were also found to have a urinary tract infection today. Please take your antibiotics as prescribed. Return to the Emergency Department for worsening of vaginal bleeding, using more than 4 pads per hour, feeling of weakness, dizzy, nausea / vomiting, any other care or concern.

## 2023-02-16 NOTE — ED NOTES
This patient was assessed by the doctor only.  Nurse processed and completed the orders from the doctor ie labs, meds, and/or EKG       Shahab Jung RN  02/15/23 2022

## 2023-03-08 ENCOUNTER — FOLLOWUP TELEPHONE ENCOUNTER (OUTPATIENT)
Dept: OBGYN | Age: 28
End: 2023-03-08

## 2023-03-08 ENCOUNTER — INITIAL PRENATAL (OUTPATIENT)
Dept: OBGYN | Age: 28
End: 2023-03-08
Payer: COMMERCIAL

## 2023-03-08 ENCOUNTER — HOSPITAL ENCOUNTER (OUTPATIENT)
Age: 28
Setting detail: SPECIMEN
Discharge: HOME | End: 2023-03-08

## 2023-03-08 DIAGNOSIS — Z87.74 HISTORY OF CONGENITAL HEART DEFECT: ICD-10-CM

## 2023-03-08 DIAGNOSIS — O09.891 HISTORY OF PRETERM DELIVERY, CURRENTLY PREGNANT IN FIRST TRIMESTER: ICD-10-CM

## 2023-03-08 DIAGNOSIS — Z82.79 FAMILY HISTORY OF CONGENITAL HEART DEFECT: ICD-10-CM

## 2023-03-08 DIAGNOSIS — J45.909 ASTHMA AFFECTING PREGNANCY, ANTEPARTUM: ICD-10-CM

## 2023-03-08 DIAGNOSIS — Z13.31 NEGATIVE DEPRESSION SCREENING: ICD-10-CM

## 2023-03-08 DIAGNOSIS — Z13.71 SCREENING FOR GENETIC DISEASE CARRIER STATUS: ICD-10-CM

## 2023-03-08 DIAGNOSIS — Z92.29 COVID-19 VACCINE SERIES COMPLETED: ICD-10-CM

## 2023-03-08 DIAGNOSIS — Z23 INFLUENZA VACCINE NEEDED: ICD-10-CM

## 2023-03-08 DIAGNOSIS — O09.90 HIGH RISK PREGNANCY, ANTEPARTUM: ICD-10-CM

## 2023-03-08 DIAGNOSIS — L73.2 AXILLARY HIDRADENITIS SUPPURATIVA: ICD-10-CM

## 2023-03-08 DIAGNOSIS — Z87.59 HISTORY OF GESTATIONAL HYPERTENSION: ICD-10-CM

## 2023-03-08 DIAGNOSIS — O20.9 VAGINAL BLEEDING IN PREGNANCY, FIRST TRIMESTER: ICD-10-CM

## 2023-03-08 DIAGNOSIS — Z83.3 FAMILY HISTORY OF DIABETES MELLITUS IN MOTHER: ICD-10-CM

## 2023-03-08 DIAGNOSIS — O09.91 HIGH-RISK PREGNANCY IN FIRST TRIMESTER: ICD-10-CM

## 2023-03-08 DIAGNOSIS — Z84.82 FAMILY HISTORY OF SIDS (SUDDEN INFANT DEATH SYNDROME): ICD-10-CM

## 2023-03-08 DIAGNOSIS — R73.09 ELEVATED GLUCOSE TOLERANCE TEST: ICD-10-CM

## 2023-03-08 DIAGNOSIS — Z23 NEED FOR TDAP VACCINATION: ICD-10-CM

## 2023-03-08 DIAGNOSIS — O99.519 ASTHMA AFFECTING PREGNANCY, ANTEPARTUM: ICD-10-CM

## 2023-03-08 LAB
CREATININE URINE: 191.8 MG/DL (ref 28–217)
TOTAL PROTEIN, URINE: 35 MG/DL
URINE TOTAL PROTEIN CREATININE RATIO: 0.18 (ref 0–0.2)

## 2023-03-08 PROCEDURE — G8427 DOCREV CUR MEDS BY ELIG CLIN: HCPCS | Performed by: OBSTETRICS & GYNECOLOGY

## 2023-03-08 PROCEDURE — 99211 OFF/OP EST MAY X REQ PHY/QHP: CPT | Performed by: OBSTETRICS & GYNECOLOGY

## 2023-03-08 PROCEDURE — G8417 CALC BMI ABV UP PARAM F/U: HCPCS | Performed by: OBSTETRICS & GYNECOLOGY

## 2023-03-08 RX ORDER — ASPIRIN 81 MG/1
81 TABLET ORAL DAILY
Qty: 30 TABLET | Refills: 5 | Status: SHIPPED | OUTPATIENT
Start: 2023-03-08

## 2023-03-08 NOTE — PROGRESS NOTES
Angel Doran is a 28 y.o. female evaluated on 3/8/2023 for Other (OB intake)  . Documentation:  I communicated with the patient and/or health care decision maker about the OB intake. Details of this discussion including any medical advice provided: see notes    Total Time: minutes: 21-30 minutes    Angel Doran was evaluated through a synchronous (real-time) audio encounter. Patient identification was verified at the start of the visit. She (or guardian if applicable) is aware that this is a billable service, which includes applicable co-pays. This visit was conducted with the patient's (and/or legal guardian's) verbal consent. She has not had a related appointment within my department in the past 7 days or scheduled within the next 24 hours. Mark Stauffer RN    First Trimester Plans/Education completed per ACOG Guidelines. Pt counseled and verbalizes understanding. Routine Prenatal Tests,  Risk Factors Identified By Prenatal History, Anticipated Course of Prenatal Care, Nutrition and Weight Gain Counseling, Toxoplasmosis Precautions ( cats/raw meat), Sexual Activity, Exercise, Influenza/Tdap Vaccine, Smoking Counseling, Environmental/Work Hazards, Travel, Alcohol, Illicit/Recreational Drugs, Use Of Any Medications, Indications for Ultrasound, Domestic Violence, Seat Belt Use, Dental Care , Childbirth Classes/Hospital Facilities. Second Trimester Plan/Education Completed Per ACOG Guidelines. Pt counseled and verbalizes understanding. Signs and Symptoms of  Labor, Influenza/Tdap Vaccine,Smoking Counseling, Domestic Violence.      Risk factors:    Patient occupation: Employed [employer], full time  Patient lives with: Patient lives alone with her three children  Primary Care Provider: Ingris Redding  Recent ER visits: Yes  Planned/ unplanned pregnancy: unplanned  Father of baby: Same partner G1-G5               LMP: Unknown             Menses monthly: Yes  BCP at conception: No  Dating ultrasound: Completed 23 8w2d  Delivery type history: normal spontaneous vaginal delivery  History of spontaneous  delivery: Yes  Varicella history: Vaccinated  Covid Vaccine: COVID vaccinated; amenable to booster  Flu Vaccine in pregnancy: Yes   TDAP Vaccine in pregnancy: Yes   Blood transfusion acceptable: Yes   Last Pap: 18 negative cytology (21 insufficient sample)            Report available: Yes  First Trimester Screen/NIPT/MSAFP/Quad: FTS, MFM referral placed for FTS 3/8/23 at 12w0d  Initial prenatal labs ordered today: Yes  Smoker: Denies  Pre-Gravid BMI: 41.18, Greater than 40 - Morbid Obesity / Extreme Obesity / Grade III  Recommend weight gain: Obese (BMI over 30): Gain 11 to 20 pounds  Substance abuse history: Denies  Depression/anxiety history: Depression, only after death of her son  Domestic abuse history: Denies  Dental care: Last cleaning: >1 year.  Reviewed, patient verbalizes understanding  Reviewed how to reach Ob/Gyn resident after hours: Reviewed, patient verbalizes understanding

## 2023-03-10 ASSESSMENT — PATIENT HEALTH QUESTIONNAIRE - PHQ9
SUM OF ALL RESPONSES TO PHQ QUESTIONS 1-9: 0
SUM OF ALL RESPONSES TO PHQ QUESTIONS 1-9: 0
1. LITTLE INTEREST OR PLEASURE IN DOING THINGS: 0
SUM OF ALL RESPONSES TO PHQ9 QUESTIONS 1 & 2: 0
SUM OF ALL RESPONSES TO PHQ QUESTIONS 1-9: 0
2. FEELING DOWN, DEPRESSED OR HOPELESS: 0
SUM OF ALL RESPONSES TO PHQ QUESTIONS 1-9: 0

## 2023-03-10 NOTE — TELEPHONE ENCOUNTER
SW met with Pt in person for depression screen and [facility] initial assessment. Pt reported having a good support system, needing some help with baby items. Reports no use of tobacco, alcohol and thc. SW completed lead screen with Pt. No risks detected     Pt scored 0 on PHQ-2. SW educated Pt on safe sleep, infant mortality, smoking cessation. No issues or concerns with MH symptoms, no depression or anxiety. No issues to discuss with SW at this time. Pt is linked with [facility]. Agreed to [facility] referral. Pt informed she will meet with Tanya DIEGO to complete the intake. SW will follow up at 28 weeks and as needed. Lead screening for pregnant and breast-feeding women. Do you live in or regularly visit a home built before 1978 that has had renovations, repair work, or remodeling in the past 12 months? No  Have you resided in or emigrated from areas were  contamination is high ([facility], [place])? No  Do you live near a manufacturing plant where lead is used e.g. battery manufacturing or recycling, ship building, or plastic manufacturing? No  Do you work with lead or live with someone who does? No  Do you cook with, store or serve food in [facility]? No  Do you eat none food substances that may be contaminated with lead such as soil or lead glazed ceramic pottery? No  Do you use alternative therapies, herbs or home remedies imported from [facility], [facility], [place], [place] or  countries? No  Do you use imported traditional cosmetics such as savage or surma that may be contaminated with lead? No  Do you or a family member engage in hobbies where lead is used e.g. stained glass or making pottery with lead glaze? No  Has your home been identified as having lead pipes or water source lines with lead? No  Have you ever been told that you have high levels of lead in your body?  No  Do you live with someone identified as having elevated lead levels, child, close friend or relative?  No      Patients answering yes to any one of the above questions, offer blood lead level testing (LAB98), associate with diagnosis of Screening for Lead Poisoning, Z13.88.

## 2023-03-13 ENCOUNTER — TELEPHONE (OUTPATIENT)
Dept: OBGYN | Age: 28
End: 2023-03-13

## 2023-03-13 ENCOUNTER — HOSPITAL ENCOUNTER (EMERGENCY)
Age: 28
Discharge: HOME | End: 2023-03-13
Attending: EMERGENCY MEDICINE
Payer: COMMERCIAL

## 2023-03-13 VITALS
DIASTOLIC BLOOD PRESSURE: 87 MMHG | HEIGHT: 64 IN | BODY MASS INDEX: 41.83 KG/M2 | SYSTOLIC BLOOD PRESSURE: 134 MMHG | HEART RATE: 81 BPM | TEMPERATURE: 98.1 F | OXYGEN SATURATION: 98 % | RESPIRATION RATE: 16 BRPM | WEIGHT: 245 LBS

## 2023-03-13 DIAGNOSIS — O46.90 VAGINAL BLEEDING IN PREGNANCY: Primary | ICD-10-CM

## 2023-03-13 LAB
ABSOLUTE EOS #: 0.06 K/UL (ref 0–0.44)
ABSOLUTE IMMATURE GRANULOCYTE: <0.03 K/UL (ref 0–0.3)
ABSOLUTE LYMPH #: 1.88 K/UL (ref 1.1–3.7)
ABSOLUTE MONO #: 0.31 K/UL (ref 0.1–1.2)
BASOPHILS # BLD: 0 % (ref 0–2)
BASOPHILS ABSOLUTE: <0.03 K/UL (ref 0–0.2)
BILIRUBIN URINE: NEGATIVE
CANDIDA SPECIES, DNA PROBE: NEGATIVE
CASTS UA: ABNORMAL /LPF (ref 0–8)
COLOR: YELLOW
EOSINOPHILS RELATIVE PERCENT: 1 % (ref 1–4)
EPITHELIAL CELLS UA: ABNORMAL /HPF (ref 0–5)
GARDNERELLA VAGINALIS, DNA PROBE: NEGATIVE
GLUCOSE UR STRIP.AUTO-MCNC: NEGATIVE MG/DL
HCT VFR BLD AUTO: 35.1 % (ref 36.3–47.1)
HGB BLD-MCNC: 11.2 G/DL (ref 11.9–15.1)
IMMATURE GRANULOCYTES: 0 %
KETONES UR STRIP.AUTO-MCNC: NEGATIVE MG/DL
LEUKOCYTE ESTERASE UR QL STRIP.AUTO: ABNORMAL
LYMPHOCYTES # BLD: 29 % (ref 24–43)
MCH RBC QN AUTO: 29.3 PG (ref 25.2–33.5)
MCHC RBC AUTO-ENTMCNC: 31.9 G/DL (ref 28.4–34.8)
MCV RBC AUTO: 91.9 FL (ref 82.6–102.9)
MONOCYTES # BLD: 5 % (ref 3–12)
NITRITE UR QL STRIP.AUTO: NEGATIVE
NRBC AUTOMATED: 0 PER 100 WBC
PDW BLD-RTO: 12.5 % (ref 11.8–14.4)
PLATELET # BLD AUTO: ABNORMAL K/UL (ref 138–453)
PLATELET, FLUORESCENCE: 173 K/UL (ref 138–453)
PLATELET, IMMATURE FRACTION: 15.5 % (ref 1.1–10.3)
PROT UR STRIP.AUTO-MCNC: >9 MG/DL (ref 5–8)
PROT UR STRIP.AUTO-MCNC: ABNORMAL MG/DL
RBC # BLD: 3.82 M/UL (ref 3.95–5.11)
RBC CLUMPS #/AREA URNS AUTO: ABNORMAL /HPF (ref 0–4)
SEG NEUTROPHILS: 65 % (ref 36–65)
SEGMENTED NEUTROPHILS ABSOLUTE COUNT: 4.14 K/UL (ref 1.5–8.1)
SOURCE: NORMAL
SPECIFIC GRAVITY UA: 1.02 (ref 1–1.03)
TRICHOMONAS VAGINALIS DNA: NEGATIVE
TURBIDITY: CLEAR
URINE HGB: ABNORMAL
UROBILINOGEN, URINE: NORMAL
WBC # BLD AUTO: 6.4 K/UL (ref 3.5–11.3)
WBC UA: ABNORMAL /HPF (ref 0–5)

## 2023-03-13 PROCEDURE — 87491 CHLMYD TRACH DNA AMP PROBE: CPT

## 2023-03-13 PROCEDURE — 85025 COMPLETE CBC W/AUTO DIFF WBC: CPT

## 2023-03-13 PROCEDURE — 81001 URINALYSIS AUTO W/SCOPE: CPT

## 2023-03-13 PROCEDURE — 99283 EMERGENCY DEPT VISIT LOW MDM: CPT

## 2023-03-13 PROCEDURE — 87660 TRICHOMONAS VAGIN DIR PROBE: CPT

## 2023-03-13 PROCEDURE — 87086 URINE CULTURE/COLONY COUNT: CPT

## 2023-03-13 PROCEDURE — 87591 N.GONORRHOEAE DNA AMP PROB: CPT

## 2023-03-13 PROCEDURE — 85055 RETICULATED PLATELET ASSAY: CPT

## 2023-03-13 PROCEDURE — 87480 CANDIDA DNA DIR PROBE: CPT

## 2023-03-13 PROCEDURE — 87510 GARDNER VAG DNA DIR PROBE: CPT

## 2023-03-13 RX ORDER — ACETAMINOPHEN 325 MG/1
650 TABLET ORAL EVERY 6 HOURS PRN
Qty: 120 TABLET | Refills: 0 | Status: SHIPPED | OUTPATIENT
Start: 2023-03-13 | End: 2023-03-20

## 2023-03-13 NOTE — ED NOTES
Labeled urine specimen sent to lab via tube system.     [x] Urine Sample   [x]  Clean catch   [] Straight cath   [x] Urine voided   []  Indwelling catheter   []  Suprapubic catheter       Lisa Martino RN  03/13/23 1038

## 2023-03-13 NOTE — ED NOTES
The following labs were labeled with appropriate pt sticker and tubed to lab:     [] Blue     [x] Lavender   [] on ice  [x] Green/yellow  [] Green/black [] on ice  [] Achilles Comfort  [] on ice  [x] Yellow  [x] Red  [] Type/ Screen  [] ABG  [] VBG    [] COVID-19 swab    [] Rapid  [] PCR  [] Flu swab  [] Peds Viral Panel     [] Urine Sample  [] Fecal Sample  [] Pelvic Cultures  [] Blood Cultures  [] X 2  [] STREP Cultures       Glendy Westfall RN  03/13/23 1035

## 2023-03-13 NOTE — ED PROVIDER NOTES
[facility] ED  Emergency Department Encounter  Emergency Medicine Resident     Pt Name:Clari Rasmussen  MRN: [id number]  Alishagfjennifer 1995  Date of evaluation: 3/13/23  PCP:  ASIA Booth CNP  Note Started: 10:19 AM EDT      CHIEF COMPLAINT       Chief Complaint   Patient presents with    Vaginal Bleeding   Vaginal bleeding    HISTORY OF PRESENT ILLNESS  (Location/Symptom, Timing/Onset, Context/Setting, Quality, Duration, Modifying Factors, Severity.)      Ángel Busch is a 28 y.o. female who presents with complaints of vaginal bleeding since 9 AM and has passed a couple quarter size clots since it started. Denied any current abdominal pain. Also notes some urinary frequency. Patient is G5, P3 and approximately 12 weeks gestation. Has had formal ultrasound confirming IUP. Blood type is O+. Follows with  for pregnancy and has an upcoming appointment on Wednesday. No chest pain or shortness of breath. Does have history of asthma. No regular medications but does take inhaler as needed and has not needed it recently. No lightheadedness. Patient notes main concern is amount of blood she has been losing throughout pregnancy but has not required blood transfusions in the past during this pregnancy. On chart review, patient just seen in ED 2/15/2023 with similar vaginal bleeding and had a UTI at that time but os was closed. PAST MEDICAL / SURGICAL / SOCIAL / FAMILY HISTORY      has a past medical history of ACL injury tear, Asthma, Child passed of SIDS, Dental crowns present, Depression, Disease of blood and blood forming organ, Gestational diabetes mellitus, Gestational HTN--G3, Heart defect, Herpes simplex virus (HSV) infection, Hidradenitis axillaris, History of  labor, Left ankle sprain, Maternal congenital heart disease, antepartum, Murmur, cardiac, Obesity,  delivery,  labor, and Wears glasses.   Reviewed with patient     has a past surgical history that includes Tonsillectomy and adenoidectomy; other surgical history (Bilateral, 02/12/2016); and knee surgery (Left, 06/09/2022).   Reviewed with patient    Social History     Socioeconomic History    Marital status: Single     Spouse name: Not on file    Number of children: Not on file    Years of education: Not on file    Highest education level: Not on file   Occupational History    Not on file   Tobacco Use    Smoking status: Never     Passive exposure: Current    Smokeless tobacco: Never   Vaping Use    Vaping Use: Never used   Substance and Sexual Activity    Alcohol use: Not Currently     Comment: Social drinker: Last alcohol May 2022    Drug use: Never    Sexual activity: Not Currently     Partners: Male   Other Topics Concern    Not on file   Social History Narrative    Not on file     Social Determinants of Health     Financial Resource Strain: Low Risk     Difficulty of Paying Living Expenses: Not hard at all   Food Insecurity: No Food Insecurity    Worried About Running Out of Food in the Last Year: Never true    [address] in the Last Year: Never true   Transportation Needs: Not on file   Physical Activity: Sufficiently Active    Days of Exercise per Week: 7 days    Minutes of Exercise per Session: 60 min   Stress: Not on file   Social Connections: Not on file   Intimate Partner Violence: Not At Risk    Fear of Current or Ex-Partner: No    Emotionally Abused: No    Physically Abused: No    Sexually Abused: No   Housing Stability: Not on file       Family History   Problem Relation Age of Onset    No Known Problems Paternal Grandfather     No Known Problems Paternal Grandmother     Breast Cancer Maternal Grandmother     Diabetes Maternal Grandmother     Cancer Maternal Grandfather         Bone    Lung Cancer Maternal Grandfather     No Known Problems Father         Father does not go to doctors    Learning Disabilities Mother     Diabetes Mother         Type 2 managed with insulin Other Brother          suicide     No Known Problems Brother     No Known Problems Sister     SIDS Child     Other Child         Patient reports aspiration was listed in autopsy report    Heart Disease Paternal Uncle     Early Death Paternal Uncle 62        MI    Colon Cancer Neg Hx     Eclampsia Neg Hx     Hypertension Neg Hx     Ovarian Cancer Neg Hx      Labor Neg Hx     Spont Abortions Neg Hx     Stroke Neg Hx     Uterine Cancer Neg Hx        Allergies:  Lidocaine    Home Medications:  Prior to Admission medications    Medication Sig Start Date End Date Taking? Authorizing Provider   acetaminophen (TYLENOL) 325 MG tablet Take 2 tablets by mouth every 6 hours as needed for Pain 3/13/23 3/20/23 Yes Jennifer Malik MD   aspirin EC 81 MG EC tablet Take 1 tablet by mouth daily 3/8/23   Saira Ying DO   Prenatal Vit-Fe Fumarate-FA (PRENATAL VITAMINS) 28-0.8 MG TABS Take 1 tablet by mouth daily 2/10/23 3/12/23  Marleen Grandchild, MD   ondansetron (ZOFRAN-ODT) 4 MG disintegrating tablet Take 1 tablet by mouth 3 times daily as needed for Nausea or Vomiting  Patient not taking: No sig reported 23   Khoi Borrego MD   albuterol sulfate HFA (VENTOLIN HFA) 108 (90 Base) MCG/ACT inhaler Inhale 2 puffs into the lungs every 6 hours as needed for Wheezing or Shortness of Breath 6/3/22   ASIA Gonsalves - CNP   ondansetron (ZOFRAN) 4 MG tablet Take 1 tablet by mouth daily as needed for Nausea or Vomiting  Patient not taking: No sig reported 22   Alfred Oleary MD   docusate sodium (COLACE) 100 MG capsule Take 1 capsule by mouth 2 times daily  Patient not taking: Reported on 3/8/2023 4/4/22   Paige Bae DO   selenium sulfide (SELSUN) 2.5 % lotion Apply topically daily as needed for Itching Apply topically daily as needed.     Historical Provider, MD       REVIEW OF SYSTEMS    (2-9 systems for level 4, 10 or more for level 5)      Review of Systems   Constitutional:  Negative for chills and fever. HENT:  Negative for congestion and rhinorrhea. Eyes:  Negative for photophobia and visual disturbance. Respiratory:  Negative for shortness of breath and wheezing. Cardiovascular:  Negative for chest pain and palpitations. Gastrointestinal:  Negative for abdominal pain, nausea and vomiting. Genitourinary:  Negative for dysuria and positive for frequency. Positive for vaginal bleeding  Musculoskeletal:  Negative for back pain and neck pain. Skin:  Negative for rash and wound. Neurological:  Negative for dizziness and headaches. PHYSICAL EXAM   (up to 7 for level 4, 8 or more for level 5)      INITIAL VITALS:   /87   Pulse 81   Temp 98.1 °F (36.7 °C) (Oral)   Resp 16   Ht 5' 4\" (1.626 m)   Wt 245 lb (111.1 kg)   LMP  (LMP Unknown)   SpO2 98%   BMI 42.05 kg/m²     Physical Exam  Vitals and nursing note reviewed. Constitutional:       General: She is not in acute distress. HENT:      Head: Atraumatic. Right Ear: External ear normal.      Left Ear: External ear normal.      Nose: Nose normal.      Mouth/Throat:      Mouth: Mucous membranes are moist.      Pharynx: Oropharynx is clear. Eyes:      Conjunctiva/sclera: Conjunctivae normal.   Cardiovascular:      Rate and Rhythm: Normal rate and regular rhythm. Pulses: Normal pulses. Pulmonary:      Effort: Pulmonary effort is normal. No respiratory distress. Breath sounds: Normal breath sounds. No wheezing. Abdominal:      Palpations: Abdomen is soft. Tenderness: There is no abdominal tenderness. Gu: os closed, moderate blood in vaginal vault  Musculoskeletal:         General: Normal range of motion. Cervical back: Normal range of motion. Skin:     General: Skin is warm and dry. Capillary Refill: Capillary refill takes less than 2 seconds. Neurological:      General: No focal deficit present. Mental Status: She is alert and oriented to person, place, and time. DDX/DIAGNOSTIC RESULTS / EMERGENCY DEPARTMENT COURSE / MDM     Medical Decision Making  Threatened miscarriage, UTI, BV    Amount and/or Complexity of Data Reviewed  External Data Reviewed: labs. Labs: ordered. Decision-making details documented in ED Course. Discussion of management or test interpretation with external provider(s): Discussed with OB via phone    Risk  OTC drugs. Prescription drug management. EMERGENCY DEPARTMENT COURSE:  69-year-old female, , 12 weeks gestation, presented to ED with complaints of vaginal bleeding that started an hour and a half prior to arrival and has passed a couple courses clots. Notes that she has had bleeding 2 other times during this pregnancy. No abdominal pain at this time. No chest pain or shortness of breath. No regular medications. Follows with . On exam, nontachycardic, abdomen soft and nontender. Bedside ultrasound performed and fetal heart rate 160. Plan to do pelvic, CBC, UA due to urinary frequency. Likely threatened miscarriage versus UTI. Os closed, moderate blood in vaginal vault. Discussed with ob via phone who recommended outpatient follow up as scheduled. No bacteria in urine and Hgb stable. Swabs negative. Pt dch with instruction to return for any abd pain, passing large clots, vomiting, fever. Pt agreeable to plan. ED Course as of 03/13/23 2100   Mon Mar 13, 2023   1019 Blood type O+ [AR]   1019 Bedside ultrasound performed and fetal heart rate 160,  [AR]   1232 Hemoglobin Quant(!): 11.2 [AR]   1234 23 hemoglobin 11.5 [AR]   1314 Trichomonas Vaginalis DNA: NEGATIVE [AR]   1314 Gardnerella Vaginalis, DNA Probe: NEGATIVE [AR]   1314 Candida Species, DNA Probe: NEGATIVE [AR]      ED Course User Index  [AR] Miriam Sweeney MD       PROCEDURES:  None    CONSULTS:  None        FINAL IMPRESSION      1.  Vaginal bleeding in pregnancy          DISPOSITION / PLAN     DISPOSITION Decision To Discharge 2023 01:14:46 PM      PATIENT REFERRED TO:  Saira Ying, [address] [address] [email or web address] [facility] [address] [phone]    In 2 days  as scheduled    DISCHARGE MEDICATIONS:  Discharge Medication List as of 3/13/2023  1:16 PM        START taking these medications    Details   acetaminophen (TYLENOL) 325 MG tablet Take 2 tablets by mouth every 6 hours as needed for Pain, Disp-120 tablet, R-0Print             Della Lozada MD  Emergency Medicine Resident    (Please note that portions of thisnote were completed with a voice recognition program.  Efforts were made to edit the dictations but occasionally words are mis-transcribed.)        Jennifer Malik MD  Resident  03/13/23 5001       Jennifer Malik MD  Resident  03/13/23 2796

## 2023-03-13 NOTE — ED PROVIDER NOTES
[facility]     Emergency Department     Faculty Attestation    I performed a history and physical examination of the patient and discussed management with the resident. I reviewed the residents note and agree with the documented findings and plan of care. Any areas of disagreement are noted on the chart. I was personally present for the key portions of any procedures. I have documented in the chart those procedures where I was not present during the key portions. I have reviewed the emergency nurses triage note. I agree with the chief complaint, past medical history, past surgical history, allergies, medications, social and family history as documented unless otherwise noted below. For Physician Assistant/ Nurse Practitioner cases/documentation I have personally evaluated this patient and have completed at least one if not all key elements of the E/M (history, physical exam, and MDM). Additional findings are as noted. I have personally seen and evaluated the patient. I find the patient's history and physical exam are consistent with the NP/PA documentation. I agree with the care provided, treatment rendered, disposition and follow-up plan. 69-year-old female, G5, P3 at approximately 12 weeks gestation presenting with vaginal bleeding. Passing clots the size of quarters. Third episode of bleeding this pregnancy. No lightheadedness or dizziness. No leakage of fluid. Exam:  General : Laying on the bed, awake, alert, and in no acute distress  CV : normal rate and regular rhythm  Lungs : Breathing comfortably on room air with no tachypnea, hypoxia, or increased work of breathing    DDx: Subchorionic hemorrhage, threatened miscarriage.      Plan:  O+; no need for rhogam  Ultrasound with appropriate fetal heart rate at bedside  Os closed per resident pelvic exam  Will check hemoglobin, UA  Will have her follow up with [facility]     Medical Decision Making  Amount and/or Complexity of Data Reviewed  Labs: ordered. Decision-making details documented in ED Course.       Efra Brady MD   Attending Emergency Physician    (Please note that portions of this note were completed with a voice recognition program. Efforts were made to edit the dictations but occasionally words are mis-transcribed.)            Efra Brady MD  03/13/23 8975

## 2023-03-13 NOTE — DISCHARGE INSTRUCTIONS
Thank you for visiting [facility] Department. You need to call ASIA Perez CNP to make an appointment as directed for follow up. Should you have any questions regarding your care or further treatment, please call [facility] at [phone]. Return to emergency department for any new or worrisome symptoms including any lightheadedness, additional vaginal bleeding, abdominal pain.
none

## 2023-03-13 NOTE — ED NOTES
The following labs were labeled with appropriate pt sticker and tubed to lab:     [] Blue     [] Lavender   [] on ice  [] Green/yellow  [] Green/black [] on ice  [] Lonie Scales  [] on ice  [] Yellow  [] Red  [] Type/ Screen  [] ABG  [] VBG    [] COVID-19 swab    [] Rapid  [] PCR  [] Flu swab  [] Peds Viral Panel     [] Urine Sample  [] Fecal Sample  [x] Pelvic Cultures  [] Blood Cultures  [] X 2  [] STREP Cultures       Yung Cabrales RN  03/13/23 1859

## 2023-03-13 NOTE — TELEPHONE ENCOUNTER
Patient called in stating she was laying in bed when she felt a big gush of blood. Patient also states she is passing clots as well. Patient was instructed to go to [facility] ER to be evaluated per office protocol.
yes

## 2023-03-13 NOTE — ED NOTES
Pt to ED with complaint of vaginal bleeding. Patient states she is 12 weeks pregnant. Pt states the bleeding started this morning and has passed a few quarter sized clots. Pt denies pain. States her previous pregnancies were high risk. Pt alert and oriented x4. Ambulated to room with steady gait. Vital signs are stable. Will continue to monitor.      Perry Pate RN  03/13/23 8640

## 2023-03-13 NOTE — ED NOTES
Pelvic exam conducted by dr. Haroon Davey with writer assisting. Pt tolerated it well. Will continue to monitor.      Brendan Boyd RN  03/13/23 2875

## 2023-03-15 ENCOUNTER — TELEPHONE (OUTPATIENT)
Dept: FAMILY MEDICINE CLINIC | Age: 28
End: 2023-03-15

## 2023-03-15 ENCOUNTER — ROUTINE PRENATAL (OUTPATIENT)
Dept: PERINATAL CARE | Age: 28
End: 2023-03-15
Payer: COMMERCIAL

## 2023-03-15 VITALS
TEMPERATURE: 98.6 F | SYSTOLIC BLOOD PRESSURE: 123 MMHG | HEART RATE: 72 BPM | DIASTOLIC BLOOD PRESSURE: 73 MMHG | WEIGHT: 248 LBS | BODY MASS INDEX: 42.34 KG/M2 | HEIGHT: 64 IN | RESPIRATION RATE: 16 BRPM

## 2023-03-15 DIAGNOSIS — Z36.9 FIRST TRIMESTER SCREENING: Primary | ICD-10-CM

## 2023-03-15 DIAGNOSIS — O46.8X1 SUBCHORIONIC HEMATOMA IN FIRST TRIMESTER, SINGLE OR UNSPECIFIED FETUS: ICD-10-CM

## 2023-03-15 DIAGNOSIS — O99.411 MATERNAL CONGENITAL CARDIAC ANOMALY AFFECTING PREGNANCY IN FIRST TRIMESTER, ANTEPARTUM: ICD-10-CM

## 2023-03-15 DIAGNOSIS — O09.211 CURRENT PREGNANCY WITH HISTORY OF PRE-TERM LABOR IN FIRST TRIMESTER: ICD-10-CM

## 2023-03-15 DIAGNOSIS — O09.899 SHORT INTERVAL BETWEEN PREGNANCIES COMPLICATING PREGNANCY, ANTEPARTUM: ICD-10-CM

## 2023-03-15 DIAGNOSIS — O99.211 OBESITY AFFECTING PREGNANCY IN FIRST TRIMESTER: ICD-10-CM

## 2023-03-15 DIAGNOSIS — O09.299 CURRENT SINGLETON PREGNANCY WITH HISTORY OF CONGENITAL HEART DISEASE IN PRIOR CHILD, ANTEPARTUM: ICD-10-CM

## 2023-03-15 DIAGNOSIS — Q24.9 MATERNAL CONGENITAL CARDIAC ANOMALY AFFECTING PREGNANCY IN FIRST TRIMESTER, ANTEPARTUM: ICD-10-CM

## 2023-03-15 DIAGNOSIS — O41.8X10 SUBCHORIONIC HEMATOMA IN FIRST TRIMESTER, SINGLE OR UNSPECIFIED FETUS: ICD-10-CM

## 2023-03-15 DIAGNOSIS — Z3A.13 13 WEEKS GESTATION OF PREGNANCY: ICD-10-CM

## 2023-03-15 LAB
CRL: NORMAL
SAC DIAMETER: NORMAL

## 2023-03-15 PROCEDURE — 99999 PR OFFICE/OUTPT VISIT,PROCEDURE ONLY: CPT | Performed by: OBSTETRICS & GYNECOLOGY

## 2023-03-15 PROCEDURE — 76813 OB US NUCHAL MEAS 1 GEST: CPT | Performed by: OBSTETRICS & GYNECOLOGY

## 2023-03-15 PROCEDURE — 76801 OB US < 14 WKS SINGLE FETUS: CPT | Performed by: OBSTETRICS & GYNECOLOGY

## 2023-03-15 NOTE — TELEPHONE ENCOUNTER
[facility]) ED Follow up Call    Reason for ED visit:  Vaginal bleeding in pregnancy    3/15/2023     Dayton Montague , this is Joey Muse from Dr. Mayur Christianson office, just calling to see how you are doing after your recent ED visit. Did you receive discharge instructions? Yes   Do you understand the discharge instructions? yes   Did the ED give you any new prescriptions? No    Were you able to fill your prescriptions? No     Do you have one of our red, yellow and green  Zone sheets that help you to determine when you should go to the ED?  no      Do you need or want to make a follow up appt with your PCP? Not at this moment will follow up fetal medicine     Do you have any further needs in the home, e.g. equipment?   No            FU appts/Provider:    Future Appointments   Date Time Provider [place] Hay   3/15/2023 12:45 PM ULTRASONOGRAPHER 4 Mat Fetal [facility]   3/21/2023  9:15 AM Marleen Stock MD [facility]   4/5/2023  2:15 PM ULTRASONOGRAPHER 3 Mat Fetal [facility]   4/19/2023  1:30 PM ULTRASONOGRAPHER 3 Mat Fetal [facility]   5/3/2023  2:00 PM ULTRASONOGRAPHER 2 Mat Fetal [facility]   6/12/2023 11:15 AM Alfred Oleary MD [facility]

## 2023-03-21 ENCOUNTER — INITIAL PRENATAL (OUTPATIENT)
Dept: OBGYN | Age: 28
End: 2023-03-21
Payer: COMMERCIAL

## 2023-03-21 ENCOUNTER — HOSPITAL ENCOUNTER (OUTPATIENT)
Age: 28
Setting detail: SPECIMEN
Discharge: HOME | End: 2023-03-21

## 2023-03-21 VITALS
HEART RATE: 76 BPM | BODY MASS INDEX: 42.91 KG/M2 | SYSTOLIC BLOOD PRESSURE: 117 MMHG | WEIGHT: 250 LBS | DIASTOLIC BLOOD PRESSURE: 67 MMHG

## 2023-03-21 DIAGNOSIS — O09.90 HIGH RISK PREGNANCY, ANTEPARTUM: ICD-10-CM

## 2023-03-21 DIAGNOSIS — Z87.74 HISTORY OF CONGENITAL HEART DEFECT: ICD-10-CM

## 2023-03-21 DIAGNOSIS — I35.0 AORTIC VALVE STENOSIS, ETIOLOGY OF CARDIAC VALVE DISEASE UNSPECIFIED: Primary | ICD-10-CM

## 2023-03-21 PROBLEM — R73.09 ELEVATED GLUCOSE TOLERANCE TEST: Status: RESOLVED | Noted: 2023-03-08 | Resolved: 2023-03-21

## 2023-03-21 PROBLEM — O20.9 VAGINAL BLEEDING IN PREGNANCY, FIRST TRIMESTER: Status: RESOLVED | Noted: 2023-03-08 | Resolved: 2023-03-21

## 2023-03-21 PROCEDURE — 99214 OFFICE O/P EST MOD 30 MIN: CPT

## 2023-03-21 PROCEDURE — 90686 IIV4 VACC NO PRSV 0.5 ML IM: CPT | Performed by: STUDENT IN AN ORGANIZED HEALTH CARE EDUCATION/TRAINING PROGRAM

## 2023-03-21 PROCEDURE — 99211 OFF/OP EST MAY X REQ PHY/QHP: CPT

## 2023-03-21 NOTE — PROGRESS NOTES
Vaccine Information Sheet, \"Influenza - Inactivated\"  given to Beryle Hey, or parent/legal guardian of  Beryle Hey and verbalized understanding. Patient responses:    Have you ever had a reaction to a flu vaccine? No  Are you able to eat eggs without adverse effects? Yes  Do you have any current illness? No  Have you ever had Guillian Florence Syndrome? No    Flu vaccine given per order. Please see immunization tab.

## 2023-03-21 NOTE — PROGRESS NOTES
[facility] OB/GYN  Initial Prenatal Visit    CC: Initial Prenatal Visit    HPI:   Ortega Henriquez is a 28 y.o. female [id number] at 13w6d  She is being seen today for her first obstetrical visit. Pregnancy history fully reviewed. This is a planned pregnancy. Her LMP is No LMP recorded (lmp unknown). Patient is pregnant. Her obstetrical history is significant for Hx PTDx2 with subsequent full term vaginal deliveries, Hx CHD, Maternal hx aortic stenosis, Fhx DM, obesity. The patient was seen and evaluated. The patient complains of nothing. There was no fetal movements due to gestational age. She denies contractions, vaginal bleeding and leakage of fluid. She currently denies any signs or symptoms of pre-eclampsia which include headache, vision changes, RUQ pain. The patient requested the T-Dap Vaccine (27-36 weeks) this pregnancy. The patient is Rh positive and Rhogam is not indicated in this pregnancy  The patient requested the influenza vaccine this year. The patient already received the COVID-19 vaccine this year. Relationship with FOB: in a relationship  Mother's ethnicity:   Father's ethnicity: , Santana Mumtaz 26 yo  Family History:    - Neural tube defects: No   - Congenital birth defects (congenital heart defects, polydactyly, cleft lip/palate): Yes: G3 heart defect, G4 club foot (now )   - Intellectual disability: No   - Genetic disorders/chromosomal abnormalities: No   - Diabetes mellitus in first degree relatives: Yes: mother  Genetic screening was discussed and patient amenable.     OB History:  OB History    Para Term  AB Living   5 4 2 2 0 3   SAB IAB Ectopic Molar Multiple Live Births   0 0 0 0 0 4      # Outcome Date GA Lbr Filipe/2nd Weight Sex Delivery Anes PTL Lv   5 Current            4 Term 22 38w4d 08:54 / 00:04 5 lb 15.4 oz (2.705 kg) M Vag-Spont EPI N DEC      Name: Roberto Askew: 8  Apgar5: 8   3 Term 06/10/20 38w1d

## 2023-03-29 LAB — CYTOLOGY REPORT: NORMAL

## 2023-04-05 ENCOUNTER — ROUTINE PRENATAL (OUTPATIENT)
Dept: PERINATAL CARE | Age: 28
End: 2023-04-05
Payer: COMMERCIAL

## 2023-04-05 VITALS
TEMPERATURE: 98.5 F | WEIGHT: 248 LBS | HEART RATE: 100 BPM | HEIGHT: 64 IN | BODY MASS INDEX: 42.34 KG/M2 | SYSTOLIC BLOOD PRESSURE: 121 MMHG | DIASTOLIC BLOOD PRESSURE: 62 MMHG | RESPIRATION RATE: 16 BRPM

## 2023-04-05 DIAGNOSIS — O09.899 SHORT INTERVAL BETWEEN PREGNANCIES COMPLICATING PREGNANCY, ANTEPARTUM: ICD-10-CM

## 2023-04-05 DIAGNOSIS — O09.299 CURRENT SINGLETON PREGNANCY WITH HISTORY OF CONGENITAL HEART DISEASE IN PRIOR CHILD, ANTEPARTUM: ICD-10-CM

## 2023-04-05 DIAGNOSIS — Q24.9 MATERNAL CONGENITAL CARDIAC ANOMALY AFFECTING PREGNANCY IN SECOND TRIMESTER, ANTEPARTUM: ICD-10-CM

## 2023-04-05 DIAGNOSIS — O09.212 CURRENT PREGNANCY WITH HISTORY OF PRE-TERM LABOR IN SECOND TRIMESTER: Primary | ICD-10-CM

## 2023-04-05 DIAGNOSIS — O99.212 OBESITY AFFECTING PREGNANCY IN SECOND TRIMESTER: ICD-10-CM

## 2023-04-05 DIAGNOSIS — O99.891 MATERNAL CONGENITAL CARDIAC ANOMALY AFFECTING PREGNANCY IN SECOND TRIMESTER, ANTEPARTUM: ICD-10-CM

## 2023-04-05 DIAGNOSIS — Z3A.16 16 WEEKS GESTATION OF PREGNANCY: ICD-10-CM

## 2023-04-05 DIAGNOSIS — Z13.89 ENCOUNTER FOR ROUTINE SCREENING FOR MALFORMATION USING ULTRASONICS: ICD-10-CM

## 2023-04-05 DIAGNOSIS — O44.00 PLACENTA PREVIA WITHOUT HEMORRHAGE, ANTEPARTUM: ICD-10-CM

## 2023-04-05 PROCEDURE — 99999 PR OFFICE/OUTPT VISIT,PROCEDURE ONLY: CPT | Performed by: OBSTETRICS & GYNECOLOGY

## 2023-04-05 PROCEDURE — 76817 TRANSVAGINAL US OBSTETRIC: CPT | Performed by: OBSTETRICS & GYNECOLOGY

## 2023-04-05 PROCEDURE — 76805 OB US >/= 14 WKS SNGL FETUS: CPT | Performed by: OBSTETRICS & GYNECOLOGY

## 2023-04-07 ENCOUNTER — OFFICE VISIT (OUTPATIENT)
Dept: FAMILY MEDICINE CLINIC | Age: 28
End: 2023-04-07

## 2023-04-07 VITALS
WEIGHT: 245 LBS | BODY MASS INDEX: 41.83 KG/M2 | TEMPERATURE: 97.2 F | HEART RATE: 87 BPM | DIASTOLIC BLOOD PRESSURE: 76 MMHG | HEIGHT: 64 IN | OXYGEN SATURATION: 98 % | SYSTOLIC BLOOD PRESSURE: 128 MMHG

## 2023-04-07 DIAGNOSIS — O99.519 ASTHMA AFFECTING PREGNANCY, ANTEPARTUM: Primary | ICD-10-CM

## 2023-04-07 DIAGNOSIS — J45.909 ASTHMA AFFECTING PREGNANCY, ANTEPARTUM: Primary | ICD-10-CM

## 2023-04-07 SDOH — ECONOMIC STABILITY: HOUSING INSECURITY
IN THE LAST 12 MONTHS, WAS THERE A TIME WHEN YOU DID NOT HAVE A STEADY PLACE TO SLEEP OR SLEPT IN A SHELTER (INCLUDING NOW)?: NO

## 2023-04-07 SDOH — ECONOMIC STABILITY: FOOD INSECURITY: WITHIN THE PAST 12 MONTHS, YOU WORRIED THAT YOUR FOOD WOULD RUN OUT BEFORE YOU GOT MONEY TO BUY MORE.: NEVER TRUE

## 2023-04-07 SDOH — ECONOMIC STABILITY: INCOME INSECURITY: HOW HARD IS IT FOR YOU TO PAY FOR THE VERY BASICS LIKE FOOD, HOUSING, MEDICAL CARE, AND HEATING?: NOT HARD AT ALL

## 2023-04-07 SDOH — ECONOMIC STABILITY: FOOD INSECURITY: WITHIN THE PAST 12 MONTHS, THE FOOD YOU BOUGHT JUST DIDN'T LAST AND YOU DIDN'T HAVE MONEY TO GET MORE.: NEVER TRUE

## 2023-04-07 ASSESSMENT — PATIENT HEALTH QUESTIONNAIRE - PHQ9
SUM OF ALL RESPONSES TO PHQ QUESTIONS 1-9: 2
SUM OF ALL RESPONSES TO PHQ QUESTIONS 1-9: 0
SUM OF ALL RESPONSES TO PHQ9 QUESTIONS 1 & 2: 0
10. IF YOU CHECKED OFF ANY PROBLEMS, HOW DIFFICULT HAVE THESE PROBLEMS MADE IT FOR YOU TO DO YOUR WORK, TAKE CARE OF THINGS AT HOME, OR GET ALONG WITH OTHER PEOPLE: 0
2. FEELING DOWN, DEPRESSED OR HOPELESS: 0
SUM OF ALL RESPONSES TO PHQ QUESTIONS 1-9: 2
SUM OF ALL RESPONSES TO PHQ QUESTIONS 1-9: 2
1. LITTLE INTEREST OR PLEASURE IN DOING THINGS: 0
SUM OF ALL RESPONSES TO PHQ QUESTIONS 1-9: 2
3. TROUBLE FALLING OR STAYING ASLEEP: 0
5. POOR APPETITE OR OVEREATING: 1
8. MOVING OR SPEAKING SO SLOWLY THAT OTHER PEOPLE COULD HAVE NOTICED. OR THE OPPOSITE, BEING SO FIGETY OR RESTLESS THAT YOU HAVE BEEN MOVING AROUND A LOT MORE THAN USUAL: 0
1. LITTLE INTEREST OR PLEASURE IN DOING THINGS: 0
2. FEELING DOWN, DEPRESSED OR HOPELESS: 0
SUM OF ALL RESPONSES TO PHQ QUESTIONS 1-9: 0
7. TROUBLE CONCENTRATING ON THINGS, SUCH AS READING THE NEWSPAPER OR WATCHING TELEVISION: 0
SUM OF ALL RESPONSES TO PHQ9 QUESTIONS 1 & 2: 0
9. THOUGHTS THAT YOU WOULD BE BETTER OFF DEAD, OR OF HURTING YOURSELF: 0
SUM OF ALL RESPONSES TO PHQ QUESTIONS 1-9: 0
4. FEELING TIRED OR HAVING LITTLE ENERGY: 1
6. FEELING BAD ABOUT YOURSELF - OR THAT YOU ARE A FAILURE OR HAVE LET YOURSELF OR YOUR FAMILY DOWN: 0
SUM OF ALL RESPONSES TO PHQ QUESTIONS 1-9: 0

## 2023-04-07 ASSESSMENT — ENCOUNTER SYMPTOMS
WHEEZING: 0
SINUS PRESSURE: 0
DIARRHEA: 0
SHORTNESS OF BREATH: 0
VOMITING: 0
NAUSEA: 0
COUGH: 0
EYE ITCHING: 0
SORE THROAT: 0
EYE REDNESS: 0
ABDOMINAL PAIN: 0
CHEST TIGHTNESS: 0
SINUS PAIN: 0
EYE DISCHARGE: 0
TROUBLE SWALLOWING: 0

## 2023-04-07 NOTE — ASSESSMENT & PLAN NOTE
Borderline controlled, continue current medications, continue current treatment plan, medication adherence emphasized and lifestyle modifications recommended. May discuss maintenance medications after the patient's current pregnancy. She is instructed to reach out to the office or her OBGYN/meternal fetal medicine providers if she feels as though she needs more help with her asthma.

## 2023-04-07 NOTE — PROGRESS NOTES
MD at [address] OR    OTHER SURGICAL HISTORY Bilateral 2016    excision axillary hydranitis    TONSILLECTOMY AND ADENOIDECTOMY       Family History   Problem Relation Age of Onset    No Known Problems Paternal Grandfather     No Known Problems Paternal Grandmother     Breast Cancer Maternal Grandmother     Diabetes Maternal Grandmother     Cancer Maternal Grandfather         Bone    Lung Cancer Maternal Grandfather     No Known Problems Father         Father does not go to doctors    Learning Disabilities Mother     Diabetes Mother         Type 2 managed with insulin     Other Brother          suicide     No Known Problems Brother     No Known Problems Sister     SIDS Child     Other Child         Patient reports aspiration was listed in autopsy report    Heart Disease Paternal Uncle     Early Death Paternal Uncle 62        MI    Colon Cancer Neg Hx     Eclampsia Neg Hx     Hypertension Neg Hx     Ovarian Cancer Neg Hx      Labor Neg Hx     Spont Abortions Neg Hx     Stroke Neg Hx     Uterine Cancer Neg Hx      Social History     Tobacco Use    Smoking status: Never     Passive exposure: Current    Smokeless tobacco: Never   Substance Use Topics    Alcohol use: Not Currently     Comment: Social drinker: Last alcohol May 2022      Current Outpatient Medications   Medication Sig Dispense Refill    aspirin EC 81 MG EC tablet Take 1 tablet by mouth daily 30 tablet 5    Prenatal Vit-Fe Fumarate-FA (PRENATAL VITAMINS) 28-0.8 MG TABS Take 1 tablet by mouth daily 30 tablet 12    albuterol sulfate HFA (VENTOLIN HFA) 108 (90 Base) MCG/ACT inhaler Inhale 2 puffs into the lungs every 6 hours as needed for Wheezing or Shortness of Breath 18 g 0     No current facility-administered medications for this visit.        Allergies   Allergen Reactions    Lidocaine Hives     Pt states that dentist told her that she was allergic to lidocaine but thought it could have been novacaine, skin wheal test per CRNA demonstrated no rash

## 2023-04-07 NOTE — PATIENT INSTRUCTIONS
New Updates for My [facility] DANIEL    Thank you for choosing US to give you the best care! [facility] ([facility]) is always trying to think of new ways to help their patients. We are asking all patients to try out the new digital registration that is now available through the new [facility] DANIEL, feel free to download today. Via the daniel you're now able to update your personal and registration information prior to your upcoming appointment. This will save you time once you arrive at the office to check-in, not to mention your information remains safe!! Many other perks come from signing up for an account, such as:  Requesting refills  Scheduling an appointment  Completing an E-Visit  Sending a message to the office/provider  Having access to your medication list  Paying your bill/copay prior to your appointment  Scheduling your yearly mammogram  Review your test results    If you are not familiar with the [facility] DANIEL, please ask one of us and we will be happy to answer any questions or help you set-up your account.

## 2023-04-13 ENCOUNTER — TELEPHONE (OUTPATIENT)
Dept: OBGYN | Age: 28
End: 2023-04-13

## 2023-04-17 ENCOUNTER — TELEPHONE (OUTPATIENT)
Dept: OBGYN | Age: 28
End: 2023-04-17

## 2023-04-17 NOTE — TELEPHONE ENCOUNTER
I received a call back from our patient regarding the withdrawal of 17P. I reviewed the date the FDA withdrew the approval of 17-OHPC due to the lack of evidence that it reduces the risk of recurrent spontaneous  birth. We will be recommending  a shared decision-making process based on the ACOG and SM's clinical guidance with all patients regarding the use of vaginal progesterone, especially if progesterone was received in a prior pregnancy. Pt verb understanding of this new information.

## 2023-04-18 ENCOUNTER — HOSPITAL ENCOUNTER (OUTPATIENT)
Dept: PHYSICAL THERAPY | Facility: CLINIC | Age: 28
Setting detail: THERAPIES SERIES
Discharge: HOME | End: 2023-04-18
Payer: COMMERCIAL

## 2023-04-18 PROCEDURE — 97110 THERAPEUTIC EXERCISES: CPT

## 2023-04-18 PROCEDURE — 97161 PT EVAL LOW COMPLEX 20 MIN: CPT

## 2023-04-18 NOTE — CONSULTS
Professional Referral:  [x] No  [] Yes:  Barriers to Goal Achievement[de-identified]  [x] No  [] Yes:  Domestic Concerns:  [x] No  [] Yes:      Pt. Education:  [x] Plans/Goals, Risks/Benefits discussed  [x] Home exercise program    Method of Education: [x] Verbal  [x] Demo  [x] Written  Comprehension of Education:  [x] Verbalizes understanding. [x] Demonstrates understanding. [x] Needs Review. [] Demonstrates/verbalizes understanding of HEP/Ed previously given.         Treatment Plan:  [x] Therapeutic Exercise   79861  [] Iontophoresis: 4 mg/mL Dexamethasone Sodium Phosphate  mAmin  92581   [x] Therapeutic Activity  25467 [x] Vasopneumatic cold with compression  22678    [x] Gait Training   56623 [] Ultrasound   55194   [x] Neuromuscular Re-education  03836 [] Electrical Stimulation Unattended  95284   [x] Manual Therapy  72126 [] Electrical Stimulation Attended  70659   [x] Instruction in HEP  [] Lumbar/Cervical Traction  02637   [] Aquatic Therapy   94984 [x] Cold/hotpack    [] Massage   00788      [] Dry Needling, 1 or 2 muscles  44465   [] Biofeedback, first 15 minutes   84789  [] Biofeedback, additional 15 minutes   31095 [] Dry Needling, 3 or more muscles  69243            Frequency:  2 x/week for 12 visits        Todays Treatment:  Modalities:   Precautions: 5 months pregnant, s/p L ACL-R (DOS 6/9/22)   Exercises:  Exercise     Reps/ Time Weight/ Level Comments         Supine       B SLR  10x ea      Bridge  2x10      Active TKE 10x5s           Sidelying       B hip abd 15x ea                 Other:    Specific Instructions for next treatment:  - Progressive strengthening of B hip girdle strength throughout   - Maximize L quad recruitment (resume NMES w/ quad exs if needed)   - Restore L knee flexion AROM   - Single leg stability w/ eccentric quad control  - Progressive loading, force production/absorption double leg/single leg (monitor knee alignment)   - CP/vaso prn         Evaluation Complexity:  History

## 2023-04-19 ENCOUNTER — ROUTINE PRENATAL (OUTPATIENT)
Dept: OBGYN | Age: 28
End: 2023-04-19

## 2023-04-19 ENCOUNTER — HOSPITAL ENCOUNTER (OUTPATIENT)
Age: 28
Discharge: HOME | End: 2023-04-19
Payer: COMMERCIAL

## 2023-04-19 ENCOUNTER — ROUTINE PRENATAL (OUTPATIENT)
Dept: PERINATAL CARE | Age: 28
End: 2023-04-19
Payer: COMMERCIAL

## 2023-04-19 ENCOUNTER — HOSPITAL ENCOUNTER (OUTPATIENT)
Age: 28
Setting detail: SPECIMEN
Discharge: HOME | End: 2023-04-19

## 2023-04-19 VITALS
DIASTOLIC BLOOD PRESSURE: 78 MMHG | BODY MASS INDEX: 42.23 KG/M2 | SYSTOLIC BLOOD PRESSURE: 127 MMHG | HEART RATE: 74 BPM | WEIGHT: 246 LBS

## 2023-04-19 VITALS
BODY MASS INDEX: 42 KG/M2 | RESPIRATION RATE: 16 BRPM | SYSTOLIC BLOOD PRESSURE: 130 MMHG | HEIGHT: 64 IN | HEART RATE: 93 BPM | DIASTOLIC BLOOD PRESSURE: 83 MMHG | WEIGHT: 246 LBS | TEMPERATURE: 98.2 F

## 2023-04-19 DIAGNOSIS — O99.212 OBESITY AFFECTING PREGNANCY IN SECOND TRIMESTER: ICD-10-CM

## 2023-04-19 DIAGNOSIS — O44.02 PLACENTA PREVIA IN SECOND TRIMESTER: ICD-10-CM

## 2023-04-19 DIAGNOSIS — O09.212 CURRENT PREGNANCY WITH HISTORY OF PRE-TERM LABOR IN SECOND TRIMESTER: ICD-10-CM

## 2023-04-19 DIAGNOSIS — Q24.9 MATERNAL CONGENITAL CARDIAC ANOMALY AFFECTING PREGNANCY IN SECOND TRIMESTER, ANTEPARTUM: ICD-10-CM

## 2023-04-19 DIAGNOSIS — Z13.79 GENETIC SCREENING: Primary | ICD-10-CM

## 2023-04-19 DIAGNOSIS — O09.899 SHORT INTERVAL BETWEEN PREGNANCIES COMPLICATING PREGNANCY, ANTEPARTUM: ICD-10-CM

## 2023-04-19 DIAGNOSIS — O09.891 HISTORY OF PRETERM DELIVERY, CURRENTLY PREGNANT IN FIRST TRIMESTER: ICD-10-CM

## 2023-04-19 DIAGNOSIS — O44.22 PLACENTA MARGINALIS IN SECOND TRIMESTER: Primary | ICD-10-CM

## 2023-04-19 DIAGNOSIS — O09.299 CURRENT SINGLETON PREGNANCY WITH HISTORY OF CONGENITAL HEART DISEASE IN PRIOR CHILD, ANTEPARTUM: ICD-10-CM

## 2023-04-19 DIAGNOSIS — O99.891 MATERNAL CONGENITAL CARDIAC ANOMALY AFFECTING PREGNANCY IN SECOND TRIMESTER, ANTEPARTUM: ICD-10-CM

## 2023-04-19 DIAGNOSIS — O44.00 PLACENTA PREVIA WITHOUT HEMORRHAGE, ANTEPARTUM: ICD-10-CM

## 2023-04-19 PROCEDURE — G8417 CALC BMI ABV UP PARAM F/U: HCPCS | Performed by: OBSTETRICS & GYNECOLOGY

## 2023-04-19 PROCEDURE — 76815 OB US LIMITED FETUS(S): CPT | Performed by: OBSTETRICS & GYNECOLOGY

## 2023-04-19 PROCEDURE — 82105 ALPHA-FETOPROTEIN SERUM: CPT

## 2023-04-19 PROCEDURE — G8427 DOCREV CUR MEDS BY ELIG CLIN: HCPCS | Performed by: OBSTETRICS & GYNECOLOGY

## 2023-04-19 PROCEDURE — 76817 TRANSVAGINAL US OBSTETRIC: CPT | Performed by: OBSTETRICS & GYNECOLOGY

## 2023-04-19 PROCEDURE — 99245 OFF/OP CONSLTJ NEW/EST HI 55: CPT | Performed by: OBSTETRICS & GYNECOLOGY

## 2023-04-19 PROCEDURE — 36415 COLL VENOUS BLD VENIPUNCTURE: CPT

## 2023-04-19 NOTE — PROGRESS NOTES
referral initiated 3/8/23.  labor definition and warning signs reviewed. 23 counseled by Obdulio Mckeon on withdrawal of Doris from market per FDA review of lack of efficacy. Pt not a candidate for vaginal progesterone. Return in about 4 weeks (around 2023) for routine OB-in person.     Ban Jones MD  2023, 11:52 AM

## 2023-04-20 ENCOUNTER — TELEPHONE (OUTPATIENT)
Dept: PERINATAL CARE | Age: 28
End: 2023-04-20

## 2023-04-20 NOTE — TELEPHONE ENCOUNTER
Phone call to pt to confirm starting vaginal progesterone. Pt agreed and asked to have called into [facility] on [address]. Writer called into above pharmacy, [phone], Huan, 200mg, progesterone capsules, to be inserted into the vagina every night.   One month supply and 3 refills

## 2023-04-21 LAB
AFP INTERPRETATION: NORMAL
AFP MOM: 0.97
AFP SPECIMEN: NORMAL
AFP: 34 NG/ML
DATE OF BIRTH: NORMAL
DATING METHOD: NORMAL
DETERMINED BY: NORMAL
DIABETIC: NEGATIVE
DONOR EGG?: NORMAL
DUE DATE: NORMAL
ESTIMATED DUE DATE: NORMAL
FAMILY HISTORY NTD: NEGATIVE
GESTATIONAL AGE: NORMAL
IN VITRO FERTILIZATION: NORMAL
INSULIN REQ DIABETES: NO
LAST MENSTRUAL PERIOD: NORMAL
MATERNAL AGE AT EDD: 29.3 YR
MATERNAL WEIGHT: 246
MONOCHORIONIC TWINS: NORMAL
NUMBER OF FETUSES: NORMAL
PATIENT WEIGHT UNITS: NORMAL
PATIENT WEIGHT: NORMAL
RACE (MATERNAL): NORMAL
RACE: NORMAL
REPEAT SPECIMEN?: NORMAL
SMOKING: NORMAL
SMOKING: NORMAL
VALPROIC/CARBAMAZEP: NORMAL
ZZ NTE CLEAN UP: HISTORY: NO

## 2023-04-25 ENCOUNTER — HOSPITAL ENCOUNTER (OUTPATIENT)
Dept: PHYSICAL THERAPY | Facility: CLINIC | Age: 28
Setting detail: THERAPIES SERIES
Discharge: HOME | End: 2023-04-25
Payer: COMMERCIAL

## 2023-04-25 NOTE — FLOWSHEET NOTE
[] [address]  [address]    [phone]  F: [phone]   [x] [address] [employer] [facility] 36   [address]  P: [phone]  F: [phone]  [] [address]  [address] [facility]  P: [phone]  F: [phone] [] [address]  P: [phone]  F: [phone]  [] [address]  [address] [facility] 70   [address]   [place]: [phone]  F: [phone]   [] [address]  [place]: [phone]   F: [phone]     Physical Therapy Cancel/No Show note    Date: 2023  Patient: Dania Gonzalez  : 1995  MRN: [id number]    Cancels/No Shows to date:     For today's appointment patient:    [x]  Cancelled    [] Rescheduled appointment    [] No-show     Reason given by patient:    []  Patient ill    []  Conflicting appointment    [x] No transportation      [] Conflict with work    [] No reason given    [] Weather related    [] COVID-19    [] Other:      Comments:        [x] Next appointment was confirmed    Electronically signed by: Carolin Lassiter PT

## 2023-05-02 ENCOUNTER — HOSPITAL ENCOUNTER (OUTPATIENT)
Dept: PHYSICAL THERAPY | Facility: CLINIC | Age: 28
Setting detail: THERAPIES SERIES
Discharge: HOME | End: 2023-05-02
Payer: COMMERCIAL

## 2023-05-02 PROCEDURE — 97016 VASOPNEUMATIC DEVICE THERAPY: CPT

## 2023-05-02 PROCEDURE — 97110 THERAPEUTIC EXERCISES: CPT

## 2023-05-02 NOTE — FLOWSHEET NOTE
[] [facility] [address] [facility]  [address]  [phone]  F: [phone] [x] [address]  [facility] 36   [address]  P: [phone]  F: [phone] [] [address]  [address] [facility]  P: [phone]  F: [phone] [] [email or web address]  P: [phone]  F: [phone] [] [address]  [facility]   [address]   [place]: [phone]  F: [phone]      Physical Therapy Daily Treatment Note    Date:  2023  Patient Name:  Serjio Grant    :  1995  MRN: [id number]  Physician: Yun Sawyer MD                   Insurance: [employer] [address] [employer] (Auth After [id number])   Medical Diagnosis: Status post reconstruction of anterior cruciate ligament (Z98.890 [ICD-10-CM])  Rehab Codes: M25.562, M25.662, M25.462, M62.81     Onset date: 3/1/23                 Next Dr's appt. : 23  Visit# / total visits: ; Cancels/No Shows: 1/0    Subjective:    Pain:  [x] Yes  [] No Location: L knee  Pain Rating: (0-10 scale) 7/10  Pain altered Tx:  [x] No  [] Yes  Action:  Comments: Pt arrives with functional knee brace describing 7/10 pain from sitting for the past 1-1.5 hours. Pt reports compliance to some of her home program however does describe increase in pain with TKE so has not performed this exercise.      Objective:  Modalities: vaso to L knee, LLE on wedge, 34 degrees, low pressure   Precautions: 5 months pregnant, s/p L ACL-R (DOS 22)   Exercises:  Exercise       Reps/ Time Weight/ Level Comments    Recumbent bike  5' L1           Long Seated       DF+ inversion stretch 3x30\" strap          Supine          B SLR  10x ea   AA AA to prevent quad lag 5/2   Bridge  2x10        Active TKE 10x5s       Heel Slides 10x3\"

## 2023-05-03 ENCOUNTER — ROUTINE PRENATAL (OUTPATIENT)
Dept: PERINATAL CARE | Age: 28
End: 2023-05-03
Payer: COMMERCIAL

## 2023-05-03 VITALS
SYSTOLIC BLOOD PRESSURE: 122 MMHG | BODY MASS INDEX: 42.34 KG/M2 | HEIGHT: 64 IN | TEMPERATURE: 98.1 F | DIASTOLIC BLOOD PRESSURE: 68 MMHG | RESPIRATION RATE: 16 BRPM | WEIGHT: 248 LBS | HEART RATE: 71 BPM

## 2023-05-03 DIAGNOSIS — Z3A.20 20 WEEKS GESTATION OF PREGNANCY: ICD-10-CM

## 2023-05-03 DIAGNOSIS — O44.22 PLACENTA MARGINALIS IN SECOND TRIMESTER: Primary | ICD-10-CM

## 2023-05-03 DIAGNOSIS — O09.899 SHORT INTERVAL BETWEEN PREGNANCIES COMPLICATING PREGNANCY, ANTEPARTUM: ICD-10-CM

## 2023-05-03 DIAGNOSIS — O99.891 MATERNAL CONGENITAL CARDIAC ANOMALY AFFECTING PREGNANCY IN SECOND TRIMESTER, ANTEPARTUM: ICD-10-CM

## 2023-05-03 DIAGNOSIS — Q24.9 MATERNAL CONGENITAL CARDIAC ANOMALY AFFECTING PREGNANCY IN SECOND TRIMESTER, ANTEPARTUM: ICD-10-CM

## 2023-05-03 DIAGNOSIS — O09.299 CURRENT SINGLETON PREGNANCY WITH HISTORY OF CONGENITAL HEART DISEASE IN PRIOR CHILD, ANTEPARTUM: ICD-10-CM

## 2023-05-03 DIAGNOSIS — O09.212 CURRENT PREGNANCY WITH HISTORY OF PRE-TERM LABOR IN SECOND TRIMESTER: ICD-10-CM

## 2023-05-03 DIAGNOSIS — O99.212 OBESITY AFFECTING PREGNANCY IN SECOND TRIMESTER: ICD-10-CM

## 2023-05-03 PROCEDURE — 99999 PR OFFICE/OUTPT VISIT,PROCEDURE ONLY: CPT | Performed by: OBSTETRICS & GYNECOLOGY

## 2023-05-03 PROCEDURE — 76811 OB US DETAILED SNGL FETUS: CPT | Performed by: OBSTETRICS & GYNECOLOGY

## 2023-05-03 PROCEDURE — 76817 TRANSVAGINAL US OBSTETRIC: CPT | Performed by: OBSTETRICS & GYNECOLOGY

## 2023-05-03 RX ORDER — PROGESTERONE 200 MG/1
CAPSULE ORAL
COMMUNITY
Start: 2023-04-20

## 2023-05-04 ENCOUNTER — HOSPITAL ENCOUNTER (OUTPATIENT)
Dept: PHYSICAL THERAPY | Facility: CLINIC | Age: 28
Setting detail: THERAPIES SERIES
Discharge: HOME | End: 2023-05-04
Payer: COMMERCIAL

## 2023-05-04 PROCEDURE — 97110 THERAPEUTIC EXERCISES: CPT

## 2023-05-04 NOTE — FLOWSHEET NOTE
[] [facility] [address]  [address]  [phone]  F: [phone] [x] [address]  [place] [address]   [address]  P: [phone]  F: [phone] [] [address]  [address]  P: [phone]  F: [phone] [] [address]  P: [phone]  F: [phone] [] [address]  [facility]   [address]   [place]: [phone]  F: [phone]      Physical Therapy Daily Treatment Note    Date:  2023  Patient Name:      :  1995  MRN: [id number]  Physician: Quyen George MD                   Insurance: [facility] (Auth After [id number])   Medical Diagnosis: Status post reconstruction of anterior cruciate ligament (Z98.890 [ICD-10-CM])  Rehab Codes: M25.562, M25.662, M25.462, M62.81     Onset date: 3/1/23                 Next Dr's appt. : 23  Visit# / total visits: 3/12; Cancels/No Shows: 1/0      Subjective:    Pain:  [] Yes  [x] No Location: L knee  Pain Rating: (0-10 scale) 0/10  Pain altered Tx:  [x] No  [] Yes  Action:  Comments: Pt arrived to physical therapy denied pain. Pt reported \"trouble child\" was sick therefore did not have to \"lilliana after him\". Pt reported has been wearing functional knee brace, noted still require assistance from therapist to perform active SLR and felt similar weakness at home when completing HEP.        Objective:  Modalities: vaso to L knee, LLE on wedge, 34 degrees, low pressure   Precautions: 5 months pregnant, s/p L ACL-R (DOS 22)   Exercises:  Exercise       Reps/ Time Weight/ Level Comments   Recumbent bike  5' L1           Long Seated       DF+ inversion stretch 3x30\" strap          Supine          B SLR  10x ea   AA AA to prevent

## 2023-05-08 ENCOUNTER — HOSPITAL ENCOUNTER (OUTPATIENT)
Dept: PHYSICAL THERAPY | Facility: CLINIC | Age: 28
Setting detail: THERAPIES SERIES
Discharge: HOME | End: 2023-05-08
Payer: COMMERCIAL

## 2023-05-08 PROCEDURE — 97110 THERAPEUTIC EXERCISES: CPT

## 2023-05-08 NOTE — FLOWSHEET NOTE
[] [facility] [address]  [address]  [phone]  F: [phone] [x] [address]  [facility]   [address]  P: [phone]  F: [phone] [] [address]  [address] [facility] [address]  P: [phone]  F: [phone] [] [address]  P: [phone]  F: [phone] [] [address]  [facility]   [address]   [place]: [phone]  F: [phone]      Physical Therapy Daily Treatment Note    Date:  2023  Patient Name:  Salima Hearn    :  1995  MRN: [id number]  Physician: Marilyn Tran MD                   Insurance: [facility] (Auth After [id number])   Medical Diagnosis: Status post reconstruction of anterior cruciate ligament (Z98.890 [ICD-10-CM])  Rehab Codes: M25.562, M25.662, M25.462, M62.81     Onset date: 3/1/23                 Next Dr's appt. : 23  Visit# / total visits: ; Cancels/No Shows: 1/0      Subjective:    Pain:  [] Yes  [x] No Location: L knee  Pain Rating: (0-10 scale) 0/10  Pain altered Tx:  [x] No  [] Yes  Action:  Comments: Arrives noting no pain currently. Objective:  Modalities: vaso to L knee, LLE on wedge, 34 degrees, low pressure - not today.    Precautions: 5 months pregnant, s/p L ACL-R (DOS 22)   Exercises:  Exercise       Reps/ Time Weight/ Level Comments   Recumbent bike  5' L1           Long Seated       DF+ inversion stretch 3x30\" strap          Supine          B SLR  10x2 ea   AA AA to prevent quad lag 5/2; exhale w/ active movement 5/4    Bridge + hip add 2x10    Added iso hip add w/ playball 5/4    Active TKE 10x5s       Heel Slides 20x3\"   Stretch at end range; inc reps 5/4   Knee extension stretch 3x1' 5#    Modified nam  1'  OP for knee

## 2023-05-10 ENCOUNTER — TELEPHONE (OUTPATIENT)
Dept: OBGYN | Age: 28
End: 2023-05-10

## 2023-05-10 ENCOUNTER — ROUTINE PRENATAL (OUTPATIENT)
Dept: OBGYN | Age: 28
End: 2023-05-10
Payer: COMMERCIAL

## 2023-05-10 VITALS — BODY MASS INDEX: 42.91 KG/M2 | WEIGHT: 250 LBS

## 2023-05-10 DIAGNOSIS — O36.8120 DECREASED FETAL MOVEMENTS IN SECOND TRIMESTER, SINGLE OR UNSPECIFIED FETUS: Primary | ICD-10-CM

## 2023-05-10 PROCEDURE — G8417 CALC BMI ABV UP PARAM F/U: HCPCS | Performed by: STUDENT IN AN ORGANIZED HEALTH CARE EDUCATION/TRAINING PROGRAM

## 2023-05-10 PROCEDURE — 1036F TOBACCO NON-USER: CPT | Performed by: STUDENT IN AN ORGANIZED HEALTH CARE EDUCATION/TRAINING PROGRAM

## 2023-05-10 PROCEDURE — G8427 DOCREV CUR MEDS BY ELIG CLIN: HCPCS | Performed by: STUDENT IN AN ORGANIZED HEALTH CARE EDUCATION/TRAINING PROGRAM

## 2023-05-10 PROCEDURE — 99212 OFFICE O/P EST SF 10 MIN: CPT | Performed by: STUDENT IN AN ORGANIZED HEALTH CARE EDUCATION/TRAINING PROGRAM

## 2023-05-10 NOTE — TELEPHONE ENCOUNTER
Pt called and stated she is concerned that her baby is not moving. Writer attempted to reach clinical staff. Please contact pt.

## 2023-05-10 NOTE — TELEPHONE ENCOUNTER
Patient was called regarding decreased fetal movement. Patient states she has not felt her baby move since last Wednesday. It looks like patient had an MFM scan which showed an anterior placenta which patient was informed that with her only being 21 weeks and having the placenta in the front it can be harder to feel fetal movement. Patient was scheduled for an office visit to ease her worries.

## 2023-05-11 ENCOUNTER — TELEPHONE (OUTPATIENT)
Dept: PERINATAL CARE | Age: 28
End: 2023-05-11

## 2023-05-11 NOTE — PROGRESS NOTES
Obstetric/Gynecology Resident Clinic Note    Patient presents to the office with complaints of decreased fetal movement at 21w0d. Patient states she really has only felt the baby move during the MFM ultrasound when she could see movement on the screen and feel it at the same time. She has not felt movement since. She knows she has an anterior placenta that might be blunting the movements. FHT obtained at 160 bpm. Patient reassured. No other concerns or complaints at this time. F/u at next appointment. REVIEW OF SYSTEMS:  Constitutional: negative fever, negative chills  HEENT: negative visual disturbances, negative headaches  Respiratory: negative dyspnea, negative cough  Cardiovascular: negative chest pain,  negative palpitations  Gastrointestinal: negative abdominal pain, negative RUQ pain, negative N/V, negative diarrhea, negative constipation  Genitourinary: negative dysuria, negative vaginal discharge  Dermatological: negative rash  Hematologic: negative bruising  Immunologic/Lymphatic: negative recent illness, negative recent sick contact  Musculoskeletal: negative back pain, negative myalgias, negative arthralgias  Neurological:  negative dizziness, negative weakness  Behavior/Psych: negative depression, negative anxiety      Physical Exam  Constitutional:       Appearance: Normal appearance. Eyes:      Extraocular Movements: Extraocular movements intact. Pupils: Pupils are equal, round, and reactive to light. Cardiovascular:      Rate and Rhythm: Normal rate and regular rhythm. Pulmonary:      Effort: Pulmonary effort is normal.      Breath sounds: Normal breath sounds. Abdominal:      Palpations: Abdomen is soft. Neurological:      Mental Status: She is alert and oriented to person, place, and time. Mental status is at baseline.      A&P  - VSS  - FHT 160s bpm  - Patient reassured, f/u for [address] [place] [address]   OB/GYN Resident, [address] [facility]  5/11/2023,

## 2023-05-11 NOTE — PROGRESS NOTES
Attending Physician Statement  I have discussed the care of [address], including pertinent history and exam findings,  with the resident. I have reviewed the key elements of all parts of the encounter with the resident. I agree with the assessment, plan and orders as documented by the resident.   (GE Modifier)    Ellen Herzog, DO

## 2023-05-11 NOTE — TELEPHONE ENCOUNTER
Unable to reach patient, left message to phone [facility] office regarding carrier results.

## 2023-05-12 DIAGNOSIS — O28.5 ABNORMAL GENETIC TEST DURING PREGNANCY: Primary | ICD-10-CM

## 2023-05-16 ENCOUNTER — HOSPITAL ENCOUNTER (OUTPATIENT)
Dept: PHYSICAL THERAPY | Facility: CLINIC | Age: 28
Setting detail: THERAPIES SERIES
Discharge: HOME | End: 2023-05-16
Payer: COMMERCIAL

## 2023-05-16 PROCEDURE — 97110 THERAPEUTIC EXERCISES: CPT

## 2023-05-16 PROCEDURE — 97016 VASOPNEUMATIC DEVICE THERAPY: CPT

## 2023-05-16 NOTE — FLOWSHEET NOTE
[] [address]  [address]  [phone]  F: [phone] [x] [address]  [place] [address]   [address]  P: [phone]  F: [phone] [] [address]  [address] [facility] [address]  P: [phone]  F: [phone] [] [address]  P: [phone]  F: [phone] [] [address]  [facility]   [address]   [place]: [phone]  F: [phone]      Physical Therapy Daily Treatment Note    Date:  2023  Patient Name:  Sissy Heath    :  1995  MRN: [id number]  Physician: Marti Sarmiento MD                   Insurance: [facility] (Auth After [id number])   Medical Diagnosis: Status post reconstruction of anterior cruciate ligament (Z98.890 [ICD-10-CM])  Rehab Codes: M25.562, M25.662, M25.462, M62.81     Onset date: 3/1/23                 Next Dr's appt. : 23  Visit# / total visits: ; Cancels/No Shows: 1/0      Subjective:    Pain:  [] Yes  [x] No Location: L knee  Pain Rating: (0-10 scale) 0/10  Pain altered Tx:  [x] No  [] Yes  Action:  Comments: Arrives without complaint of pain or soreness this date. Does report slight soreness after last session. Objective:  Modalities: vaso to L knee, LLE on wedge, 34 degrees, low pressure - not today.    Precautions: 5 months pregnant, s/p L ACL-R (DOS 22)   Exercises:  Exercise       Reps/ Time Weight/ Level Comments   Recumbent bike  5' L1           Long Seated       DF+ inversion stretch 3x30\" strap          Supine          B SLR  10x2 ea   AA AA to prevent quad lag 5/2; exhale w/ active movement 5/4    Bridge + hip add 2x10    Added iso hip add w/ playball 5/4    Active TKE 10x5s       Heel Slides 20x3\"   Stretch at end range; inc reps

## 2023-05-17 ENCOUNTER — ROUTINE PRENATAL (OUTPATIENT)
Dept: PERINATAL CARE | Age: 28
End: 2023-05-17
Payer: COMMERCIAL

## 2023-05-17 ENCOUNTER — ROUTINE PRENATAL (OUTPATIENT)
Dept: OBGYN | Age: 28
End: 2023-05-17
Payer: COMMERCIAL

## 2023-05-17 VITALS
HEART RATE: 82 BPM | DIASTOLIC BLOOD PRESSURE: 80 MMHG | WEIGHT: 248.5 LBS | SYSTOLIC BLOOD PRESSURE: 124 MMHG | BODY MASS INDEX: 42.65 KG/M2

## 2023-05-17 VITALS
DIASTOLIC BLOOD PRESSURE: 72 MMHG | WEIGHT: 246 LBS | HEIGHT: 64 IN | BODY MASS INDEX: 42 KG/M2 | TEMPERATURE: 97.7 F | HEART RATE: 78 BPM | SYSTOLIC BLOOD PRESSURE: 135 MMHG | RESPIRATION RATE: 16 BRPM

## 2023-05-17 DIAGNOSIS — Z92.29 COVID-19 VACCINE SERIES COMPLETED: ICD-10-CM

## 2023-05-17 DIAGNOSIS — O99.212 OBESITY AFFECTING PREGNANCY IN SECOND TRIMESTER: ICD-10-CM

## 2023-05-17 DIAGNOSIS — O09.891 HISTORY OF PRETERM DELIVERY, CURRENTLY PREGNANT IN FIRST TRIMESTER: ICD-10-CM

## 2023-05-17 DIAGNOSIS — Z84.82 FAMILY HISTORY OF SIDS (SUDDEN INFANT DEATH SYNDROME): ICD-10-CM

## 2023-05-17 DIAGNOSIS — Z82.79 FAMILY HISTORY OF CONGENITAL HEART DEFECT: ICD-10-CM

## 2023-05-17 DIAGNOSIS — Z87.74 HISTORY OF CONGENITAL HEART DEFECT: ICD-10-CM

## 2023-05-17 DIAGNOSIS — Z83.3 FAMILY HISTORY OF DIABETES MELLITUS IN MOTHER: ICD-10-CM

## 2023-05-17 DIAGNOSIS — Q24.9 MATERNAL CONGENITAL CARDIAC ANOMALY AFFECTING PREGNANCY IN SECOND TRIMESTER, ANTEPARTUM: ICD-10-CM

## 2023-05-17 DIAGNOSIS — L73.2 AXILLARY HIDRADENITIS SUPPURATIVA: ICD-10-CM

## 2023-05-17 DIAGNOSIS — Z3A.22 22 WEEKS GESTATION OF PREGNANCY: ICD-10-CM

## 2023-05-17 DIAGNOSIS — O99.891 MATERNAL CONGENITAL CARDIAC ANOMALY AFFECTING PREGNANCY IN SECOND TRIMESTER, ANTEPARTUM: ICD-10-CM

## 2023-05-17 DIAGNOSIS — Z87.59 HISTORY OF GESTATIONAL HYPERTENSION: ICD-10-CM

## 2023-05-17 DIAGNOSIS — O09.899 SHORT INTERVAL BETWEEN PREGNANCIES COMPLICATING PREGNANCY, ANTEPARTUM: ICD-10-CM

## 2023-05-17 DIAGNOSIS — Z23 INFLUENZA VACCINE NEEDED: ICD-10-CM

## 2023-05-17 DIAGNOSIS — O09.212 CURRENT PREGNANCY WITH HISTORY OF PRE-TERM LABOR IN SECOND TRIMESTER: Primary | ICD-10-CM

## 2023-05-17 DIAGNOSIS — O09.92 HIGH-RISK PREGNANCY IN SECOND TRIMESTER: Primary | ICD-10-CM

## 2023-05-17 DIAGNOSIS — O44.02 PLACENTA PREVIA IN SECOND TRIMESTER: ICD-10-CM

## 2023-05-17 DIAGNOSIS — O09.90 HIGH RISK PREGNANCY, ANTEPARTUM: ICD-10-CM

## 2023-05-17 DIAGNOSIS — O44.22 PLACENTA MARGINALIS IN SECOND TRIMESTER: ICD-10-CM

## 2023-05-17 DIAGNOSIS — Z23 NEED FOR TDAP VACCINATION: ICD-10-CM

## 2023-05-17 PROCEDURE — 99213 OFFICE O/P EST LOW 20 MIN: CPT | Performed by: OBSTETRICS & GYNECOLOGY

## 2023-05-17 PROCEDURE — 99999 PR OFFICE/OUTPT VISIT,PROCEDURE ONLY: CPT | Performed by: OBSTETRICS & GYNECOLOGY

## 2023-05-17 PROCEDURE — 76815 OB US LIMITED FETUS(S): CPT | Performed by: OBSTETRICS & GYNECOLOGY

## 2023-05-17 PROCEDURE — G8417 CALC BMI ABV UP PARAM F/U: HCPCS | Performed by: OBSTETRICS & GYNECOLOGY

## 2023-05-17 PROCEDURE — 76817 TRANSVAGINAL US OBSTETRIC: CPT | Performed by: OBSTETRICS & GYNECOLOGY

## 2023-05-17 PROCEDURE — 1036F TOBACCO NON-USER: CPT | Performed by: OBSTETRICS & GYNECOLOGY

## 2023-05-17 PROCEDURE — G8427 DOCREV CUR MEDS BY ELIG CLIN: HCPCS | Performed by: OBSTETRICS & GYNECOLOGY

## 2023-05-17 PROCEDURE — 99211 OFF/OP EST MAY X REQ PHY/QHP: CPT | Performed by: OBSTETRICS & GYNECOLOGY

## 2023-05-17 NOTE — PROGRESS NOTES
routine OB-in person.     Sim Chavez MD  [facility], [facility]  5/17/2023, 11:48 AM

## 2023-05-23 ENCOUNTER — HOSPITAL ENCOUNTER (OUTPATIENT)
Dept: PHYSICAL THERAPY | Facility: CLINIC | Age: 28
Setting detail: THERAPIES SERIES
Discharge: HOME | End: 2023-05-23
Payer: COMMERCIAL

## 2023-05-23 NOTE — FLOWSHEET NOTE
[] [address]  [address]    [phone]  F: [phone]   [x] [address]  [facility] 36   [address]  P: [phone]  F: [phone]  [] [address]  [address] [facility] [address]  P: [phone]  F: [phone] [] [address]  P: [phone]  F: [phone]  [] [address]  [address] [facility] 70   [address]   [place]: [phone]  F: [phone]   [] [address]  [place]: [phone]   F: [phone]     Physical Therapy Cancel/No Show note    Date: 2023  Patient: González Alcantar  : 1995  MRN: [id number]    Cancels/No Shows to date:     For today's appointment patient:    []  Cancelled    [] Rescheduled appointment    [x] No-show     Reason given by patient:    []  Patient ill    []  Conflicting appointment    [x] No transportation      [] Conflict with work    [] No reason given    [] Weather related    [] COVID-19    [] Other:      Comments: car troubles      [x] Next appointment was confirmed    Electronically signed by: Shant Malin PT

## 2023-05-25 ENCOUNTER — HOSPITAL ENCOUNTER (OUTPATIENT)
Dept: PHYSICAL THERAPY | Facility: CLINIC | Age: 28
Setting detail: THERAPIES SERIES
Discharge: HOME | End: 2023-05-25
Payer: COMMERCIAL

## 2023-05-25 NOTE — FLOWSHEET NOTE
[] [facility]) [facility] &  Therapy  [address]    [phone]  F: [phone]   [x] [address]  [facility]   [address]  P: [phone]  F: [phone]  [] [address]  [address] [facility] [address]  P: [phone]  F: [phone] [] [address]  P: [phone]  F: [phone]  [] [address]  [address] [address] [facility] 70   [address]   [place]: [phone]  F: [phone]   [] [place] [address] [facility] [address]  [place]: [phone]   F: [phone]     Physical Therapy Cancel/No Show note    Date: 2023  Patient: Johny Waters  : 1995  MRN: [id number]    Cancels/No Shows to date: 3/1    For today's appointment patient:    [x]  Cancelled    [] Rescheduled appointment    [] No-show     Reason given by patient:    []  Patient ill    [x]  Conflicting appointment    [] No transportation      [] Conflict with work    [] No reason given    [] Weather related    [] COVID-19    [] Other:      Comments: Please review attendance policy next therapy session. [x] Next appointment was confirmed    Electronically signed by:  Goldie Palomo PT

## 2023-05-30 ENCOUNTER — ROUTINE PRENATAL (OUTPATIENT)
Dept: PERINATAL CARE | Age: 28
End: 2023-05-30
Payer: COMMERCIAL

## 2023-05-30 ENCOUNTER — HOSPITAL ENCOUNTER (OUTPATIENT)
Age: 28
Discharge: HOME | End: 2023-05-30
Payer: COMMERCIAL

## 2023-05-30 ENCOUNTER — HOSPITAL ENCOUNTER (OUTPATIENT)
Dept: PHYSICAL THERAPY | Facility: CLINIC | Age: 28
Setting detail: THERAPIES SERIES
Discharge: HOME | End: 2023-05-30
Payer: COMMERCIAL

## 2023-05-30 VITALS
SYSTOLIC BLOOD PRESSURE: 126 MMHG | HEIGHT: 64 IN | HEART RATE: 67 BPM | BODY MASS INDEX: 42.17 KG/M2 | DIASTOLIC BLOOD PRESSURE: 71 MMHG | TEMPERATURE: 98 F | WEIGHT: 247 LBS

## 2023-05-30 DIAGNOSIS — Z3A.23 23 WEEKS GESTATION OF PREGNANCY: ICD-10-CM

## 2023-05-30 DIAGNOSIS — O28.5 ABNORMAL GENETIC TEST DURING PREGNANCY: Primary | ICD-10-CM

## 2023-05-30 PROCEDURE — 99244 OFF/OP CNSLTJ NEW/EST MOD 40: CPT | Performed by: OBSTETRICS & GYNECOLOGY

## 2023-05-30 PROCEDURE — 82105 ALPHA-FETOPROTEIN SERUM: CPT

## 2023-05-30 PROCEDURE — G8417 CALC BMI ABV UP PARAM F/U: HCPCS | Performed by: OBSTETRICS & GYNECOLOGY

## 2023-05-30 PROCEDURE — 36415 COLL VENOUS BLD VENIPUNCTURE: CPT

## 2023-05-30 PROCEDURE — G8427 DOCREV CUR MEDS BY ELIG CLIN: HCPCS | Performed by: OBSTETRICS & GYNECOLOGY

## 2023-05-30 NOTE — FLOWSHEET NOTE
[] [facility]) [facility] &  Therapy  [address]    [phone]  F: [phone]   [] [address]  [facility]   [address]  P: [phone]  F: [phone]  [] [place]  [address]  P: [phone]  F: [phone] [] [address]  P: [phone]  F: [phone]  [] [address]  [address] [address] [facility] 70   [address]   [place]: [phone]  F: [phone]   [] [facility]  [address] [facility] [address]  [place]: [phone]   F: [phone]     Physical Therapy Cancel/No Show note    Date: 2023  Patient: Chavez Gomez  : 1995  MRN: [id number]    Cancels/No Shows to date:     For today's appointment patient:    [x]  Cancelled    [] Rescheduled appointment    [] No-show     Reason given by patient:    []  Patient ill    [x]  Conflicting appointment    [] No transportation      [] Conflict with work    [] No reason given    [] Weather related    [] COVID-19    [] Other:      Comments:        [x] Next appointment was confirmed    Electronically signed by: Apryl Gonsalves, PT

## 2023-05-31 ENCOUNTER — ROUTINE PRENATAL (OUTPATIENT)
Dept: PERINATAL CARE | Age: 28
End: 2023-05-31
Payer: COMMERCIAL

## 2023-05-31 VITALS
DIASTOLIC BLOOD PRESSURE: 69 MMHG | WEIGHT: 245 LBS | HEART RATE: 86 BPM | RESPIRATION RATE: 16 BRPM | SYSTOLIC BLOOD PRESSURE: 116 MMHG | TEMPERATURE: 98.6 F | HEIGHT: 64 IN | BODY MASS INDEX: 41.83 KG/M2

## 2023-05-31 DIAGNOSIS — O99.891 MATERNAL CONGENITAL CARDIAC ANOMALY AFFECTING PREGNANCY IN SECOND TRIMESTER, ANTEPARTUM: ICD-10-CM

## 2023-05-31 DIAGNOSIS — Z3A.24 24 WEEKS GESTATION OF PREGNANCY: ICD-10-CM

## 2023-05-31 DIAGNOSIS — Q24.9 MATERNAL CONGENITAL CARDIAC ANOMALY AFFECTING PREGNANCY IN SECOND TRIMESTER, ANTEPARTUM: ICD-10-CM

## 2023-05-31 DIAGNOSIS — O99.212 OBESITY AFFECTING PREGNANCY IN SECOND TRIMESTER: ICD-10-CM

## 2023-05-31 DIAGNOSIS — O44.22 PLACENTA MARGINALIS IN SECOND TRIMESTER: Primary | ICD-10-CM

## 2023-05-31 DIAGNOSIS — O09.899 SHORT INTERVAL BETWEEN PREGNANCIES COMPLICATING PREGNANCY, ANTEPARTUM: ICD-10-CM

## 2023-05-31 DIAGNOSIS — Z36.4 ULTRASOUND FOR ANTENATAL SCREENING FOR FETAL GROWTH RESTRICTION: ICD-10-CM

## 2023-05-31 DIAGNOSIS — O09.212 CURRENT PREGNANCY WITH HISTORY OF PRE-TERM LABOR IN SECOND TRIMESTER: ICD-10-CM

## 2023-05-31 PROCEDURE — 76817 TRANSVAGINAL US OBSTETRIC: CPT | Performed by: OBSTETRICS & GYNECOLOGY

## 2023-05-31 PROCEDURE — 76820 UMBILICAL ARTERY ECHO: CPT | Performed by: OBSTETRICS & GYNECOLOGY

## 2023-05-31 PROCEDURE — 76816 OB US FOLLOW-UP PER FETUS: CPT | Performed by: OBSTETRICS & GYNECOLOGY

## 2023-05-31 PROCEDURE — 99999 PR OFFICE/OUTPT VISIT,PROCEDURE ONLY: CPT | Performed by: OBSTETRICS & GYNECOLOGY

## 2023-06-01 ENCOUNTER — HOSPITAL ENCOUNTER (OUTPATIENT)
Dept: PHYSICAL THERAPY | Facility: CLINIC | Age: 28
Setting detail: THERAPIES SERIES
Discharge: HOME | End: 2023-06-01
Payer: COMMERCIAL

## 2023-06-01 LAB
AFP INTERPRETATION: NORMAL
AFP MOM: 0.64
AFP SPECIMEN: NORMAL
AFP: 56 NG/ML
DATE OF BIRTH: NORMAL
DATING METHOD: NORMAL
DETERMINED BY: NORMAL
DIABETIC: NEGATIVE
DONOR EGG?: NORMAL
DUE DATE: NORMAL
ESTIMATED DUE DATE: NORMAL
FAMILY HISTORY NTD: NEGATIVE
GESTATIONAL AGE: NORMAL
IN VITRO FERTILIZATION: NORMAL
INSULIN REQ DIABETES: NO
LAST MENSTRUAL PERIOD: NORMAL
MATERNAL AGE AT EDD: 29.3 YR
MATERNAL WEIGHT: 247
MONOCHORIONIC TWINS: NORMAL
NUMBER OF FETUSES: NORMAL
PATIENT WEIGHT UNITS: NORMAL
PATIENT WEIGHT: NORMAL
RACE (MATERNAL): NORMAL
RACE: NORMAL
REPEAT SPECIMEN?: NORMAL
SMOKING: NORMAL
SMOKING: NORMAL
VALPROIC/CARBAMAZEP: NORMAL
ZZ NTE CLEAN UP: HISTORY: NO

## 2023-06-01 PROCEDURE — 97110 THERAPEUTIC EXERCISES: CPT

## 2023-06-01 NOTE — FLOWSHEET NOTE
Activities     []  Aquatics     []  Vasocompression     []  Other     Total Treatment time 48 3   Re-assessment time is billed under therex this date 6/1/23         Assessment: [x] Progressing toward goals. Re-assessment of STGs and overall function completed this date. Pt reported no change in overall pain level with increased work-related activities particularly with repetitive stairs negotiation, no significant improvement achieved with L knee AROM, continued to demo mild L extension lag sign with SLR, and slight improvement in B hip girdle strength R>L. Pt reported only able to complete HEP 1-2x/week due to busy life/work schedule. Spent extensive amount of time educating pt on benefit and importance of completing HEP in a consistent manner to maximize rehab potential with verbal understanding noted. Will reduce to 1x/week due to busy work schedule, minimal progression made with pt plateau, and is currently 6 months pregnant. Pt will also f/u with surgeon on 6/12/23 with regards of all factors stated to ensure safety when participating in skilled outpatient therapy. Pt spent the rest of time completing progressive therex intervention charted above. Reduced step height with heel tap to ensure proper technique. Added tandem stance on foam with ball toss to further challenge proprioception and overall stability. Will re-assess compliance with HEP and progress as able. [] No change. 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. Problems:    [x] ? Pain: up to 10/10 L knee (lateral and anterior)   [x] ?  ROM: L knee AROM

## 2023-06-01 NOTE — PROGRESS NOTES
pain levels to 4/10 after work shift to indicate improved overall tolerance  Not Met  Up to 7/10 after work    2. ? ROM: Pt will improve L knee to at least 0-130° AROM to promote equal weight distribution with standing/walking activities  Not Met   Initial eval 4/18/23: 3-110°   6/1/23: 0-113°       3. ? Strength: Pt will improve B hip girdle strength to 5/5 throughout to ease difficulty with repetitive stairs tasks at work  Progressing  Hip flex: 5/5 bilat  Hip abd: L 4/5, R 5/5  Knee flex: L 4+/5, R 5/5  Knee ext: L 4+/5, R 5/5  Mild L extension lag sign w/ L SLR   4. Pt will perform 10x Squat with proper technique without B tibial ER compensation to indicate improved B DF AROM to be able to progress towards higher functional strengthening and single limb stability interventions Progressing   (-) tibial ER rotation  (+) inc R lateral weight shift   5. Independent with Home Exercise Programs Partially Met   Completed HEP 1-2x/week               Date Addressed:      LTG: To be met in 12 treatments     1. Pt will report \"no difficulty\" with item \"m\" of LEFI functional assessment to indicate improved tolerance with work-related tasks     2. Pt will be able to perform 6\" step test with proper pelvic alignment to demonstrate improved functional strength of B hip abductors      3. Pt will be able to ascend/descend a flight of stairs without excessive anterior/lateral weight shift compensation w/ ascending & eccentric quad control w/ descending     4.  Pt will be able to maintain B SLS for at least 30 sec without LOB to demonstrate improved single leg stability and neuromuscular control                        Patient goals: \"no pain\"          Treatment Plan:  [x] Therapeutic Exercise   40057             [] Iontophoresis: 4 mg/mL Dexamethasone Sodium Phosphate  mAmin  35954   [x] Therapeutic Activity  26712 [x] Vasopneumatic cold with compression  81933               [x] Gait Training    33977 [] Ultrasound

## 2023-06-07 ENCOUNTER — HOSPITAL ENCOUNTER (OUTPATIENT)
Dept: PHYSICAL THERAPY | Facility: CLINIC | Age: 28
Setting detail: THERAPIES SERIES
Discharge: HOME | End: 2023-06-07
Payer: COMMERCIAL

## 2023-06-07 NOTE — FLOWSHEET NOTE
[] [address]  [address]    [phone]  F: [phone]   [x] [address]  [facility]   [address]  P: [phone]  F: [phone]  [] [address]  [address] [facility]  P: [phone]  F: [phone] [] [address]  P: [phone]  F: [phone]  [] [address]  [address] [facility] [address]   [address]   [place]: [phone]  F: [phone]   [] [address]  [place]: [phone]   F: [phone]     Physical Therapy Cancel/No Show note    Date: 2023  Patient: Sissy Heath  : 1995  MRN: [id number]    Cancels/No Shows to date:     For today's appointment patient:    [x]  Cancelled    [] Rescheduled appointment    [] No-show     Reason given by patient:    []  Patient ill    []  Conflicting appointment    [] No transportation      [] Conflict with work    [] No reason given    [] Weather related    [] COVID-19    [x] Other:      Comments:  Pt to be discharged per Maternal Fetal Medicine.       [] Next appointment was confirmed    Electronically signed by: Carole Donovan PTA

## 2023-06-14 ENCOUNTER — APPOINTMENT (OUTPATIENT)
Dept: PHYSICAL THERAPY | Facility: CLINIC | Age: 28
End: 2023-06-14
Payer: COMMERCIAL

## 2023-06-14 PROBLEM — Z13.31 NEGATIVE DEPRESSION SCREENING: Status: RESOLVED | Noted: 2023-03-08 | Resolved: 2023-06-14

## 2023-06-14 PROBLEM — Z13.71 SCREENING FOR GENETIC DISEASE CARRIER STATUS: Status: RESOLVED | Noted: 2023-03-08 | Resolved: 2023-06-14

## 2023-06-28 ENCOUNTER — ROUTINE PRENATAL (OUTPATIENT)
Dept: PERINATAL CARE | Age: 28
End: 2023-06-28
Payer: COMMERCIAL

## 2023-06-28 VITALS
WEIGHT: 243 LBS | TEMPERATURE: 97 F | BODY MASS INDEX: 41.48 KG/M2 | HEART RATE: 91 BPM | HEIGHT: 64 IN | SYSTOLIC BLOOD PRESSURE: 124 MMHG | RESPIRATION RATE: 16 BRPM | DIASTOLIC BLOOD PRESSURE: 77 MMHG

## 2023-06-28 DIAGNOSIS — O44.23 PLACENTA MARGINALIS IN THIRD TRIMESTER: Primary | ICD-10-CM

## 2023-06-28 DIAGNOSIS — Z13.89 ENCOUNTER FOR ROUTINE SCREENING FOR MALFORMATION USING ULTRASONICS: ICD-10-CM

## 2023-06-28 DIAGNOSIS — Z3A.28 28 WEEKS GESTATION OF PREGNANCY: ICD-10-CM

## 2023-06-28 DIAGNOSIS — Q24.9 MATERNAL CONGENITAL CARDIAC ANOMALY AFFECTING PREGNANCY, ANTEPARTUM, THIRD TRIMESTER: ICD-10-CM

## 2023-06-28 DIAGNOSIS — O99.413 MATERNAL CONGENITAL CARDIAC ANOMALY AFFECTING PREGNANCY, ANTEPARTUM, THIRD TRIMESTER: ICD-10-CM

## 2023-06-28 DIAGNOSIS — O99.213 OBESITY AFFECTING PREGNANCY IN THIRD TRIMESTER: ICD-10-CM

## 2023-06-28 DIAGNOSIS — O09.899 SHORT INTERVAL BETWEEN PREGNANCIES COMPLICATING PREGNANCY, ANTEPARTUM: ICD-10-CM

## 2023-06-28 DIAGNOSIS — Z36.4 ULTRASOUND FOR ANTENATAL SCREENING FOR FETAL GROWTH RESTRICTION: ICD-10-CM

## 2023-06-28 DIAGNOSIS — O09.213 CURRENT PREGNANCY WITH HISTORY OF PRE-TERM LABOR IN THIRD TRIMESTER: ICD-10-CM

## 2023-06-28 PROCEDURE — 76817 TRANSVAGINAL US OBSTETRIC: CPT | Performed by: OBSTETRICS & GYNECOLOGY

## 2023-06-28 PROCEDURE — 76820 UMBILICAL ARTERY ECHO: CPT | Performed by: OBSTETRICS & GYNECOLOGY

## 2023-06-28 PROCEDURE — 99999 PR OFFICE/OUTPT VISIT,PROCEDURE ONLY: CPT | Performed by: OBSTETRICS & GYNECOLOGY

## 2023-06-28 PROCEDURE — 76805 OB US >/= 14 WKS SNGL FETUS: CPT | Performed by: OBSTETRICS & GYNECOLOGY

## 2023-06-28 PROCEDURE — 76819 FETAL BIOPHYS PROFIL W/O NST: CPT | Performed by: OBSTETRICS & GYNECOLOGY

## 2023-07-12 ENCOUNTER — ROUTINE PRENATAL (OUTPATIENT)
Dept: PERINATAL CARE | Age: 28
End: 2023-07-12
Payer: COMMERCIAL

## 2023-07-12 ENCOUNTER — ROUTINE PRENATAL (OUTPATIENT)
Dept: OBGYN | Age: 28
End: 2023-07-12
Payer: COMMERCIAL

## 2023-07-12 VITALS
DIASTOLIC BLOOD PRESSURE: 73 MMHG | SYSTOLIC BLOOD PRESSURE: 136 MMHG | WEIGHT: 245 LBS | HEART RATE: 81 BPM | BODY MASS INDEX: 42.05 KG/M2

## 2023-07-12 VITALS
WEIGHT: 245 LBS | HEIGHT: 64 IN | SYSTOLIC BLOOD PRESSURE: 126 MMHG | BODY MASS INDEX: 41.83 KG/M2 | HEART RATE: 102 BPM | RESPIRATION RATE: 16 BRPM | DIASTOLIC BLOOD PRESSURE: 76 MMHG | TEMPERATURE: 97.2 F

## 2023-07-12 DIAGNOSIS — O99.413 MATERNAL CONGENITAL CARDIAC ANOMALY AFFECTING PREGNANCY, ANTEPARTUM, THIRD TRIMESTER: ICD-10-CM

## 2023-07-12 DIAGNOSIS — Z3A.30 30 WEEKS GESTATION OF PREGNANCY: Primary | ICD-10-CM

## 2023-07-12 DIAGNOSIS — O09.93 HIGH-RISK PREGNANCY IN THIRD TRIMESTER: ICD-10-CM

## 2023-07-12 DIAGNOSIS — O44.23 PLACENTA MARGINALIS IN THIRD TRIMESTER: Primary | ICD-10-CM

## 2023-07-12 DIAGNOSIS — Z3A.30 30 WEEKS GESTATION OF PREGNANCY: ICD-10-CM

## 2023-07-12 DIAGNOSIS — O99.213 OBESITY AFFECTING PREGNANCY IN THIRD TRIMESTER: ICD-10-CM

## 2023-07-12 DIAGNOSIS — O09.213 CURRENT PREGNANCY WITH HISTORY OF PRE-TERM LABOR IN THIRD TRIMESTER: ICD-10-CM

## 2023-07-12 DIAGNOSIS — O09.899 SHORT INTERVAL BETWEEN PREGNANCIES COMPLICATING PREGNANCY, ANTEPARTUM: ICD-10-CM

## 2023-07-12 DIAGNOSIS — Q24.9 MATERNAL CONGENITAL CARDIAC ANOMALY AFFECTING PREGNANCY, ANTEPARTUM, THIRD TRIMESTER: ICD-10-CM

## 2023-07-12 PROCEDURE — 90715 TDAP VACCINE 7 YRS/> IM: CPT | Performed by: OBSTETRICS & GYNECOLOGY

## 2023-07-12 PROCEDURE — 99999 PR OFFICE/OUTPT VISIT,PROCEDURE ONLY: CPT | Performed by: OBSTETRICS & GYNECOLOGY

## 2023-07-12 PROCEDURE — 76819 FETAL BIOPHYS PROFIL W/O NST: CPT | Performed by: OBSTETRICS & GYNECOLOGY

## 2023-07-12 PROCEDURE — 76815 OB US LIMITED FETUS(S): CPT | Performed by: OBSTETRICS & GYNECOLOGY

## 2023-07-12 PROCEDURE — 76817 TRANSVAGINAL US OBSTETRIC: CPT | Performed by: OBSTETRICS & GYNECOLOGY

## 2023-07-12 PROCEDURE — 76820 UMBILICAL ARTERY ECHO: CPT | Performed by: OBSTETRICS & GYNECOLOGY

## 2023-07-12 NOTE — PROGRESS NOTES
Prenatal Visit    Maricruz Crowell is a 33 y.o. female [id number] at 30w0d    The patient was seen and evaluated. She presents for a routine prenatal appointment today. She reports positive fetal movements. She denies contractions, vaginal bleeding and leakage of fluid. Signs and symptoms of labor and pre-eclampsia were reviewed with the patient in detail. She is to report any of these if they occur. She currently denies any of these. The patient is Rh pos and Rhogam is not indicated in this pregnancy. The patient is requesting the T-Dap Vaccine (27-36 weeks) this pregnancy. The patient declined the influenza vaccine this year. The patient declined the COVID-19 vaccine this year. The problem list reflects the active issues addressed during today's visit    Vitals:    BP: 136/73  Weight - Scale: 245 lb (111.1 kg)  Pulse: 81  Patient Position: Sitting  Albumin: Trace  Glucose: Negative  Fundal Height (cm): 30 cm  Fetal HR: 130  Movement: Present     PRENATAL LAB RESULTS:   Blood Type/Rh: O pos  Antibody Screen: negative  Hemoglobin, Hematocrit, Platelets: Hgb 08.9/NDZ 36. 0/Plt 193  Rubella: immune  T.  Pallidum, IgG: non-reactive  Hepatitis B Surface Antigen: non-reactive   Hepatitis C Antibody: non-reactive   HIV: non-reactive   Sickle Cell Screen: negative  Gonorrhea: negative  Chlamydia: negative  Urine culture: negative, date: 2/10/23    1 hour Glucose Tolerance Test:  not done    Group B Strep: not done  Cystic Fibrosis Screen: negative  First Trimester Screen: low risk for aneuploidy  MSAFP/Multiple Markers: negative  Non-Invasive Prenatal Testing: low risk for aneuploidy  Anatomy US (5/3/23): anterior placenta, 3VC, male, normal anatomy        Assessment & Plan:  Maricruz Crowell is a 33 y.o. female  at 30w0d   - 28 week labs ordered   - Tdap vaccination: is requesting    - Influenza vaccination: declined    - Rhogam: is not indicated in this pregnancy    -  testing indication

## 2023-07-12 NOTE — PROGRESS NOTES
Patient was given TDAP in the Right Deltoid.  Per doctor Justin  NDC# 48536-773-57  LOT# [id number]  Exp date- 10/05/2025  Patient tolerated well without difficulty

## 2023-07-15 NOTE — ANESTHESIA POSTPROCEDURE EVALUATION
Department of Anesthesiology  Postprocedure Note    Patient: Batsheva Quiros  MRN: [id number]  YOB: 1995  Date of evaluation: 4/5/2022  Time:  6:20 AM     Procedure Summary     Date: 04/04/22 Room / Location:     Anesthesia Start: 1227 Anesthesia Stop: 5897    Procedure: Labor Analgesia Diagnosis:     Scheduled Providers:  Responsible Provider: Brian Johnson MD    Anesthesia Type: epidural ASA Status: 2          Anesthesia Type: epidural    Brian Phase I: Brian Score: 10    Brian Phase II:      Last vitals: Reviewed and per EMR flowsheets.        Anesthesia Post Evaluation    Patient location during evaluation: floor  Patient participation: complete - patient participated  Level of consciousness: awake and alert  Pain score: 0  Airway patency: patent  Nausea & Vomiting: no nausea and no vomiting  Complications: no  Cardiovascular status: blood pressure returned to baseline  Respiratory status: acceptable  Hydration status: euvolemic 0 (no pain/absence of nonverbal indicators of pain)

## 2023-07-26 ENCOUNTER — ROUTINE PRENATAL (OUTPATIENT)
Dept: PERINATAL CARE | Age: 28
End: 2023-07-26
Payer: COMMERCIAL

## 2023-07-26 VITALS
RESPIRATION RATE: 16 BRPM | BODY MASS INDEX: 41.48 KG/M2 | HEART RATE: 86 BPM | TEMPERATURE: 98 F | DIASTOLIC BLOOD PRESSURE: 72 MMHG | HEIGHT: 64 IN | WEIGHT: 243 LBS | SYSTOLIC BLOOD PRESSURE: 131 MMHG

## 2023-07-26 DIAGNOSIS — O44.23 PLACENTA MARGINALIS IN THIRD TRIMESTER: Primary | ICD-10-CM

## 2023-07-26 DIAGNOSIS — O99.413 MATERNAL CONGENITAL CARDIAC ANOMALY AFFECTING PREGNANCY, ANTEPARTUM, THIRD TRIMESTER: ICD-10-CM

## 2023-07-26 DIAGNOSIS — Z3A.32 32 WEEKS GESTATION OF PREGNANCY: ICD-10-CM

## 2023-07-26 DIAGNOSIS — Z36.4 ULTRASOUND FOR ANTENATAL SCREENING FOR FETAL GROWTH RESTRICTION: ICD-10-CM

## 2023-07-26 DIAGNOSIS — O09.213 CURRENT PREGNANCY WITH HISTORY OF PRE-TERM LABOR IN THIRD TRIMESTER: ICD-10-CM

## 2023-07-26 DIAGNOSIS — O09.899 SHORT INTERVAL BETWEEN PREGNANCIES COMPLICATING PREGNANCY, ANTEPARTUM: ICD-10-CM

## 2023-07-26 DIAGNOSIS — O99.213 OBESITY AFFECTING PREGNANCY IN THIRD TRIMESTER: ICD-10-CM

## 2023-07-26 DIAGNOSIS — Q24.9 MATERNAL CONGENITAL CARDIAC ANOMALY AFFECTING PREGNANCY, ANTEPARTUM, THIRD TRIMESTER: ICD-10-CM

## 2023-07-26 PROCEDURE — 76819 FETAL BIOPHYS PROFIL W/O NST: CPT | Performed by: OBSTETRICS & GYNECOLOGY

## 2023-07-26 PROCEDURE — 76816 OB US FOLLOW-UP PER FETUS: CPT | Performed by: OBSTETRICS & GYNECOLOGY

## 2023-07-26 PROCEDURE — 76817 TRANSVAGINAL US OBSTETRIC: CPT | Performed by: OBSTETRICS & GYNECOLOGY

## 2023-07-26 PROCEDURE — 99999 PR OFFICE/OUTPT VISIT,PROCEDURE ONLY: CPT | Performed by: OBSTETRICS & GYNECOLOGY

## 2023-07-26 PROCEDURE — 76820 UMBILICAL ARTERY ECHO: CPT | Performed by: OBSTETRICS & GYNECOLOGY

## 2023-07-28 ENCOUNTER — OFFICE VISIT (OUTPATIENT)
Dept: ORTHOPEDIC SURGERY | Age: 28
End: 2023-07-28

## 2023-07-28 VITALS — BODY MASS INDEX: 41.48 KG/M2 | HEIGHT: 64 IN | RESPIRATION RATE: 14 BRPM | WEIGHT: 243 LBS

## 2023-07-28 DIAGNOSIS — Z98.890 STATUS POST RECONSTRUCTION OF ANTERIOR CRUCIATE LIGAMENT: Primary | ICD-10-CM

## 2023-07-28 NOTE — PROGRESS NOTES
Procedure: Left knee ACL reconstruction  Date of procedure: 6/9/2022    HPI: Ms. Farhana Serrano is 26-year-old approximately 1 year status post the aforementioned procedure. She still reports having some anterior knee and lateral knee pain intermittently. She had to stop physical therapy because she is pregnant and was not able to fully participate in the maneuvers that she was required to. Her knee primarily bothers her when it is while she is standing. She denies having any feelings of instability at this time. Physical examination:  Evaluation patient's left knee and lower extremity demonstrates mild swelling at the knee. She has full knee extension with flexion to approximately 120 degrees. No gross instability with varus and valgus stress applied across the knee joint and she has a negative Lachman. She is mildly tender to palpation along the patellar tendon anteriorly. She is also mildly tender to palpation along the lateral joint line. Sensation is grossly intact light touch in all dermatomes and she has good pedal pulses distally. Impression and plan: Ms. Farhana Serrano is a 26-year-old approximately 1 year status post left knee ACL reconstruction. She does have some persistent knee pain as outlined above. We once again had a discussion about possible etiologies including patellar tendinitis and articular or lateral meniscal pathology. We had a discussion about treatment options moving forward. At this time due to the fact that she is pregnant and is expecting within the next couple of months she wants to just continue to manage this symptomatically until afterwards and then we can reassess. I believe this is reasonable. I will see her back in my clinic as needed but she may return or call at anytime with questions or concerns.

## 2023-08-01 DIAGNOSIS — Z3A.32 32 WEEKS GESTATION OF PREGNANCY: Primary | ICD-10-CM

## 2023-08-01 RX ORDER — FAMOTIDINE 20 MG/1
20 TABLET, FILM COATED ORAL 2 TIMES DAILY
Qty: 60 TABLET | Refills: 3 | Status: SHIPPED | OUTPATIENT
Start: 2023-08-01

## 2023-08-02 ENCOUNTER — ROUTINE PRENATAL (OUTPATIENT)
Dept: PERINATAL CARE | Age: 28
End: 2023-08-02
Payer: COMMERCIAL

## 2023-08-02 ENCOUNTER — TELEPHONE (OUTPATIENT)
Dept: OBGYN | Age: 28
End: 2023-08-02

## 2023-08-02 VITALS
TEMPERATURE: 98 F | SYSTOLIC BLOOD PRESSURE: 120 MMHG | HEART RATE: 80 BPM | DIASTOLIC BLOOD PRESSURE: 75 MMHG | RESPIRATION RATE: 16 BRPM | WEIGHT: 243 LBS | BODY MASS INDEX: 41.48 KG/M2 | HEIGHT: 64 IN

## 2023-08-02 DIAGNOSIS — O44.23 PLACENTA MARGINALIS IN THIRD TRIMESTER: Primary | ICD-10-CM

## 2023-08-02 DIAGNOSIS — Z3A.33 33 WEEKS GESTATION OF PREGNANCY: ICD-10-CM

## 2023-08-02 DIAGNOSIS — O99.213 OBESITY AFFECTING PREGNANCY IN THIRD TRIMESTER: ICD-10-CM

## 2023-08-02 DIAGNOSIS — O09.899 SHORT INTERVAL BETWEEN PREGNANCIES COMPLICATING PREGNANCY, ANTEPARTUM: ICD-10-CM

## 2023-08-02 DIAGNOSIS — Q24.9 MATERNAL CONGENITAL CARDIAC ANOMALY AFFECTING PREGNANCY, ANTEPARTUM, THIRD TRIMESTER: ICD-10-CM

## 2023-08-02 DIAGNOSIS — O99.413 MATERNAL CONGENITAL CARDIAC ANOMALY AFFECTING PREGNANCY, ANTEPARTUM, THIRD TRIMESTER: ICD-10-CM

## 2023-08-02 PROBLEM — Z23 NEED FOR TDAP VACCINATION: Status: RESOLVED | Noted: 2023-03-08 | Resolved: 2023-08-02

## 2023-08-02 PROCEDURE — 76815 OB US LIMITED FETUS(S): CPT | Performed by: OBSTETRICS & GYNECOLOGY

## 2023-08-02 PROCEDURE — 99999 PR OFFICE/OUTPT VISIT,PROCEDURE ONLY: CPT | Performed by: OBSTETRICS & GYNECOLOGY

## 2023-08-02 PROCEDURE — 76819 FETAL BIOPHYS PROFIL W/O NST: CPT | Performed by: OBSTETRICS & GYNECOLOGY

## 2023-08-02 PROCEDURE — 76820 UMBILICAL ARTERY ECHO: CPT | Performed by: OBSTETRICS & GYNECOLOGY

## 2023-08-02 RX ORDER — OFLOXACIN 3 MG/ML
SOLUTION/ DROPS OPHTHALMIC
COMMUNITY
Start: 2023-07-19

## 2023-08-02 NOTE — TELEPHONE ENCOUNTER
Patient states she was at [facility] this morning and her baby was \"being stubborn\". She states she has felt very little movement today. Patient was advised to go to Labor and Delivery at [facility] for evaluation. Zena DIEGO notified of patient.

## 2023-08-08 ENCOUNTER — HOSPITAL ENCOUNTER (OUTPATIENT)
Age: 28
Setting detail: SPECIMEN
Discharge: HOME | End: 2023-08-08

## 2023-08-08 ENCOUNTER — ROUTINE PRENATAL (OUTPATIENT)
Dept: OBGYN | Age: 28
End: 2023-08-08
Payer: COMMERCIAL

## 2023-08-08 VITALS
HEART RATE: 77 BPM | DIASTOLIC BLOOD PRESSURE: 79 MMHG | WEIGHT: 246 LBS | SYSTOLIC BLOOD PRESSURE: 120 MMHG | BODY MASS INDEX: 42.23 KG/M2

## 2023-08-08 DIAGNOSIS — Z87.74 HISTORY OF CONGENITAL HEART DEFECT: ICD-10-CM

## 2023-08-08 DIAGNOSIS — J45.909 ASTHMA AFFECTING PREGNANCY, ANTEPARTUM: ICD-10-CM

## 2023-08-08 DIAGNOSIS — Z87.59 HISTORY OF GESTATIONAL HYPERTENSION: ICD-10-CM

## 2023-08-08 DIAGNOSIS — Z82.79 FAMILY HISTORY OF CONGENITAL HEART DEFECT: ICD-10-CM

## 2023-08-08 DIAGNOSIS — Z92.29 COVID-19 VACCINE SERIES COMPLETED: ICD-10-CM

## 2023-08-08 DIAGNOSIS — Z83.3 FAMILY HISTORY OF DIABETES MELLITUS IN MOTHER: ICD-10-CM

## 2023-08-08 DIAGNOSIS — O09.90 HIGH RISK PREGNANCY, ANTEPARTUM: ICD-10-CM

## 2023-08-08 DIAGNOSIS — O99.519 ASTHMA AFFECTING PREGNANCY, ANTEPARTUM: ICD-10-CM

## 2023-08-08 DIAGNOSIS — Z3A.33 33 WEEKS GESTATION OF PREGNANCY: Primary | ICD-10-CM

## 2023-08-08 DIAGNOSIS — L73.2 AXILLARY HIDRADENITIS SUPPURATIVA: ICD-10-CM

## 2023-08-08 DIAGNOSIS — O09.891 HISTORY OF PRETERM DELIVERY, CURRENTLY PREGNANT IN FIRST TRIMESTER: ICD-10-CM

## 2023-08-08 PROCEDURE — 99213 OFFICE O/P EST LOW 20 MIN: CPT | Performed by: OBSTETRICS & GYNECOLOGY

## 2023-08-08 PROCEDURE — 1036F TOBACCO NON-USER: CPT | Performed by: OBSTETRICS & GYNECOLOGY

## 2023-08-08 PROCEDURE — G8427 DOCREV CUR MEDS BY ELIG CLIN: HCPCS | Performed by: OBSTETRICS & GYNECOLOGY

## 2023-08-08 PROCEDURE — G8417 CALC BMI ABV UP PARAM F/U: HCPCS | Performed by: OBSTETRICS & GYNECOLOGY

## 2023-08-08 NOTE — PROGRESS NOTES
Prenatal Visit    Keiry Keith is a 33 y.o. female [id number] at 33w6d    The patient was seen and evaluated. She complains of pressure. She reports positive fetal movements. She denies contractions, vaginal bleeding and leakage of fluid. Signs and symptoms of labor and pre-eclampsia were reviewed with the patient in detail. She is to report any of these if they occur. She currently denies any of these. The patient is Rh positive, Rhogam not indicated  The patient already received  the T-Dap Vaccine 23. The problem list reflects the active issues addressed during today's visit    Vitals:  BP: 120/79  Weight - Scale: 246 lb (111.6 kg)  Pulse: 77  Patient Position: Sitting  Albumin: Trace  Glucose: Negative  Movement: Present     PRENATAL LAB RESULTS:   Blood Type/Rh: O pos  Antibody Screen: negative  Hemoglobin, Hematocrit, Platelets: Hgb 88.8/ALA 36. 0/Plt 193  Rubella: immune  T. Pallidum, IgG: non-reactive  Hepatitis B Surface Antigen: non-reactive   Hepatitis C Antibody: non-reactive   HIV: non-reactive   Sickle Cell Screen: negative  Gonorrhea: negative  Chlamydia: negative  Urine culture: negative, date: 2/10/23     1 hour Glucose Tolerance Test:  not done     Group B Strep: done today  Cystic Fibrosis Screen: negative  First Trimester Screen: low risk for aneuploidy  MSAFP/Multiple Markers: negative  Non-Invasive Prenatal Testing: low risk for aneuploidy  Anatomy US (5/3/23): anterior placenta, 3VC, male, normal anatomy    Assessment & Plan:  Keiry Keith is a 33 y.o. female [id number] at 35w8d   - 28 week labs ordered, reprinted for patient today   - Tdap vaccination: already received     - Rhogam: is not indicated in this pregnancy   -  testing indication starting 32 weeks GA: gHTN, scheduled weekly at [facility]   -Indications for  section:  N/A , ERAS protocols: not indicated   - Patient was screened for  labor and s/sx of PreEclampsia.  Recommendations when to call or

## 2023-08-09 ENCOUNTER — ROUTINE PRENATAL (OUTPATIENT)
Dept: PERINATAL CARE | Age: 28
End: 2023-08-09
Payer: COMMERCIAL

## 2023-08-09 VITALS
TEMPERATURE: 98 F | RESPIRATION RATE: 16 BRPM | HEART RATE: 98 BPM | BODY MASS INDEX: 41.32 KG/M2 | WEIGHT: 242 LBS | HEIGHT: 64 IN | SYSTOLIC BLOOD PRESSURE: 124 MMHG | DIASTOLIC BLOOD PRESSURE: 72 MMHG

## 2023-08-09 DIAGNOSIS — Q24.9 MATERNAL CONGENITAL CARDIAC ANOMALY AFFECTING PREGNANCY, ANTEPARTUM, THIRD TRIMESTER: ICD-10-CM

## 2023-08-09 DIAGNOSIS — O99.413 MATERNAL CONGENITAL CARDIAC ANOMALY AFFECTING PREGNANCY, ANTEPARTUM, THIRD TRIMESTER: ICD-10-CM

## 2023-08-09 DIAGNOSIS — O99.213 OBESITY AFFECTING PREGNANCY IN THIRD TRIMESTER: ICD-10-CM

## 2023-08-09 DIAGNOSIS — O44.23 PLACENTA MARGINALIS IN THIRD TRIMESTER: Primary | ICD-10-CM

## 2023-08-09 DIAGNOSIS — Z3A.34 34 WEEKS GESTATION OF PREGNANCY: ICD-10-CM

## 2023-08-09 DIAGNOSIS — O09.899 SHORT INTERVAL BETWEEN PREGNANCIES COMPLICATING PREGNANCY, ANTEPARTUM: ICD-10-CM

## 2023-08-09 DIAGNOSIS — O43.103 PLACENTAL ABNORMALITY IN THIRD TRIMESTER: ICD-10-CM

## 2023-08-09 DIAGNOSIS — Z3A.33 33 WEEKS GESTATION OF PREGNANCY: ICD-10-CM

## 2023-08-09 PROCEDURE — 76815 OB US LIMITED FETUS(S): CPT | Performed by: OBSTETRICS & GYNECOLOGY

## 2023-08-09 PROCEDURE — 76819 FETAL BIOPHYS PROFIL W/O NST: CPT | Performed by: OBSTETRICS & GYNECOLOGY

## 2023-08-09 PROCEDURE — 76820 UMBILICAL ARTERY ECHO: CPT | Performed by: OBSTETRICS & GYNECOLOGY

## 2023-08-09 PROCEDURE — 99999 PR OFFICE/OUTPT VISIT,PROCEDURE ONLY: CPT | Performed by: OBSTETRICS & GYNECOLOGY

## 2023-08-12 LAB
MICROORGANISM SPEC CULT: NORMAL
SPECIMEN DESCRIPTION: NORMAL

## 2023-08-16 ENCOUNTER — HOSPITAL ENCOUNTER (OUTPATIENT)
Age: 28
Discharge: HOME | End: 2023-08-16
Payer: COMMERCIAL

## 2023-08-16 ENCOUNTER — ROUTINE PRENATAL (OUTPATIENT)
Dept: PERINATAL CARE | Age: 28
End: 2023-08-16
Payer: COMMERCIAL

## 2023-08-16 VITALS
WEIGHT: 243 LBS | BODY MASS INDEX: 41.48 KG/M2 | TEMPERATURE: 97.9 F | SYSTOLIC BLOOD PRESSURE: 128 MMHG | RESPIRATION RATE: 16 BRPM | HEART RATE: 75 BPM | DIASTOLIC BLOOD PRESSURE: 75 MMHG | HEIGHT: 64 IN

## 2023-08-16 DIAGNOSIS — Q24.9 MATERNAL CONGENITAL CARDIAC ANOMALY AFFECTING PREGNANCY, ANTEPARTUM, THIRD TRIMESTER: ICD-10-CM

## 2023-08-16 DIAGNOSIS — O99.413 MATERNAL CONGENITAL CARDIAC ANOMALY AFFECTING PREGNANCY, ANTEPARTUM, THIRD TRIMESTER: ICD-10-CM

## 2023-08-16 DIAGNOSIS — Z36.4 ULTRASOUND FOR ANTENATAL SCREENING FOR FETAL GROWTH RESTRICTION: ICD-10-CM

## 2023-08-16 DIAGNOSIS — O09.899 SHORT INTERVAL BETWEEN PREGNANCIES COMPLICATING PREGNANCY, ANTEPARTUM: ICD-10-CM

## 2023-08-16 DIAGNOSIS — O44.23 PLACENTA MARGINALIS IN THIRD TRIMESTER: Primary | ICD-10-CM

## 2023-08-16 DIAGNOSIS — O43.103 PLACENTAL ABNORMALITY IN THIRD TRIMESTER: ICD-10-CM

## 2023-08-16 DIAGNOSIS — O99.213 OBESITY AFFECTING PREGNANCY IN THIRD TRIMESTER: ICD-10-CM

## 2023-08-16 DIAGNOSIS — Z3A.35 35 WEEKS GESTATION OF PREGNANCY: ICD-10-CM

## 2023-08-16 LAB
ABDOMINAL CIRCUMFERENCE: NORMAL
BACTERIA URNS QL MICRO: ABNORMAL
BASOPHILS # BLD: 0.03 K/UL (ref 0–0.2)
BASOPHILS NFR BLD: 1 % (ref 0–2)
BILIRUB UR QL STRIP: NEGATIVE
BIPARIETAL DIAMETER: NORMAL
CASTS #/AREA URNS LPF: ABNORMAL /LPF (ref 0–8)
CLARITY UR: ABNORMAL
COLOR UR: YELLOW
EOSINOPHIL # BLD: 0.07 K/UL (ref 0–0.44)
EOSINOPHILS RELATIVE PERCENT: 1 % (ref 1–4)
EPI CELLS #/AREA URNS HPF: ABNORMAL /HPF (ref 0–5)
ERYTHROCYTE [DISTWIDTH] IN BLOOD BY AUTOMATED COUNT: 13.2 % (ref 11.8–14.4)
ESTIMATED FETAL WEIGHT: NORMAL
FEMORAL DIAMETER: NORMAL
GLUCOSE UR STRIP-MCNC: NEGATIVE MG/DL
HC/AC: NORMAL
HCT VFR BLD AUTO: 33.3 % (ref 36.3–47.1)
HEAD CIRCUMFERENCE: NORMAL
HGB BLD-MCNC: 11 G/DL (ref 11.9–15.1)
HGB UR QL STRIP.AUTO: NEGATIVE
IMM GRANULOCYTES # BLD AUTO: <0.03 K/UL (ref 0–0.3)
IMM GRANULOCYTES NFR BLD: 0 %
KETONES UR STRIP-MCNC: NEGATIVE MG/DL
LEUKOCYTE ESTERASE UR QL STRIP: ABNORMAL
LYMPHOCYTES NFR BLD: 1.61 K/UL (ref 1.1–3.7)
LYMPHOCYTES RELATIVE PERCENT: 25 % (ref 24–43)
MCH RBC QN AUTO: 30.5 PG (ref 25.2–33.5)
MCHC RBC AUTO-ENTMCNC: 33 G/DL (ref 28.4–34.8)
MCV RBC AUTO: 92.2 FL (ref 82.6–102.9)
MONOCYTES NFR BLD: 0.48 K/UL (ref 0.1–1.2)
MONOCYTES NFR BLD: 7 % (ref 3–12)
NEUTROPHILS NFR BLD: 66 % (ref 36–65)
NEUTS SEG NFR BLD: 4.33 K/UL (ref 1.5–8.1)
NITRITE UR QL STRIP: NEGATIVE
NRBC BLD-RTO: 0 PER 100 WBC
PH UR STRIP: 8.5 [PH] (ref 5–8)
PLATELET # BLD AUTO: ABNORMAL K/UL (ref 138–453)
PLATELET, FLUORESCENCE: 157 K/UL (ref 138–453)
PLATELETS.RETICULATED NFR BLD AUTO: 15.8 % (ref 1.1–10.3)
PROT UR STRIP-MCNC: NEGATIVE MG/DL
RBC # BLD AUTO: 3.61 M/UL (ref 3.95–5.11)
RBC #/AREA URNS HPF: ABNORMAL /HPF (ref 0–4)
SP GR UR STRIP: 1.01 (ref 1–1.03)
UROBILINOGEN UR STRIP-ACNC: NORMAL EU/DL (ref 0–1)
WBC #/AREA URNS HPF: ABNORMAL /HPF (ref 0–5)
WBC OTHER # BLD: 6.5 K/UL (ref 3.5–11.3)

## 2023-08-16 PROCEDURE — 85055 RETICULATED PLATELET ASSAY: CPT

## 2023-08-16 PROCEDURE — 36415 COLL VENOUS BLD VENIPUNCTURE: CPT

## 2023-08-16 PROCEDURE — 87086 URINE CULTURE/COLONY COUNT: CPT

## 2023-08-16 PROCEDURE — 99999 PR OFFICE/OUTPT VISIT,PROCEDURE ONLY: CPT | Performed by: OBSTETRICS & GYNECOLOGY

## 2023-08-16 PROCEDURE — 85025 COMPLETE CBC W/AUTO DIFF WBC: CPT

## 2023-08-16 PROCEDURE — 76819 FETAL BIOPHYS PROFIL W/O NST: CPT | Performed by: OBSTETRICS & GYNECOLOGY

## 2023-08-16 PROCEDURE — 76820 UMBILICAL ARTERY ECHO: CPT | Performed by: OBSTETRICS & GYNECOLOGY

## 2023-08-16 PROCEDURE — 81001 URINALYSIS AUTO W/SCOPE: CPT

## 2023-08-16 PROCEDURE — 76816 OB US FOLLOW-UP PER FETUS: CPT | Performed by: OBSTETRICS & GYNECOLOGY

## 2023-08-17 LAB
MICROORGANISM SPEC CULT: NORMAL
SPECIMEN DESCRIPTION: NORMAL

## 2023-08-23 ENCOUNTER — HOSPITAL ENCOUNTER (OUTPATIENT)
Age: 28
Discharge: HOME | End: 2023-08-23
Attending: OBSTETRICS & GYNECOLOGY | Admitting: OBSTETRICS & GYNECOLOGY
Payer: COMMERCIAL

## 2023-08-23 ENCOUNTER — ROUTINE PRENATAL (OUTPATIENT)
Dept: PERINATAL CARE | Age: 28
End: 2023-08-23

## 2023-08-23 VITALS
DIASTOLIC BLOOD PRESSURE: 78 MMHG | HEART RATE: 90 BPM | RESPIRATION RATE: 16 BRPM | OXYGEN SATURATION: 100 % | SYSTOLIC BLOOD PRESSURE: 122 MMHG | TEMPERATURE: 98.1 F

## 2023-08-23 VITALS
HEART RATE: 96 BPM | RESPIRATION RATE: 16 BRPM | TEMPERATURE: 98.2 F | WEIGHT: 243 LBS | SYSTOLIC BLOOD PRESSURE: 127 MMHG | DIASTOLIC BLOOD PRESSURE: 89 MMHG | BODY MASS INDEX: 41.48 KG/M2 | HEIGHT: 64 IN

## 2023-08-23 DIAGNOSIS — O43.103 PLACENTAL ABNORMALITY IN THIRD TRIMESTER: ICD-10-CM

## 2023-08-23 DIAGNOSIS — Q24.9 MATERNAL CONGENITAL CARDIAC ANOMALY AFFECTING PREGNANCY, ANTEPARTUM, THIRD TRIMESTER: ICD-10-CM

## 2023-08-23 DIAGNOSIS — O99.213 OBESITY AFFECTING PREGNANCY IN THIRD TRIMESTER: ICD-10-CM

## 2023-08-23 DIAGNOSIS — Z3A.36 36 WEEKS GESTATION OF PREGNANCY: ICD-10-CM

## 2023-08-23 DIAGNOSIS — O99.413 MATERNAL CONGENITAL CARDIAC ANOMALY AFFECTING PREGNANCY, ANTEPARTUM, THIRD TRIMESTER: ICD-10-CM

## 2023-08-23 DIAGNOSIS — O09.899 SHORT INTERVAL BETWEEN PREGNANCIES COMPLICATING PREGNANCY, ANTEPARTUM: ICD-10-CM

## 2023-08-23 DIAGNOSIS — O44.23 PLACENTA MARGINALIS IN THIRD TRIMESTER: Primary | ICD-10-CM

## 2023-08-23 PROBLEM — Z3A.33 33 WEEKS GESTATION OF PREGNANCY: Status: RESOLVED | Noted: 2023-08-02 | Resolved: 2023-08-23

## 2023-08-23 PROBLEM — O09.90 HIGH-RISK PREGNANCY, UNSPECIFIED TRIMESTER: Status: ACTIVE | Noted: 2023-08-23

## 2023-08-23 PROBLEM — Z92.29 COVID-19 VACCINE SERIES COMPLETED: Status: RESOLVED | Noted: 2021-09-27 | Resolved: 2023-08-23

## 2023-08-23 PROBLEM — Z23 INFLUENZA VACCINE NEEDED: Status: RESOLVED | Noted: 2023-03-08 | Resolved: 2023-08-23

## 2023-08-23 PROBLEM — O43.199 MARGINAL INSERTION OF UMBILICAL CORD AFFECTING MANAGEMENT OF MOTHER: Status: ACTIVE | Noted: 2023-08-23

## 2023-08-23 PROBLEM — O35.HXX0 CLUB FOOT, FETAL, AFFECTING CARE OF MOTHER, ANTEPARTUM, SINGLE GESTATION: Status: ACTIVE | Noted: 2023-08-23

## 2023-08-23 LAB
BILIRUB UR QL STRIP: NEGATIVE
CANDIDA SPECIES: NEGATIVE
CASTS #/AREA URNS LPF: ABNORMAL /LPF (ref 0–8)
CLARITY UR: CLEAR
COLOR UR: YELLOW
EPI CELLS #/AREA URNS HPF: ABNORMAL /HPF (ref 0–5)
GARDNERELLA VAGINALIS: POSITIVE
GLUCOSE UR STRIP-MCNC: NEGATIVE MG/DL
HGB UR QL STRIP.AUTO: NEGATIVE
KETONES UR STRIP-MCNC: NEGATIVE MG/DL
LEUKOCYTE ESTERASE UR QL STRIP: ABNORMAL
NITRITE UR QL STRIP: NEGATIVE
PH UR STRIP: 8.5 [PH] (ref 5–8)
PROT UR STRIP-MCNC: ABNORMAL MG/DL
RBC #/AREA URNS HPF: ABNORMAL /HPF (ref 0–4)
SOURCE: ABNORMAL
SP GR UR STRIP: 1.02 (ref 1–1.03)
TRICHOMONAS: NEGATIVE
UROBILINOGEN UR STRIP-ACNC: NORMAL EU/DL (ref 0–1)
WBC #/AREA URNS HPF: ABNORMAL /HPF (ref 0–5)

## 2023-08-23 PROCEDURE — 99213 OFFICE O/P EST LOW 20 MIN: CPT

## 2023-08-23 PROCEDURE — 87510 GARDNER VAG DNA DIR PROBE: CPT

## 2023-08-23 PROCEDURE — 87591 N.GONORRHOEAE DNA AMP PROB: CPT

## 2023-08-23 PROCEDURE — 87491 CHLMYD TRACH DNA AMP PROBE: CPT

## 2023-08-23 PROCEDURE — 87086 URINE CULTURE/COLONY COUNT: CPT

## 2023-08-23 PROCEDURE — 87480 CANDIDA DNA DIR PROBE: CPT

## 2023-08-23 PROCEDURE — 6370000000 HC RX 637 (ALT 250 FOR IP): Performed by: STUDENT IN AN ORGANIZED HEALTH CARE EDUCATION/TRAINING PROGRAM

## 2023-08-23 PROCEDURE — 81001 URINALYSIS AUTO W/SCOPE: CPT

## 2023-08-23 PROCEDURE — 87660 TRICHOMONAS VAGIN DIR PROBE: CPT

## 2023-08-23 RX ORDER — ONDANSETRON 4 MG/1
4 TABLET, ORALLY DISINTEGRATING ORAL EVERY 8 HOURS PRN
Status: DISCONTINUED | OUTPATIENT
Start: 2023-08-23 | End: 2023-08-23 | Stop reason: HOSPADM

## 2023-08-23 RX ORDER — METRONIDAZOLE 500 MG/1
500 TABLET ORAL 2 TIMES DAILY
Qty: 14 TABLET | Refills: 0 | Status: SHIPPED | OUTPATIENT
Start: 2023-08-23 | End: 2023-08-30

## 2023-08-23 RX ORDER — ONDANSETRON 2 MG/ML
4 INJECTION INTRAMUSCULAR; INTRAVENOUS EVERY 6 HOURS PRN
Status: DISCONTINUED | OUTPATIENT
Start: 2023-08-23 | End: 2023-08-23 | Stop reason: HOSPADM

## 2023-08-23 RX ORDER — ACETAMINOPHEN 500 MG
1000 TABLET ORAL EVERY 8 HOURS SCHEDULED
Status: DISCONTINUED | OUTPATIENT
Start: 2023-08-23 | End: 2023-08-23 | Stop reason: HOSPADM

## 2023-08-23 RX ORDER — CYCLOBENZAPRINE HCL 5 MG
5 TABLET ORAL 2 TIMES DAILY PRN
Qty: 6 TABLET | Refills: 0 | Status: SHIPPED | OUTPATIENT
Start: 2023-08-23

## 2023-08-23 RX ADMIN — ACETAMINOPHEN 1000 MG: 500 TABLET ORAL at 13:47

## 2023-08-23 ASSESSMENT — PAIN DESCRIPTION - LOCATION: LOCATION: PELVIS;VAGINA

## 2023-08-23 ASSESSMENT — PAIN SCALES - GENERAL: PAINLEVEL_OUTOF10: 10

## 2023-08-23 NOTE — PROGRESS NOTES
Obstetric/Gynecology Maternal Fetal Medicine Resident Note    Patient is a 32 yo [id number] @ 36w0d who presented for a routine [address]. Upon chart review, patient was noted to be scheduled for an IOL 2/2 gHTN @ 37w0d. After thorough chart review, no elevated Bps were noted in this pregnancy. Patient has a history of gHTN in G3. At this time, IOL 2/2 gHTN cancelled. Sent message to patient's primary OBGYN to discuss further routine OB visits and a RR IOL @ 39w0d as patient has no other indications for delivery at this time. Patient updated and understanding of plan. Recommend that patient follow closely with primary OBGYN provider on 8/25 and return to [facility] for a repeat US in one week.     Navjot Guerrero MD  OBGYN Resident, PGY2  [facility]  8/23/2023, 11:53 AM

## 2023-08-23 NOTE — FLOWSHEET NOTE
Pt to L&D with complaints of pressure. Pt denies bleeding, SROM, contractions, reports positive fetal movement.

## 2023-08-24 ENCOUNTER — TELEPHONE (OUTPATIENT)
Dept: OBGYN | Age: 28
End: 2023-08-24

## 2023-08-24 NOTE — TELEPHONE ENCOUNTER
pt called in statting that she was told at her [facility] appt yesterday at her labor induction has been cancelled pt is requesting a call from clinical staff

## 2023-08-25 ENCOUNTER — FOLLOWUP TELEPHONE ENCOUNTER (OUTPATIENT)
Dept: OBGYN | Age: 28
End: 2023-08-25

## 2023-08-25 ENCOUNTER — ROUTINE PRENATAL (OUTPATIENT)
Dept: OBGYN | Age: 28
End: 2023-08-25
Payer: COMMERCIAL

## 2023-08-25 VITALS
WEIGHT: 241 LBS | BODY MASS INDEX: 41.37 KG/M2 | DIASTOLIC BLOOD PRESSURE: 75 MMHG | HEART RATE: 72 BPM | SYSTOLIC BLOOD PRESSURE: 121 MMHG

## 2023-08-25 DIAGNOSIS — O09.90 HIGH RISK PREGNANCY, ANTEPARTUM: Primary | ICD-10-CM

## 2023-08-25 DIAGNOSIS — Z3A.36 36 WEEKS GESTATION OF PREGNANCY: ICD-10-CM

## 2023-08-25 PROBLEM — I35.0 AORTIC VALVE STENOSIS: Status: ACTIVE | Noted: 2023-08-25

## 2023-08-25 PROCEDURE — 99211 OFF/OP EST MAY X REQ PHY/QHP: CPT | Performed by: STUDENT IN AN ORGANIZED HEALTH CARE EDUCATION/TRAINING PROGRAM

## 2023-08-25 NOTE — PROGRESS NOTES
Attending Physician Statement  I have discussed the care of [address], including pertinent history and exam findings, with the resident. I have reviewed the key elements of all parts of the encounter with the resident. I agree with the assessment, plan and orders as documented by the resident.   (GE Modifier)    Kristan Goldstein, [facility], [address] [facility]  8/25/2023, 9:01 AM
23    Resolved MFM scan 23        High risk pregnancy, antepartum 2023     Fetal testing indicated: Yes  Fetal testing indication: Prepregnancy BMI 40 or greater  Fetal testing initiation: 34 0/7 weeks  Fetal testing frequency: Weekly      Pregravid BMI 41.18 2023     3/8/23: One hour glucose tolerance test ordered previously for early diabetic screening (GA 12w0d at time of order). Reprinted order today, requested patient complete as soon as feasible. ASA Rx sent to patient's pharmacy per protocol. FHx CHD 2023     G3: Patient's daughter born with VSD, no surgery needed    Fetal echo 23         FHx DM  2023     3/8/23: One-hour glucose tolerance test previously ordered; reprinted order and advised patient to complete as soon as feasible. Early 1hr GTT, not completed   24-28 wk 1hr GTT, not completed         Hx  x4 2022    Hx gHTN (G3) 2020     3/8/23: ASA Rx sent to patient pharmacy per protocol; CMP previously completed; protein creatinine ratio previously ordered; reprinted today and requested patient please have completed as soon as possible. Hx Aortic Stenosis 2019     3/8/23: Patient reports she has not seen Cardiology since , was advised she did not need to follow up with them unless new concerns arose. Arotic Stenosis. 3/24/20- saw Dr Agnes Geiger at [facility], echo done showing EF of 55%, normal LV diastolic compliance, no significant valvular abnormalities   Pt has f/u appt in 2020, Pt completed appt, to follow up as needed-SK    23: ECHO performed, EF 60%. Normal aortic valve structure and function without stenosis or regurgitation. Axillary hidradenitis suppurativa 2019     3/8/23: Patient reports the boils return intermittently but that she has not needed them drained recently.         Hx sPTD x 2 2019     G1, : 32w0d, spontaneous  labor, delivered just after making it to the

## 2023-08-25 NOTE — TELEPHONE ENCOUNTER
SW met with Pt to discuss [facility] and current needs. Pt is 36 weeks at this time. Pt stated she has not been working with [facility] and is not in need of items or programs. Pt stated she was not able to meet with the CHW due to her work schedule. Pt was agreeable to closing [facility] case. SW was able to complete the rest of the assessment without concern. No MH concerns at this time. SW will follow up with [facility]. SW will follow up with Pt as needed.

## 2023-08-30 ENCOUNTER — ROUTINE PRENATAL (OUTPATIENT)
Dept: PERINATAL CARE | Age: 28
End: 2023-08-30
Payer: COMMERCIAL

## 2023-08-30 VITALS
DIASTOLIC BLOOD PRESSURE: 72 MMHG | HEIGHT: 64 IN | HEART RATE: 68 BPM | RESPIRATION RATE: 18 BRPM | SYSTOLIC BLOOD PRESSURE: 121 MMHG | TEMPERATURE: 97.6 F | BODY MASS INDEX: 41.32 KG/M2 | WEIGHT: 242 LBS

## 2023-08-30 DIAGNOSIS — O09.899 SHORT INTERVAL BETWEEN PREGNANCIES COMPLICATING PREGNANCY, ANTEPARTUM: ICD-10-CM

## 2023-08-30 DIAGNOSIS — O99.213 OBESITY AFFECTING PREGNANCY IN THIRD TRIMESTER: ICD-10-CM

## 2023-08-30 DIAGNOSIS — Z3A.37 37 WEEKS GESTATION OF PREGNANCY: ICD-10-CM

## 2023-08-30 DIAGNOSIS — O44.23 PLACENTA MARGINALIS IN THIRD TRIMESTER: Primary | ICD-10-CM

## 2023-08-30 PROCEDURE — 76815 OB US LIMITED FETUS(S): CPT | Performed by: OBSTETRICS & GYNECOLOGY

## 2023-08-30 PROCEDURE — 99999 PR OFFICE/OUTPT VISIT,PROCEDURE ONLY: CPT | Performed by: OBSTETRICS & GYNECOLOGY

## 2023-08-30 PROCEDURE — 76820 UMBILICAL ARTERY ECHO: CPT | Performed by: OBSTETRICS & GYNECOLOGY

## 2023-08-30 PROCEDURE — 76819 FETAL BIOPHYS PROFIL W/O NST: CPT | Performed by: OBSTETRICS & GYNECOLOGY

## 2023-08-30 ASSESSMENT — PAIN DESCRIPTION - LOCATION: LOCATION: ABDOMEN

## 2023-08-30 ASSESSMENT — PAIN SCALES - GENERAL: PAINLEVEL_OUTOF10: 9

## 2023-09-01 ENCOUNTER — ROUTINE PRENATAL (OUTPATIENT)
Dept: OBGYN | Age: 28
End: 2023-09-01
Payer: COMMERCIAL

## 2023-09-01 VITALS
SYSTOLIC BLOOD PRESSURE: 117 MMHG | BODY MASS INDEX: 41.88 KG/M2 | WEIGHT: 244 LBS | HEART RATE: 84 BPM | DIASTOLIC BLOOD PRESSURE: 78 MMHG

## 2023-09-01 DIAGNOSIS — Z3A.37 37 WEEKS GESTATION OF PREGNANCY: ICD-10-CM

## 2023-09-01 DIAGNOSIS — O09.90 HIGH RISK PREGNANCY, ANTEPARTUM: Primary | ICD-10-CM

## 2023-09-01 PROCEDURE — 59025 FETAL NON-STRESS TEST: CPT

## 2023-09-01 PROCEDURE — 99211 OFF/OP EST MAY X REQ PHY/QHP: CPT

## 2023-09-01 PROCEDURE — G8417 CALC BMI ABV UP PARAM F/U: HCPCS | Performed by: OBSTETRICS & GYNECOLOGY

## 2023-09-01 PROCEDURE — 59025 FETAL NON-STRESS TEST: CPT | Performed by: OBSTETRICS & GYNECOLOGY

## 2023-09-01 PROCEDURE — 1036F TOBACCO NON-USER: CPT | Performed by: OBSTETRICS & GYNECOLOGY

## 2023-09-01 PROCEDURE — G8427 DOCREV CUR MEDS BY ELIG CLIN: HCPCS | Performed by: OBSTETRICS & GYNECOLOGY

## 2023-09-01 NOTE — PROGRESS NOTES
Prenatal Visit    Janette Mcburney is a 33 y.o. female [id number] at 37w2d    The patient was seen and evaluated. The patient complains of pelvic pressure. She is requesting SVE. There was positive fetal movements. She denies contractions, vaginal bleeding and leakage of fluid. Signs and symptoms of labor were reviewed. The S/S of Pre-Eclampsia were reviewed with the patient in detail. She is to report any of these if they occur. She currently denies any signs or symptoms of pre-eclampsia which include headache, vision changes, RUQ pain. The patient already received the T-Dap Vaccine (27-36 weeks) this pregnancy on 2023. The patient is Rh positive and Rhogam is not indicated in this pregnancy  The patient already received the COVID-19 vaccine this year. Allergies:  Lidocaine    Vitals and physical exam:  BP: 117/78  Weight - Scale: 244 lb (110.7 kg)  Pulse: 84  Albumin: 1+  Glucose: Negative  Fetal HR: 135     Labs:  Group Beta Strep RV culture: previously obtained, GBS neg. NST:   Fetal heart rate baseline: 135, moderate variability, accelerations present, decelerations absent    The tracing has been reviewed and is considered reactive. Assessment & Plan:  Janette Mcburney is a 33 y.o. female [id number] at 43w1d   - GBS RV culture: negative   - Tdap vaccination: discussed recommendations for TDAP immunization, patient already received. - Influenza vaccination: discussed receiving, vaccine not yet available at [facility] clinic   - Rhogam: not indicated, Rh positive   - COVID-19 vaccination: R/B/A discussed with increased risk of both maternal and fetal morbidity and mortality in unvaccinated pregnant patients who contract COVID-19- patient already received   -  testing indication: BMI >40- scheduled for weekly until delivery    - Indications for  section:  NA at this time   - Patient was screened for signs of labor and s/sx of PreEclampsia.  Recommendations when to call or present to

## 2023-09-02 NOTE — PROGRESS NOTES
Attending Physician Statement  I have discussed the care of [address] [place] [address], including pertinent history and exam findings,  with the resident. I have reviewed the key elements of all parts of the encounter with the resident. I agree with the assessment, plan and orders as documented by the resident.   (GE Modifier)    Electronically signed by Tam Warren MD  9/1/2023  8:18 PM

## 2023-09-06 ENCOUNTER — ROUTINE PRENATAL (OUTPATIENT)
Dept: PERINATAL CARE | Age: 28
End: 2023-09-06
Payer: COMMERCIAL

## 2023-09-06 VITALS
WEIGHT: 245 LBS | DIASTOLIC BLOOD PRESSURE: 73 MMHG | HEIGHT: 64 IN | BODY MASS INDEX: 41.83 KG/M2 | HEART RATE: 77 BPM | TEMPERATURE: 97.3 F | RESPIRATION RATE: 18 BRPM | SYSTOLIC BLOOD PRESSURE: 124 MMHG

## 2023-09-06 DIAGNOSIS — O99.413 MATERNAL CONGENITAL CARDIAC ANOMALY AFFECTING PREGNANCY, ANTEPARTUM, THIRD TRIMESTER: ICD-10-CM

## 2023-09-06 DIAGNOSIS — O43.103 PLACENTAL ABNORMALITY IN THIRD TRIMESTER: ICD-10-CM

## 2023-09-06 DIAGNOSIS — Z3A.38 38 WEEKS GESTATION OF PREGNANCY: ICD-10-CM

## 2023-09-06 DIAGNOSIS — O99.213 OBESITY AFFECTING PREGNANCY IN THIRD TRIMESTER: ICD-10-CM

## 2023-09-06 DIAGNOSIS — O44.23 PLACENTA MARGINALIS IN THIRD TRIMESTER: Primary | ICD-10-CM

## 2023-09-06 DIAGNOSIS — Z13.89 ENCOUNTER FOR ROUTINE SCREENING FOR MALFORMATION USING ULTRASONICS: ICD-10-CM

## 2023-09-06 DIAGNOSIS — Q24.9 MATERNAL CONGENITAL CARDIAC ANOMALY AFFECTING PREGNANCY, ANTEPARTUM, THIRD TRIMESTER: ICD-10-CM

## 2023-09-06 DIAGNOSIS — Z36.4 ULTRASOUND FOR ANTENATAL SCREENING FOR FETAL GROWTH RESTRICTION: ICD-10-CM

## 2023-09-06 DIAGNOSIS — O09.899 SHORT INTERVAL BETWEEN PREGNANCIES COMPLICATING PREGNANCY, ANTEPARTUM: ICD-10-CM

## 2023-09-06 PROCEDURE — 99999 PR OFFICE/OUTPT VISIT,PROCEDURE ONLY: CPT | Performed by: OBSTETRICS & GYNECOLOGY

## 2023-09-06 PROCEDURE — 76820 UMBILICAL ARTERY ECHO: CPT | Performed by: OBSTETRICS & GYNECOLOGY

## 2023-09-06 PROCEDURE — 76819 FETAL BIOPHYS PROFIL W/O NST: CPT | Performed by: OBSTETRICS & GYNECOLOGY

## 2023-09-06 PROCEDURE — 76805 OB US >/= 14 WKS SNGL FETUS: CPT | Performed by: OBSTETRICS & GYNECOLOGY

## 2023-09-07 ENCOUNTER — ROUTINE PRENATAL (OUTPATIENT)
Dept: OBGYN | Age: 28
End: 2023-09-07
Payer: COMMERCIAL

## 2023-09-07 VITALS
WEIGHT: 247 LBS | DIASTOLIC BLOOD PRESSURE: 72 MMHG | SYSTOLIC BLOOD PRESSURE: 115 MMHG | HEART RATE: 67 BPM | BODY MASS INDEX: 42.4 KG/M2

## 2023-09-07 DIAGNOSIS — O09.90 HIGH RISK PREGNANCY, ANTEPARTUM: Primary | ICD-10-CM

## 2023-09-07 DIAGNOSIS — Z3A.38 38 WEEKS GESTATION OF PREGNANCY: ICD-10-CM

## 2023-09-07 PROCEDURE — G8427 DOCREV CUR MEDS BY ELIG CLIN: HCPCS | Performed by: OBSTETRICS & GYNECOLOGY

## 2023-09-07 PROCEDURE — 99213 OFFICE O/P EST LOW 20 MIN: CPT | Performed by: OBSTETRICS & GYNECOLOGY

## 2023-09-07 PROCEDURE — G8417 CALC BMI ABV UP PARAM F/U: HCPCS | Performed by: OBSTETRICS & GYNECOLOGY

## 2023-09-07 PROCEDURE — 1036F TOBACCO NON-USER: CPT | Performed by: OBSTETRICS & GYNECOLOGY

## 2023-09-07 NOTE — PROGRESS NOTES
Prenatal Visit    Lory Crowley is a 33 y.o. female [id number] at 38w1d    The patient was seen and evaluated. The patient complains of ***. There was positive fetal movements. She denies contractions, vaginal bleeding and leakage of fluid. Signs and symptoms of labor were reviewed. The S/S of Pre-Eclampsia were reviewed with the patient in detail. She is to report any of these if they occur. She currently denies any signs or symptoms of pre-eclampsia which include headache, vision changes, RUQ pain. The patient already received the T-Dap Vaccine (27-36 weeks) this pregnancy on 23. The patient is Rh positive and Rhogam is not indicated in this pregnancy. The patient declined the influenza vaccine this year. The patient declined the COVID-19 vaccine this year. Allergies:  Lidocaine    Vitals and physical exam:  ***  BP: 115/72  Weight - Scale: 247 lb (112 kg)  Pulse: 67  Patient Position: Sitting  Albumin: Negative  Glucose: Negative       Labs:  Group Beta Strep RV culture: negative. Sensitivities for clindamycin and erythromycin: N/A      Assessment & Plan:  Lory Crowley is a 33 y.o. female [id number] at 38w1d   - GBS RV culture: ***, sensitivities for clindamycin and erythromycin {WERE / WERE HA} ordered   - Tdap vaccination: {DESC; DONE/DISCUSSED/DECLINED:98921} recommendations for TDAP immunization, patient {Desc; requested/declined/undecided:23037::\"already received\"} ***.   - Influenza vaccination: ***   - Rhogam: ***   - COVID-19 vaccination: R/B/A discussed with increased risk of both maternal and fetal morbidity and mortality in unvaccinated pregnant patients who contract COVID-19- patient {declined/requested/not indicated modified 9090:69558}   -  testing indication: ***- scheduled for ***    - Indications for  section: {ckindicationscsection:55162}, ERAS protocols: {ckreviewed:17409}   - Patient was screened for signs of labor and s/sx of PreEclampsia.

## 2023-09-07 NOTE — PROGRESS NOTES
Prenatal Visit    Tripp Quiros is a 33 y.o. female [id number] at 38w1d    The patient was seen and evaluated. The patient denies complaints. There was positive fetal movements. She denies contractions, vaginal bleeding and leakage of fluid. Signs and symptoms of labor were reviewed. The S/S of Pre-Eclampsia were reviewed with the patient in detail. She is to report any of these if they occur. She currently denies any signs or symptoms of pre-eclampsia which include headache, vision changes, RUQ pain. The patient already received the T-Dap Vaccine (27-36 weeks) this pregnancy on 7/12/23. The patient is Rh positive and Rhogam is not indicated in this pregnancy. The patient declined the influenza vaccine this year. The patient declined the COVID-19 vaccine this year. Allergies:  Lidocaine    Vitals and physical exam:  BP: 115/72  Weight - Scale: 247 lb (112 kg)  Pulse: 67  Patient Position: Sitting  Albumin: Negative  Glucose: Negative  Fundal Height (cm): 39 cm  Fetal HR: 130  Movement: Present  Presentation: Vertex       PRENATAL LAB RESULTS (9/7/23): Blood Type/Rh: O pos  Antibody Screen: negative  Hemoglobin, Hematocrit, Platelets: Hgb 07.4/ADN 36. 0/Plt 193  Rubella: immune  T.  Pallidum, IgG: non-reactive  Hepatitis B Surface Antigen: non-reactive   Hepatitis C Antibody: non-reactive   HIV: non-reactive   Sickle Cell Screen: negative  Gonorrhea: negative  Chlamydia: negative  Urine culture: negative, date: 2/10/23; 8/16/23, 8/23/23    Early 1 hour Glucose Tolerance Test: not done  1 hour Glucose Tolerance Test: not done    Group B Strep: negative  Cystic Fibrosis Screen: negative  First Trimester Screen: low risk for aneuploidy  MSAFP/Multiple Markers: negative  Non-Invasive Prenatal Testing: low risk for aneuploidy  Anatomy US (5/3/23): anterior placenta, 3VC, male, normal anatomy      Assessment & Plan:  Tripp Quiros is a 33 y.o. female [id number] at 38w1d   - GBS RV culture: negative   -

## 2023-09-13 ENCOUNTER — ANESTHESIA EVENT (OUTPATIENT)
Dept: LABOR AND DELIVERY | Age: 28
End: 2023-09-13
Payer: COMMERCIAL

## 2023-09-13 ENCOUNTER — APPOINTMENT (OUTPATIENT)
Dept: LABOR AND DELIVERY | Age: 28
DRG: 560 | End: 2023-09-13
Payer: COMMERCIAL

## 2023-09-13 ENCOUNTER — HOSPITAL ENCOUNTER (INPATIENT)
Age: 28
LOS: 2 days | Discharge: HOME | DRG: 560 | End: 2023-09-15
Attending: OBSTETRICS & GYNECOLOGY | Admitting: OBSTETRICS & GYNECOLOGY
Payer: COMMERCIAL

## 2023-09-13 ENCOUNTER — ANESTHESIA (OUTPATIENT)
Dept: LABOR AND DELIVERY | Age: 28
End: 2023-09-13
Payer: COMMERCIAL

## 2023-09-13 PROBLEM — Z3A.39 39 WEEKS GESTATION OF PREGNANCY: Status: ACTIVE | Noted: 2023-09-13

## 2023-09-13 LAB
ABO + RH BLD: NORMAL
AMPHET UR QL SCN: NEGATIVE
ARM BAND NUMBER: NORMAL
BARBITURATES UR QL SCN: NEGATIVE
BASOPHILS # BLD: <0.03 K/UL (ref 0–0.2)
BASOPHILS NFR BLD: 0 % (ref 0–2)
BENZODIAZ UR QL: NEGATIVE
BLOOD BANK SAMPLE EXPIRATION: NORMAL
BLOOD GROUP ANTIBODIES SERPL: NEGATIVE
CANNABINOIDS UR QL SCN: NEGATIVE
COCAINE UR QL SCN: NEGATIVE
EOSINOPHIL # BLD: 0.09 K/UL (ref 0–0.44)
EOSINOPHILS RELATIVE PERCENT: 2 % (ref 1–4)
ERYTHROCYTE [DISTWIDTH] IN BLOOD BY AUTOMATED COUNT: 13.5 % (ref 11.8–14.4)
FENTANYL UR QL: NEGATIVE
HCT VFR BLD AUTO: 34.8 % (ref 36.3–47.1)
HGB BLD-MCNC: 11.4 G/DL (ref 11.9–15.1)
IMM GRANULOCYTES # BLD AUTO: <0.03 K/UL (ref 0–0.3)
IMM GRANULOCYTES NFR BLD: 0 %
LYMPHOCYTES NFR BLD: 1.5 K/UL (ref 1.1–3.7)
LYMPHOCYTES RELATIVE PERCENT: 27 % (ref 24–43)
MCH RBC QN AUTO: 29.8 PG (ref 25.2–33.5)
MCHC RBC AUTO-ENTMCNC: 32.8 G/DL (ref 28.4–34.8)
MCV RBC AUTO: 91.1 FL (ref 82.6–102.9)
METHADONE UR QL: NEGATIVE
MONOCYTES NFR BLD: 0.31 K/UL (ref 0.1–1.2)
MONOCYTES NFR BLD: 6 % (ref 3–12)
NEUTROPHILS NFR BLD: 65 % (ref 36–65)
NEUTS SEG NFR BLD: 3.54 K/UL (ref 1.5–8.1)
NRBC BLD-RTO: 0 PER 100 WBC
OPIATES UR QL SCN: NEGATIVE
OXYCODONE UR QL SCN: NEGATIVE
PCP UR QL SCN: NEGATIVE
PLATELET # BLD AUTO: 154 K/UL (ref 138–453)
PMV BLD AUTO: 13 FL (ref 8.1–13.5)
RBC # BLD AUTO: 3.82 M/UL (ref 3.95–5.11)
T PALLIDUM AB SER QL IA: NONREACTIVE
TEST INFORMATION: NORMAL
WBC OTHER # BLD: 5.5 K/UL (ref 3.5–11.3)

## 2023-09-13 PROCEDURE — 86780 TREPONEMA PALLIDUM: CPT

## 2023-09-13 PROCEDURE — 2580000003 HC RX 258: Performed by: STUDENT IN AN ORGANIZED HEALTH CARE EDUCATION/TRAINING PROGRAM

## 2023-09-13 PROCEDURE — 86850 RBC ANTIBODY SCREEN: CPT

## 2023-09-13 PROCEDURE — 85025 COMPLETE CBC W/AUTO DIFF WBC: CPT

## 2023-09-13 PROCEDURE — 1220000000 HC SEMI PRIVATE OB R&B

## 2023-09-13 PROCEDURE — 88307 TISSUE EXAM BY PATHOLOGIST: CPT

## 2023-09-13 PROCEDURE — 59409 OBSTETRICAL CARE: CPT | Performed by: OBSTETRICS & GYNECOLOGY

## 2023-09-13 PROCEDURE — 86901 BLOOD TYPING SEROLOGIC RH(D): CPT

## 2023-09-13 PROCEDURE — 6370000000 HC RX 637 (ALT 250 FOR IP): Performed by: STUDENT IN AN ORGANIZED HEALTH CARE EDUCATION/TRAINING PROGRAM

## 2023-09-13 PROCEDURE — 6360000002 HC RX W HCPCS: Performed by: NURSE ANESTHETIST, CERTIFIED REGISTERED

## 2023-09-13 PROCEDURE — 3700000025 EPIDURAL BLOCK: Performed by: ANESTHESIOLOGY

## 2023-09-13 PROCEDURE — 10907ZC DRAINAGE OF AMNIOTIC FLUID, THERAPEUTIC FROM PRODUCTS OF CONCEPTION, VIA NATURAL OR ARTIFICIAL OPENING: ICD-10-PCS | Performed by: OBSTETRICS & GYNECOLOGY

## 2023-09-13 PROCEDURE — 2500000003 HC RX 250 WO HCPCS: Performed by: NURSE ANESTHETIST, CERTIFIED REGISTERED

## 2023-09-13 PROCEDURE — 7200000001 HC VAGINAL DELIVERY

## 2023-09-13 PROCEDURE — 58300 INSERT INTRAUTERINE DEVICE: CPT | Performed by: OBSTETRICS & GYNECOLOGY

## 2023-09-13 PROCEDURE — 6360000002 HC RX W HCPCS: Performed by: STUDENT IN AN ORGANIZED HEALTH CARE EDUCATION/TRAINING PROGRAM

## 2023-09-13 PROCEDURE — 6370000000 HC RX 637 (ALT 250 FOR IP)

## 2023-09-13 PROCEDURE — 3E033VJ INTRODUCTION OF OTHER HORMONE INTO PERIPHERAL VEIN, PERCUTANEOUS APPROACH: ICD-10-PCS | Performed by: OBSTETRICS & GYNECOLOGY

## 2023-09-13 PROCEDURE — 6360000002 HC RX W HCPCS

## 2023-09-13 PROCEDURE — 80307 DRUG TEST PRSMV CHEM ANLYZR: CPT

## 2023-09-13 PROCEDURE — 86900 BLOOD TYPING SEROLOGIC ABO: CPT

## 2023-09-13 RX ORDER — DOCUSATE SODIUM 100 MG/1
100 CAPSULE, LIQUID FILLED ORAL 2 TIMES DAILY
Status: DISCONTINUED | OUTPATIENT
Start: 2023-09-13 | End: 2023-09-15 | Stop reason: HOSPADM

## 2023-09-13 RX ORDER — ALBUTEROL SULFATE 90 UG/1
2 AEROSOL, METERED RESPIRATORY (INHALATION) EVERY 6 HOURS PRN
Status: DISCONTINUED | OUTPATIENT
Start: 2023-09-13 | End: 2023-09-15 | Stop reason: HOSPADM

## 2023-09-13 RX ORDER — ONDANSETRON 2 MG/ML
4 INJECTION INTRAMUSCULAR; INTRAVENOUS EVERY 6 HOURS PRN
Status: DISCONTINUED | OUTPATIENT
Start: 2023-09-13 | End: 2023-09-13 | Stop reason: HOSPADM

## 2023-09-13 RX ORDER — ONDANSETRON 2 MG/ML
4 INJECTION INTRAMUSCULAR; INTRAVENOUS EVERY 4 HOURS PRN
Status: DISCONTINUED | OUTPATIENT
Start: 2023-09-13 | End: 2023-09-15 | Stop reason: HOSPADM

## 2023-09-13 RX ORDER — BISACODYL 10 MG
10 SUPPOSITORY, RECTAL RECTAL DAILY PRN
Status: DISCONTINUED | OUTPATIENT
Start: 2023-09-13 | End: 2023-09-15 | Stop reason: HOSPADM

## 2023-09-13 RX ORDER — DOCUSATE SODIUM 100 MG/1
100 CAPSULE, LIQUID FILLED ORAL 2 TIMES DAILY
Status: DISCONTINUED | OUTPATIENT
Start: 2023-09-13 | End: 2023-09-13 | Stop reason: HOSPADM

## 2023-09-13 RX ORDER — IBUPROFEN 600 MG/1
600 TABLET ORAL EVERY 6 HOURS PRN
Qty: 30 TABLET | Refills: 1 | Status: SHIPPED | OUTPATIENT
Start: 2023-09-13

## 2023-09-13 RX ORDER — IBUPROFEN 600 MG/1
600 TABLET ORAL EVERY 6 HOURS SCHEDULED
Status: DISCONTINUED | OUTPATIENT
Start: 2023-09-14 | End: 2023-09-15 | Stop reason: HOSPADM

## 2023-09-13 RX ORDER — ACETAMINOPHEN 500 MG
1000 TABLET ORAL EVERY 6 HOURS PRN
Status: DISCONTINUED | OUTPATIENT
Start: 2023-09-13 | End: 2023-09-13 | Stop reason: HOSPADM

## 2023-09-13 RX ORDER — LANOLIN 72 %
OINTMENT (GRAM) TOPICAL PRN
Status: DISCONTINUED | OUTPATIENT
Start: 2023-09-13 | End: 2023-09-15 | Stop reason: HOSPADM

## 2023-09-13 RX ORDER — NALOXONE HYDROCHLORIDE 0.4 MG/ML
INJECTION, SOLUTION INTRAMUSCULAR; INTRAVENOUS; SUBCUTANEOUS PRN
Status: DISCONTINUED | OUTPATIENT
Start: 2023-09-13 | End: 2023-09-13 | Stop reason: HOSPADM

## 2023-09-13 RX ORDER — SODIUM CHLORIDE 0.9 % (FLUSH) 0.9 %
5-40 SYRINGE (ML) INJECTION EVERY 12 HOURS SCHEDULED
Status: DISCONTINUED | OUTPATIENT
Start: 2023-09-13 | End: 2023-09-15 | Stop reason: HOSPADM

## 2023-09-13 RX ORDER — ACETAMINOPHEN 500 MG
1000 TABLET ORAL EVERY 6 HOURS SCHEDULED
Status: DISCONTINUED | OUTPATIENT
Start: 2023-09-14 | End: 2023-09-15 | Stop reason: HOSPADM

## 2023-09-13 RX ORDER — SODIUM CHLORIDE 9 MG/ML
INJECTION, SOLUTION INTRAVENOUS PRN
Status: DISCONTINUED | OUTPATIENT
Start: 2023-09-13 | End: 2023-09-15 | Stop reason: HOSPADM

## 2023-09-13 RX ORDER — HYDROCORTISONE 25 MG/G
CREAM TOPICAL
Status: DISCONTINUED | OUTPATIENT
Start: 2023-09-13 | End: 2023-09-15 | Stop reason: HOSPADM

## 2023-09-13 RX ORDER — SODIUM CHLORIDE 9 MG/ML
INJECTION, SOLUTION INTRAVENOUS PRN
Status: DISCONTINUED | OUTPATIENT
Start: 2023-09-13 | End: 2023-09-13 | Stop reason: HOSPADM

## 2023-09-13 RX ORDER — SODIUM CHLORIDE, SODIUM LACTATE, POTASSIUM CHLORIDE, AND CALCIUM CHLORIDE .6; .31; .03; .02 G/100ML; G/100ML; G/100ML; G/100ML
1000 INJECTION, SOLUTION INTRAVENOUS PRN
Status: DISCONTINUED | OUTPATIENT
Start: 2023-09-13 | End: 2023-09-13 | Stop reason: HOSPADM

## 2023-09-13 RX ORDER — ROPIVACAINE HYDROCHLORIDE 2 MG/ML
INJECTION, SOLUTION EPIDURAL; INFILTRATION; PERINEURAL CONTINUOUS PRN
Status: DISCONTINUED | OUTPATIENT
Start: 2023-09-13 | End: 2023-09-13 | Stop reason: SDUPTHER

## 2023-09-13 RX ORDER — SODIUM CHLORIDE 0.9 % (FLUSH) 0.9 %
5-40 SYRINGE (ML) INJECTION EVERY 12 HOURS SCHEDULED
Status: DISCONTINUED | OUTPATIENT
Start: 2023-09-13 | End: 2023-09-13 | Stop reason: HOSPADM

## 2023-09-13 RX ORDER — SIMETHICONE 80 MG
80 TABLET,CHEWABLE ORAL EVERY 6 HOURS PRN
Status: DISCONTINUED | OUTPATIENT
Start: 2023-09-13 | End: 2023-09-15 | Stop reason: HOSPADM

## 2023-09-13 RX ORDER — DIPHENHYDRAMINE HCL 25 MG
25 TABLET ORAL EVERY 4 HOURS PRN
Status: DISCONTINUED | OUTPATIENT
Start: 2023-09-13 | End: 2023-09-13 | Stop reason: HOSPADM

## 2023-09-13 RX ORDER — SODIUM CHLORIDE 0.9 % (FLUSH) 0.9 %
5-40 SYRINGE (ML) INJECTION PRN
Status: DISCONTINUED | OUTPATIENT
Start: 2023-09-13 | End: 2023-09-15 | Stop reason: HOSPADM

## 2023-09-13 RX ORDER — ROPIVACAINE HYDROCHLORIDE 2 MG/ML
INJECTION, SOLUTION EPIDURAL; INFILTRATION; PERINEURAL
Status: COMPLETED
Start: 2023-09-13 | End: 2023-09-13

## 2023-09-13 RX ORDER — LIDOCAINE HYDROCHLORIDE AND EPINEPHRINE 15; 5 MG/ML; UG/ML
INJECTION, SOLUTION EPIDURAL PRN
Status: DISCONTINUED | OUTPATIENT
Start: 2023-09-13 | End: 2023-09-13 | Stop reason: SDUPTHER

## 2023-09-13 RX ORDER — SODIUM CHLORIDE, SODIUM LACTATE, POTASSIUM CHLORIDE, CALCIUM CHLORIDE 600; 310; 30; 20 MG/100ML; MG/100ML; MG/100ML; MG/100ML
INJECTION, SOLUTION INTRAVENOUS CONTINUOUS
Status: DISCONTINUED | OUTPATIENT
Start: 2023-09-13 | End: 2023-09-15 | Stop reason: HOSPADM

## 2023-09-13 RX ORDER — SODIUM CHLORIDE, SODIUM LACTATE, POTASSIUM CHLORIDE, AND CALCIUM CHLORIDE .6; .31; .03; .02 G/100ML; G/100ML; G/100ML; G/100ML
500 INJECTION, SOLUTION INTRAVENOUS PRN
Status: DISCONTINUED | OUTPATIENT
Start: 2023-09-13 | End: 2023-09-13 | Stop reason: HOSPADM

## 2023-09-13 RX ORDER — ROPIVACAINE HYDROCHLORIDE 2 MG/ML
INJECTION, SOLUTION EPIDURAL; INFILTRATION; PERINEURAL PRN
Status: DISCONTINUED | OUTPATIENT
Start: 2023-09-13 | End: 2023-09-13 | Stop reason: SDUPTHER

## 2023-09-13 RX ORDER — SODIUM CHLORIDE 0.9 % (FLUSH) 0.9 %
5-40 SYRINGE (ML) INJECTION PRN
Status: DISCONTINUED | OUTPATIENT
Start: 2023-09-13 | End: 2023-09-13 | Stop reason: HOSPADM

## 2023-09-13 RX ORDER — SENNA AND DOCUSATE SODIUM 50; 8.6 MG/1; MG/1
1 TABLET, FILM COATED ORAL DAILY
Qty: 30 TABLET | Refills: 1 | Status: SHIPPED | OUTPATIENT
Start: 2023-09-13

## 2023-09-13 RX ORDER — ACETAMINOPHEN 500 MG
1000 TABLET ORAL EVERY 6 HOURS PRN
Qty: 60 TABLET | Refills: 1 | Status: SHIPPED | OUTPATIENT
Start: 2023-09-13

## 2023-09-13 RX ADMIN — ACETAMINOPHEN 1000 MG: 500 TABLET ORAL at 22:07

## 2023-09-13 RX ADMIN — Medication 8 ML: at 14:38

## 2023-09-13 RX ADMIN — SODIUM CHLORIDE, POTASSIUM CHLORIDE, SODIUM LACTATE AND CALCIUM CHLORIDE: 600; 310; 30; 20 INJECTION, SOLUTION INTRAVENOUS at 20:22

## 2023-09-13 RX ADMIN — ROPIVACAINE HYDROCHLORIDE 10 ML/HR: 2 INJECTION, SOLUTION EPIDURAL; INFILTRATION; PERINEURAL at 14:38

## 2023-09-13 RX ADMIN — Medication 87.3 MILLI-UNITS/MIN: at 19:47

## 2023-09-13 RX ADMIN — LEVONORGESTREL 1 EACH: 52 INTRAUTERINE DEVICE INTRAUTERINE at 19:50

## 2023-09-13 RX ADMIN — Medication 1 MILLI-UNITS/MIN: at 10:01

## 2023-09-13 RX ADMIN — LIDOCAINE HYDROCHLORIDE,EPINEPHRINE BITARTRATE 3 ML: 15; .005 INJECTION, SOLUTION EPIDURAL; INFILTRATION; INTRACAUDAL; PERINEURAL at 14:34

## 2023-09-13 RX ADMIN — DOCUSATE SODIUM 100 MG: 100 CAPSULE, LIQUID FILLED ORAL at 21:17

## 2023-09-13 RX ADMIN — DOCUSATE SODIUM 100 MG: 100 CAPSULE, LIQUID FILLED ORAL at 22:50

## 2023-09-13 RX ADMIN — Medication 166.7 ML: at 19:36

## 2023-09-13 RX ADMIN — SODIUM CHLORIDE, POTASSIUM CHLORIDE, SODIUM LACTATE AND CALCIUM CHLORIDE: 600; 310; 30; 20 INJECTION, SOLUTION INTRAVENOUS at 08:29

## 2023-09-13 ASSESSMENT — PAIN DESCRIPTION - DESCRIPTORS
DESCRIPTORS: CRAMPING

## 2023-09-13 ASSESSMENT — PAIN DESCRIPTION - LOCATION
LOCATION: ABDOMEN
LOCATION: ABDOMEN

## 2023-09-13 ASSESSMENT — PAIN DESCRIPTION - ORIENTATION
ORIENTATION: LOWER
ORIENTATION: LOWER

## 2023-09-13 ASSESSMENT — PAIN SCALES - GENERAL
PAINLEVEL_OUTOF10: 6
PAINLEVEL_OUTOF10: 8
PAINLEVEL_OUTOF10: 7

## 2023-09-13 NOTE — FLOWSHEET NOTE
3441 Shilo Muro CRNA, at bedside. Epidural procedure explained, risks discussed. Pt verbalizes consent for epidural.   1425 Patient positioned for epidural. 1425 Time out completed. 1433 catheter placed. 1434 test dose given. Epidural catheter taped and secured per anesthesia. 1437 to low fowlers with left uterine displacement. loading dose given. 1438p ump initiated. Pt tolerated procedure well.

## 2023-09-13 NOTE — FLOWSHEET NOTE
Patient arrived on L&D unit for a scheduled induction. Patient reports +FM, denies vaginal bleeding, denies leaking of fluids. Given hospital gown, consents to universal maternal urine drug screen. Oriented to room and call system.

## 2023-09-13 NOTE — PROGRESS NOTES
Labor Progress Note    Domo Roger is a 33 y.o. female P0T5336 at 39w0d  The patient was seen and examined. Her pain is well controlled with epidural. She reports fetal movement is present, complains of contractions, denies loss of fluid, denies vaginal bleeding.        Vital Signs:  Vitals:    09/13/23 1409 09/13/23 1435 09/13/23 1436 09/13/23 1440   BP: 127/78 116/81 130/83 115/62   Pulse: 67 69 66 65   Resp: 16      Temp:       TempSrc:       SpO2:       Weight:       Height:             FHT:  135 , moderate variability, accelerations present, two late decelerations noted with spontaneous return to baseline  Contractions: q2 mins contractions    Chaperone for Intimate Exam: Chaperone was present for entire exam, Chaperone Name: Franco Rodríguez RN  Cervical Exam: 4 cm dilated, 50 effaced, -1 station  Pitocin: @ 10 mu/min    Membranes: Intact  Scalp Electrode in place: absent  Intrauterine Pressure Catheter in Place: absent    Interventions: SVE and AROM    Assessment/Plan:  Domo Roger is a 33 y.o. female D3H4213 at 39w0d admitted for RR IOL   - GBS negative, No indication for GBS prophylaxis   - VSS, afebrile   - cEFM/TOCO: cat 1   - SVE 4/50/-1   - Epidural in place for pain management   - AROM (clr) @ 1515   - Pit @ 10 mu/min   - Continue pit per protocol   - Continue to monitor closely      Senior Resident updated and in agreement with plan    Juan Pablo Ibarra MD  Ob/Gyn Resident  9/13/2023, 3:14 PM

## 2023-09-13 NOTE — PROGRESS NOTES
Labor Progress Note    Vicenta Fernandez is a 33 y.o. female E3F0799 at 39w0d  The patient was seen and examined. Her pain is well controlled. She reports fetal movement is present, complains of contractions, denies loss of fluid, denies vaginal bleeding.        Vital Signs:  Vitals:    09/13/23 1000 09/13/23 1100 09/13/23 1200 09/13/23 1300   BP: 128/72 117/69 118/68 113/61   Pulse: 86 87 68 70   Resp: 18  16 16   Temp:       TempSrc:       SpO2:       Weight:       Height:             FHT:  135 , moderate variability, accelerations present, decelerations absent  Contractions: intermittent contractions    Chaperone for Intimate Exam: Chaperone was present for entire exam, Chaperone Name: Coleen Rolon RN  Cervical Exam: 4 cm dilated, 50 effaced, -1 station  Pitocin: @ 10 mu/min    Membranes: Intact  Scalp Electrode in place: absent  Intrauterine Pressure Catheter in Place: absent    Interventions: SVE    Assessment/Plan:  Vicenta Fernandez is a 33 y.o. female V8O3331 at 39w0d admitted for RR IOL   - GBS negative, No indication for GBS prophylaxis   - VSS, afebrile   - cEFM/TOCO: cat 1   - SVE 4/50/-1   - Patient desires epidural before AROM   - Pit @ 10 mu/min   - Continue pit per protocol   - Continue to monitor closely      Senior Resident updated and in agreement with plan    Julián Alvarado MD  Ob/Gyn Resident  9/13/2023, 2:04 PM colostrum colostrum

## 2023-09-13 NOTE — H&P
[facility]    Date: 2023       Time: 3:30 PM   Patient Name: Gabriela Anderson     Patient : 1995  Room/Bed: [address] [place] [address]    Admission Date/Time: 2023  8:06 AM      CC: risk reducing induction of labor     HPI: Gabriela Anderson is a 33 y.o. D0A2027 at 39w0d who presents for a RR IOL. The patient reports fetal movement is present, denies contractions, denies loss of fluid, denies vaginal bleeding. Patient denies headache, vision changes, nausea, vomiting, fever, chills, shortness of breath, chest pain, RUQ pain, abdominal pain, diarrhea, change in color/amount/odor of vaginal discharge, dysuria or, hematuria. DATING:  LMP: No LMP recorded (lmp unknown). Patient is pregnant.   Estimated Date of Delivery: 23   Based on: early ultrasound, at 6 2/7 weeks GA    PREGNANCY RISK FACTORS:  Patient Active Problem List   Diagnosis    Asthma     Hx Aortic Stenosis    Axillary hidradenitis suppurativa    Hx sPTD x 2    Hx gHTN (G3)    Hx  x4    Child passed of SIDS    High risk pregnancy, antepartum    Pregravid BMI 41.18    FHx CHD    FHx DM     Placenta previa in second trimester (rslvd)    Positive Maternal Carrier: Alpha-Mannosidosis and Steroid-Resistant Nephrotic Syndrome     Marginal Cord Insertion     FHx Club Feet/Larygomalcia G4    39 weeks gestation of pregnancy        Steroids Given In This Pregnancy:  no     REVIEW OF SYSTEMS:   Constitutional: negative fever, negative chills, negative weight changes   HEENT: negative visual disturbances, negative headaches, negative dizziness, negative hearing loss  Breast: Negative breast abnormalities, negative breast lumps, negative nipple discharge  Respiratory: negative dyspnea, negative cough, negative SOB  Cardiovascular: negative chest pain,  negative palpitations, negative arrhythmia, negative syncope   Gastrointestinal: negative abdominal pain/contractions, negative RUQ pain, monitor closely    Child Passed from SIDS   - Child passed away at 4 weeks   - Was noted to have larygomalacia   - Is excited about this current delivery    Fhx CHD   - Previous child with VSD, spontaneously resolved   - Fetal Echo wnl 23    Fhx DM   - Early 1 hr GTT and 1 hr GTT ordered not done    + Carrier Alpha Mannosidosis and Steroid Resistant Nephrotic Syndrome   - Found on carrier screening   - Patient followed closely with [address] Feet/Larygomalacia    - Normal fetal anatomy noted on anatomy ultrasound    BMI 42       Patient Active Problem List    Diagnosis Date Noted    Asthma  2012     Priority: High     3/8/23: Patient reports using her albuterol inhaler infrequently, but she does still need it occasionally. Patient denies s/sx asthma exacerbation at this time. Child passed of SIDS 2022     Priority: Medium     Patient's G4  passed at 3 weeks. Patient reports that the autopsy report indicated aspiration. 39 weeks gestation of pregnancy 2023    Marginal Cord Insertion  2023     Seen on MFM Scan 23    Follows with MF for weekly BPP/Dopplers. Most recent MFM scan 23:  BPP, normal dopplers      FHx Club Feet/Larygomalcia G4 2023    Positive Maternal Carrier: Alpha-Mannosidosis and Steroid-Resistant Nephrotic Syndrome  2023     positive carrier screen       Placenta previa in second trimester (rslvd) 2023     Seen on MFM scan 23    Resolved MFM scan 23      High risk pregnancy, antepartum 2023     Fetal testing indicated: Yes  Fetal testing indication: Prepregnancy BMI 40 or greater  Fetal testing initiation: 34 0/7 weeks  Fetal testing frequency: Weekly      Pregravid BMI 41.18 2023     3/8/23: One hour glucose tolerance test ordered previously for early diabetic screening (GA 12w0d at time of order). Reprinted order today, requested patient complete as soon as feasible.  ASA Rx sent to patient's

## 2023-09-13 NOTE — DISCHARGE SUMMARY
Obstetric Discharge Summary  [address]    Patient Name: Jessie Raya  Patient : 1995  Primary Care Physician: ASIA Magana CNP  Admit Date: 2023    Principal Diagnosis: IUP at 39w0d, admitted for risk reducing induction of labor      Her pregnancy has been complicated by:   Patient Active Problem List   Diagnosis    Asthma     Hx Aortic Stenosis    Axillary hidradenitis suppurativa    Hx sPTD x 2    Hx gHTN (G3)    Child passed of SIDS    Pregravid BMI 41.18    FHx CHD    FHx DM     Placenta previa in second trimester (rslvd)    Positive Maternal Carrier: Alpha-Mannosidosis and Steroid-Resistant Nephrotic Syndrome     Marginal Cord Insertion     FHx Club Feet/Larygomalcia G4    s/p  w/ IUD 23       Infection Present?: No  Hospital Acquired: N/A    Surgical Operations & Procedures:  Analgesia: epidural  Delivery Type: Spontaneous Vaginal Delivery w/ IUD   Laceration(s): Absent    Consultations: Anesthesia, Social work, [address] [place] [address]    Pertinent Findings & Procedures:   Jessie Raya is a 33 y.o. female E2F2761 at 39w0d admitted for risk reducing induction of labor; received pitocin, epidural, AROM (clr), IUPC. She delivered by induced vaginal a Live Born infant on 23. She received a Mirena IUD after delivery. Information for the patient's :  Annamaria Narvaez [[id number]]   male   Birth Weight: 7 lb 2.1 oz (3.235 kg)     Apgars: 8 at 1 minute and 9 at 5 minutes.      Postpartum course: normal.      Course of patient: uncomplicated    Discharge to: Home    Readmission planned: no     Indication for 6 week PP 2 hour GTT?: no     Recommendations on Discharge:     Medications:      Medication List        START taking these medications      acetaminophen 500 MG tablet  Commonly known as: TYLENOL  Take 2 tablets by mouth every 6 hours as needed for Pain     ibuprofen 600 MG tablet  Commonly known as: ADVIL;MOTRIN  Take 1 tablet by

## 2023-09-13 NOTE — CARE COORDINATION
ANTEPARTUM NOTE    39 weeks gestation of pregnancy [Z3A.39]    Corinne Nicholas was admitted to L&D on 9/13/2023 for RR IOL @ 44 0/7    OB GYN Provider: [facility]    Will meet with patient after delivery to verify name/address/phone/insurance and discuss discharge planning. Anticipate DC home 2 nights after vaginal delivery or 4 nights after C/S delivery as long as hemodynamically stable.

## 2023-09-13 NOTE — ANESTHESIA PROCEDURE NOTES
Epidural Block    Patient location during procedure: OB  Start time: 9/13/2023 2:21 PM  End time: 9/13/2023 2:33 PM  Reason for block: labor epidural  Staffing  Performed: resident/CRNA   Anesthesiologist: Eva Quevedo MD  Resident/CRNA: ASIA Long CRNA  Performed by: ASIA Long CRNA  Authorized by: ASIA Long CRNA    Epidural  Patient position: sitting  Prep: Betadine  Patient monitoring: continuous pulse ox and frequent blood pressure checks  Approach: midline  Location: L4-5  Injection technique: SUHA saline  Guidance: paresthesia technique  Provider prep: mask and sterile gloves  Needle  Needle type: Tuohy   Needle gauge: 17 G  Needle length: 3.5 in  Needle insertion depth: 7 cm  Catheter type: end hole  Catheter at skin depth: 14 cm  Test dose: negativeCatheter Secured: tegaderm and tape  Assessment  Hemodynamics: stable  Attempts: 1  Outcomes: uncomplicated and patient tolerated procedure well  Preanesthetic Checklist  Completed: patient identified, IV checked, site marked, risks and benefits discussed, surgical/procedural consents, equipment checked, pre-op evaluation, timeout performed, anesthesia consent given, oxygen available and monitors applied/VS acknowledged

## 2023-09-14 PROBLEM — Z3A.39 39 WEEKS GESTATION OF PREGNANCY: Status: RESOLVED | Noted: 2023-09-13 | Resolved: 2023-09-14

## 2023-09-14 PROBLEM — O09.90 HIGH RISK PREGNANCY, ANTEPARTUM: Status: RESOLVED | Noted: 2023-03-08 | Resolved: 2023-09-14

## 2023-09-14 PROCEDURE — 6370000000 HC RX 637 (ALT 250 FOR IP)

## 2023-09-14 PROCEDURE — 1220000000 HC SEMI PRIVATE OB R&B

## 2023-09-14 RX ADMIN — ACETAMINOPHEN 1000 MG: 500 TABLET ORAL at 15:53

## 2023-09-14 RX ADMIN — DOCUSATE SODIUM 100 MG: 100 CAPSULE, LIQUID FILLED ORAL at 09:37

## 2023-09-14 RX ADMIN — IBUPROFEN 600 MG: 600 TABLET ORAL at 06:11

## 2023-09-14 RX ADMIN — IBUPROFEN 600 MG: 600 TABLET ORAL at 12:42

## 2023-09-14 RX ADMIN — IBUPROFEN 600 MG: 600 TABLET ORAL at 19:01

## 2023-09-14 RX ADMIN — ACETAMINOPHEN 1000 MG: 500 TABLET ORAL at 09:37

## 2023-09-14 RX ADMIN — DOCUSATE SODIUM 100 MG: 100 CAPSULE, LIQUID FILLED ORAL at 20:52

## 2023-09-14 RX ADMIN — ACETAMINOPHEN 1000 MG: 500 TABLET ORAL at 03:43

## 2023-09-14 RX ADMIN — ACETAMINOPHEN 1000 MG: 500 TABLET ORAL at 22:37

## 2023-09-14 ASSESSMENT — PAIN DESCRIPTION - LOCATION
LOCATION: ABDOMEN
LOCATION: ABDOMEN
LOCATION: VAGINA
LOCATION: ABDOMEN
LOCATION: VAGINA

## 2023-09-14 ASSESSMENT — PAIN SCALES - GENERAL
PAINLEVEL_OUTOF10: 8
PAINLEVEL_OUTOF10: 3

## 2023-09-14 ASSESSMENT — PAIN DESCRIPTION - DESCRIPTORS
DESCRIPTORS: ACHING
DESCRIPTORS: CRAMPING
DESCRIPTORS: SHARP

## 2023-09-14 ASSESSMENT — PAIN - FUNCTIONAL ASSESSMENT
PAIN_FUNCTIONAL_ASSESSMENT: ACTIVITIES ARE NOT PREVENTED

## 2023-09-14 ASSESSMENT — PAIN DESCRIPTION - ORIENTATION
ORIENTATION: LOWER
ORIENTATION: MID;LOWER
ORIENTATION: LOWER

## 2023-09-14 NOTE — FLOWSHEET NOTE
Admitted to room 737 per wheelchair for labor and delivery. Holding baby in arms Oriented to room and surroundings. Educated on safe sleep and security measures. Hepatitis education provided. Parents both openly discuss with writer death of infant one year ago. Emotional support provided. Will continue to monitor.

## 2023-09-14 NOTE — CARE COORDINATION
CASE MANAGEMENT POST-PARTUM TRANSITIONAL CARE PLAN    39 weeks gestation of pregnancy [Z3A.39]    OB Provider: Jorge Ardon met w/ Ata Hauser at her bedside to discuss DCP. She is S/P  on 23 @ 39w0d of male infant at [address] verified address/phone number correct on facesheet. She states she lives with her 3 children: Reji Kirby, [address] she did discuss she has another son Sheyla Serrano who passed away. Ata Hauser verbalized no difficulties with transportation to and from doctors appointments or with paying for medications upon discharge home. [facility] [place] Medicaid insurance correct. Writer notified Ata Hauser she has 30 days from date of birth to add  to insurance policy by contacting [address] [place] [address]. She verbalized understanding. She confirmed a safe place for infant to sleep at home. Infant name on BC: Ramiro Cartwright. (MARIZOL Dominique)  Infant PCP [facility]. DME: none  HOME CARE: none, but was set up w/ [employer] for progesterone injections, however, it was then [facility] and had to alternative at home    Anticipate DC home of couplet in private vehicle in 1-2 days status post vaginal delivery.         Readmission Risk              Risk of Unplanned Readmission:  6

## 2023-09-14 NOTE — L&D DELIVERY NOTE
Raul Hanson [[id number]]      Labor Events     Labor: No   Steroids: None  Cervical Ripening Date/Time:      Rupture Identifier: Sac 1  Rupture Date/Time:  23 15:10:00   Rupture Type: AROM  Fluid Color: Clear  Fluid Odor: None  Induction: Oxytocin              Anesthesia    Method: Epidural       Labor Event Times      Labor onset date/time:  23 16:23:00     Dilation complete date/time:        Start pushing date/time:     Decision date/time (emergent ):            Delivery Details      Delivery Date: 23 Delivery Time: 19:31:00                 [place] Presentation    Presentation: Vertex       Shoulder Dystocia    Shoulder Dystocia Present?: No                                                                                                           Assisted Delivery Details    Forceps Attempted?: No  Vacuum Extractor Attempted?: No                                                             Cord    Vessels: 3 Vessels  Complications: None  Delayed Cord Clamping?: Yes  Cord Clamped Date/Time: 2023 19:32:00  Cord Blood Disposition: Lab  Gases Sent?: Yes              Placenta    Date/Time: 2023 19:36:00  Removal: Spontaneous  Appearance: Intact  Disposition: Lab, Pathology       Lacerations    Episiotomy: None       Vaginal Counts    Initial Count Personnel: Luc Bustos RN  Initial Count Verified By: DR. Colin Bateman Sponge Count: Correct Intial Needles Count: Correct Intial Instruments Count: Correct          Blood Loss  Mother: Maricruz Crowell #[id number]     Start of Mother's Information      Delivery Blood Loss  23 1623 - 23 2017      None                 End of Mother's Information  Mother: Maricruz Crowell #[id number]                Delivery Providers    Delivering clinician: Simone Ramsey MD     Provider Role    Simone Ramsey MD [address] [place] [address] Primary Nurse    Hank Guerrier RN Primary  Nurse    Magaly Wu Resident     Neonatologist     Anesthesiologist     Nurse Anesthetist     Nurse Practitioner     Midwife     Nursery Nurse    Pilar Gutierrez DO Resident     Resident               Assessment    Living Status: Living  Delivery Location Comment: 706        Skin Color:   Heart Rate:   Reflex Irritability:   Muscle Tone:   Respiratory Effort: Total:            1 Minute:    0    2    2    2    2    8         5 Minute:    1    2    2    2    2    9                                        Apgars Assigned By: Zahida James RN              Resuscitation    Method: Bulb Suction, Room Air, Stimulation              Measurements      Birth Weight: 3235 g   Birth Length: 49.5 cm              Skin to Skin      Skin to Skin Initiation Date/Time: 23 19:31:00 EDT     Skin to Skin With: Mother                  Vaginal Delivery Note  Department of Obstetrics and Gynecology  [address]       Patient: Asiya Tatum   : 1995  MRN: [id number]   Date of delivery: 23     Pre-operative Diagnosis: Asiya Tatum [id number] at 39w0d  RR IOL   Asthma   Hx sPTD x 2   Hx gHTN (G3)   Child passed of SIDS   FHx CHD   FHx DM    BMI 42    Post-operative Diagnosis:   living infant male   w/ IUD placement  Asthma   Hx sPTD x 2   Hx gHTN (G3)   Child passed of SIDS   FHx CHD   FHx DM    BMI 42    Delivering Obstetrician & Assistant(s): Dr. Sigifredo Laguna MD; Dr. Duke Peters DO PGY1 Pilar Gutierrez DO PGY1    Infant Information:   Information for the patient's :  Kat Fermin [[id number]]      Information for the patient's :  Kat Fermin [[id number]]        Apgar scores: 8 at 1 minute and 9 at 5 minutes. Anesthesia:  epidural anesthesia    Application and Delivery:    She was known to be GBS negative. The patient progressed well, received an epidural, became complete and felt the urge to push.  After pushing with contractions the fetal head delivered Cephalic, left

## 2023-09-14 NOTE — CARE COORDINATION
Social Work     Sw reviewed medical record (current active problem list) and tox screens and found no current concerns. Sw spoke with mom briefly to explain Sw role, inquire if any needs or concerns, and provide safe sleep education and discuss. Mom denied any needs or questions and informs baby has a safe sleep environment (Pnp with david). Mom denied any current s/s of anxiety or depression and is aware to reach out to OB if any s/s occur after dc. Mom did openly discuss the death of her last child who passed away from SIDS at 4 weeks old. Mom states she has not yet been linked with counseling, but is open to resources. Sw provided mom with mental health resource list and information for Online support group for parents who have lost infants. Mom does have Sw's contact information if any  other needs arise. Mom reports a good support system and denied any current questions or needs. Mom reports her other three kids ( 15, 6, 1) all very excited for baby, mom discussed her 15year old is very protective since death of sibling and has been acting out some. Mom reports he is linked [facility] supports and behavior supports in school for this ([employer] for therapy). Mom states ped will be FCC. Sw encouraged mom to reach out if any issues or concerns arise.

## 2023-09-15 VITALS
SYSTOLIC BLOOD PRESSURE: 109 MMHG | DIASTOLIC BLOOD PRESSURE: 61 MMHG | RESPIRATION RATE: 16 BRPM | HEIGHT: 64 IN | TEMPERATURE: 98 F | OXYGEN SATURATION: 99 % | BODY MASS INDEX: 42.17 KG/M2 | HEART RATE: 75 BPM | WEIGHT: 247 LBS

## 2023-09-15 PROCEDURE — 6370000000 HC RX 637 (ALT 250 FOR IP)

## 2023-09-15 RX ADMIN — ACETAMINOPHEN 1000 MG: 500 TABLET ORAL at 10:54

## 2023-09-15 RX ADMIN — IBUPROFEN 600 MG: 600 TABLET ORAL at 06:51

## 2023-09-15 RX ADMIN — IBUPROFEN 600 MG: 600 TABLET ORAL at 01:08

## 2023-09-15 ASSESSMENT — PAIN SCALES - GENERAL
PAINLEVEL_OUTOF10: 6
PAINLEVEL_OUTOF10: 6
PAINLEVEL_OUTOF10: 5

## 2023-09-15 ASSESSMENT — PAIN - FUNCTIONAL ASSESSMENT
PAIN_FUNCTIONAL_ASSESSMENT: ACTIVITIES ARE NOT PREVENTED
PAIN_FUNCTIONAL_ASSESSMENT: ACTIVITIES ARE NOT PREVENTED

## 2023-09-15 ASSESSMENT — PAIN DESCRIPTION - LOCATION: LOCATION: ABDOMEN

## 2023-09-15 ASSESSMENT — PAIN DESCRIPTION - DESCRIPTORS: DESCRIPTORS: ACHING

## 2023-09-15 NOTE — FLOWSHEET NOTE
Discharge instructions reviewed with patient. Patient denies questions or concerns at this time. Writer reiterated importance of self care, monitoring closely for depression. I have reviewed all AWHONN Post-Birth Warning Signs and essential teaching points for pulmonary embolism, cardiac disease, hypertensive disorders of pregnancy, obstetric hemorrhage, venous thromboembolism, infection, and postpartum depression with the patient and FOB (support person) . I have informed the patient on when to call their healthcare provider and when to call 911. I have discussed with the patient  the importance of scheduling a follow-up visit with their physician, nurse practitioner or midwife and provided them with correct contact information for appointment. I have provided the patient with a copy of the \"Save Your Life\" handout. The patient has acknowledged receiving and understanding this education with her signature.

## 2023-09-15 NOTE — CONSULTS
[facility] Inpatient Progress Note  COLT Denney   9/15/2023  10:27 AM  Dimitris Peraza  1995  [id number]      Time spent with Patient: 0 minutes    This writer was unable to complete screen or therapy services with patient at bedside at this time due to the following:    Pt was showering. Therapist to return at later time. Pt discharged prior to seeing pt again.

## 2023-09-15 NOTE — PLAN OF CARE
Problem: Pain  Goal: Verbalizes/displays adequate comfort level or baseline comfort level  Recent Flowsheet Documentation  Taken 2023 2000 by Catrina Ashby RN  Verbalizes/displays adequate comfort level or baseline comfort level:   Encourage patient to monitor pain and request assistance   Assess pain using appropriate pain scale   Administer analgesics based on type and severity of pain and evaluate response   Implement non-pharmacological measures as appropriate and evaluate response   Consider cultural and social influences on pain and pain management     Problem: Postpartum  Goal: Experiences normal postpartum course  Description:  Postpartum OB-Pregnancy care plan goal which identifies if the mother is experiencing a normal postpartum course  Outcome: Progressing  Goal: Appropriate maternal -  bonding  Description:  Postpartum OB-Pregnancy care plan goal which identifies if the mother and  are bonding appropriately  Outcome: Progressing  Goal: Establishment of infant feeding pattern  Description:  Postpartum OB-Pregnancy care plan goal which identifies if the mother is establishing a feeding pattern with their   Outcome: Progressing     Problem: Discharge Planning  Goal: Discharge to home or other facility with appropriate resources  Outcome: Progressing

## 2023-09-15 NOTE — PROGRESS NOTES
CLINICAL PHARMACY NOTE: MEDS TO BEDS    Total # of Prescriptions Filled: 3   The following medications were delivered to the patient:  Senexon-s 8.6-50mg tabs  Acetaminophen 500mg tabs  Ibuprofen 600mg tabs    Additional Documentation:  Delivered to pt in room 737 on 9/15 at 11A. No copay.

## 2023-09-18 LAB — SURGICAL PATHOLOGY REPORT: NORMAL

## 2023-09-21 ENCOUNTER — TELEPHONE (OUTPATIENT)
Dept: FAMILY MEDICINE CLINIC | Age: 28
End: 2023-09-21

## 2023-10-04 ENCOUNTER — POSTPARTUM VISIT (OUTPATIENT)
Dept: OBGYN | Age: 28
End: 2023-10-04

## 2023-10-04 VITALS
BODY MASS INDEX: 39.31 KG/M2 | WEIGHT: 229 LBS | SYSTOLIC BLOOD PRESSURE: 129 MMHG | DIASTOLIC BLOOD PRESSURE: 85 MMHG | HEART RATE: 68 BPM

## 2023-10-04 PROCEDURE — 99024 POSTOP FOLLOW-UP VISIT: CPT | Performed by: ADVANCED PRACTICE MIDWIFE

## 2023-10-04 NOTE — PROGRESS NOTES
HPI:  DELIVERY DATE: 9/13/23  TYPE OF DELIVERY: normal spontaneous vaginal delivery  PROVIDER: Res  PERINEUM: int    Nathaniel Montes De Oca was seen for her 2 week visit. Her Male infant is healthy. Pregnancy: Bentley Chelsea  Support person: MARIZOL Arriaga     Delivery Plans  Planned delivery method: Vaginal  Planned delivery location: [facility]  Planned anesthesia: Epidural     Post-Delivery Plans  Feeding intentions: Formula   She is bottle feeding. She is not experiencing pain. She is rating her pain 0  She reports her lochia is no bleeding  She reports no perineal discomfort. She denies urinary incontinence. Her bowels have returned to her normal pattern. She is back to her normal activity pattern. Nathaniel Montes De Oca has not engaged in intercourse. She does report a mood disorder. She feels she is not having difficulty coping. Verbalizes some increased anxiety due to her previous child lost to SIDS. Feels she is coping well. Nathaniel Montes De Oca feels her family adjustment is effective. She reports an overall positive birth experience. Has Mirena IUD in place. OBJECTIVE:   Wt Readings from Last 3 Encounters:   09/13/23 247 lb (112 kg)   09/07/23 247 lb (112 kg)   09/06/23 245 lb (111.1 kg)       unknown if currently breastfeeding. ASSESSMENT/PLAN:    2  week postpartum visit  Labs were not ordered. Return to the office in 3 weeks. She will return for 6 week PP visit  bottle feeding  Signs & Symptoms of mastitis reviewed; notify if occurs  Family planning/birth spacing needs  Family planning counseling was initiated/completed. IUD  Smoker/non-smoker  Secondary smoke risks were reviewed, including increased risks of respiratory     problems, Sudden Infant Death Syndrome, and potential malignancies.       The patient, Lory Crowley,  was seen with a total time spent of 20 minutes for the visit on this date of service by the [facility]  The time component, involved both face-to-face (counseling and

## 2023-10-16 ENCOUNTER — OFFICE VISIT (OUTPATIENT)
Dept: ORTHOPEDIC SURGERY | Age: 28
End: 2023-10-16
Payer: COMMERCIAL

## 2023-10-16 VITALS — HEIGHT: 64 IN | RESPIRATION RATE: 14 BRPM | BODY MASS INDEX: 41.48 KG/M2 | WEIGHT: 243 LBS

## 2023-10-16 DIAGNOSIS — M25.562 LEFT ANTERIOR KNEE PAIN: Primary | ICD-10-CM

## 2023-10-16 DIAGNOSIS — M25.562 LEFT KNEE PAIN, UNSPECIFIED CHRONICITY: ICD-10-CM

## 2023-10-16 PROCEDURE — 1036F TOBACCO NON-USER: CPT | Performed by: ORTHOPAEDIC SURGERY

## 2023-10-16 PROCEDURE — 99212 OFFICE O/P EST SF 10 MIN: CPT | Performed by: ORTHOPAEDIC SURGERY

## 2023-10-16 PROCEDURE — G8427 DOCREV CUR MEDS BY ELIG CLIN: HCPCS | Performed by: ORTHOPAEDIC SURGERY

## 2023-10-16 PROCEDURE — G8484 FLU IMMUNIZE NO ADMIN: HCPCS | Performed by: ORTHOPAEDIC SURGERY

## 2023-10-16 PROCEDURE — G8417 CALC BMI ABV UP PARAM F/U: HCPCS | Performed by: ORTHOPAEDIC SURGERY

## 2023-10-16 RX ORDER — DICLOFENAC SODIUM 75 MG/1
75 TABLET, DELAYED RELEASE ORAL 2 TIMES DAILY WITH MEALS
Qty: 28 TABLET | Refills: 0 | Status: SHIPPED | OUTPATIENT
Start: 2023-10-16 | End: 2023-10-30

## 2023-10-16 NOTE — PROGRESS NOTES
Procedure: Left knee ACL reconstruction  Date of procedure: 6/9/2022    HPI: Ms. Sergio Brothers is 70-year-old returning for evaluation of her left knee. She was seen in my clinic a few months ago complaining of some anterior knee pain. At that time she was at the end of her pregnancy and so wanted to hold off on any intervention until afterwards. She has since had her baby. She states that she has a healthy boy. She continues to have pain primarily localized to the anterior aspect of the knee mostly present with weightbearing activities. She denies any instability. Physical examination:  Evaluation patient's left knee and lower extremity demonstrates her to continue to have good range of motion without gross instability. She is tender to palpation along the distal pole of the patella extending into the patellar tendon. Negative Lachman and no instability with varus and valgus stress applied across the joint at 0 and 30 degrees of knee flexion. Imaging studies:   4 x-ray views of the left knee completed on 10/16/2023 were reviewed independently demonstrating normal tricompartmental joint spaces without obvious fracture, dislocation or subluxation. Femoral and tibial tunnels consistent with a previous ACL reconstruction. Some tunnel widening noted. Distal femur button fixation still appears to be well-placed without any migration. Impression and plan: Ms. Sergio Brothers is a 70-year-old with anterior  left knee pain. We did have a discussion about possible etiologies including tendinitis versus patellofemoral pain syndrome. I recommended at this time proceeding conservatively with a course of physical therapy and as well as a short course of a prescription anti-inflammatories after confirming that she is not breast-feeding. A prescription for Voltaren was electronically sent to her pharmacy and a prescription for physical therapy was also provided.   I will see her back in my clinic as needed but she was

## 2023-10-23 ENCOUNTER — HOSPITAL ENCOUNTER (OUTPATIENT)
Dept: PHYSICAL THERAPY | Facility: CLINIC | Age: 28
Setting detail: THERAPIES SERIES
Discharge: HOME | End: 2023-10-23
Payer: COMMERCIAL

## 2023-10-23 PROCEDURE — 97161 PT EVAL LOW COMPLEX 20 MIN: CPT

## 2023-10-23 NOTE — CONSULTS
[x] Stable [] Evolving  [] Unstable   Decision Making [x] Low [] Moderate [] High     [x] Low Complexity [] Moderate Complexity [] High Complexity          Treatment Charges: Mins Units   [x] Evaluation       [x]  Low       []  Moderate       []  High 25 1   []  Modalities     []  Ther Exercise     []  Manual Therapy     []  Ther Activities     []  Aquatics     []  Vasocompression     []  Other         TOTAL TREATMENT TIME: 25 mins       Time in: 2:30 pm    Time Out: 3:00 pm     Electronically signed by: Goldie Najera PT        Physician Signature:________________________________Date:__________________  By signing above or cosigning this note, I have reviewed this plan of care and certify a need for medically necessary rehabilitation services.      *PLEASE SIGN ABOVE AND FAX BACK ALL PAGES*

## 2023-10-25 ENCOUNTER — POSTPARTUM VISIT (OUTPATIENT)
Dept: OBGYN | Age: 28
End: 2023-10-25
Payer: COMMERCIAL

## 2023-10-25 VITALS
SYSTOLIC BLOOD PRESSURE: 115 MMHG | HEART RATE: 77 BPM | WEIGHT: 230 LBS | DIASTOLIC BLOOD PRESSURE: 80 MMHG | BODY MASS INDEX: 39.48 KG/M2

## 2023-10-25 PROCEDURE — G8427 DOCREV CUR MEDS BY ELIG CLIN: HCPCS | Performed by: ADVANCED PRACTICE MIDWIFE

## 2023-10-25 PROCEDURE — G8484 FLU IMMUNIZE NO ADMIN: HCPCS | Performed by: ADVANCED PRACTICE MIDWIFE

## 2023-10-25 PROCEDURE — 1036F TOBACCO NON-USER: CPT | Performed by: ADVANCED PRACTICE MIDWIFE

## 2023-10-25 PROCEDURE — G8417 CALC BMI ABV UP PARAM F/U: HCPCS | Performed by: ADVANCED PRACTICE MIDWIFE

## 2023-10-25 NOTE — PROGRESS NOTES
No chief complaint on file. HPI:  DELIVERY DATE: 23  TYPE OF DELIVERY: normal spontaneous vaginal delivery  PROVIDER: RES  PERINEUM: theodora    Grisel Blake was seen for her 6 week visit. Her Male infant is healthy. She is bottle feeding. She is not experiencing pain. She is rating her pain   She reports her lochia is no bleeding  She reports none perineal discomfort. She denies urinary incontinence. Her bowels have returned to her normal pattern. She is back to her normal activity pattern. She has not her first menses. Grisel Blake has not engaged in intercourse. She does not report a mood disorder. She feels she is not having difficulty coping. Grisel Blake feels her family adjustment is effective. She reports an overall positive birth experience. Her [place] Score is 0. This score does match my assessment. OBJECTIVE:   Wt Readings from Last 3 Encounters:   10/25/23 104.3 kg (230 lb)   10/16/23 110.2 kg (243 lb)   10/04/23 103.9 kg (229 lb)       Blood pressure 115/80, pulse 77, weight 104.3 kg (230 lb), not currently breastfeeding. Breast exam: Normal to inspection    Abdomen: Soft non-tender without guarding. There is not diastasis present. The diastasis is 0 finger breaths of separation. Perineum: not inspected,     Extremities: No calf tenderness bilaterally. No edema. ASSESSMENT/PLAN:    6 week postpartum visit  She will return for annual  Labs were not ordered.  Date of last pap:  bottle feeding  Signs & Symptoms of mastitis reviewed; notify if occurs  Smoker/non-smoker:  Secondary smoke risks were reviewed, including increased risks of respiratory problems, Sudden Infant Death Syndrome, and potential malignancies. Smoking cessation counseling:  no  Family planning needs  Family planning counseling was completed.    Adverse outcomes/Arteriosclerotic Cardiovascular Disease Screen (ASCVD):   Delivery: no  GDM: no    - 2 hr GTT ordered: no  HTN disorder:

## 2024-04-09 ENCOUNTER — OFFICE VISIT (OUTPATIENT)
Dept: FAMILY MEDICINE CLINIC | Age: 29
End: 2024-04-09

## 2024-04-09 VITALS
BODY MASS INDEX: 42.99 KG/M2 | WEIGHT: 251.8 LBS | OXYGEN SATURATION: 96 % | HEART RATE: 81 BPM | HEIGHT: 64 IN | DIASTOLIC BLOOD PRESSURE: 62 MMHG | SYSTOLIC BLOOD PRESSURE: 118 MMHG

## 2024-04-09 DIAGNOSIS — E55.9 VITAMIN D DEFICIENCY: ICD-10-CM

## 2024-04-09 DIAGNOSIS — E53.8 VITAMIN B12 DEFICIENCY: ICD-10-CM

## 2024-04-09 DIAGNOSIS — Z71.89 ACP (ADVANCE CARE PLANNING): ICD-10-CM

## 2024-04-09 DIAGNOSIS — J45.909 ASTHMA AFFECTING PREGNANCY, ANTEPARTUM: ICD-10-CM

## 2024-04-09 DIAGNOSIS — O99.519 ASTHMA AFFECTING PREGNANCY, ANTEPARTUM: ICD-10-CM

## 2024-04-09 DIAGNOSIS — Z00.00 ENCOUNTER FOR WELL ADULT EXAM WITHOUT ABNORMAL FINDINGS: Primary | ICD-10-CM

## 2024-04-09 DIAGNOSIS — Z86.39 HISTORY OF HYPERGLYCEMIA: ICD-10-CM

## 2024-04-09 DIAGNOSIS — Z13.29 THYROID DISORDER SCREEN: ICD-10-CM

## 2024-04-09 DIAGNOSIS — Z13.0 SCREENING FOR IRON DEFICIENCY ANEMIA: ICD-10-CM

## 2024-04-09 DIAGNOSIS — Z13.220 SCREENING FOR HYPERLIPIDEMIA: ICD-10-CM

## 2024-04-09 DIAGNOSIS — Z13.6 SCREENING FOR CARDIOVASCULAR CONDITION: ICD-10-CM

## 2024-04-09 RX ORDER — ALBUTEROL SULFATE 90 UG/1
2 AEROSOL, METERED RESPIRATORY (INHALATION) EVERY 6 HOURS PRN
Qty: 18 G | Refills: 0 | OUTPATIENT
Start: 2024-04-09

## 2024-04-09 RX ORDER — ALBUTEROL SULFATE 90 UG/1
2 AEROSOL, METERED RESPIRATORY (INHALATION) EVERY 6 HOURS PRN
Qty: 18 G | Refills: 0 | Status: SHIPPED | OUTPATIENT
Start: 2024-04-09

## 2024-04-09 SDOH — ECONOMIC STABILITY: INCOME INSECURITY: HOW HARD IS IT FOR YOU TO PAY FOR THE VERY BASICS LIKE FOOD, HOUSING, MEDICAL CARE, AND HEATING?: NOT HARD AT ALL

## 2024-04-09 SDOH — ECONOMIC STABILITY: FOOD INSECURITY: WITHIN THE PAST 12 MONTHS, YOU WORRIED THAT YOUR FOOD WOULD RUN OUT BEFORE YOU GOT MONEY TO BUY MORE.: NEVER TRUE

## 2024-04-09 SDOH — ECONOMIC STABILITY: FOOD INSECURITY: WITHIN THE PAST 12 MONTHS, THE FOOD YOU BOUGHT JUST DIDN'T LAST AND YOU DIDN'T HAVE MONEY TO GET MORE.: NEVER TRUE

## 2024-04-09 ASSESSMENT — PATIENT HEALTH QUESTIONNAIRE - PHQ9
8. MOVING OR SPEAKING SO SLOWLY THAT OTHER PEOPLE COULD HAVE NOTICED. OR THE OPPOSITE, BEING SO FIGETY OR RESTLESS THAT YOU HAVE BEEN MOVING AROUND A LOT MORE THAN USUAL: NOT AT ALL
SUM OF ALL RESPONSES TO PHQ9 QUESTIONS 1 & 2: 0
2. FEELING DOWN, DEPRESSED OR HOPELESS: NOT AT ALL
4. FEELING TIRED OR HAVING LITTLE ENERGY: NOT AT ALL
1. LITTLE INTEREST OR PLEASURE IN DOING THINGS: NOT AT ALL
6. FEELING BAD ABOUT YOURSELF - OR THAT YOU ARE A FAILURE OR HAVE LET YOURSELF OR YOUR FAMILY DOWN: NOT AT ALL
9. THOUGHTS THAT YOU WOULD BE BETTER OFF DEAD, OR OF HURTING YOURSELF: NOT AT ALL
5. POOR APPETITE OR OVEREATING: NOT AT ALL
7. TROUBLE CONCENTRATING ON THINGS, SUCH AS READING THE NEWSPAPER OR WATCHING TELEVISION: NOT AT ALL
4. FEELING TIRED OR HAVING LITTLE ENERGY: NOT AT ALL
7. TROUBLE CONCENTRATING ON THINGS, SUCH AS READING THE NEWSPAPER OR WATCHING TELEVISION: NOT AT ALL
10. IF YOU CHECKED OFF ANY PROBLEMS, HOW DIFFICULT HAVE THESE PROBLEMS MADE IT FOR YOU TO DO YOUR WORK, TAKE CARE OF THINGS AT HOME, OR GET ALONG WITH OTHER PEOPLE: NOT DIFFICULT AT ALL
1. LITTLE INTEREST OR PLEASURE IN DOING THINGS: NOT AT ALL
3. TROUBLE FALLING OR STAYING ASLEEP: NOT AT ALL
SUM OF ALL RESPONSES TO PHQ QUESTIONS 1-9: 0
9. THOUGHTS THAT YOU WOULD BE BETTER OFF DEAD, OR OF HURTING YOURSELF: NOT AT ALL
5. POOR APPETITE OR OVEREATING: NOT AT ALL
10. IF YOU CHECKED OFF ANY PROBLEMS, HOW DIFFICULT HAVE THESE PROBLEMS MADE IT FOR YOU TO DO YOUR WORK, TAKE CARE OF THINGS AT HOME, OR GET ALONG WITH OTHER PEOPLE: NOT DIFFICULT AT ALL
3. TROUBLE FALLING OR STAYING ASLEEP: NOT AT ALL
SUM OF ALL RESPONSES TO PHQ QUESTIONS 1-9: 0
6. FEELING BAD ABOUT YOURSELF - OR THAT YOU ARE A FAILURE OR HAVE LET YOURSELF OR YOUR FAMILY DOWN: NOT AT ALL
2. FEELING DOWN, DEPRESSED OR HOPELESS: NOT AT ALL
8. MOVING OR SPEAKING SO SLOWLY THAT OTHER PEOPLE COULD HAVE NOTICED. OR THE OPPOSITE - BEING SO FIDGETY OR RESTLESS THAT YOU HAVE BEEN MOVING AROUND A LOT MORE THAN USUAL: NOT AT ALL
SUM OF ALL RESPONSES TO PHQ QUESTIONS 1-9: 0

## 2024-04-09 ASSESSMENT — ENCOUNTER SYMPTOMS
COUGH: 0
EYE DISCHARGE: 0
SORE THROAT: 0
TROUBLE SWALLOWING: 0
SHORTNESS OF BREATH: 0
SINUS PRESSURE: 0
EYE REDNESS: 0
WHEEZING: 0
CHEST TIGHTNESS: 0
DIARRHEA: 0
ABDOMINAL PAIN: 0
SINUS PAIN: 0
VOMITING: 0
EYE ITCHING: 0
NAUSEA: 0

## 2024-04-09 NOTE — PROGRESS NOTES
Well Adult Note  Name: Clari Dan Today’s Date: 2024   MRN: [id number] Sex: Female   Age: 28 y.o. Ethnicity: Non- / Non    : 1995 Race: Black /       Clari Dan is here for well adult exam.  History:  Overall patient is doing well.  She has no concerns for today's visit.  No new health changes to report at this time.    Review of Systems   Constitutional:  Negative for chills, fatigue and fever.   HENT:  Negative for ear discharge, ear pain, sinus pressure, sinus pain, sore throat and trouble swallowing.    Eyes:  Negative for discharge, redness and itching.   Respiratory:  Negative for cough, chest tightness, shortness of breath and wheezing.    Cardiovascular:  Negative for chest pain.   Gastrointestinal:  Negative for abdominal pain, diarrhea, nausea and vomiting.   Genitourinary:  Negative for difficulty urinating.   Musculoskeletal:  Negative for arthralgias and neck pain.   Skin:  Negative for rash.   Neurological:  Negative for dizziness, weakness, light-headedness and headaches.   All other systems reviewed and are negative.      Allergies   Allergen Reactions    Lidocaine Hives     Pt states that dentist told her that she was allergic to lidocaine but thought it could have been novacaine, skin wheal test per CRNA demonstrated no rash         Prior to Visit Medications    Medication Sig Taking? Authorizing Provider   ibuprofen (ADVIL;MOTRIN) 600 MG tablet Take 1 tablet by mouth every 6 hours as needed for Pain Yes Melanie Salazar, DO   acetaminophen (TYLENOL) 500 MG tablet Take 2 tablets by mouth every 6 hours as needed for Pain Yes Melanie Salazar, DO   famotidine (PEPCID) 20 MG tablet Take 1 tablet by mouth 2 times daily Yes Gaviota Stephenson MD   albuterol sulfate HFA (VENTOLIN HFA) 108 (90 Base) MCG/ACT inhaler Inhale 2 puffs into the lungs every 6 hours as needed for Wheezing or Shortness of Breath Yes Gigi Felix APRN - CNP         Past Medical

## 2024-04-09 NOTE — TELEPHONE ENCOUNTER
Clari Dan is calling to request a refill on the following medication(s):    Medication Request:  Requested Prescriptions     Pending Prescriptions Disp Refills    albuterol sulfate HFA (VENTOLIN HFA) 108 (90 Base) MCG/ACT inhaler 18 g 0     Sig: Inhale 2 puffs into the lungs every 6 hours as needed for Wheezing or Shortness of Breath       Last Visit Date (If Applicable):  4/9/2024    Next Visit Date:    Visit date not found

## 2024-04-09 NOTE — PATIENT INSTRUCTIONS
weight may be enough to improve your health.  Get family and friends involved to provide support. Talk to them about why you are trying to lose weight, and ask them to help. They can help by participating in exercise and having meals with you, even if they may be eating something different.  Find what works best for you. If you do not have time or do not like to cook, a program that offers meal replacement bars or shakes may be better for you. Or if you like to prepare meals, finding a plan that includes daily menus and recipes may be best.  Ask your doctor about other health professionals who can help you achieve your weight loss goals.  A dietitian can help you make healthy changes in your diet.  An exercise specialist or  can help you develop a safe and effective exercise program.  A counselor or psychiatrist can help you cope with issues such as depression, anxiety, or family problems that can make it hard to focus on weight loss.  Consider joining a support group for people who are trying to lose weight. Your doctor can suggest groups in your area.  Where can you learn more?  Go to https://www.[employer].net/patientEd and enter U357 to learn more about \"Starting a Weight Loss Plan: Care Instructions.\"  Current as of: September 20, 2023               Content Version: 14.0  © 2006-2024 [employer].   Care instructions adapted under license by [employer]. If you have questions about a medical condition or this instruction, always ask your healthcare professional. [employer] disclaims any warranty or liability for your use of this information.           Well Visit, Ages 18 to 65: Care Instructions  Well visits can help you stay healthy. Your doctor has checked your overall health and may have suggested ways to take good care of yourself. Your doctor also may have recommended tests. You can help prevent illness with healthy eating, good sleep, vaccinations, regular

## 2024-11-11 ENCOUNTER — HOSPITAL ENCOUNTER (OUTPATIENT)
Dept: PHYSICAL THERAPY | Facility: CLINIC | Age: 29
Setting detail: THERAPIES SERIES
Discharge: HOME | End: 2024-11-11
Payer: COMMERCIAL

## 2024-11-11 PROCEDURE — 97161 PT EVAL LOW COMPLEX 20 MIN: CPT

## 2024-11-11 PROCEDURE — 97110 THERAPEUTIC EXERCISES: CPT

## 2024-11-14 ENCOUNTER — HOSPITAL ENCOUNTER (OUTPATIENT)
Dept: PHYSICAL THERAPY | Facility: CLINIC | Age: 29
Setting detail: THERAPIES SERIES
Discharge: HOME | End: 2024-11-14
Payer: COMMERCIAL
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
## 2024-11-18 ENCOUNTER — HOSPITAL ENCOUNTER (OUTPATIENT)
Dept: PHYSICAL THERAPY | Facility: CLINIC | Age: 29
Setting detail: THERAPIES SERIES
Discharge: HOME | End: 2024-11-18
Payer: COMMERCIAL

## 2024-11-18 PROCEDURE — 97110 THERAPEUTIC EXERCISES: CPT

## 2024-11-18 NOTE — FLOWSHEET NOTE
[] [facility]  Outpatient Rehabilitation &  Therapy  [address]  [phone]  [phone] [x] [facility]  Outpatient Rehabilitation &  Therapy  [address]  P: [phone]  F: [phone] [] [facility]  Outpatient Rehabilitation &  Therapy  [address]  P: [phone]  F: [phone] [] [facility]  Outpatient Rehabilitation &  Therapy  [address]  [phone]  [phone] [] [facility]  Outpatient Rehabilitation &  Therapy  [address]   P: [phone]  F: [phone]  [] [facility]  Outpatient Rehabilitation &  Therapy  [address]  P: [phone]  F: [phone] [] [facility]  Outpatient Rehabilitation &  Therapy  [address]  P: [phone]  F: [phone] [] [facility]  Outpatient Rehabilitation &  Therapy  [address]  P: [phone]  F: [phone] [] [facility]  Outpatient Rehabilitation &  Therapy  [address]  P: [phone]  F: [phone]  [] [facility] Outpatient Rehabilitation &  Therapy  [address]  P: [phone]  F: [phone]     Physical Therapy Daily Treatment Note    Date:  2024  Patient Name:  Clari Dan    :  1995  MRN: [id number]  Physician: Sudhir Gomez MD                            Insurance: [facility] (Approved 9 visits from 24-24)  Medical Diagnosis: Strain of muscle and tendon of back wall of thorax, initial encounter (S29.012A), Unspecified sprain of R shoulder joint, initial encounter (S45.401A)               Rehab Codes: M25.511;R29.3  Onset Date: 10/16/24             Next 's appt.: 24     Visit# / total visits: ; Progress note for Medicare patient due at visit 9     Cancels/No Shows: 1/0    Frequency:  2-3 x/week

## 2024-11-25 ENCOUNTER — HOSPITAL ENCOUNTER (OUTPATIENT)
Dept: PHYSICAL THERAPY | Facility: CLINIC | Age: 29
Setting detail: THERAPIES SERIES
Discharge: HOME | End: 2024-11-25
Payer: COMMERCIAL

## 2024-11-25 NOTE — FLOWSHEET NOTE
[] [facility]  Outpatient Rehabilitation &  Therapy  [address]  [phone]  [phone] [x] [facility]  Outpatient Rehabilitation &  Therapy  [address]  P: [phone]  F: [phone] [] [facility]  Outpatient Rehabilitation &  Therapy  [address]  P: [phone]  F: [phone] [] [facility]  Outpatient Rehabilitation &  Therapy  [address]  [phone]  [phone] [] [facility]  Outpatient Rehabilitation &  Therapy  [address]   P: [phone]  F: [phone]  [] [facility]  Outpatient Rehabilitation &  Therapy  [address]  P: [phone]  F: [phone] [] [facility]  Outpatient Rehabilitation &  Therapy  [address]  P: [phone]  F: [phone] [] [facility]  Outpatient Rehabilitation &  Therapy  [address]  P: [phone]  F: [phone] [] [facility]  Outpatient Rehabilitation &  Therapy  [address]  P: [phone]  F: [phone]  [] [facility] Outpatient Rehabilitation &  Therapy  [address]  P: [phone]  F: [phone]     Therapy Cancel/No Show note    Date: 2024  Patient: Clari Dan  : 1995  MRN: [id number]    Cancels/No Shows to date:     For today's appointment patient:    [x]  Cancelled    [] Rescheduled appointment    [] No-show     Reason given by patient:    [x]  Patient ill    []  Conflicting appointment    [] No transportation      [] Conflict with work    [] No reason given    [] Weather related    [] COVID-19    [] Other:      Comments:        [x] Next appointment was confirmed    Electronically signed by: Alexandro Ibarra PT

## 2024-11-27 ENCOUNTER — HOSPITAL ENCOUNTER (OUTPATIENT)
Dept: PHYSICAL THERAPY | Facility: CLINIC | Age: 29
Setting detail: THERAPIES SERIES
Discharge: HOME | End: 2024-11-27
Payer: COMMERCIAL

## 2024-11-27 NOTE — FLOWSHEET NOTE
[] [facility]  Outpatient Rehabilitation &  Therapy  [address]  [phone]  [phone] [x] [facility]  Outpatient Rehabilitation &  Therapy  [address]  P: [phone]  F: [phone] [] [facility]  Outpatient Rehabilitation &  Therapy  [address]  P: [phone]  F: [phone] [] [facility]  Outpatient Rehabilitation &  Therapy  [address]  [phone]  [phone] [] [facility]  Outpatient Rehabilitation &  Therapy  [address]   P: [phone]  F: [phone]  [] [facility]  Outpatient Rehabilitation &  Therapy  [address]  P: [phone]  F: [phone] [] [facility]  Outpatient Rehabilitation &  Therapy  [address]  P: [phone]  F: [phone] [] [facility]  Outpatient Rehabilitation &  Therapy  [address]  P: [phone]  F: [phone] [] [facility]  Outpatient Rehabilitation &  Therapy  [address]  P: [phone]  F: [phone]  [] [facility] Outpatient Rehabilitation &  Therapy  [address]  P: [phone]  F: [phone]     Therapy Cancel/No Show note    Date: 2024  Patient: Clari Dan  : 1995  MRN: [id number]    Cancels/No Shows to date:     For today's appointment patient:    []  Cancelled    [] Rescheduled appointment    [x] No-show     Reason given by patient:    []  Patient ill    []  Conflicting appointment    [] No transportation      [] Conflict with work    [x] No reason given    [] Weather related    [] COVID-19    [x] Other:      Comments:  Patient was marked a NS after she did not arrive to her appointment after the 15 minute ronna period. Patient eventually showed up at 12:27 pm and was unable to be accommodated d/t tardiness.

## 2025-01-28 ENCOUNTER — HOSPITAL ENCOUNTER (EMERGENCY)
Age: 30
Discharge: HOME | End: 2025-01-28
Attending: EMERGENCY MEDICINE

## 2025-01-28 ENCOUNTER — APPOINTMENT (OUTPATIENT)
Dept: ULTRASOUND IMAGING | Age: 30
End: 2025-01-28

## 2025-01-28 VITALS
HEART RATE: 56 BPM | TEMPERATURE: 98.4 F | SYSTOLIC BLOOD PRESSURE: 135 MMHG | OXYGEN SATURATION: 100 % | DIASTOLIC BLOOD PRESSURE: 92 MMHG

## 2025-01-28 DIAGNOSIS — R10.11 RIGHT UPPER QUADRANT ABDOMINAL PAIN: Primary | ICD-10-CM

## 2025-01-28 DIAGNOSIS — K80.20 CALCULUS OF GALLBLADDER WITHOUT CHOLECYSTITIS WITHOUT OBSTRUCTION: ICD-10-CM

## 2025-01-28 LAB
ALBUMIN SERPL-MCNC: 4.8 G/DL (ref 3.5–5.2)
ALBUMIN/GLOB SERPL: 1.4 {RATIO} (ref 1–2.5)
ALP SERPL-CCNC: 99 U/L (ref 35–104)
ALT SERPL-CCNC: 17 U/L (ref 10–35)
ANION GAP SERPL CALCULATED.3IONS-SCNC: 10 MMOL/L (ref 9–16)
AST SERPL-CCNC: 19 U/L (ref 10–35)
BACTERIA URNS QL MICRO: NORMAL
BASOPHILS # BLD: <0.03 K/UL (ref 0–0.2)
BASOPHILS NFR BLD: 0 % (ref 0–2)
BILIRUB SERPL-MCNC: 0.5 MG/DL (ref 0–1.2)
BILIRUB UR QL STRIP: NEGATIVE
BUN SERPL-MCNC: 8 MG/DL (ref 6–20)
CALCIUM SERPL-MCNC: 9.5 MG/DL (ref 8.6–10.4)
CASTS #/AREA URNS LPF: NORMAL /LPF (ref 0–8)
CHLORIDE SERPL-SCNC: 103 MMOL/L (ref 98–107)
CLARITY UR: ABNORMAL
CO2 SERPL-SCNC: 25 MMOL/L (ref 20–31)
COLOR UR: ABNORMAL
CREAT SERPL-MCNC: 0.7 MG/DL (ref 0.6–0.9)
EOSINOPHIL # BLD: 0.07 K/UL (ref 0–0.44)
EOSINOPHILS RELATIVE PERCENT: 1 % (ref 1–4)
EPI CELLS #/AREA URNS HPF: NORMAL /HPF (ref 0–5)
ERYTHROCYTE [DISTWIDTH] IN BLOOD BY AUTOMATED COUNT: 12.1 % (ref 11.8–14.4)
GFR, ESTIMATED: >90 ML/MIN/1.73M2
GLUCOSE SERPL-MCNC: 89 MG/DL (ref 74–99)
GLUCOSE UR STRIP-MCNC: NEGATIVE MG/DL
HCG SERPL QL: NEGATIVE
HCT VFR BLD AUTO: 39.5 % (ref 36.3–47.1)
HGB BLD-MCNC: 12.8 G/DL (ref 11.9–15.1)
HGB UR QL STRIP.AUTO: NEGATIVE
IMM GRANULOCYTES # BLD AUTO: <0.03 K/UL (ref 0–0.3)
IMM GRANULOCYTES NFR BLD: 0 %
KETONES UR STRIP-MCNC: NEGATIVE MG/DL
LEUKOCYTE ESTERASE UR QL STRIP: ABNORMAL
LIPASE SERPL-CCNC: 16 U/L (ref 13–60)
LYMPHOCYTES NFR BLD: 1.61 K/UL (ref 1.1–3.7)
LYMPHOCYTES RELATIVE PERCENT: 30 % (ref 24–43)
MCH RBC QN AUTO: 29.1 PG (ref 25.2–33.5)
MCHC RBC AUTO-ENTMCNC: 32.4 G/DL (ref 28.4–34.8)
MCV RBC AUTO: 89.8 FL (ref 82.6–102.9)
MONOCYTES NFR BLD: 0.25 K/UL (ref 0.1–1.2)
MONOCYTES NFR BLD: 5 % (ref 3–12)
NEUTROPHILS NFR BLD: 63 % (ref 36–65)
NEUTS SEG NFR BLD: 3.38 K/UL (ref 1.5–8.1)
NITRITE UR QL STRIP: NEGATIVE
NRBC BLD-RTO: 0 PER 100 WBC
PH UR STRIP: 8 [PH] (ref 5–8)
PLATELET # BLD AUTO: ABNORMAL K/UL (ref 138–453)
PLATELET, FLUORESCENCE: 180 K/UL (ref 138–453)
PLATELETS.RETICULATED NFR BLD AUTO: 12.2 % (ref 1.1–10.3)
POTASSIUM SERPL-SCNC: 3.9 MMOL/L (ref 3.7–5.3)
PROT SERPL-MCNC: 8.3 G/DL (ref 6.6–8.7)
PROT UR STRIP-MCNC: ABNORMAL MG/DL
RBC # BLD AUTO: 4.4 M/UL (ref 3.95–5.11)
RBC #/AREA URNS HPF: NORMAL /HPF (ref 0–4)
SODIUM SERPL-SCNC: 138 MMOL/L (ref 136–145)
SP GR UR STRIP: 1.03 (ref 1–1.03)
UROBILINOGEN UR STRIP-ACNC: NORMAL EU/DL (ref 0–1)
WBC #/AREA URNS HPF: NORMAL /HPF (ref 0–5)
WBC OTHER # BLD: 5.3 K/UL (ref 3.5–11.3)

## 2025-01-28 PROCEDURE — 2500000003 HC RX 250 WO HCPCS

## 2025-01-28 PROCEDURE — 80053 COMPREHEN METABOLIC PANEL: CPT

## 2025-01-28 PROCEDURE — 83690 ASSAY OF LIPASE: CPT

## 2025-01-28 PROCEDURE — 76705 ECHO EXAM OF ABDOMEN: CPT

## 2025-01-28 PROCEDURE — 99284 EMERGENCY DEPT VISIT MOD MDM: CPT

## 2025-01-28 PROCEDURE — 85055 RETICULATED PLATELET ASSAY: CPT

## 2025-01-28 PROCEDURE — 96375 TX/PRO/DX INJ NEW DRUG ADDON: CPT

## 2025-01-28 PROCEDURE — 96374 THER/PROPH/DIAG INJ IV PUSH: CPT

## 2025-01-28 PROCEDURE — 81001 URINALYSIS AUTO W/SCOPE: CPT

## 2025-01-28 PROCEDURE — 2580000003 HC RX 258

## 2025-01-28 PROCEDURE — 6360000002 HC RX W HCPCS

## 2025-01-28 PROCEDURE — 85025 COMPLETE CBC W/AUTO DIFF WBC: CPT

## 2025-01-28 PROCEDURE — 84703 CHORIONIC GONADOTROPIN ASSAY: CPT

## 2025-01-28 RX ORDER — FENTANYL CITRATE 50 UG/ML
50 INJECTION, SOLUTION INTRAMUSCULAR; INTRAVENOUS ONCE
Status: COMPLETED | OUTPATIENT
Start: 2025-01-28 | End: 2025-01-28

## 2025-01-28 RX ADMIN — FAMOTIDINE 20 MG: 10 INJECTION, SOLUTION INTRAVENOUS at 09:26

## 2025-01-28 RX ADMIN — FENTANYL CITRATE 50 MCG: 50 INJECTION, SOLUTION INTRAMUSCULAR; INTRAVENOUS at 09:24

## 2025-01-28 ASSESSMENT — PAIN DESCRIPTION - LOCATION: LOCATION: ABDOMEN;BACK

## 2025-01-28 ASSESSMENT — PAIN SCALES - GENERAL: PAINLEVEL_OUTOF10: 10

## 2025-01-28 ASSESSMENT — PAIN DESCRIPTION - DESCRIPTORS: DESCRIPTORS: SHARP;SHOOTING

## 2025-01-28 ASSESSMENT — PAIN DESCRIPTION - ONSET: ONSET: ON-GOING

## 2025-01-28 NOTE — DISCHARGE INSTRUCTIONS
You were seen and evaluated at [facility] emergency department for abdominal pain.    Urine extensive workup done including CBC, CMP, lipase, qualitative hCG, UA, right upper quadrant ultrasound, antiacid medication, pain medication.    Your hemoglobin was stable, your white blood cell count was not elevated, your liver enzymes and kidney function were within normal limits.  Your lipase which is a measure of pancreatic enzyme was within normal limits.  Your serum hCG test your pregnancy test was also negative.  Your urine showed no sign of infection.    You did report slight improvement in symptoms after antiacid medication as well as pain medication.    Your right upper quadrant ultrasound showed gallstones in your gallbladder however no signs or evidence of gallbladder infection.    Attached is information going over gallstones and diets that can help with this.  If this becomes a recurring problem for you you may ultimately need to have your gallbladder removed.    Please follow-up with your primary care provider.  Please return to the ED with any new or worsening symptoms or concerns including intractable abdominal pain, fever, intractable nausea/vomiting, lightheadedness, dizziness, feeling like in a pass out, or any other concerns.

## 2025-01-28 NOTE — ED NOTES
Pt arrives to ED 26 via triage.  Pt c/o of abd pain and back pain x1 wk.  Pt reports pain is getting worse, rates pain 10/10, describes as sharp and shooting.  Pt denies taking any OTC meds to help w/ pain.  Pt denies nausea but reports vomiting.  Pt denies constipation, reports diarrhea, last BM was last night.  Pt A&O x4, RR even and unlabored, call light within reach. Whiteboard updated. Will continue plan of care.

## 2025-01-28 NOTE — ED PROVIDER NOTES
[facility] EMERGENCY DEPARTMENT  Emergency Department Encounter  Emergency Medicine Resident     Pt Name: Clari Dan  MRN: [id number]  Birthdate 1995  Date of evaluation: 25  PCP:  Gigi Felix APRN - CNP    Chief Complaint     Chief Complaint   Patient presents with    Abdominal Pain    Back Pain       HISTORY OF PRESENT ILLNESS     Clari Dan is a 29 y.o. female who presents with right upper quadrant abdominal pain.  Patient reports this pain has been worsening over the last 3 to 4 days.  Patient points to her right upper stomach and describes pain radiating across the upper abdomen.  Denies any flank pain, fevers, chills.  Does endorse nausea, vomiting.  Denies any burning with urination or vaginal discharge.  Does endorse having an IUD.  Reports that she does not have regular periods with IUD.  Denying any chest pain or shortness of breath.  Denies any current nausea.    REVIEW OF SYSTEMS       See HPI    PAST MEDICAL/SURGICAL/FAMILY HISTORY     PMH:  has a past medical history of ACL injury tear, Asthma, Child passed of SIDS, Dental crowns present, Depression, Disease of blood and blood forming organ, Gestational diabetes mellitus, Gestational HTN--G3, Heart defect, Herpes simplex virus (HSV) infection, Hidradenitis axillaris, History of  labor, Left ankle sprain, Maternal congenital heart disease, antepartum, Murmur, cardiac, Obesity,  delivery,  labor, and Wears glasses.  Surgical History:  has a past surgical history that includes Tonsillectomy and adenoidectomy; other surgical history (Bilateral, 2016); and knee surgery (Left, 2022).  Social History:  reports that she has never smoked. She has been exposed to tobacco smoke. She has never used smokeless tobacco. She reports that she does not currently use alcohol. She reports that she does not use drugs.      Allergies:is allergic to lidocaine.    PHYSICAL EXAM       INITIAL VITALS: BP (!)

## 2025-01-28 NOTE — ED PROVIDER NOTES
[facility]     Emergency Department     Faculty Attestation    I performed a history and physical examination of the patient and discussed management with the resident. I reviewed the resident's note and agree with the documented findings and plan of care. Any areas of disagreement are noted on the chart. I was personally present for the key portions of any procedures. I have documented in the chart those procedures where I was not present during the key portions. I have reviewed the emergency nurses triage note. I agree with the chief complaint, past medical history, past surgical history, allergies, medications, social and family history as documented unless otherwise noted below. For Physician Assistant/ Nurse Practitioner cases/documentation I have personally evaluated this patient and have completed at least one if not all key elements of the E/M (history, physical exam, and MDM). Additional findings are as noted.    Mild epigastric and right upper quadrant abdominal pain, no pain in McBurney's point, no guarding or rebounding, mild right CVA tenderness.  No adnexal pain.  No history of previous abdominal surgeries     Rancho Goodson MD  01/28/25 9071

## 2025-02-12 PROBLEM — O44.02 PLACENTA PREVIA IN SECOND TRIMESTER: Status: RESOLVED | Noted: 2023-04-19 | Resolved: 2025-02-12

## 2025-02-12 PROBLEM — O43.199 MARGINAL INSERTION OF UMBILICAL CORD AFFECTING MANAGEMENT OF MOTHER: Status: RESOLVED | Noted: 2023-08-23 | Resolved: 2025-02-12

## 2025-02-13 ENCOUNTER — OFFICE VISIT (OUTPATIENT)
Dept: OBGYN | Age: 30
End: 2025-02-13
Payer: COMMERCIAL

## 2025-02-13 VITALS
DIASTOLIC BLOOD PRESSURE: 72 MMHG | HEIGHT: 64 IN | SYSTOLIC BLOOD PRESSURE: 115 MMHG | WEIGHT: 235 LBS | BODY MASS INDEX: 40.12 KG/M2 | HEART RATE: 55 BPM

## 2025-02-13 DIAGNOSIS — Z97.5 IUD (INTRAUTERINE DEVICE) IN PLACE: Primary | ICD-10-CM

## 2025-02-13 PROCEDURE — G8427 DOCREV CUR MEDS BY ELIG CLIN: HCPCS | Performed by: STUDENT IN AN ORGANIZED HEALTH CARE EDUCATION/TRAINING PROGRAM

## 2025-02-13 PROCEDURE — 58301 REMOVE INTRAUTERINE DEVICE: CPT | Performed by: STUDENT IN AN ORGANIZED HEALTH CARE EDUCATION/TRAINING PROGRAM

## 2025-02-13 PROCEDURE — G8417 CALC BMI ABV UP PARAM F/U: HCPCS | Performed by: STUDENT IN AN ORGANIZED HEALTH CARE EDUCATION/TRAINING PROGRAM

## 2025-02-13 PROCEDURE — 1036F TOBACCO NON-USER: CPT | Performed by: STUDENT IN AN ORGANIZED HEALTH CARE EDUCATION/TRAINING PROGRAM

## 2025-02-13 PROCEDURE — 99213 OFFICE O/P EST LOW 20 MIN: CPT | Performed by: STUDENT IN AN ORGANIZED HEALTH CARE EDUCATION/TRAINING PROGRAM

## 2025-02-13 PROCEDURE — 99211 OFF/OP EST MAY X REQ PHY/QHP: CPT | Performed by: STUDENT IN AN ORGANIZED HEALTH CARE EDUCATION/TRAINING PROGRAM

## 2025-02-13 ASSESSMENT — PATIENT HEALTH QUESTIONNAIRE - PHQ9
SUM OF ALL RESPONSES TO PHQ QUESTIONS 1-9: 0
1. LITTLE INTEREST OR PLEASURE IN DOING THINGS: NOT AT ALL
SUM OF ALL RESPONSES TO PHQ9 QUESTIONS 1 & 2: 0
SUM OF ALL RESPONSES TO PHQ QUESTIONS 1-9: 0
2. FEELING DOWN, DEPRESSED OR HOPELESS: NOT AT ALL
SUM OF ALL RESPONSES TO PHQ QUESTIONS 1-9: 0
SUM OF ALL RESPONSES TO PHQ QUESTIONS 1-9: 0

## 2025-02-13 NOTE — PROGRESS NOTES
OB/GYN Problem Visit    Clari Dan  2025                       Primary Care Physician: Gigi Felix APRN - CNP    CC:   Chief Complaint   Patient presents with    Follow-up     Requesting to have IUD removed         HPI: Clari Dan is a 29 y.o. female     The patient was seen and examined. She is here to discuss removal of her Mirena IUD that was placed at time of vaginal delivery on 23. She desires pregnancy. She was otherwise happy with the IUD with no bleeding.    Her partner is here with her today.    REVIEW OF SYSTEMS:  Constitutional: negative fever, negative chills  HEENT: negative visual disturbances, negative headaches  Respiratory: negative dyspnea, negative cough  Cardiovascular: negative chest pain,  negative palpitations  Gastrointestinal: negative abdominal pain, negative RUQ pain, negative N/V, negative diarrhea, negative constipation  Genitourinary: negative dysuria, negative vaginal discharge  Dermatological: negative rash  Hematologic: negative bruising  Immunologic/Lymphatic: negative recent illness, negative recent sick contact  Musculoskeletal: negative back pain, negative myalgias, negative arthralgias  Neurological:  negative dizziness, negative weakness  Behavior/Psych: negative depression, negative anxiety  ________________________________________________________________________    GYNECOLOGICAL HISTORY:  Sexually Active: one male partner  STD History: none    Pap History: Last PAP was normal; 2023.  Colposcopy History: no    Permanent Sterilization: no  Reversible Birth Control: Mirena IUD inserted 23    OBSTETRICAL HISTORY:  OB History    Para Term  AB Living   5 5 3 2 0 4   SAB IAB Ectopic Molar Multiple Live Births   0 0 0 0 0 5      # Outcome Date GA Lbr Filipe/2nd Weight Sex Type Anes PTL Lv   5 Term 23 39w0d  3.235 kg (7 lb 2.1 oz) M Vag-Spont EPI N VANITA   4 Term 22 38w4d 08:54 / 00:04 2.705 kg (5 lb 15.4 oz) M

## 2025-02-17 NOTE — PROGRESS NOTES
Attending Physician Statement  I have discussed the care of Clari Dan, including pertinent history and exam findings,  with the resident. I have seen and examined the patient and the key elements of all parts of the encounter have been performed by me.  I agree with the assessment, plan and orders as documented by the resident.  (GC Modifier)    Amada Orosco, DO

## 2025-02-21 ENCOUNTER — OFFICE VISIT (OUTPATIENT)
Dept: FAMILY MEDICINE CLINIC | Age: 30
End: 2025-02-21

## 2025-02-21 VITALS
HEIGHT: 64 IN | SYSTOLIC BLOOD PRESSURE: 108 MMHG | WEIGHT: 235 LBS | HEART RATE: 75 BPM | BODY MASS INDEX: 40.12 KG/M2 | DIASTOLIC BLOOD PRESSURE: 64 MMHG | OXYGEN SATURATION: 97 %

## 2025-02-21 DIAGNOSIS — Z00.00 ENCOUNTER FOR WELL ADULT EXAM WITHOUT ABNORMAL FINDINGS: ICD-10-CM

## 2025-02-21 DIAGNOSIS — E53.8 VITAMIN B12 DEFICIENCY: ICD-10-CM

## 2025-02-21 DIAGNOSIS — O99.413 MATERNAL CONGENITAL CARDIAC ANOMALY AFFECTING PREGNANCY, ANTEPARTUM, THIRD TRIMESTER: ICD-10-CM

## 2025-02-21 DIAGNOSIS — Z13.220 SCREENING FOR HYPERLIPIDEMIA: ICD-10-CM

## 2025-02-21 DIAGNOSIS — Z13.0 SCREENING FOR IRON DEFICIENCY ANEMIA: ICD-10-CM

## 2025-02-21 DIAGNOSIS — J30.89 NON-SEASONAL ALLERGIC RHINITIS, UNSPECIFIED TRIGGER: ICD-10-CM

## 2025-02-21 DIAGNOSIS — Z13.6 SCREENING FOR CARDIOVASCULAR CONDITION: ICD-10-CM

## 2025-02-21 DIAGNOSIS — Z13.29 THYROID DISORDER SCREEN: ICD-10-CM

## 2025-02-21 DIAGNOSIS — E55.9 VITAMIN D DEFICIENCY: ICD-10-CM

## 2025-02-21 DIAGNOSIS — K80.20 CALCULUS OF GALLBLADDER WITHOUT CHOLECYSTITIS WITHOUT OBSTRUCTION: ICD-10-CM

## 2025-02-21 DIAGNOSIS — Z86.39 HISTORY OF HYPERGLYCEMIA: ICD-10-CM

## 2025-02-21 DIAGNOSIS — N63.12 MASS OF UPPER INNER QUADRANT OF RIGHT BREAST: Primary | ICD-10-CM

## 2025-02-21 DIAGNOSIS — Q24.9 MATERNAL CONGENITAL CARDIAC ANOMALY AFFECTING PREGNANCY, ANTEPARTUM, THIRD TRIMESTER: ICD-10-CM

## 2025-02-21 PROBLEM — J45.20 MILD INTERMITTENT ASTHMA WITHOUT COMPLICATION: Chronic | Status: ACTIVE | Noted: 2025-02-21

## 2025-02-21 PROBLEM — J45.20 MILD INTERMITTENT ASTHMA WITHOUT COMPLICATION: Chronic | Status: RESOLVED | Noted: 2025-02-21 | Resolved: 2025-02-21

## 2025-02-21 SDOH — ECONOMIC STABILITY: FOOD INSECURITY: WITHIN THE PAST 12 MONTHS, YOU WORRIED THAT YOUR FOOD WOULD RUN OUT BEFORE YOU GOT MONEY TO BUY MORE.: NEVER TRUE

## 2025-02-21 SDOH — ECONOMIC STABILITY: FOOD INSECURITY: WITHIN THE PAST 12 MONTHS, THE FOOD YOU BOUGHT JUST DIDN'T LAST AND YOU DIDN'T HAVE MONEY TO GET MORE.: NEVER TRUE

## 2025-02-21 ASSESSMENT — ENCOUNTER SYMPTOMS
DIARRHEA: 0
COUGH: 0
TROUBLE SWALLOWING: 0
SORE THROAT: 0
SHORTNESS OF BREATH: 0
EYE ITCHING: 0
VOMITING: 0
WHEEZING: 0
ABDOMINAL PAIN: 1
SINUS PAIN: 0
CHEST TIGHTNESS: 0
NAUSEA: 0
SINUS PRESSURE: 0
EYE REDNESS: 0
EYE DISCHARGE: 0

## 2025-02-21 NOTE — PROGRESS NOTES
Well Adult Note  Name: Clari Dan Today’s Date: 2025   MRN: [id number] Sex: Female   Age: 29 y.o. Ethnicity: Non- / Non    : 1995 Race: Black /       Clari Dan is here for a well adult exam.       Subjective    History:  History of Present Illness  The patient presents for an annual physical exam and evaluation of a chest mass, gallstones, and allergies.    They report a small lump in their right breast, first noticed on Saturday. Monthly self-examinations confirm this is a new finding. No similar findings in the left breast. No changes in nipple shape, skin texture, or discharge. Not breastfeeding and no new medications since the last visit.    Uses albuterol as needed, less than three times per week. No over-the-counter allergy medications. History of severe ear infections and previously received weekly allergy injections, which were beneficial but discontinued due to clinic distance. Seeks referral to an immunologist. Symptoms worsen in spring.    Recently informed of gallstones at a ER visit due to severe pain. Despite being told they should have passed, they continue to experience intermittent pain. Diet modification by avoiding greasy foods has helped, but pain persists.     Last visit in 2024. No surgeries since. Had a son in 2024. Removed IUD to conceive another child. Not breastfeeding. No new medications. Does not smoke, vape, consume alcohol, or use drugs. No high-risk hobbies.    Patient has no further concerns for today's visit.  Previous notes reviewed in the chart. Results of CT abdomen/pelvis reviewed in chart.          Review of Systems   Constitutional:  Negative for chills, fatigue and fever.   HENT:  Negative for ear discharge, ear pain, sinus pressure, sinus pain, sore throat and trouble swallowing.    Eyes:  Negative for discharge, redness and itching.   Respiratory:  Negative for cough, chest tightness, shortness of breath and

## 2025-02-21 NOTE — PATIENT INSTRUCTIONS
hands, brush your teeth twice a day, and wear a seat belt in the car.   Where can you learn more?  Go to [email or web address]://[email or web address]/patientEd and enter P072 to learn more about \"Well Visit, Ages 18 to 65: Care Instructions.\"  Current as of: April 30, 2024  Content Version: 14.3  © 2024 [employer].   Care instructions adapted under license by [employer]. If you have questions about a medical condition or this instruction, always ask your healthcare professional. [employer] disclaims any warranty or liability for your use of this information.

## 2025-03-05 ENCOUNTER — TELEPHONE (OUTPATIENT)
Dept: OBGYN | Age: 30
End: 2025-03-05

## 2025-03-05 RX ORDER — PNV NO.95/FERROUS FUM/FOLIC AC 28MG-0.8MG
1 TABLET ORAL
Qty: 30 TABLET | Refills: 11 | Status: SHIPPED | OUTPATIENT
Start: 2025-03-05

## 2025-03-05 NOTE — TELEPHONE ENCOUNTER
Patient is requesting prenatal vitamin script at [facility].  She had IUD removed recently and is trying to conceive.

## 2025-03-18 ENCOUNTER — TELEPHONE (OUTPATIENT)
Dept: FAMILY MEDICINE CLINIC | Age: 30
End: 2025-03-18

## 2025-03-21 ENCOUNTER — RESULTS FOLLOW-UP (OUTPATIENT)
Dept: ULTRASOUND IMAGING | Age: 30
End: 2025-03-21

## 2025-03-21 ENCOUNTER — HOSPITAL ENCOUNTER (OUTPATIENT)
Dept: ULTRASOUND IMAGING | Age: 30
Discharge: HOME | End: 2025-03-23
Payer: COMMERCIAL

## 2025-03-21 ENCOUNTER — HOSPITAL ENCOUNTER (OUTPATIENT)
Dept: MAMMOGRAPHY | Age: 30
Discharge: HOME | End: 2025-03-23
Payer: COMMERCIAL

## 2025-03-21 DIAGNOSIS — N63.12 MASS OF UPPER INNER QUADRANT OF RIGHT BREAST: ICD-10-CM

## 2025-03-21 PROCEDURE — 76642 ULTRASOUND BREAST LIMITED: CPT

## 2025-05-08 ENCOUNTER — OFFICE VISIT (OUTPATIENT)
Age: 30
End: 2025-05-08
Payer: COMMERCIAL

## 2025-05-08 ENCOUNTER — HOSPITAL ENCOUNTER (OUTPATIENT)
Age: 30
Setting detail: SPECIMEN
Discharge: HOME | End: 2025-05-08

## 2025-05-08 VITALS
WEIGHT: 248 LBS | SYSTOLIC BLOOD PRESSURE: 132 MMHG | DIASTOLIC BLOOD PRESSURE: 85 MMHG | BODY MASS INDEX: 42.57 KG/M2 | HEART RATE: 68 BPM

## 2025-05-08 DIAGNOSIS — R10.2 PELVIC PRESSURE IN FEMALE: Primary | ICD-10-CM

## 2025-05-08 DIAGNOSIS — R10.2 PELVIC PRESSURE IN FEMALE: ICD-10-CM

## 2025-05-08 DIAGNOSIS — R10.2 PELVIC PAIN: ICD-10-CM

## 2025-05-08 LAB
CONTROL: PRESENT
PREGNANCY TEST URINE, POC: NEGATIVE

## 2025-05-08 PROCEDURE — 99212 OFFICE O/P EST SF 10 MIN: CPT | Performed by: STUDENT IN AN ORGANIZED HEALTH CARE EDUCATION/TRAINING PROGRAM

## 2025-05-08 PROCEDURE — 81025 URINE PREGNANCY TEST: CPT | Performed by: STUDENT IN AN ORGANIZED HEALTH CARE EDUCATION/TRAINING PROGRAM

## 2025-05-08 NOTE — PROGRESS NOTES
OB/GYN Problem Visit    Clari Dan  2025                       Primary Care Physician: Gigi Felix APRN - CNP    CC:   Chief Complaint   Patient presents with    Follow-up     Upper ab pain          HPI: Clari Dan is a 29 y.o. female     The patient was seen and examined. She is here for a problem visit and is complaining of abdominal pain.  Patient reports that for approximately 2 months she has pelvic pain she reports that it is somewhat cyclic in nature she notices that the worst pain is right prior to her menses.  And then subsequently has pain after bleeding.  She reports her periods are very light and are more so spotting to her.  Patient previously had an IUD which was removed because she desires to get pregnant.    Patient also complaining of upper abdominal pain discussed that this is a problem with her primary care provider.    Her Patient's last menstrual period was 2025 (exact date). and she denies irregular/heavy bleeding and dysmenorrhea. Her periods are regular and last 1-3 days. She describes them as light.     Her bowel habits are regular. She denies any bloating.  She denies dysuria. She denies urinary leaking.  She denies vaginal discharge.  She is sexually active.  She is desiring pregnancy.    REVIEW OF SYSTEMS:   Constitutional: negative fever, negative chills, negative weight changes   HEENT: negative visual disturbances, negative headaches, negative dizziness  Breast: negative breast abnormalities, negative breast lumps, negative nipple discharge  Respiratory: negative dyspnea, negative cough, negative SOB  Cardiovascular: negative chest pain,  negative palpitations, negative arrhythmia, negative syncope   Gastrointestinal: negative abdominal pain, negative RUQ pain, negative N/V, negative diarrhea, negative constipation, negative bowel changes, + epigastric pain   Genitourinary: negative dysuria, negative hematuria, negative urinary incontinence, negative

## 2025-05-09 ENCOUNTER — RESULTS FOLLOW-UP (OUTPATIENT)
Dept: OBGYN | Age: 30
End: 2025-05-09

## 2025-05-09 DIAGNOSIS — N39.0 URINARY TRACT INFECTION WITHOUT HEMATURIA, SITE UNSPECIFIED: Primary | ICD-10-CM

## 2025-05-09 LAB
BACTERIA URNS QL MICRO: ABNORMAL
BILIRUB UR QL STRIP: NEGATIVE
CASTS #/AREA URNS LPF: ABNORMAL /LPF (ref 0–2)
CLARITY UR: ABNORMAL
COLOR UR: YELLOW
EPI CELLS #/AREA URNS HPF: ABNORMAL /HPF (ref 0–5)
GLUCOSE UR STRIP-MCNC: NEGATIVE MG/DL
HGB UR QL STRIP.AUTO: NEGATIVE
KETONES UR STRIP-MCNC: NEGATIVE MG/DL
LEUKOCYTE ESTERASE UR QL STRIP: ABNORMAL
MICROORGANISM SPEC CULT: ABNORMAL
MUCOUS THREADS URNS QL MICRO: ABNORMAL
NITRITE UR QL STRIP: NEGATIVE
PH UR STRIP: 7 [PH] (ref 5–8)
PROT UR STRIP-MCNC: ABNORMAL MG/DL
RBC #/AREA URNS HPF: ABNORMAL /HPF (ref 0–2)
SERVICE CMNT-IMP: ABNORMAL
SP GR UR STRIP: 1.03 (ref 1–1.03)
SPECIMEN DESCRIPTION: ABNORMAL
UROBILINOGEN UR STRIP-ACNC: NORMAL EU/DL (ref 0–1)
WBC #/AREA URNS HPF: ABNORMAL /HPF (ref 0–5)

## 2025-05-09 RX ORDER — CEPHALEXIN 500 MG/1
500 CAPSULE ORAL 2 TIMES DAILY
Qty: 14 CAPSULE | Refills: 0 | Status: SHIPPED | OUTPATIENT
Start: 2025-05-09 | End: 2025-05-16

## 2025-05-09 NOTE — PROGRESS NOTES
Attending Physician Statement  I have discussed the care of Clari Dan, including pertinent history and exam findings,  with the resident. I have reviewed the key elements of all parts of the encounter with the resident.  I agree with the assessment, plan and orders as documented by the resident.  (GE Modifier)    Amada Orosco,

## 2025-05-29 ENCOUNTER — ANCILLARY PROCEDURE (OUTPATIENT)
Age: 30
End: 2025-05-29
Payer: COMMERCIAL

## 2025-05-29 ENCOUNTER — HOSPITAL ENCOUNTER (OUTPATIENT)
Age: 30
Discharge: HOME | End: 2025-05-29
Payer: COMMERCIAL

## 2025-05-29 DIAGNOSIS — Z13.29 THYROID DISORDER SCREEN: ICD-10-CM

## 2025-05-29 DIAGNOSIS — Z13.220 SCREENING FOR HYPERLIPIDEMIA: ICD-10-CM

## 2025-05-29 DIAGNOSIS — E53.8 VITAMIN B12 DEFICIENCY: ICD-10-CM

## 2025-05-29 DIAGNOSIS — E55.9 VITAMIN D DEFICIENCY: ICD-10-CM

## 2025-05-29 DIAGNOSIS — R10.2 PELVIC PAIN: ICD-10-CM

## 2025-05-29 DIAGNOSIS — Z86.39 HISTORY OF HYPERGLYCEMIA: ICD-10-CM

## 2025-05-29 DIAGNOSIS — Z13.6 SCREENING FOR CARDIOVASCULAR CONDITION: ICD-10-CM

## 2025-05-29 DIAGNOSIS — Z13.0 SCREENING FOR IRON DEFICIENCY ANEMIA: ICD-10-CM

## 2025-05-29 LAB
25(OH)D3 SERPL-MCNC: 18.6 NG/ML (ref 30–100)
ALBUMIN SERPL-MCNC: 4.6 G/DL (ref 3.5–5.2)
ALBUMIN/GLOB SERPL: 1.3 {RATIO} (ref 1–2.5)
ALP SERPL-CCNC: 96 U/L (ref 35–104)
ALT SERPL-CCNC: 21 U/L (ref 10–35)
ANION GAP SERPL CALCULATED.3IONS-SCNC: 11 MMOL/L (ref 9–16)
AST SERPL-CCNC: 19 U/L (ref 10–35)
BASOPHILS # BLD: <0.03 K/UL (ref 0–0.2)
BASOPHILS NFR BLD: 0 % (ref 0–2)
BILIRUB SERPL-MCNC: 0.4 MG/DL (ref 0–1.2)
BUN SERPL-MCNC: 8 MG/DL (ref 6–20)
CALCIUM SERPL-MCNC: 9.5 MG/DL (ref 8.6–10.4)
CHLORIDE SERPL-SCNC: 102 MMOL/L (ref 98–107)
CHOLEST SERPL-MCNC: 203 MG/DL (ref 0–199)
CHOLESTEROL/HDL RATIO: 4.1
CO2 SERPL-SCNC: 26 MMOL/L (ref 20–31)
CREAT SERPL-MCNC: 0.6 MG/DL (ref 0.6–0.9)
EOSINOPHIL # BLD: 0.09 K/UL (ref 0–0.44)
EOSINOPHILS RELATIVE PERCENT: 2 % (ref 1–4)
ERYTHROCYTE [DISTWIDTH] IN BLOOD BY AUTOMATED COUNT: 12.1 % (ref 11.8–14.4)
EST. AVERAGE GLUCOSE BLD GHB EST-MCNC: 117 MG/DL
GFR, ESTIMATED: >90 ML/MIN/1.73M2
GLUCOSE SERPL-MCNC: 88 MG/DL (ref 74–99)
HBA1C MFR BLD: 5.7 % (ref 4–6)
HCT VFR BLD AUTO: 39.5 % (ref 36.3–47.1)
HDLC SERPL-MCNC: 49 MG/DL
HGB BLD-MCNC: 12.9 G/DL (ref 11.9–15.1)
IMM GRANULOCYTES # BLD AUTO: <0.03 K/UL (ref 0–0.3)
IMM GRANULOCYTES NFR BLD: 0 %
LDLC SERPL CALC-MCNC: 134 MG/DL (ref 0–100)
LYMPHOCYTES NFR BLD: 2.13 K/UL (ref 1.1–3.7)
LYMPHOCYTES RELATIVE PERCENT: 43 % (ref 24–43)
MCH RBC QN AUTO: 30.1 PG (ref 25.2–33.5)
MCHC RBC AUTO-ENTMCNC: 32.7 G/DL (ref 28.4–34.8)
MCV RBC AUTO: 92.1 FL (ref 82.6–102.9)
MONOCYTES NFR BLD: 0.24 K/UL (ref 0.1–1.2)
MONOCYTES NFR BLD: 5 % (ref 3–12)
NEUTROPHILS NFR BLD: 50 % (ref 36–65)
NEUTS SEG NFR BLD: 2.51 K/UL (ref 1.5–8.1)
NRBC BLD-RTO: 0 PER 100 WBC
PLATELET # BLD AUTO: 183 K/UL (ref 138–453)
PMV BLD AUTO: 12.6 FL (ref 8.1–13.5)
POTASSIUM SERPL-SCNC: 4.2 MMOL/L (ref 3.7–5.3)
PROT SERPL-MCNC: 8.2 G/DL (ref 6.6–8.7)
RBC # BLD AUTO: 4.29 M/UL (ref 3.95–5.11)
SODIUM SERPL-SCNC: 139 MMOL/L (ref 136–145)
TRIGL SERPL-MCNC: 101 MG/DL (ref 0–149)
TSH SERPL DL<=0.05 MIU/L-ACNC: 0.88 UIU/ML (ref 0.27–4.2)
VIT B12 SERPL-MCNC: 389 PG/ML (ref 232–1245)
VLDLC SERPL CALC-MCNC: 20 MG/DL (ref 1–30)
WBC OTHER # BLD: 5 K/UL (ref 3.5–11.3)

## 2025-05-29 PROCEDURE — 85025 COMPLETE CBC W/AUTO DIFF WBC: CPT

## 2025-05-29 PROCEDURE — 36415 COLL VENOUS BLD VENIPUNCTURE: CPT

## 2025-05-29 PROCEDURE — 80053 COMPREHEN METABOLIC PANEL: CPT

## 2025-05-29 PROCEDURE — 83036 HEMOGLOBIN GLYCOSYLATED A1C: CPT

## 2025-05-29 PROCEDURE — 82306 VITAMIN D 25 HYDROXY: CPT

## 2025-05-29 PROCEDURE — 76856 US EXAM PELVIC COMPLETE: CPT | Performed by: RADIOLOGY

## 2025-05-29 PROCEDURE — 84443 ASSAY THYROID STIM HORMONE: CPT

## 2025-05-29 PROCEDURE — 80061 LIPID PANEL: CPT

## 2025-05-29 PROCEDURE — 82607 VITAMIN B-12: CPT

## 2025-06-03 ENCOUNTER — TELEPHONE (OUTPATIENT)
Age: 30
End: 2025-06-03

## 2025-06-03 NOTE — TELEPHONE ENCOUNTER
Patient requesting return call from clinical regarding ultrasound done 5/28 and abdominal issues. Please call to discuss    Thank you

## 2025-06-04 ENCOUNTER — TELEPHONE (OUTPATIENT)
Age: 30
End: 2025-06-04

## 2025-06-04 NOTE — RESULT ENCOUNTER NOTE
US reviewed, normal pelvic US with possible septate uterus, unlikely to be cause of pelvic pain. Small physiological cysts on ovaries. Left VM with patient.

## 2025-06-04 NOTE — TELEPHONE ENCOUNTER
Patient called for further information about pelvic ultrasound. Patient was provided with Dr. Barton's note, advised to continue using heat and NSAIDs for pelvic and abdominal pain; patient voiced understanding. Patient was scheduled for follow up and is requesting call. Patient states she is so bloated that she looks pregnant; HPT negative.

## 2025-06-09 ENCOUNTER — RESULTS FOLLOW-UP (OUTPATIENT)
Dept: FAMILY MEDICINE CLINIC | Age: 30
End: 2025-06-09

## 2025-06-09 DIAGNOSIS — E55.9 VITAMIN D DEFICIENCY: Primary | ICD-10-CM

## 2025-06-09 RX ORDER — ERGOCALCIFEROL 1.25 MG/1
50000 CAPSULE, LIQUID FILLED ORAL WEEKLY
Qty: 12 CAPSULE | Refills: 1 | Status: SHIPPED | OUTPATIENT
Start: 2025-06-09

## 2025-06-26 ENCOUNTER — HOSPITAL ENCOUNTER (EMERGENCY)
Age: 30
Discharge: HOME | End: 2025-06-27
Attending: EMERGENCY MEDICINE
Payer: COMMERCIAL

## 2025-06-26 VITALS
HEART RATE: 61 BPM | BODY MASS INDEX: 41.42 KG/M2 | HEIGHT: 64 IN | WEIGHT: 242.6 LBS | RESPIRATION RATE: 16 BRPM | SYSTOLIC BLOOD PRESSURE: 131 MMHG | TEMPERATURE: 97.9 F | OXYGEN SATURATION: 97 % | DIASTOLIC BLOOD PRESSURE: 77 MMHG

## 2025-06-26 DIAGNOSIS — R10.9 ABDOMINAL PAIN, UNSPECIFIED ABDOMINAL LOCATION: Primary | ICD-10-CM

## 2025-06-26 PROCEDURE — 99285 EMERGENCY DEPT VISIT HI MDM: CPT

## 2025-06-26 RX ORDER — DICYCLOMINE HYDROCHLORIDE 10 MG/ML
20 INJECTION INTRAMUSCULAR ONCE
Status: COMPLETED | OUTPATIENT
Start: 2025-06-27 | End: 2025-06-27

## 2025-06-27 ENCOUNTER — APPOINTMENT (OUTPATIENT)
Dept: CT IMAGING | Age: 30
End: 2025-06-27
Payer: COMMERCIAL

## 2025-06-27 ENCOUNTER — TELEPHONE (OUTPATIENT)
Dept: FAMILY MEDICINE CLINIC | Age: 30
End: 2025-06-27

## 2025-06-27 LAB
ALBUMIN SERPL-MCNC: 4.4 G/DL (ref 3.5–5.2)
ALBUMIN/GLOB SERPL: 1.3 {RATIO} (ref 1–2.5)
ALP SERPL-CCNC: 104 U/L (ref 35–104)
ALT SERPL-CCNC: 19 U/L (ref 10–35)
AMORPH SED URNS QL MICRO: ABNORMAL
ANION GAP SERPL CALCULATED.3IONS-SCNC: 10 MMOL/L (ref 9–16)
AST SERPL-CCNC: 24 U/L (ref 10–35)
BACTERIA URNS QL MICRO: ABNORMAL
BASOPHILS # BLD: <0.03 K/UL (ref 0–0.2)
BASOPHILS NFR BLD: 0 % (ref 0–2)
BILIRUB DIRECT SERPL-MCNC: 0.1 MG/DL (ref 0–0.2)
BILIRUB INDIRECT SERPL-MCNC: 0.1 MG/DL
BILIRUB SERPL-MCNC: 0.2 MG/DL (ref 0–1.2)
BILIRUB UR QL STRIP: NEGATIVE
BUN SERPL-MCNC: 11 MG/DL (ref 6–20)
CALCIUM SERPL-MCNC: 9.3 MG/DL (ref 8.6–10.4)
CHLORIDE SERPL-SCNC: 102 MMOL/L (ref 98–107)
CLARITY UR: CLEAR
CO2 SERPL-SCNC: 26 MMOL/L (ref 20–31)
COLOR UR: YELLOW
CREAT SERPL-MCNC: 0.7 MG/DL (ref 0.5–0.9)
EOSINOPHIL # BLD: 0.12 K/UL (ref 0–0.44)
EOSINOPHILS RELATIVE PERCENT: 2 % (ref 1–4)
EPI CELLS #/AREA URNS HPF: ABNORMAL /HPF (ref 0–5)
ERYTHROCYTE [DISTWIDTH] IN BLOOD BY AUTOMATED COUNT: 12 % (ref 11.8–14.4)
GFR, ESTIMATED: >90 ML/MIN/1.73M2
GLUCOSE SERPL-MCNC: 98 MG/DL (ref 74–99)
GLUCOSE UR STRIP-MCNC: NEGATIVE MG/DL
HCG UR QL: NEGATIVE
HCT VFR BLD AUTO: 38.1 % (ref 36.3–47.1)
HGB BLD-MCNC: 12.5 G/DL (ref 11.9–15.1)
HGB UR QL STRIP.AUTO: NEGATIVE
IMM GRANULOCYTES # BLD AUTO: 0.01 K/UL (ref 0–0.3)
IMM GRANULOCYTES NFR BLD: 0 %
KETONES UR STRIP-MCNC: NEGATIVE MG/DL
LEUKOCYTE ESTERASE UR QL STRIP: ABNORMAL
LIPASE SERPL-CCNC: 29 U/L (ref 13–60)
LYMPHOCYTES NFR BLD: 2.22 K/UL (ref 1.1–3.7)
LYMPHOCYTES RELATIVE PERCENT: 37 % (ref 24–43)
MCH RBC QN AUTO: 30.6 PG (ref 25.2–33.5)
MCHC RBC AUTO-ENTMCNC: 32.8 G/DL (ref 28.4–34.8)
MCV RBC AUTO: 93.4 FL (ref 82.6–102.9)
MONOCYTES NFR BLD: 0.36 K/UL (ref 0.1–1.2)
MONOCYTES NFR BLD: 6 % (ref 3–12)
NEUTROPHILS NFR BLD: 55 % (ref 36–65)
NEUTS SEG NFR BLD: 3.31 K/UL (ref 1.5–8.1)
NITRITE UR QL STRIP: NEGATIVE
NRBC BLD-RTO: 0 PER 100 WBC
PH UR STRIP: 7 [PH] (ref 5–8)
PLATELET # BLD AUTO: 171 K/UL (ref 138–453)
PMV BLD AUTO: 13.2 FL (ref 8.1–13.5)
POTASSIUM SERPL-SCNC: 4 MMOL/L (ref 3.7–5.3)
PROT SERPL-MCNC: 7.8 G/DL (ref 6.6–8.7)
PROT UR STRIP-MCNC: NEGATIVE MG/DL
RBC # BLD AUTO: 4.08 M/UL (ref 3.95–5.11)
RBC #/AREA URNS HPF: ABNORMAL /HPF (ref 0–2)
SODIUM SERPL-SCNC: 138 MMOL/L (ref 136–145)
SP GR UR STRIP: 1.02 (ref 1–1.03)
UROBILINOGEN UR STRIP-ACNC: NORMAL EU/DL (ref 0–1)
WBC #/AREA URNS HPF: ABNORMAL /HPF (ref 0–5)
WBC OTHER # BLD: 6 K/UL (ref 3.5–11.3)

## 2025-06-27 PROCEDURE — 81001 URINALYSIS AUTO W/SCOPE: CPT

## 2025-06-27 PROCEDURE — 96372 THER/PROPH/DIAG INJ SC/IM: CPT

## 2025-06-27 PROCEDURE — 74177 CT ABD & PELVIS W/CONTRAST: CPT

## 2025-06-27 PROCEDURE — 85025 COMPLETE CBC W/AUTO DIFF WBC: CPT

## 2025-06-27 PROCEDURE — 83690 ASSAY OF LIPASE: CPT

## 2025-06-27 PROCEDURE — 6360000002 HC RX W HCPCS: Performed by: EMERGENCY MEDICINE

## 2025-06-27 PROCEDURE — 80048 BASIC METABOLIC PNL TOTAL CA: CPT

## 2025-06-27 PROCEDURE — 81025 URINE PREGNANCY TEST: CPT

## 2025-06-27 PROCEDURE — 6360000004 HC RX CONTRAST MEDICATION: Performed by: EMERGENCY MEDICINE

## 2025-06-27 PROCEDURE — 80076 HEPATIC FUNCTION PANEL: CPT

## 2025-06-27 PROCEDURE — 2580000003 HC RX 258: Performed by: EMERGENCY MEDICINE

## 2025-06-27 PROCEDURE — 2500000003 HC RX 250 WO HCPCS: Performed by: EMERGENCY MEDICINE

## 2025-06-27 RX ORDER — IOPAMIDOL 755 MG/ML
75 INJECTION, SOLUTION INTRAVASCULAR
Status: COMPLETED | OUTPATIENT
Start: 2025-06-27 | End: 2025-06-27

## 2025-06-27 RX ORDER — SODIUM CHLORIDE 0.9 % (FLUSH) 0.9 %
10 SYRINGE (ML) INJECTION PRN
Status: DISCONTINUED | OUTPATIENT
Start: 2025-06-27 | End: 2025-06-27 | Stop reason: HOSPADM

## 2025-06-27 RX ORDER — 0.9 % SODIUM CHLORIDE 0.9 %
80 INTRAVENOUS SOLUTION INTRAVENOUS ONCE
Status: COMPLETED | OUTPATIENT
Start: 2025-06-27 | End: 2025-06-27

## 2025-06-27 RX ADMIN — DICYCLOMINE HYDROCHLORIDE 20 MG: 10 INJECTION, SOLUTION INTRAMUSCULAR at 00:57

## 2025-06-27 RX ADMIN — IOPAMIDOL 75 ML: 755 INJECTION, SOLUTION INTRAVENOUS at 00:40

## 2025-06-27 RX ADMIN — SODIUM CHLORIDE, PRESERVATIVE FREE 10 ML: 5 INJECTION INTRAVENOUS at 00:40

## 2025-06-27 RX ADMIN — SODIUM CHLORIDE 80 ML: 9 INJECTION, SOLUTION INTRAVENOUS at 00:40

## 2025-06-27 NOTE — ED PROVIDER NOTES
[facility] EMERGENCY DEPARTMENT  eMERGENCY dEPARTMENT eNCOUnter    Pt Name: Clari Dan  MRN: [id number]  Birthdate 1995  Date of evaluation: 25  CHIEF COMPLAINT       Chief Complaint   Patient presents with    Abdominal Pain     HISTORY OF PRESENT ILLNESS   HPI  30-year-old female presents emergency room with complaints of epigastric abdominal pain that radiates to the right upper quadrant and right lower quadrant.  Pain has been present over the past 2 to 3 weeks and is episodic.  Patient states pain was worse today and therefore she presents to the emergency room.  She denies any chest pain shortness of breath nausea vomiting diarrhea constipation fevers or dysuria.    REVIEW OF SYSTEMS     Constitutional: No fever  Eye: No visual changes  Ear/Nose/Mouth/Throat: No sore throat  Respiratory: No shortness of breath  Cardiovascular: No chest pain  Gastrointestinal: No nausea, no vomiting, no diarrhea, see above  Genitourinary: No dysuria  Musculoskeletal: No joint pain  Integumentary: No rash  Neurologic: No dizziness  Psychiatric: No anxiety, no depression  All systems otherwise negative.        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

## 2025-06-27 NOTE — ED NOTES
Pt has had abdominal pain that is described by dull pain at top of belly that goes to right side that started weeks ago. Pt comes in today because this pain is more sharp. Denies nausea, Last BM this am. Denies fevers. Denies chest pain and SOB. No dysuria. Hx of asthma and heart murmer. LMP06/13/2025.

## 2025-06-27 NOTE — TELEPHONE ENCOUNTER
[facility] ED Follow up Call    Reason for ED visit: constipation          [place] Montague , this is jose antonio  from [facility] Johnson's office, just calling to see how you are doing after your recent ED visit.    Did you receive discharge instructions?    Do you understand the discharge instructions?  Did the ED give you any new prescriptions?   Were you able to fill your prescriptions?       Do you have one of our red, yellow and green  Zone sheets that help you to determine when you should go to the ED?      Do you need or want to make a follow up appt with your PCP?    Do you have any further needs in the home i.e. Equipment?          FU appts/Provider:    Future Appointments   Date Time Provider Department Center   7/3/2025  1:15 PM Siomara Gambino MD [id number]       VOICEMAIL DOCUMENTATION - ERASE IF NOT USED  Hi, this message is for  karson   This is jose antonio  from [facility] office. Just calling to see how you are doing after your recent visit to the Emergency Room. [facility] wants to make sure you were able to fill any prescriptions and that you understand your discharge instructions. Please return our call if you need to make a follow up appointment with your provider or have any further needs.   Our phone number is [phone]  Have a great day.

## 2025-07-02 NOTE — PROGRESS NOTES
OB/GYN Problem Visit    Clari Dan  7/3/2025                       Primary Care Physician: Gigi Felix APRN - CNP    CC:   Chief Complaint   Patient presents with    Abdominal Pain     Patient c/o abdominal pain for several months.           HPI: Clari Dan is a 30 y.o. female      The patient was seen and examined.     History of Present Illness  The patient is a 30-year-old female who presents for evaluation of pelvic pain.    She reports a slight improvement in her pelvic pain, which she describes as intermittent and radiating down her thigh. The pain is primarily located at the top of her abdomen. She experienced severe pain on Wednesday, 2025, which led to her hospitalization on Thursday night. The pain was both abdominal and pelvic in nature. CT at that time revealed questionable septate morphology of the uterus with no other acute intraabdominal or pelvic process. The cause of the pain remains unknown, leading to a referral to a gastroenterologist. She has been informed that the gastroenterology department will contact her next week to schedule an appointment.    Her last menstrual cycle started on 2025, but she is uncertain if it was a true period as it began with spotting, followed by a day of bleeding, and then stopped. Typically, her periods start with spotting, last for 2 to 3 days, and then gradually lighten. She experiences pressure during urination but reports no issues with bowel movements. She uses Tylenol for pain management and tries to sleep through the pain. The pain varies in nature, sometimes starting as muscular and then becoming sharp. She has not undergone any laparoscopic or abdominal surgeries.    She had an intrauterine device (IUD) removed in 2025 in order to try to conceived. Patient states the pain started about a month after IDU removal.  She is currently trying to conceive with a different partner and has no history of difficulty

## 2025-07-03 ENCOUNTER — OFFICE VISIT (OUTPATIENT)
Age: 30
End: 2025-07-03
Payer: MEDICAID

## 2025-07-03 VITALS
WEIGHT: 239 LBS | BODY MASS INDEX: 41.02 KG/M2 | HEART RATE: 78 BPM | SYSTOLIC BLOOD PRESSURE: 107 MMHG | DIASTOLIC BLOOD PRESSURE: 78 MMHG

## 2025-07-03 DIAGNOSIS — R10.2 PELVIC PAIN: Primary | ICD-10-CM

## 2025-07-03 PROCEDURE — G8427 DOCREV CUR MEDS BY ELIG CLIN: HCPCS

## 2025-07-03 PROCEDURE — 99212 OFFICE O/P EST SF 10 MIN: CPT

## 2025-07-03 PROCEDURE — 1036F TOBACCO NON-USER: CPT

## 2025-07-03 PROCEDURE — 99213 OFFICE O/P EST LOW 20 MIN: CPT

## 2025-07-03 PROCEDURE — G8417 CALC BMI ABV UP PARAM F/U: HCPCS

## 2025-07-03 RX ORDER — CYCLOBENZAPRINE HCL 5 MG
5 TABLET ORAL 3 TIMES DAILY PRN
Qty: 30 TABLET | Refills: 0 | Status: SHIPPED | OUTPATIENT
Start: 2025-07-03 | End: 2025-07-13

## 2025-07-03 RX ORDER — FLUTICASONE PROPIONATE 50 MCG
2 SPRAY, SUSPENSION (ML) NASAL DAILY PRN
COMMUNITY
Start: 2025-05-09

## 2025-07-03 RX ORDER — CETIRIZINE HYDROCHLORIDE 10 MG/1
10 TABLET ORAL DAILY
COMMUNITY
Start: 2025-05-09

## 2025-07-09 ENCOUNTER — TELEPHONE (OUTPATIENT)
Age: 30
End: 2025-07-09

## 2025-07-09 NOTE — TELEPHONE ENCOUNTER
Patient called in this morning, stating when she wiped she noticed some blood on the toilet paper that was bright red in color. Patient states that it was a medium amount of blood and just started today, and she is not due to start her period until the end of this month. Writer offered patient an appointment to come in but patient requesting message be sent to the doctor for recommendations.

## 2025-08-26 ENCOUNTER — TELEPHONE (OUTPATIENT)
Age: 30
End: 2025-08-26

## (undated) DEVICE — INTENT TO BE USED WITH SUTURE MATERIAL FOR TISSUE CLOSURE: Brand: RICHARD-ALLAN® NEEDLE 1/2 CIRCLE TAPER

## (undated) DEVICE — [TOMCAT CUTTER, ARTHROSCOPIC SHAVER BLADE,  DO NOT RESTERILIZE,  DO NOT USE IF PACKAGE IS DAMAGED,  KEEP DRY,  KEEP AWAY FROM SUNLIGHT]: Brand: FORMULA

## (undated) DEVICE — BLADE SAW SAG 25X9X0.38 MM OSCILLATING AGGRESSIVE CUT THN

## (undated) DEVICE — GLOVE ORANGE PI 7   MSG9070

## (undated) DEVICE — BANDAGE,ELASTIC,ESMARK,STERILE,6"X9',LF: Brand: MEDLINE

## (undated) DEVICE — BANDAGE COMPR M W6INXL10YD WHT BGE VELC E MTRX HK AND LOOP

## (undated) DEVICE — STOCKINETTE,IMPERVIOUS,12X48,STERILE: Brand: MEDLINE

## (undated) DEVICE — TUBING SUCT 12FR MAL ALUM SHFT FN CAP VENT UNIV CONN W/ OBT

## (undated) DEVICE — DRAPE,REIN 53X77,STERILE: Brand: MEDLINE

## (undated) DEVICE — Device

## (undated) DEVICE — DRAPE,U/ SHT,SPLIT,PLAS,STERIL: Brand: MEDLINE

## (undated) DEVICE — TOWEL,OR,DSP,ST,BLUE,STD,6/PK,12PK/CS: Brand: MEDLINE

## (undated) DEVICE — SUTURE FIBERSNARE 2 L26IN NONABSORBABLE GRN L12IN FIBERWIRE AR7209SN

## (undated) DEVICE — BANDAGE COBAN 6 IN WND 6INX5YD FOAM

## (undated) DEVICE — TUBING, SUCTION, 3/16" X 10', STRAIGHT: Brand: MEDLINE

## (undated) DEVICE — ZIMMER® STERILE DISPOSABLE TOURNIQUET CUFF WITH PLC, DUAL PORT, SINGLE BLADDER, 30 IN. (76 CM)

## (undated) DEVICE — MERCY HEALTH ST CHARLES: Brand: MEDLINE INDUSTRIES, INC.

## (undated) DEVICE — NEEDLE SPNL 18GA L3.5IN W/ QNCKE SHARPER BVL DURA CLICK

## (undated) DEVICE — BRACE ORTH HINGED POST OPERATIVE DONJOY XACT ROM LT

## (undated) DEVICE — STRIP,CLOSURE,WOUND,MEDI-STRIP,1/2X4: Brand: MEDLINE

## (undated) DEVICE — SOLUTION IV IRRIG LACTATED RINGERS 3000ML 2B7487

## (undated) DEVICE — SUTURE PROL SZ 1 L30IN NONABSORBABLE BLU L36MM CT-1 1/2 CIR 8425H

## (undated) DEVICE — HYPODERMIC SAFETY NEEDLE: Brand: MAGELLAN

## (undated) DEVICE — MARKER,SKIN,WI/RULER AND LABELS: Brand: MEDLINE

## (undated) DEVICE — INTENDED FOR TISSUE SEPARATION, AND OTHER PROCEDURES THAT REQUIRE A SHARP SURGICAL BLADE TO PUNCTURE OR CUT.: Brand: BARD-PARKER ® CARBON RIB-BACK BLADES

## (undated) DEVICE — BLANKET WRM W29.9XL79.1IN UP BODY FORC AIR MISTRAL-AIR

## (undated) DEVICE — GOWN,SIRUS,POLYRNF,BRTHSLV,XL,30/CS: Brand: MEDLINE

## (undated) DEVICE — GLOVE ORANGE PI 7 1/2   MSG9075

## (undated) DEVICE — SOLUTION IRRIG 1000ML 0.9% SOD CHL USP POUR PLAS BTL

## (undated) DEVICE — DRESSING,GAUZE,XEROFORM,CURAD,1"X8",ST: Brand: CURAD

## (undated) DEVICE — SUTURE FIBERLOOP SZ 2-0 L20IN NONABSORBABLE BLU STR NDL AR7234

## (undated) DEVICE — SUTURE FIBERWIRE SZ 2 W/ TAPERED NEEDLE BLUE L38IN NONABSORB BLU L26.5MM 1/2 CIRCLE AR7200

## (undated) DEVICE — DRILL SURG FLIPCUTTER III

## (undated) DEVICE — [ARTHROSCOPY PUMP,  DO NOT USE IF PACKAGE IS DAMAGED,  KEEP DRY,  KEEP AWAY FROM SUNLIGHT,  PROTECT FROM HEAT AND RADIOACTIVE SOURCES.]: Brand: FLOSTEADY

## (undated) DEVICE — YANKAUER,OPEN TIP,W/O VENT,STERILE: Brand: MEDLINE INDUSTRIES, INC.

## (undated) DEVICE — GAUZE,SPONGE,FLUFF,6"X6.75",STRL,5/TRAY: Brand: MEDLINE

## (undated) DEVICE — COVER,MAYO STAND,XL,STERILE: Brand: MEDLINE

## (undated) DEVICE — DRESSING TRNSPAR W5XL4.5IN FLM SHT SEMIPERMEABLE WIND

## (undated) DEVICE — 4-PORT MANIFOLD: Brand: NEPTUNE 2

## (undated) DEVICE — GOWN,SIRUS,NONRNF,SETINSLV,XL,20/CS: Brand: MEDLINE

## (undated) DEVICE — COVER,TABLE,60X90,STERILE: Brand: MEDLINE

## (undated) DEVICE — COVER,TABLE,HEAVY DUTY,50"X90",STRL: Brand: MEDLINE

## (undated) DEVICE — DISC ABSRB YEL FLR POLYETH MGMT SUCT QUICKSUITE

## (undated) DEVICE — PAD,ABDOMINAL,8"X7.5",ST,LF,20/BX: Brand: MEDLINE INDUSTRIES, INC.

## (undated) DEVICE — KIT INSTR TRANSTIBIAL CRUCE W/O SAW BLDE DISP

## (undated) DEVICE — BANDAGE COBAN 4 IN COMPR W4INXL5YD FOAM COHESIVE QUIK STK SELF ADH SFT

## (undated) DEVICE — BIT DRL DIA10MM CANN FOR ACL PCL RECON

## (undated) DEVICE — 90-S, SUCTION PROBE, NON-BENDABLE, MAX CUT LEVEL 11: Brand: SERFAS ENERGY

## (undated) DEVICE — 3M™ STERI-DRAPE™ U-DRAPE 1015: Brand: STERI-DRAPE™

## (undated) DEVICE — SPONGE LAP W18XL18IN WHT COT 4 PLY FLD STRUNG RADPQ DISP ST